# Patient Record
Sex: MALE | Race: BLACK OR AFRICAN AMERICAN | NOT HISPANIC OR LATINO | Employment: FULL TIME | URBAN - METROPOLITAN AREA
[De-identification: names, ages, dates, MRNs, and addresses within clinical notes are randomized per-mention and may not be internally consistent; named-entity substitution may affect disease eponyms.]

---

## 2017-03-27 ENCOUNTER — OFFICE VISIT (OUTPATIENT)
Dept: URGENT CARE | Facility: PHYSICIAN GROUP | Age: 27
End: 2017-03-27
Payer: COMMERCIAL

## 2017-03-27 VITALS
OXYGEN SATURATION: 96 % | WEIGHT: 165 LBS | BODY MASS INDEX: 23.62 KG/M2 | SYSTOLIC BLOOD PRESSURE: 126 MMHG | TEMPERATURE: 97.9 F | HEIGHT: 70 IN | RESPIRATION RATE: 16 BRPM | HEART RATE: 53 BPM | DIASTOLIC BLOOD PRESSURE: 82 MMHG

## 2017-03-27 DIAGNOSIS — K52.9 GASTROENTERITIS: ICD-10-CM

## 2017-03-27 DIAGNOSIS — J45.20 MILD INTERMITTENT ASTHMA WITHOUT COMPLICATION: ICD-10-CM

## 2017-03-27 DIAGNOSIS — R51.9 NONINTRACTABLE EPISODIC HEADACHE, UNSPECIFIED HEADACHE TYPE: ICD-10-CM

## 2017-03-27 PROCEDURE — 99214 OFFICE O/P EST MOD 30 MIN: CPT | Performed by: EMERGENCY MEDICINE

## 2017-03-27 RX ORDER — KETOROLAC TROMETHAMINE 30 MG/ML
60 INJECTION, SOLUTION INTRAMUSCULAR; INTRAVENOUS ONCE
Status: COMPLETED | OUTPATIENT
Start: 2017-03-27 | End: 2017-03-27

## 2017-03-27 RX ORDER — ONDANSETRON 4 MG/1
4 TABLET, ORALLY DISINTEGRATING ORAL EVERY 8 HOURS PRN
Qty: 10 TAB | Refills: 0 | Status: SHIPPED | OUTPATIENT
Start: 2017-03-27 | End: 2017-10-05

## 2017-03-27 RX ORDER — ONDANSETRON 4 MG/1
4 TABLET, ORALLY DISINTEGRATING ORAL ONCE
Status: COMPLETED | OUTPATIENT
Start: 2017-03-27 | End: 2017-03-27

## 2017-03-27 RX ORDER — ALBUTEROL SULFATE 90 UG/1
2 AEROSOL, METERED RESPIRATORY (INHALATION) EVERY 6 HOURS PRN
Qty: 8.5 G | Refills: 1 | Status: SHIPPED | OUTPATIENT
Start: 2017-03-27 | End: 2018-05-01

## 2017-03-27 RX ADMIN — ONDANSETRON 4 MG: 4 TABLET, ORALLY DISINTEGRATING ORAL at 10:27

## 2017-03-27 RX ADMIN — KETOROLAC TROMETHAMINE 60 MG: 30 INJECTION, SOLUTION INTRAMUSCULAR; INTRAVENOUS at 10:29

## 2017-03-27 ASSESSMENT — ENCOUNTER SYMPTOMS
SPEECH CHANGE: 0
MYALGIAS: 0
CONSTIPATION: 0
COUGH: 0
EYE DISCHARGE: 0
SHORTNESS OF BREATH: 0
SENSORY CHANGE: 0
ABDOMINAL PAIN: 1
NERVOUS/ANXIOUS: 0
DIARRHEA: 1
NAUSEA: 1
BLOOD IN STOOL: 0
HEADACHES: 1
EYE REDNESS: 0
FEVER: 0
CHILLS: 0
WEIGHT LOSS: 0
VOMITING: 1

## 2017-03-27 NOTE — PROGRESS NOTES
Subjective:      Barry Griffith is a 27 y.o. male who presents with Illness            Gastroenteritis   This is a new problem. The current episode started yesterday. The problem has been unchanged. The emesis has an appearance of stomach contents. There has been no fever. Associated symptoms include abdominal pain, diarrhea and headaches. Pertinent negatives include no chest pain, chills, coughing, fever, myalgias or weight loss. Associated symptoms comments: Vomiting associated with the diarrhea. No blood in either.. Risk factors: was at a birthday party for children yesterday. He has tried nothing for the symptoms.   No Known Allergies   Social History     Social History   • Marital Status: Single     Spouse Name: N/A   • Number of Children: 1   • Years of Education: N/A     Occupational History   • caregiver for mentally ill patients      Social History Main Topics   • Smoking status: Never Smoker    • Smokeless tobacco: Never Used   • Alcohol Use: No      Comment: rare    • Drug Use: No   • Sexual Activity:     Partners: Female     Birth Control/ Protection: Condom, Pill     Other Topics Concern   • Not on file     Social History Narrative     Past Medical History   Diagnosis Date   • ASTHMA      Asthma since infancy.     Current Outpatient Prescriptions on File Prior to Visit   Medication Sig Dispense Refill   • benzonatate (TESSALON) 100 MG Cap Take 1 Cap by mouth 3 times a day as needed for Cough. 60 Cap 0   • albuterol 108 (90 BASE) MCG/ACT Aero Soln inhalation aerosol Inhale 2 Puffs by mouth every 6 hours as needed for Shortness of Breath. 8.5 g 3   • DM-Doxylamine-Acetaminophen (NYQUIL COLD & FLU PO) Take  by mouth.       No current facility-administered medications on file prior to visit.     Family History   Problem Relation Age of Onset   • Hypertension Mother    • Hyperlipidemia Father    • Hypertension Father    • Cancer Neg Hx    • Diabetes Neg Hx        Review of Systems   Constitutional: Negative  for fever, chills and weight loss.   HENT: Negative for congestion and hearing loss.    Eyes: Negative for discharge and redness.   Respiratory: Negative for cough and shortness of breath.    Cardiovascular: Negative for chest pain and leg swelling.   Gastrointestinal: Positive for nausea, vomiting, abdominal pain and diarrhea. Negative for constipation and blood in stool.   Musculoskeletal: Negative for myalgias.   Neurological: Positive for headaches. Negative for sensory change and speech change.   Psychiatric/Behavioral: The patient is not nervous/anxious.           Objective:     There were no vitals taken for this visit.     Physical Exam   Constitutional: He is oriented to person, place, and time. He appears well-developed and well-nourished. He appears distressed.   Ill-appearing   HENT:   Head: Normocephalic and atraumatic.   Right Ear: External ear normal.   Eyes: Conjunctivae are normal. Right eye exhibits no discharge. Left eye exhibits no discharge. No scleral icterus.   Neck: Normal range of motion.   Cardiovascular: Normal rate.    Pulmonary/Chest: Effort normal and breath sounds normal. No stridor.   Abdominal: Soft. He exhibits no distension and no mass. There is no tenderness. There is no rebound and no guarding.   Musculoskeletal: Normal range of motion.   Lymphadenopathy:     He has no cervical adenopathy.   Neurological: He is alert and oriented to person, place, and time.   Skin: Skin is warm and dry. No rash noted. He is not diaphoretic. No erythema.   Psychiatric: He has a normal mood and affect. His behavior is normal.   Vitals reviewed.       Patient given Zofran by mouth: Able to hold liquids down without difficulty   Assessment/Plan:     Diagnosis acute gastroenteritis    Patient will be given a work note for the next 1-2 days off for 2 jobs. He will stay on clear liquids for the first 24 hours now. Context for 5 days. Patient has been meningeal dose of Zofran and a prescription has been  called into the pharmacy. He will use Imodium to slow down his diarrhea starting 2 tablets. If his diarrhea persists she'll bring in a stool culture and I will call him with results. If his symptoms worsen with any production of blood in his vomitus or stool he be reevaluated in the urgent care emergency department. Patient was given a work note for 1-2 days off to notes were given because of 2 jobs.    Patient is aware most likely contagious keep his eating drinking utensils away from others. Patient will stand clothing was for 24 hours. Products for 5 days. Patient will bring a specimen of stool in her culture of his diarrhea persists.    Patient was also given an inhaler for his chronic asthma.

## 2017-03-27 NOTE — Clinical Note
March 27, 2017         Patient: Barry Griffith   YOB: 1990   Date of Visit: 3/27/2017           To Whom it May Concern:    Barry Griffith was seen in my clinic on 3/27/2017. Please excuse him from work for the next 1-2 days for medical reasons.    If you have any questions or concerns, please don't hesitate to call.        Sincerely,           MARQUITA Rockwell M.D.  Electronically Signed

## 2017-04-11 ENCOUNTER — OFFICE VISIT (OUTPATIENT)
Dept: MEDICAL GROUP | Facility: PHYSICIAN GROUP | Age: 27
End: 2017-04-11
Payer: COMMERCIAL

## 2017-04-11 VITALS
OXYGEN SATURATION: 97 % | HEIGHT: 69 IN | BODY MASS INDEX: 23.7 KG/M2 | HEART RATE: 52 BPM | DIASTOLIC BLOOD PRESSURE: 66 MMHG | SYSTOLIC BLOOD PRESSURE: 110 MMHG | TEMPERATURE: 98.6 F | RESPIRATION RATE: 16 BRPM | WEIGHT: 160 LBS

## 2017-04-11 DIAGNOSIS — M54.2 ACUTE NECK PAIN: ICD-10-CM

## 2017-04-11 DIAGNOSIS — M25.571 ACUTE RIGHT ANKLE PAIN: ICD-10-CM

## 2017-04-11 DIAGNOSIS — G44.319 ACUTE POST-TRAUMATIC HEADACHE, NOT INTRACTABLE: ICD-10-CM

## 2017-04-11 PROCEDURE — 99214 OFFICE O/P EST MOD 30 MIN: CPT | Performed by: FAMILY MEDICINE

## 2017-04-11 RX ORDER — CYCLOBENZAPRINE HCL 10 MG
10 TABLET ORAL 3 TIMES DAILY PRN
Qty: 30 TAB | Refills: 1 | Status: SHIPPED | OUTPATIENT
Start: 2017-04-11 | End: 2017-04-12 | Stop reason: SDUPTHER

## 2017-04-11 RX ORDER — NAPROXEN 500 MG/1
500 TABLET ORAL 2 TIMES DAILY WITH MEALS
Qty: 60 TAB | Refills: 1 | Status: SHIPPED | OUTPATIENT
Start: 2017-04-11 | End: 2017-04-12 | Stop reason: SDUPTHER

## 2017-04-11 RX ORDER — HYDROCODONE BITARTRATE AND ACETAMINOPHEN 5; 325 MG/1; MG/1
1 TABLET ORAL EVERY 6 HOURS PRN
Qty: 20 TAB | Refills: 0 | Status: SHIPPED | OUTPATIENT
Start: 2017-04-11 | End: 2017-10-05

## 2017-04-11 ASSESSMENT — PATIENT HEALTH QUESTIONNAIRE - PHQ9: CLINICAL INTERPRETATION OF PHQ2 SCORE: 0

## 2017-04-11 NOTE — Clinical Note
April 11, 2017         Patient: Barry Griffith   YOB: 1990   Date of Visit: 4/11/2017           To Whom it May Concern:    Barry Griffith was seen in my clinic on 4/11/2017. He is excused from work on 4/11-14/2017 due to illness..    If you have any questions or concerns, please don't hesitate to call.        Sincerely,           Mine Nunes M.D.  Electronically Signed
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
## 2017-04-12 DIAGNOSIS — G44.319 ACUTE POST-TRAUMATIC HEADACHE, NOT INTRACTABLE: ICD-10-CM

## 2017-04-12 DIAGNOSIS — M25.571 ACUTE RIGHT ANKLE PAIN: ICD-10-CM

## 2017-04-12 DIAGNOSIS — M54.2 ACUTE NECK PAIN: ICD-10-CM

## 2017-04-12 RX ORDER — CYCLOBENZAPRINE HCL 10 MG
10 TABLET ORAL 3 TIMES DAILY PRN
Qty: 30 TAB | Refills: 1 | Status: SHIPPED | OUTPATIENT
Start: 2017-04-12 | End: 2017-10-05

## 2017-04-12 RX ORDER — NAPROXEN 500 MG/1
500 TABLET ORAL 2 TIMES DAILY WITH MEALS
Qty: 60 TAB | Refills: 1 | Status: SHIPPED | OUTPATIENT
Start: 2017-04-12 | End: 2017-10-05
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
## 2017-04-12 NOTE — TELEPHONE ENCOUNTER
1. Caller Name: Barry Griffith                                   Call Back Number: [phone] (home) [phone] (work)        Patient called requesting Rxs for Flexeril & Naprosyn be resent to [facility].  Rxs sent to [facility] on 4/11/17.  Rxs cancelled at [facility] and new Rxs attached.  Patient states he dropped off [place] [facility] to [facility] already, aware we can not replace narcotic Rx so he will speak with [facility] regarding this refill.

## 2017-10-05 ENCOUNTER — OFFICE VISIT (OUTPATIENT)
Dept: MEDICAL GROUP | Facility: PHYSICIAN GROUP | Age: 27
End: 2017-10-05
Payer: COMMERCIAL

## 2017-10-05 ENCOUNTER — TELEPHONE (OUTPATIENT)
Dept: MEDICAL GROUP | Facility: PHYSICIAN GROUP | Age: 27
End: 2017-10-05

## 2017-10-05 VITALS
OXYGEN SATURATION: 95 % | HEIGHT: 69 IN | WEIGHT: 160 LBS | HEART RATE: 75 BPM | BODY MASS INDEX: 23.7 KG/M2 | SYSTOLIC BLOOD PRESSURE: 120 MMHG | RESPIRATION RATE: 24 BRPM | DIASTOLIC BLOOD PRESSURE: 62 MMHG | TEMPERATURE: 99.5 F

## 2017-10-05 DIAGNOSIS — J18.9 PNEUMONIA OF BOTH LOWER LOBES DUE TO INFECTIOUS ORGANISM: ICD-10-CM

## 2017-10-05 DIAGNOSIS — J45.21 MILD INTERMITTENT ASTHMA WITH ACUTE EXACERBATION: ICD-10-CM

## 2017-10-05 PROCEDURE — 99214 OFFICE O/P EST MOD 30 MIN: CPT | Performed by: NURSE PRACTITIONER

## 2017-10-05 PROCEDURE — 94760 N-INVAS EAR/PLS OXIMETRY 1: CPT | Performed by: NURSE PRACTITIONER

## 2017-10-05 RX ORDER — METHYLPREDNISOLONE 4 MG/1
TABLET ORAL
Qty: 21 TAB | Refills: 0 | Status: SHIPPED | OUTPATIENT
Start: 2017-10-05 | End: 2018-03-06 | Stop reason: SDUPTHER

## 2017-10-05 RX ORDER — IPRATROPIUM BROMIDE AND ALBUTEROL SULFATE 2.5; .5 MG/3ML; MG/3ML
3 SOLUTION RESPIRATORY (INHALATION) ONCE
Status: COMPLETED | OUTPATIENT
Start: 2017-10-05 | End: 2017-10-05

## 2017-10-05 RX ORDER — PROMETHAZINE HYDROCHLORIDE, PHENYLEPHRINE HYDROCHLORIDE AND CODEINE PHOSPHATE 6.25; 5; 1 MG/5ML; MG/5ML; MG/5ML
5 SOLUTION ORAL EVERY 8 HOURS PRN
Qty: 280 ML | Refills: 0 | Status: SHIPPED | OUTPATIENT
Start: 2017-10-05 | End: 2018-03-06 | Stop reason: SDUPTHER

## 2017-10-05 RX ORDER — AZITHROMYCIN 250 MG/1
TABLET, FILM COATED ORAL
Qty: 6 TAB | Refills: 0 | Status: SHIPPED | OUTPATIENT
Start: 2017-10-05 | End: 2018-03-06 | Stop reason: SDUPTHER

## 2017-10-05 RX ORDER — ALBUTEROL SULFATE 90 UG/1
2 AEROSOL, METERED RESPIRATORY (INHALATION) EVERY 6 HOURS PRN
Qty: 8.5 G | Refills: 6 | Status: SHIPPED | OUTPATIENT
Start: 2017-10-05 | End: 2018-05-01

## 2017-10-05 RX ADMIN — IPRATROPIUM BROMIDE AND ALBUTEROL SULFATE 3 ML: 2.5; .5 SOLUTION RESPIRATORY (INHALATION) at 13:55

## 2017-10-05 ASSESSMENT — PAIN SCALES - GENERAL: PAINLEVEL: NO PAIN

## 2017-10-05 NOTE — PROGRESS NOTES
Chief Complaint   Patient presents with   • Cough     x 2 weeks    • Pharyngitis     x 3 days        HISTORY OF PRESENT ILLNESS: Patient is a 27 y.o. male established patient who presents today toDiscuss asthma and pneumonia.    Mild intermittent asthma with acute exacerbation  Patient states that he started to have increased shortness of breath approximately 2 weeks ago. Patient states that this is worse at night. Patient states that he has been out of his rescue inhaler. States he developed a cough, congestion. States he is having coughing that keeps him awake at night. Patient also states he has had increased wheezing. Patient states that he has not noticed anything specific that makes this better or worse. He has been taking over-the-counter decongestants, NyQuil, DayQuil without results.    Pneumonia of both lower lobes due to infectious organism  This is a new problem. Patient states he has been having symptoms of congestion, pain with deep breaths, coughing, fevers over the last couple of weeks patient states that over the last 3 days shortness of breath has become significantly worse and he has been feeling fatigued, positive myalgia. Patient states that he has been taking over-the-counter medications without relief. Patient states that he has been unable to sleep in the same room as his children as he wakes him up with his coughing.      Patient Active Problem List    Diagnosis Date Noted   • Pneumonia of both lower lobes due to infectious organism 10/05/2017   • Cough, persistent 12/22/2016   • Mild intermittent asthma with acute exacerbation 06/10/2016       Allergies:Review of patient's allergies indicates no known allergies.    Current Outpatient Prescriptions   Medication Sig Dispense Refill   • albuterol 108 (90 Base) MCG/ACT Aero Soln inhalation aerosol Inhale 2 Puffs by mouth every 6 hours as needed for Shortness of Breath. 8.5 g 6   • MethylPREDNISolone (MEDROL DOSEPAK) 4 MG Tablet Therapy Pack As  directed on the packaging label. 21 Tab 0   • Promethazine-Phenyleph-Codeine (PHENERGAN VC CODEINE) 6.25-5-10 MG/5ML Syrup Take 5 mL by mouth every 8 hours as needed. 280 mL 0   • azithromycin (ZITHROMAX) 250 MG Tab Take 500 mg on the first day and then 250 mg daily for 4 days. 6 Tab 0   • albuterol 108 (90 BASE) MCG/ACT Aero Soln inhalation aerosol Inhale 2 Puffs by mouth every 6 hours as needed for Shortness of Breath. 8.5 g 3   • albuterol 108 (90 BASE) MCG/ACT Aero Soln inhalation aerosol Inhale 2 Puffs by mouth every 6 hours as needed for Shortness of Breath. 8.5 g 1     Current Facility-Administered Medications   Medication Dose Route Frequency Provider Last Rate Last Dose   • ipratropium-albuterol (DUONEB) nebulizer solution 3 mL  3 mL Nebulization Once Rajendra Saeed, A.P.R.N.           Social History   Substance Use Topics   • Smoking status: Never Smoker   • Smokeless tobacco: Never Used   • Alcohol use No      Comment: rare        Family Status   Relation Status   • Mother    • Father Alive   • Sister Alive   • Brother Alive   • Brother Alive   • Neg Hx      Family History   Problem Relation Age of Onset   • Hypertension Mother    • Hyperlipidemia Father    • Hypertension Father    • Cancer Neg Hx    • Diabetes Neg Hx        Review of Systems:   Constitutional:  Negative for fever, chills, weight loss andPositive fatigue.   HEENT:  Negative for ear pain, nosebleeds, positive congestion, sore throat and negative neck pain.    Eyes:  Negative for blurred vision.   Respiratory: Positive for dry cough, negative sputum production, positive shortness of breath and wheezing.    Cardiovascular:  Negative for chest pain, palpitations, orthopnea and leg swelling.   Gastrointestinal:  Negative for heartburn, nausea, vomiting and abdominal pain.   Genitourinary:  Negative for dysuria, urgency and frequency.   Musculoskeletal: Positive for myalgias, negative back pain and joint pain.   Skin:   "Negative for rash and itching.   Neurological:  Negative for dizziness, tingling, tremors, sensory change, focal weakness and headaches.   Endo/Heme/Allergies:  Does not bruise/bleed easily.   Psychiatric/Behavioral:  Negative for depression, suicidal ideas and memory loss.  The patient is not nervous/anxious and does not have insomnia.    All other systems reviewed and are negative except as in HPI.    Exam:  Blood pressure 120/62, pulse 75, temperature 37.5 °C (99.5 °F), resp. rate (!) 24, height 1.753 m (5' 9\"), weight 72.6 kg (160 lb), SpO2 96 %.  General:  Normal appearing. No distress.  HEENT:  Normocephalic. Eyes conjunctiva clear lids without ptosis, pupils equal and reactive to light accommodation, ears normal shape and contour, canals are clear bilaterally, tympanic membranes are benign, nasal mucosa erythematous, oropharynx is with erythema positive cobblestoning.  Neck:  Supple without JVD or bruit. Thyroid is not enlarged.  Pulmonary:  Positive wheezing expiratory in all 5 lung fields, positive bibasilar crackles.  Cardiovascular:  Regular rate and rhythm without murmur. Carotid and radial pulses are intact and equal bilaterally.  Neurologic:  Grossly nonfocal  Lymph: Positive cervical lymphadenopathy negative supraclavicular or axillary lymph nodes are palpable  Skin:  Warm and dry.  No obvious lesions.  Musculoskeletal:  Normal gait. No extremity cyanosis, clubbing, or edema.  Psych:  Normal mood and affect. Alert and oriented x3. Judgment and insight is normal.    Nebulizer: Nebulized DuoNeb treatment in office. Patient tolerated procedure well. Lungs no wheezing, crackles following treatment. Patient reports improvement of dyspnea.    PLAN:    1. Pneumonia of both lower lobes due to infectious organism  Counseled patient regarding recent CBC and in the emergency room including shortness of breath not relieved by rest, fevers, increasing symptoms  Counseled patient regarding continued " over-the-counter treatment  Discussed risks, benefits, side effects of prescribed medications.  - ipratropium-albuterol (DUONEB) nebulizer solution 3 mL; 3 mL by Nebulization route Once.  - DX-CHEST-2 VIEWS; Future  - albuterol 108 (90 Base) MCG/ACT Aero Soln inhalation aerosol; Inhale 2 Puffs by mouth every 6 hours as needed for Shortness of Breath.  Dispense: 8.5 g; Refill: 6  - MethylPREDNISolone (MEDROL DOSEPAK) 4 MG Tablet Therapy Pack; As directed on the packaging label.  Dispense: 21 Tab; Refill: 0  - Promethazine-Phenyleph-Codeine (PHENERGAN VC CODEINE) 6.25-5-10 MG/5ML Syrup; Take 5 mL by mouth every 8 hours as needed.  Dispense: 280 mL; Refill: 0  - azithromycin (ZITHROMAX) 250 MG Tab; Take 500 mg on the first day and then 250 mg daily for 4 days.  Dispense: 6 Tab; Refill: 0    2. Mild intermittent asthma with acute exacerbation  Plan as above.    . Follow-up in 2 weeks or sooner as needed. Patient is encouraged to be seen in the emergency room for chest pain, palpitations, shortness of breath, dizziness, severe abdominal pain or other concerning symptoms.        Please note that this dictation was created using voice recognition software. I have made every reasonable attempt to correct obvious errors, but I expect that there are errors of grammar and possibly content that I did not discover before finalizing the note.    Assessment/Plan:  1. Pneumonia of both lower lobes due to infectious organism  ipratropium-albuterol (DUONEB) nebulizer solution 3 mL    DX-CHEST-2 VIEWS    albuterol 108 (90 Base) MCG/ACT Aero Soln inhalation aerosol    MethylPREDNISolone (MEDROL DOSEPAK) 4 MG Tablet Therapy Pack    Promethazine-Phenyleph-Codeine (PHENERGAN VC CODEINE) 6.25-5-10 MG/5ML Syrup    azithromycin (ZITHROMAX) 250 MG Tab   2. Mild intermittent asthma with acute exacerbation            I have placed the below orders and discussed them with an approved delegating provider. The MA is performing the below orders  under the direction of Dr. Cummings.

## 2017-10-05 NOTE — ASSESSMENT & PLAN NOTE
This is a new problem. Patient states he has been having symptoms of congestion, pain with deep breaths, coughing, fevers over the last couple of weeks patient states that over the last 3 days shortness of breath has become significantly worse and he has been feeling fatigued, positive myalgia. Patient states that he has been taking over-the-counter medications without relief. Patient states that he has been unable to sleep in the same room as his children as he wakes him up with his coughing.

## 2017-10-05 NOTE — ASSESSMENT & PLAN NOTE
Patient states that he started to have increased shortness of breath approximately 2 weeks ago. Patient states that this is worse at night. Patient states that he has been out of his rescue inhaler. States he developed a cough, congestion. States he is having coughing that keeps him awake at night. Patient also states he has had increased wheezing. Patient states that he has not noticed anything specific that makes this better or worse. He has been taking over-the-counter decongestants, NyQuil, DayQuil without results.

## 2017-10-05 NOTE — LETTER
October 5, 2017        Barry Griffith  [address] [facility] [address] [address]        To Whom It May Concern:    Barry was seen in our office today please excuse him from work 10/5/2017 to 10/9/2017.    If you have any questions or concerns, please don't hesitate to call.        Sincerely,        [email or web address] [email or web address]    Electronically Signed

## 2018-02-23 ENCOUNTER — OFFICE VISIT (OUTPATIENT)
Dept: MEDICAL GROUP | Facility: PHYSICIAN GROUP | Age: 28
End: 2018-02-23
Payer: COMMERCIAL

## 2018-02-23 VITALS
BODY MASS INDEX: 23.7 KG/M2 | WEIGHT: 160 LBS | TEMPERATURE: 99.2 F | HEART RATE: 62 BPM | SYSTOLIC BLOOD PRESSURE: 120 MMHG | DIASTOLIC BLOOD PRESSURE: 66 MMHG | RESPIRATION RATE: 16 BRPM | HEIGHT: 69 IN | OXYGEN SATURATION: 100 %

## 2018-02-23 DIAGNOSIS — Z70.8 SEXUALLY TRANSMITTED DISEASE COUNSELING: Primary | ICD-10-CM

## 2018-02-23 DIAGNOSIS — A63.0 WARTS, GENITAL: ICD-10-CM

## 2018-02-23 PROBLEM — N48.9 PENILE ABNORMALITY: Status: ACTIVE | Noted: 2018-02-23

## 2018-02-23 PROCEDURE — 99213 OFFICE O/P EST LOW 20 MIN: CPT | Mod: 25 | Performed by: PHYSICIAN ASSISTANT

## 2018-02-23 PROCEDURE — 54056 CRYOSURGERY PENIS LESION(S): CPT | Performed by: PHYSICIAN ASSISTANT

## 2018-02-23 ASSESSMENT — PAIN SCALES - GENERAL: PAINLEVEL: NO PAIN

## 2018-02-23 NOTE — ASSESSMENT & PLAN NOTE
Patient states one month ago he developed a small raised bump on ventral side of penis below the glans penis. Denies being sexually active for the past 2 months. Admits to being in a long term relationship but states he has not always been faithful. Admits to not always wearing protection with past sexual partners. Denies using at home or over-the-counter treatments to alleviate symptoms. Denies abdominal pain, pelvic pain, rectal pain, fever, chills, nausea, vomiting, pruritus/blisters, urethral discharge, dysuria, hematuria. Denies long term partner having similiar symptoms.

## 2018-02-27 PROBLEM — Z70.8 SEXUALLY TRANSMITTED DISEASE COUNSELING: Status: ACTIVE | Noted: 2018-02-27

## 2018-02-28 NOTE — PROGRESS NOTES
Chief Complaint   Patient presents with   • Nodule     on penis       HISTORY OF PRESENT ILLNESS: Barry Griffith is an established 28 y.o. male here to discuss the evaluation and management of:    Warts, genital  Patient states one month ago he developed a small raised bump on ventral side of penis below the glans penis. Denies being sexually active for the past 2 months. Admits to being in a long term relationship but states he has not always been faithful. Admits to not always wearing protection with past sexual partners. Denies using at home or over-the-counter treatments to alleviate symptoms. Denies abdominal pain, pelvic pain, rectal pain, fever, chills, nausea, vomiting, pruritus/blisters, urethral discharge, dysuria, hematuria. Denies long term partner having similiar symptoms.      Sexually transmitted disease counseling  Patient denies always using protection with sexual intercourse. States he is currently in a committed relationship and has been with his partner for an extended period of time. Denies always being faithful and admits to high risk sexual behavior. Patient is concerned about STI's during today's appointment.       Patient Active Problem List    Diagnosis Date Noted   • Sexually transmitted disease counseling 02/27/2018   • Warts, genital 02/23/2018   • Pneumonia of both lower lobes due to infectious organism 10/05/2017   • Cough, persistent 12/22/2016   • Mild intermittent asthma with acute exacerbation 06/10/2016       Allergies:Patient has no known allergies.    Current Outpatient Prescriptions   Medication Sig Dispense Refill   • albuterol 108 (90 Base) MCG/ACT Aero Soln inhalation aerosol Inhale 2 Puffs by mouth every 6 hours as needed for Shortness of Breath. 8.5 g 6   • MethylPREDNISolone (MEDROL DOSEPAK) 4 MG Tablet Therapy Pack As directed on the packaging label. 21 Tab 0   • Promethazine-Phenyleph-Codeine (PHENERGAN VC CODEINE) 6.25-5-10 MG/5ML Syrup Take 5 mL by mouth every 8 hours  as needed. 280 mL 0   • azithromycin (ZITHROMAX) 250 MG Tab Take 500 mg on the first day and then 250 mg daily for 4 days. 6 Tab 0   • albuterol 108 (90 BASE) MCG/ACT Aero Soln inhalation aerosol Inhale 2 Puffs by mouth every 6 hours as needed for Shortness of Breath. 8.5 g 1   • albuterol 108 (90 BASE) MCG/ACT Aero Soln inhalation aerosol Inhale 2 Puffs by mouth every 6 hours as needed for Shortness of Breath. 8.5 g 3     No current facility-administered medications for this visit.        Social History   Substance Use Topics   • Smoking status: Never Smoker   • Smokeless tobacco: Never Used   • Alcohol use No      Comment: rare        Family Status   Relation Status   • Mother    • Father Alive   • Sister Alive   • Brother Alive   • Brother Alive   • Neg Hx      Family History   Problem Relation Age of Onset   • Hypertension Mother    • Hyperlipidemia Father    • Hypertension Father    • Cancer Neg Hx    • Diabetes Neg Hx        ROS:  Review of Systems   Constitutional: Negative for fever, chills, weight loss and malaise/fatigue.   HENT: Negative for ear pain, nosebleeds, congestion, sore throat and neck pain.    Eyes: Negative for blurred vision.   Respiratory: Negative for cough, sputum production, shortness of breath and wheezing.    Cardiovascular: Negative for chest pain, palpitations, orthopnea and leg swelling.   Gastrointestinal: Negative for heartburn, nausea, vomiting and abdominal pain.   Genitourinary: Negative for dysuria, urgency and frequency.   Musculoskeletal: Negative for myalgias, back pain and joint pain.   Skin: Negative for rash and itching. + for genital lesion.  Neurological: Negative for dizziness, tingling, tremors, sensory change, focal weakness and headaches.   Endo/Heme/Allergies: Does not bruise/bleed easily.   Psychiatric/Behavioral: Negative for depression, suicidal ideas and memory loss.  The patient is not nervous/anxious and does not have insomnia.    All other systems  "reviewed and are negative except as in HPI.    Exam: Blood pressure 120/66, pulse 62, temperature 37.3 °C (99.2 °F), resp. rate 16, height 1.753 m (5' 9\"), weight 72.6 kg (160 lb), SpO2 100 %. Body mass index is 23.63 kg/m².  General: Normal appearing. No distress.  HEENT: Normocephalic. Eyes conjunctiva clear lids without ptosis, pupils equal and reactive to light accommodation, ears normal shape and contour, canals are clear bilaterally, tympanic membranes are benign, nasal mucosa benign, oropharynx is without erythema, edema or exudates.   Neck: Supple without JVD or bruit. Thyroid is not enlarged.  Pulmonary: Clear to ausculation.  Normal effort. No rales, ronchi, or wheezing.  Cardiovascular: Regular rate and rhythm without murmur. Carotid and radial pulses are intact and equal bilaterally.  Abdomen: Soft, nontender, nondistended. Normal bowel sounds. Liver and spleen are not palpable  Neurologic: Grossly nonfocal.  Cranial nerves are normal. DTR's normal and symmetric.  Lymph: No cervical, supraclavicular or axillary lymph nodes are palpable  Skin: Warm and dry.  No rashes or suspicious skin lesions.  Musculoskeletal: Normal gait. No extremity cyanosis, clubbing, or edema.  Psych: Normal mood and affect. Alert and oriented x3. Judgment and insight is normal.  Genitalia: normal circumcised penis, no urethral discharge, scrotal contents normal to inspection and palpation. + for pen head sized keratotic round firm mass on ventral penis inferior to glans penis. Negative for bleeding/erythema/discharge.         Medical decision-making and discussion:  1. Sexually transmitted disease counseling  Discussed with patient the STI prevention, HIV risk factor and prevention, and the importance of practice safe sex. Patient did not want to be screened for STI's during today's appointment.   Advised patient to abstain from high risk sexual behavior.     2. Warts, genital  Discussed topical Imiquimod as a treatment option " and patient did not want to proceed. States he would be non complaint and would rather undergo cryotherapy and is requesting cryotherapy during today's appointment. This patient the importance of discussing potential STI with partner and the importance of wearing protection.     Follow-up for worsening symptoms, lack of expected recovery, or should new symptoms or problems arise.    CRYOTHERAPY:  Discussed risks and benefits of cryotherapy. Patient verbally agreed. Three applications of liquid nitrogen were applied to 1 lesion on ventral penis inferior to glans penis. Patient tolerated procedure well. Aftercare and return precaution instructions given.      Please note that this dictation was created using voice recognition software. I have made every reasonable attempt to correct obvious errors, but I expect that there are errors of grammar and possibly content that I did not discover before finalizing the note.      Return if symptoms worsen or fail to improve.

## 2018-02-28 NOTE — ASSESSMENT & PLAN NOTE
Patient denies always using protection with sexual intercourse. States he is currently in a committed relationship and has been with his partner for an extended period of time. Denies always being faithful and admits to high risk sexual behavior. Patient is concerned about STI's during today's appointment.

## 2018-03-01 ENCOUNTER — SUPERVISING PHYSICIAN REVIEW (OUTPATIENT)
Dept: MEDICAL GROUP | Facility: PHYSICIAN GROUP | Age: 28
End: 2018-03-01

## 2018-03-01 NOTE — PROGRESS NOTES
I have reviewed and agree with history, assessment and plan for office encounter on 2/23/2018 with Jewels Amaral PA-C.  Face to Face encounter/direct observation: no  Suggestions as follows: In counseling for genital warts, would emphasize that multiple cryotherapy treatments are often needed and schedule follow-up in 4 weeks to re-evaluate. Follow-up visit may also be helpful to discuss STD screening with patient again as he declined.  Katelyn Cummings M.D.

## 2018-03-06 ENCOUNTER — OFFICE VISIT (OUTPATIENT)
Dept: MEDICAL GROUP | Facility: MEDICAL CENTER | Age: 28
End: 2018-03-06
Payer: COMMERCIAL

## 2018-03-06 VITALS
HEART RATE: 70 BPM | WEIGHT: 161 LBS | DIASTOLIC BLOOD PRESSURE: 68 MMHG | BODY MASS INDEX: 23.85 KG/M2 | OXYGEN SATURATION: 99 % | SYSTOLIC BLOOD PRESSURE: 108 MMHG | HEIGHT: 69 IN | TEMPERATURE: 98.2 F

## 2018-03-06 DIAGNOSIS — R05.9 COUGH: ICD-10-CM

## 2018-03-06 DIAGNOSIS — J02.9 SORE THROAT: ICD-10-CM

## 2018-03-06 DIAGNOSIS — J45.21 EXACERBATION OF INTERMITTENT ASTHMA, UNSPECIFIED ASTHMA SEVERITY: ICD-10-CM

## 2018-03-06 DIAGNOSIS — Z00.00 HEALTH CARE MAINTENANCE: ICD-10-CM

## 2018-03-06 LAB
FLUAV+FLUBV AG SPEC QL IA: NEGATIVE
INT CON NEG: NEGATIVE
INT CON POS: POSITIVE

## 2018-03-06 PROCEDURE — 99214 OFFICE O/P EST MOD 30 MIN: CPT | Performed by: INTERNAL MEDICINE

## 2018-03-06 PROCEDURE — 87804 INFLUENZA ASSAY W/OPTIC: CPT | Performed by: INTERNAL MEDICINE

## 2018-03-06 RX ORDER — PROMETHAZINE HYDROCHLORIDE, PHENYLEPHRINE HYDROCHLORIDE AND CODEINE PHOSPHATE 6.25; 5; 1 MG/5ML; MG/5ML; MG/5ML
5 SOLUTION ORAL EVERY 8 HOURS PRN
Qty: 280 ML | Refills: 0 | Status: SHIPPED | OUTPATIENT
Start: 2018-03-06 | End: 2018-03-16

## 2018-03-06 RX ORDER — METHYLPREDNISOLONE 4 MG/1
TABLET ORAL
Qty: 21 TAB | Refills: 0 | Status: SHIPPED | OUTPATIENT
Start: 2018-03-06 | End: 2018-05-01

## 2018-03-06 RX ORDER — AZITHROMYCIN 250 MG/1
TABLET, FILM COATED ORAL
Qty: 6 TAB | Refills: 0 | Status: SHIPPED | OUTPATIENT
Start: 2018-03-06 | End: 2018-05-01

## 2018-03-06 ASSESSMENT — PAIN SCALES - GENERAL: PAINLEVEL: NO PAIN

## 2018-03-06 NOTE — PROGRESS NOTES
CHIEF COMPLAINT  No chief complaint on file.      HPI  Patient is a 28 y.o. male patient who presents today for the following     ACUTE  The patient 28-year-old, male, with history of asthma and pneumonia in October 2017, 6 months ago    He got sick 2 days ago with:  · Nasal congestion w  Yellow d/c  · Postnasal drip,   · HA,   · Sore throat, severe  · Swollen glands, difficulty swallowing  ·   · Cough with yellow sputum  ·  chest pain  · Decreased appetite,     Denies:   · pain in sinus areas  Fever, chills,  body aches,   Earache  · Wheezing,  · nausea, vomiting, diarrhea, abdominal pain  · Skin rash.    · Meds used:   Nyquil, did not help;   · Sick contact:  N  · Flu vaccine:    N    Reviewed PMH, PSH, FH, SH, ALL, HCM/IMM, no changes  Reviewed MEDS, no changes    Patient Active Problem List    Diagnosis Date Noted   • Sexually transmitted disease counseling 02/27/2018   • Warts, genital 02/23/2018   • Pneumonia of both lower lobes due to infectious organism 10/05/2017   • Cough, persistent 12/22/2016   • Mild intermittent asthma with acute exacerbation 06/10/2016     CURRENT MEDICATIONS  Current Outpatient Prescriptions   Medication Sig Dispense Refill   • albuterol 108 (90 Base) MCG/ACT Aero Soln inhalation aerosol Inhale 2 Puffs by mouth every 6 hours as needed for Shortness of Breath. 8.5 g 6   • MethylPREDNISolone (MEDROL DOSEPAK) 4 MG Tablet Therapy Pack As directed on the packaging label. 21 Tab 0   • Promethazine-Phenyleph-Codeine (PHENERGAN VC CODEINE) 6.25-5-10 MG/5ML Syrup Take 5 mL by mouth every 8 hours as needed. 280 mL 0   • azithromycin (ZITHROMAX) 250 MG Tab Take 500 mg on the first day and then 250 mg daily for 4 days. 6 Tab 0   • albuterol 108 (90 BASE) MCG/ACT Aero Soln inhalation aerosol Inhale 2 Puffs by mouth every 6 hours as needed for Shortness of Breath. 8.5 g 1   • albuterol 108 (90 BASE) MCG/ACT Aero Soln inhalation aerosol Inhale 2 Puffs by mouth every 6 hours as needed for Shortness  of Breath. 8.5 g 3     No current facility-administered medications for this visit.      ALLERGIES  Allergies: Patient has no known allergies.  PAST MEDICAL HISTORY  Past Medical History:   Diagnosis Date   • ASTHMA     Asthma since infancy.     SURGICAL HISTORY  He  has no past surgical history on file.  SOCIAL HISTORY  Social History   Substance Use Topics   • Smoking status: Never Smoker   • Smokeless tobacco: Never Used   • Alcohol use No      Comment: rare      Social History     Social History Narrative   • No narrative on file     FAMILY HISTORY  Family History   Problem Relation Age of Onset   • Hypertension Mother    • Hyperlipidemia Father    • Hypertension Father    • Cancer Neg Hx    • Diabetes Neg Hx      Family Status   Relation Status   • Mother    • Father Alive   • Sister Alive   • Brother Alive   • Brother Alive   • Neg Hx      ROS   Constitutional: as above.  HENT: as above.  Eyes: Negative for blurred vision.   Respiratory: as above.  Cardiovascular: Negative for chest pain, palpitations.   Gastrointestinal: Negative for heartburn, nausea, abdominal pain.   Genitourinary: Negative for dysuria.  Musculoskeletal: Negative for significant myalgias, back pain and joint pain.   Skin: Negative for rash and itching.   Neuro: Negative for dizziness, weakness and headaches.   Endo/Heme/Allergies: Does not bruise/bleed easily.   Psychiatric/Behavioral: Negative for depression.    PHYSICAL EXAM   There were no vitals taken for this visit. There is no height or weight on file to calculate BMI.  General:  NAD, appears acutely sick  HEENT:   NC/AT, PERRLA, EOMI, TMs are clear. Pharyngeal mucosa is erythematous,  without lesions;  no cervical / supraclavicular  lymphadenopathy, no thyromegaly.    Cardiovascular: RRR.   No m/r/g. No carotid bruits .      Lungs:   Slightly decreased air entry, with bilateral expiratory wheezing, no r/r, no respiratory distress.  Abdomen: Soft, NT/ND + BS; no suprapubic  tenderness; no masses or hepatosplenomegaly.  Extremities:  2+ DP and radial pulses bilaterally.  No c/c/e.   Skin:  Warm, dry.  No erythema. No rash.   Neurologic: Alert & oriented x 3. No focal deficits.  Psychiatric:  Affect normal, mood normal, judgment normal.    LABS     Labs are reviewed and discussed with a patient:  POCT influenza ANP: Negative      IMAGING     None    ASSESMENT AND PLAN          1. Exacerbation of intermittent asthma, unspecified asthma severity  - POCT Influenza A/B  - azithromycin (ZITHROMAX) 250 MG Tab; Take 500 mg on the first day and then 250 mg daily for 4 days.  Dispense: 6 Tab; Refill: 0  - Promethazine-Phenyleph-Codeine (PHENERGAN VC CODEINE) 6.25-5-10 MG/5ML Syrup; Take 5 mL by mouth every 8 hours as needed for up to 10 days.  Dispense: 280 mL; Refill: 0  Advised to take abx after a meal.   Side effects of abx use discussed with a patient. Advised to stop abx and call if develop any side effect, including diarrhea.     - MethylPREDNISolone (MEDROL DOSEPAK) 4 MG Tablet Therapy Pack; As directed on the packaging label.  Dispense: 21 Tab; Refill: 0  - Reviewed effects and side effects of medication, the patient verbalized understanding    2. Sore throat  - POCT Influenza A/B  3. Cough  - POCT Influenza A/B  As above.    Advised   - increase fluid intake;   - may take Tylenol/ibuprofen for fever, pain; nasal decongestant   - may apply warm packs over sinus areas     4. Health care maintenance  Advise flu shot in the season    Counseling:   - Smoking:  Nonsmoker    Followup: as needed    All questions are answered.    Please note that this dictation was created using voice recognition software, and that there might be errors of myranda and possibly content.

## 2018-03-06 NOTE — LETTER
[facility]  [address]  [place] [address]     March 6, 2018    Patient: Barry Griffith   YOB: 1990   Date of Visit: 3/6/2018                 To Whom It May Concern:          Barry Griffith was seen and treated in our department on 3/6/2018.       Excuse from work today and tomorrow, start to work 3/8/18.      Sincerely,       Amanda Soria M.D.

## 2018-04-22 ENCOUNTER — HOSPITAL ENCOUNTER (EMERGENCY)
Facility: MEDICAL CENTER | Age: 28
End: 2018-04-22
Attending: EMERGENCY MEDICINE
Payer: COMMERCIAL

## 2018-04-22 ENCOUNTER — APPOINTMENT (OUTPATIENT)
Dept: RADIOLOGY | Facility: MEDICAL CENTER | Age: 28
End: 2018-04-22
Attending: EMERGENCY MEDICINE
Payer: COMMERCIAL

## 2018-04-22 VITALS
WEIGHT: 165 LBS | DIASTOLIC BLOOD PRESSURE: 49 MMHG | BODY MASS INDEX: 23.62 KG/M2 | RESPIRATION RATE: 16 BRPM | TEMPERATURE: 98.4 F | OXYGEN SATURATION: 99 % | HEIGHT: 70 IN | SYSTOLIC BLOOD PRESSURE: 126 MMHG | HEART RATE: 55 BPM

## 2018-04-22 DIAGNOSIS — R56.9 SEIZURE (HCC): ICD-10-CM

## 2018-04-22 LAB
ALBUMIN SERPL BCP-MCNC: 4.6 G/DL (ref 3.2–4.9)
ALBUMIN/GLOB SERPL: 1.8 G/DL
ALP SERPL-CCNC: 62 U/L (ref 30–99)
ALT SERPL-CCNC: 30 U/L (ref 2–50)
AMPHET UR QL SCN: NEGATIVE
ANION GAP SERPL CALC-SCNC: 14 MMOL/L (ref 0–11.9)
APTT PPP: 21.8 SEC (ref 24.7–36)
AST SERPL-CCNC: 29 U/L (ref 12–45)
BARBITURATES UR QL SCN: NEGATIVE
BASOPHILS # BLD AUTO: 0.4 % (ref 0–1.8)
BASOPHILS # BLD: 0.03 K/UL (ref 0–0.12)
BENZODIAZ UR QL SCN: NEGATIVE
BILIRUB SERPL-MCNC: 0.5 MG/DL (ref 0.1–1.5)
BUN SERPL-MCNC: 12 MG/DL (ref 8–22)
BZE UR QL SCN: NEGATIVE
CALCIUM SERPL-MCNC: 9.5 MG/DL (ref 8.5–10.5)
CANNABINOIDS UR QL SCN: POSITIVE
CHLORIDE SERPL-SCNC: 108 MMOL/L (ref 96–112)
CO2 SERPL-SCNC: 17 MMOL/L (ref 20–33)
CREAT SERPL-MCNC: 1.13 MG/DL (ref 0.5–1.4)
EOSINOPHIL # BLD AUTO: 0.15 K/UL (ref 0–0.51)
EOSINOPHIL NFR BLD: 1.8 % (ref 0–6.9)
ERYTHROCYTE [DISTWIDTH] IN BLOOD BY AUTOMATED COUNT: 40 FL (ref 35.9–50)
ETHANOL BLD-MCNC: 0 G/DL
GLOBULIN SER CALC-MCNC: 2.6 G/DL (ref 1.9–3.5)
GLUCOSE SERPL-MCNC: 75 MG/DL (ref 65–99)
HCT VFR BLD AUTO: 48.5 % (ref 42–52)
HGB BLD-MCNC: 16 G/DL (ref 14–18)
IMM GRANULOCYTES # BLD AUTO: 0.06 K/UL (ref 0–0.11)
IMM GRANULOCYTES NFR BLD AUTO: 0.7 % (ref 0–0.9)
INR PPP: 0.92 (ref 0.87–1.13)
LACTATE BLD-SCNC: 7.8 MMOL/L (ref 0.5–2)
LYMPHOCYTES # BLD AUTO: 4.23 K/UL (ref 1–4.8)
LYMPHOCYTES NFR BLD: 49.9 % (ref 22–41)
MCH RBC QN AUTO: 31.4 PG (ref 27–33)
MCHC RBC AUTO-ENTMCNC: 33 G/DL (ref 33.7–35.3)
MCV RBC AUTO: 95.3 FL (ref 81.4–97.8)
METHADONE UR QL SCN: NEGATIVE
MONOCYTES # BLD AUTO: 0.49 K/UL (ref 0–0.85)
MONOCYTES NFR BLD AUTO: 5.8 % (ref 0–13.4)
NEUTROPHILS # BLD AUTO: 3.52 K/UL (ref 1.82–7.42)
NEUTROPHILS NFR BLD: 41.4 % (ref 44–72)
NRBC # BLD AUTO: 0 K/UL
NRBC BLD-RTO: 0 /100 WBC
OPIATES UR QL SCN: POSITIVE
OXYCODONE UR QL SCN: NEGATIVE
PCP UR QL SCN: NEGATIVE
PLATELET # BLD AUTO: 141 K/UL (ref 164–446)
PMV BLD AUTO: 11.6 FL (ref 9–12.9)
POTASSIUM SERPL-SCNC: 3.9 MMOL/L (ref 3.6–5.5)
PROPOXYPH UR QL SCN: NEGATIVE
PROT SERPL-MCNC: 7.2 G/DL (ref 6–8.2)
PROTHROMBIN TIME: 12.1 SEC (ref 12–14.6)
RBC # BLD AUTO: 5.09 M/UL (ref 4.7–6.1)
SODIUM SERPL-SCNC: 139 MMOL/L (ref 135–145)
TROPONIN I SERPL-MCNC: <0.01 NG/ML (ref 0–0.04)
WBC # BLD AUTO: 8.5 K/UL (ref 4.8–10.8)

## 2018-04-22 PROCEDURE — A9270 NON-COVERED ITEM OR SERVICE: HCPCS | Performed by: EMERGENCY MEDICINE

## 2018-04-22 PROCEDURE — 700102 HCHG RX REV CODE 250 W/ 637 OVERRIDE(OP): Performed by: EMERGENCY MEDICINE

## 2018-04-22 PROCEDURE — 84484 ASSAY OF TROPONIN QUANT: CPT

## 2018-04-22 PROCEDURE — 80307 DRUG TEST PRSMV CHEM ANLYZR: CPT

## 2018-04-22 PROCEDURE — 36415 COLL VENOUS BLD VENIPUNCTURE: CPT

## 2018-04-22 PROCEDURE — 85730 THROMBOPLASTIN TIME PARTIAL: CPT

## 2018-04-22 PROCEDURE — 70450 CT HEAD/BRAIN W/O DYE: CPT

## 2018-04-22 PROCEDURE — 80053 COMPREHEN METABOLIC PANEL: CPT

## 2018-04-22 PROCEDURE — 93005 ELECTROCARDIOGRAM TRACING: CPT | Performed by: EMERGENCY MEDICINE

## 2018-04-22 PROCEDURE — 85610 PROTHROMBIN TIME: CPT

## 2018-04-22 PROCEDURE — 85025 COMPLETE CBC W/AUTO DIFF WBC: CPT

## 2018-04-22 PROCEDURE — 83605 ASSAY OF LACTIC ACID: CPT

## 2018-04-22 PROCEDURE — 99284 EMERGENCY DEPT VISIT MOD MDM: CPT

## 2018-04-22 PROCEDURE — 94760 N-INVAS EAR/PLS OXIMETRY 1: CPT

## 2018-04-22 RX ORDER — ACETAMINOPHEN 325 MG/1
650 TABLET ORAL ONCE
Status: COMPLETED | OUTPATIENT
Start: 2018-04-22 | End: 2018-04-22

## 2018-04-22 RX ADMIN — ACETAMINOPHEN 650 MG: 325 TABLET, FILM COATED ORAL at 10:53

## 2018-04-22 ASSESSMENT — LIFESTYLE VARIABLES: DO YOU DRINK ALCOHOL: NO

## 2018-04-22 NOTE — DISCHARGE INSTRUCTIONS
Driving and Equipment Restrictions  Some medical problems make it dangerous to drive, ride a bike, or use machines. Some of these problems are:  · A hard blow to the head (concussion).  · Passing out (fainting).  · Twitching and shaking (seizures).  · Low blood sugar.  · Taking medicine to help you relax (sedatives).  · Taking pain medicines.  · Wearing an eye patch.  · Wearing splints. This can make it hard to use parts of your body that you need to drive safely.  HOME CARE   · Do not drive until your doctor says it is okay.  · Do not use machines until your doctor says it is okay.  You may need a form signed by your doctor (medical release) before you can drive again. You may also need this form before you do other tasks where you need to be fully alert.  MAKE SURE YOU:  · Understand these instructions.  · Will watch your condition.  · Will get help right away if you are not doing well or get worse.     This information is not intended to replace advice given to you by your health care provider. Make sure you discuss any questions you have with your health care provider.     Document Released: 01/25/2006 Document Revised: 03/11/2013 Document Reviewed: 04/27/2011  [employer] Interactive Patient Education ©2016 [employer]    Seizure, Adult  When you have a seizure:  Parts of your body may move.  How aware or awake (conscious) you are may change.  You may shake (convulse).  Some people have symptoms right before a seizure happens. These symptoms may include:  Fear.  Worry (anxiety).  Feeling like you are going to throw up (nausea).  Feeling like the room is spinning (vertigo).  Feeling like you saw or heard something before (maria victoria vu).  Odd tastes or smells.  Changes in vision, such as seeing flashing lights or spots.  Seizures usually last from 30 seconds to 2 minutes. Usually, they are not harmful unless they last a long time.  Follow these instructions at home:  Medicines  Take over-the-counter and prescription  medicines only as told by your doctor.  Avoid anything that may keep your medicine from working, such as alcohol.  Activity  Do not do any activities that would be dangerous if you had another seizure, like driving or swimming. Wait until your doctor approves.  If you live in the [place] ask your local DMV (department of Availigent) when you can drive.  Rest.  Teaching others  Teach friends and family what to do when you have a seizure. They should:  Lay you on the ground.  Protect your head and body.  Loosen any tight clothing around your neck.  Turn you on your side.  Stay with you until you are better.  Not hold you down.  Not put anything in your mouth.  Know whether or not you need emergency care.  General instructions  Contact your doctor each time you have a seizure.  Avoid anything that gives you seizures.  Keep a seizure diary. Write down:  What you think caused each seizure.  What you remember about each seizure.  Keep all follow-up visits as told by your doctor. This is important.  Contact a doctor if:  You have another seizure.  You have seizures more often.  There is any change in what happens during your seizures.  You continue to have seizures with treatment.  You have symptoms of being sick or having an infection.  Get help right away if:  You have a seizure:  That lasts longer than 5 minutes.  That is different than seizures you had before.  That makes it harder to breathe.  After you hurt your head.  After a seizure, you cannot speak or use a part of your body.  After a seizure, you are confused or have a bad headache.  You have two or more seizures in a row.  You are having seizures more often.  You do not wake up right after a seizure.  You get hurt during a seizure.  In an emergency:  These symptoms may be an emergency. Do not wait to see if the symptoms will go away. Get medical help right away. Call your local emergency services (911 in the [place] Do not drive yourself to the hospital.  This  information is not intended to replace advice given to you by your health care provider. Make sure you discuss any questions you have with your health care provider.  Document Released: 06/05/2009 Document Revised: 08/30/2017 Document Reviewed: 08/30/2017  [employer] Interactive Patient Education © 2017 [employer]

## 2018-04-22 NOTE — ED PROVIDER NOTES
"ED Provider Note    Scribed for Radha Garcia M.D. by Lorenzo Olivas. 4/22/2018  8:56 AM    Primary care provider: Mine Nunes M.D.  Means of arrival: Ambulance  History obtained from: Patient  History limited by: None    CHIEF COMPLAINT  Chief Complaint   Patient presents with   • Seizure     denies hx        HPI  Barry Griffith is a 28 y.o. male who presents to the Emergency Department via ambulance for a seizure onset this morning. Per wife, she heard a loud \"thump\" and found the patient on the floor seizing. His seizure was described as his upper body shaking, he was grunting and panting attempting to breathe. She noticed his eyes were rolled back. His seizure lasted approximately 40 seconds. After he regained consciousness, wife states the patient experienced slight memory loss. He sustained tongue trauma during his episode, however denies urinary incontinence or shoulder dislocation or injury. Denies past head injury, family or personal history of seizure. He does have a history of asthma. Denies smoking. He occasionally smokes marijuana and occasionally drinks alcohol with his last drink last night. Father reports the patient drinks over the counter cough syrup at times. No complaints of fever at this time.     REVIEW OF SYSTEMS  HEENT:  Tongue trauma  : no urinary incontinence  Neuro: Seizure, no weakness, aphasia, or headache  Musculoskeletal: no shoulder dislocation or injury or deformity  Endocrine: no fevers     See history of present illness. All other systems are negative. C.     PAST MEDICAL HISTORY   has a past medical history of ASTHMA.    SURGICAL HISTORY  patient denies any surgical history    SOCIAL HISTORY  Social History   Substance Use Topics   • Smoking status: Never Smoker   • Smokeless tobacco: Never Used   • Alcohol use No      Comment: rare       History   Drug Use No       FAMILY HISTORY  Family History   Problem Relation Age of Onset   • Hypertension Mother    • Hyperlipidemia " "Father    • Hypertension Father    • Cancer Neg Hx    • Diabetes Neg Hx        CURRENT MEDICATIONS  No current facility-administered medications on file prior to encounter.      Current Outpatient Prescriptions on File Prior to Encounter   Medication Sig Dispense Refill   • azithromycin (ZITHROMAX) 250 MG Tab Take 500 mg on the first day and then 250 mg daily for 4 days. 6 Tab 0   • MethylPREDNISolone (MEDROL DOSEPAK) 4 MG Tablet Therapy Pack As directed on the packaging label. 21 Tab 0   • albuterol 108 (90 Base) MCG/ACT Aero Soln inhalation aerosol Inhale 2 Puffs by mouth every 6 hours as needed for Shortness of Breath. 8.5 g 6   • albuterol 108 (90 BASE) MCG/ACT Aero Soln inhalation aerosol Inhale 2 Puffs by mouth every 6 hours as needed for Shortness of Breath. 8.5 g 1   • albuterol 108 (90 BASE) MCG/ACT Aero Soln inhalation aerosol Inhale 2 Puffs by mouth every 6 hours as needed for Shortness of Breath. 8.5 g 3      ALLERGIES  No Known Allergies    PHYSICAL EXAM  VITAL SIGNS: /49   Pulse 74   Temp 36.9 °C (98.4 °F)   Resp 16   Ht 1.765 m (5' 9.5\")   Wt 74.8 kg (165 lb)   BMI 24.02 kg/m²     Constitutional: Afebrile, Well developed, Well nourished, No acute distress, Non-toxic appearance.   HEENT: Normocephalic, Atraumatic,  external ears normal, pharynx pink,  Mucous  Membranes moist, No rhinorrhea or mucosal edema  Eyes: PERRL, EOMI, Conjunctiva normal, No discharge.   Neck: Normal range of motion, No tenderness, Supple, No stridor.   Lymphatic: No lymphadenopathy    Cardiovascular: Regular Rate and Rhythm, No murmurs,  rubs, or gallops.   Thorax & Lungs: Lungs clear to auscultation bilaterally, No respiratory distress, No wheezes, rhales or rhonchi, No chest wall tenderness.   Abdomen: Bowel sounds normal, Soft, non tender, non distended,  No pulsatile masses., no rebound guarding or peritoneal signs.   Skin: Warm, Dry, No erythema, No rash,   Back:  No CVA tenderness,  No spinal tenderness, bony " crepitance, step offs, or instability.   Neurologic: Alert & oriented x 3, Normal motor function, Normal sensory function, No focal deficits noted. Normal reflexes. Normal Cranial Nerves.  Extremities: Equal, intact distal pulses, No cyanosis, clubbing or edema,  No tenderness.   Musculoskeletal: Good range of motion in all major joints. No tenderness to palpation or major deformities noted.     DIAGNOSTIC STUDIES / PROCEDURES    LABS  Results for orders placed or performed during the hospital encounter of 04/22/18   CBC WITH DIFFERENTIAL   Result Value Ref Range    WBC 8.5 4.8 - 10.8 K/uL    RBC 5.09 4.70 - 6.10 M/uL    Hemoglobin 16.0 14.0 - 18.0 g/dL    Hematocrit 48.5 42.0 - 52.0 %    MCV 95.3 81.4 - 97.8 fL    MCH 31.4 27.0 - 33.0 pg    MCHC 33.0 (L) 33.7 - 35.3 g/dL    RDW 40.0 35.9 - 50.0 fL    Platelet Count 141 (L) 164 - 446 K/uL    MPV 11.6 9.0 - 12.9 fL    Neutrophils-Polys 41.40 (L) 44.00 - 72.00 %    Lymphocytes 49.90 (H) 22.00 - 41.00 %    Monocytes 5.80 0.00 - 13.40 %    Eosinophils 1.80 0.00 - 6.90 %    Basophils 0.40 0.00 - 1.80 %    Immature Granulocytes 0.70 0.00 - 0.90 %    Nucleated RBC 0.00 /100 WBC    Neutrophils (Absolute) 3.52 1.82 - 7.42 K/uL    Lymphs (Absolute) 4.23 1.00 - 4.80 K/uL    Monos (Absolute) 0.49 0.00 - 0.85 K/uL    Eos (Absolute) 0.15 0.00 - 0.51 K/uL    Baso (Absolute) 0.03 0.00 - 0.12 K/uL    Immature Granulocytes (abs) 0.06 0.00 - 0.11 K/uL    NRBC (Absolute) 0.00 K/uL   COMP METABOLIC PANEL   Result Value Ref Range    Sodium 139 135 - 145 mmol/L    Potassium 3.9 3.6 - 5.5 mmol/L    Chloride 108 96 - 112 mmol/L    Co2 17 (L) 20 - 33 mmol/L    Anion Gap 14.0 (H) 0.0 - 11.9    Glucose 75 65 - 99 mg/dL    Bun 12 8 - 22 mg/dL    Creatinine 1.13 0.50 - 1.40 mg/dL    Calcium 9.5 8.5 - 10.5 mg/dL    AST(SGOT) 29 12 - 45 U/L    ALT(SGPT) 30 2 - 50 U/L    Alkaline Phosphatase 62 30 - 99 U/L    Total Bilirubin 0.5 0.1 - 1.5 mg/dL    Albumin 4.6 3.2 - 4.9 g/dL    Total Protein 7.2 6.0  - 8.2 g/dL    Globulin 2.6 1.9 - 3.5 g/dL    A-G Ratio 1.8 g/dL   PROTHROMBIN TIME   Result Value Ref Range    PT 12.1 12.0 - 14.6 sec    INR 0.92 0.87 - 1.13   APTT   Result Value Ref Range    APTT 21.8 (L) 24.7 - 36.0 sec   LACTIC ACID   Result Value Ref Range    Lactic Acid 7.8 (HH) 0.5 - 2.0 mmol/L   TROPONIN   Result Value Ref Range    Troponin I <0.01 0.00 - 0.04 ng/mL   DIAGNOSTIC ALCOHOL   Result Value Ref Range    Diagnostic Alcohol 0.00 0.00 g/dL   URINE DRUG SCREEN   Result Value Ref Range    Amphetamines Urine Negative Negative    Barbiturates Negative Negative    Benzodiazepines Negative Negative    Cocaine Metabolite Negative Negative    Methadone Negative Negative    Opiates Positive (A) Negative    Oxycodone Negative Negative    Phencyclidine -Pcp Negative Negative    Propoxyphene Negative Negative    Cannabinoid Metab Positive (A) Negative   ESTIMATED GFR   Result Value Ref Range    GFR If African American >60 >60 mL/min/1.73 m 2    GFR If Non African American >60 >60 mL/min/1.73 m 2   EKG (NOW)   Result Value Ref Range    Report       [facility] Emergency Dept.    Test Date:  2018  Pt Name:    DUANE AGUILAR                Department: ER  MRN:        [id number]                      Room:        22  Gender:     Male                         Technician: [id number]  :        1990                   Requested By:JAQUI MARQUEZ  Order #:    [id number]                    Reading MD:    Measurements  Intervals                                Axis  Rate:       65                           P:          77  MN:         180                          QRS:        84  QRSD:       100                          T:          59  QT:         384  QTc:        400    Interpretive Statements  SINUS RHYTHM  RSR' IN V1 OR V2, PROBABLY NORMAL VARIANT  ST ELEV, PROBABLE NORMAL EARLY REPOL PATTERN  No previous ECG available for comparison        All labs reviewed by me.    EKG  12 lead EKG; Interpreted by  emergency department physician at 9:27 AM.     Rhythm: Normal Sinus Rhythm   Rate: 65  Axis: Normal  R Wave: Normal R wave progression  Normal ST-T segments  ST Segments: No ST segment elevation   T waves: Normal  Q waves: None    Clinical Impression: Early repolarization pattern noted. No previous EKG.     RADIOLOGY  CT-HEAD W/O   Final Result         NO ACUTE ABNORMALITIES ARE NOTED ON CT SCAN OF THE HEAD.           The radiologist's interpretation of all radiological studies have been reviewed by me.    COURSE & MEDICAL DECISION MAKING  Nursing notes, VS, PMSFHx reviewed in chart.    8:56 AM - Patient seen and examined at bedside. Ordered CT head, diagnostic alcohol, urine drug screen, CBC, CMP, prothrombin time, APTT, lactic acid, troponin, and EKG to evaluate his symptoms. The differential diagnoses include but are not limited to: new onset seizure, electrolyte disturbance, medication reaction. Discussed with the patient and wife, I will check basic     10:30 AM Reviewed the patient's lab and radiology results.     10:45 AM Paged Neuro, Dr. Hung.    10:46 AM Patient was reevaluated at bedside. Patient reports he has a headache. Discussed lab and radiology results with the patient and informed them that I have a page out to the neurologist to consult. Advised the patient to refrain from using cough syrup and marijuana.     10:50 AM Ordered acetaminophen 650 mg for patient's headache.     10:51 AM I discussed the patient's case and the above findings with Dr. Hung (Neurology) who recommends patient follow up.     10:54 AM Discussed with patient to follow up with Dr. Hung. Explained DMV laws and patient has signed DMV/seizure driving form. He will be discharged. Patient agrees with plan of care.     The patient will return for new or worsening symptoms and is stable at the time of discharge.    DISPOSITION:  Patient will be discharged home in stable condition.    FOLLOW UP:  [facility]  [address], [address]  [facility] [address]  [phone]  Call in 1 day  to establish care, for recheck      FINAL IMPRESSION  1. Seizure ([facility])          Lorenzo VANN (Scribpat), am scribing for, and in the presence of, Radha Garcia M.D..    Electronically signed by: Lorenzo Olivas (Natalia), 4/22/2018    Radha VANN M.D. personally performed the services described in this documentation, as scribed by Lorenzo Olivas in my presence, and it is both accurate and complete.    The note accurately reflects work and decisions made by me.  Radha Garcia  4/22/2018  3:52 PM

## 2018-04-22 NOTE — ED TRIAGE NOTES
Chief Complaint   Patient presents with   • Seizure     denies hx      pts wife describes t/c sz activity with associated rigidity and confusion after

## 2018-04-23 ENCOUNTER — PATIENT OUTREACH (OUTPATIENT)
Dept: HEALTH INFORMATION MANAGEMENT | Facility: OTHER | Age: 28
End: 2018-04-23

## 2018-04-23 NOTE — DISCHARGE PLANNING
MR faxed to [facility] with note that Dr. LINO spoke with Dr. Hung who recommended f/u in office within one week.

## 2018-04-25 LAB — EKG IMPRESSION: NORMAL

## 2018-05-01 ENCOUNTER — OFFICE VISIT (OUTPATIENT)
Dept: MEDICAL GROUP | Facility: PHYSICIAN GROUP | Age: 28
End: 2018-05-01
Payer: COMMERCIAL

## 2018-05-01 VITALS
BODY MASS INDEX: 23.55 KG/M2 | RESPIRATION RATE: 16 BRPM | OXYGEN SATURATION: 98 % | HEART RATE: 81 BPM | TEMPERATURE: 97.6 F | WEIGHT: 159 LBS | SYSTOLIC BLOOD PRESSURE: 110 MMHG | DIASTOLIC BLOOD PRESSURE: 60 MMHG | HEIGHT: 69 IN

## 2018-05-01 DIAGNOSIS — R56.9 SEIZURE (HCC): ICD-10-CM

## 2018-05-01 PROCEDURE — 99213 OFFICE O/P EST LOW 20 MIN: CPT | Performed by: NURSE PRACTITIONER

## 2018-05-01 ASSESSMENT — PAIN SCALES - GENERAL: PAINLEVEL: NO PAIN

## 2018-05-01 ASSESSMENT — PATIENT HEALTH QUESTIONNAIRE - PHQ9: CLINICAL INTERPRETATION OF PHQ2 SCORE: 0

## 2018-05-01 NOTE — ASSESSMENT & PLAN NOTE
Patient was seen in the emergency room on 4/22/2018 for seizure activity. Seizure was witnessed by his wife. CT and labs post seizure were within normal limits. Single occurrence of seizure, patient has no past history of seizure. No continued issues. No headache, dizziness, changes in vision, disorientation. States he overall feels well and is requesting referral to neurologist in [place] as next appointment in [place] is August.

## 2018-05-02 NOTE — PROGRESS NOTES
Chief Complaint   Patient presents with   • Hospital Follow-up     SEIZURE        HISTORY OF PRESENT ILLNESS: Patient is a 28 y.o. male established patient who presents today to discuss seizure.    Seizure ([facility])  Patient was seen in the emergency room on 2018 for seizure activity. Seizure was witnessed by his wife. CT and labs post seizure were within normal limits. Single occurrence of seizure, patient has no past history of seizure. No continued issues. No headache, dizziness, changes in vision, disorientation. States he overall feels well and is requesting referral to neurologist in [place] as next appointment in [place] is August.       Patient Active Problem List    Diagnosis Date Noted   • Seizure ([facility]) 2018   • Sexually transmitted disease counseling 2018   • Warts, genital 2018   • Pneumonia of both lower lobes due to infectious organism ([facility]) 10/05/2017   • Cough, persistent 2016   • Mild intermittent asthma with acute exacerbation 06/10/2016       Allergies:Patient has no known allergies.    Current Outpatient Prescriptions   Medication Sig Dispense Refill   • albuterol 108 (90 BASE) MCG/ACT Aero Soln inhalation aerosol Inhale 2 Puffs by mouth every 6 hours as needed for Shortness of Breath. 8.5 g 3     No current facility-administered medications for this visit.        Social History   Substance Use Topics   • Smoking status: Never Smoker   • Smokeless tobacco: Never Used   • Alcohol use No      Comment: rare        Family Status   Relation Status   • Mother    • Father Alive   • Sister Alive   • Brother Alive   • Brother Alive   • Neg Hx      Family History   Problem Relation Age of Onset   • Hypertension Mother    • Hyperlipidemia Father    • Hypertension Father    • Cancer Neg Hx    • Diabetes Neg Hx        Review of Systems:   Constitutional:  Negative for fever, chills, weight loss and malaise/fatigue.   HEENT:  Negative for ear pain, nosebleeds, congestion, sore  "throat and neck pain.    Eyes:  Negative for blurred vision.   Respiratory:  Negative for cough, sputum production, shortness of breath and wheezing.    Cardiovascular:  Negative for chest pain, palpitations, orthopnea and leg swelling.   Gastrointestinal:  Negative for heartburn, nausea, vomiting and abdominal pain.   Genitourinary:  Negative for dysuria, urgency and frequency.   Musculoskeletal:  Negative for myalgias, back pain and joint pain.   Skin:  Negative for rash and itching.   Neurological:  Negative for dizziness, tingling, tremors, sensory change, focal weakness and headaches.   Endo/Heme/Allergies:  Does not bruise/bleed easily.   Psychiatric/Behavioral:  Negative for depression, suicidal ideas and memory loss.  The patient is not nervous/anxious and does not have insomnia.    All other systems reviewed and are negative except as in HPI.    Exam:  Blood pressure 110/60, pulse 81, temperature 36.4 °C (97.6 °F), resp. rate 16, height 1.765 m (5' 9.49\"), weight 72.1 kg (159 lb), SpO2 98 %.  General: Normal appearing. No distress.  HEENT:  Normocephalic. Eyes conjunctiva clear lids without ptosis, pupils equal and reactive to light accommodation, ears normal shape and contour, canals are clear bilaterally, tympanic membranes are benign, nasal mucosa benign, oropharynx is without erythema, edema or exudates.   Pulmonary:  Clear to ausculation.  Normal effort. No rales, ronchi, or wheezing.  Cardiovascular:  Regular rate and rhythm without murmur. Carotid and radial pulses are intact and equal bilaterally.  Abdomen:  Soft, nontender, nondistended. Normal bowel sounds. Liver and spleen are not palpable  Neurologic:  Grossly nonfocal. CN 2-12  Skin:  Warm and dry.  No obvious lesions.  Musculoskeletal:  Normal gait. No extremity cyanosis, clubbing, or edema.  Psych:  Normal mood and affect. Alert and oriented x3. Judgment and insight is normal.      PLAN:    1. Seizure (H[facility])  Patient referred to [place] " neurology per request.  - REFERRAL TO NEUROLOGY    Follow-up with primary care provider. Patient is encouraged to be seen in the emergency room for chest pain, palpitations, shortness of breath, dizziness, severe abdominal pain or other concerning symptoms.    Please note that this dictation was created using voice recognition software. I have made every reasonable attempt to correct obvious errors, but I expect that there are errors of grammar and possibly content that I did not discover before finalizing the note.    Assessment/Plan:  1. Seizure (HCC)  REFERRAL TO NEUROLOGY          I have placed the below orders and discussed them with an approved delegating provider. The MA is performing the below orders under the direction of Dr. Nunes.

## 2018-05-11 ENCOUNTER — OFFICE VISIT (OUTPATIENT)
Dept: NEUROLOGY | Facility: MEDICAL CENTER | Age: 28
End: 2018-05-11
Payer: COMMERCIAL

## 2018-05-11 ENCOUNTER — TELEPHONE (OUTPATIENT)
Dept: MEDICAL GROUP | Facility: PHYSICIAN GROUP | Age: 28
End: 2018-05-11

## 2018-05-11 ENCOUNTER — TELEPHONE (OUTPATIENT)
Dept: NEUROLOGY | Facility: MEDICAL CENTER | Age: 28
End: 2018-05-11

## 2018-05-11 VITALS
SYSTOLIC BLOOD PRESSURE: 128 MMHG | OXYGEN SATURATION: 100 % | DIASTOLIC BLOOD PRESSURE: 68 MMHG | TEMPERATURE: 97.2 F | RESPIRATION RATE: 16 BRPM | BODY MASS INDEX: 22.97 KG/M2 | HEART RATE: 62 BPM | HEIGHT: 69 IN | WEIGHT: 155.1 LBS

## 2018-05-11 DIAGNOSIS — R56.9 SEIZURE (HCC): ICD-10-CM

## 2018-05-11 PROCEDURE — 99205 OFFICE O/P NEW HI 60 MIN: CPT | Performed by: PSYCHIATRY & NEUROLOGY

## 2018-05-11 NOTE — TELEPHONE ENCOUNTER
1. Caller Name: [facility]ne [facility] Referral [facility]                                         Call Back Number: ext [phone]      Patient approves a detailed voicemail message: yes    Marianne called stating insurance called today stating that patient has a pending appointment with [facility] and wants this referral to [facility] withdrawn and if [facility] neurology thinks he needs to go to [place] we can resubmit.    I spoke with Marianne and she can not withdraw referral request unless ordering provider ok's this.  She is asking if you agree and would like to withdraw request until seen and recommended by [facility] neurology.

## 2018-05-11 NOTE — TELEPHONE ENCOUNTER
Per Cecelia I called patient to notify him of his VEEG in July, I confirmed his address and mailed his appointment letter out. Patient verbally understood all information given to him.

## 2018-05-11 NOTE — TELEPHONE ENCOUNTER
Rajendra, I spoke with patient and he is fine with keeping appointment with [facility].  Ok to withdraw Chris request?

## 2018-05-12 NOTE — PROGRESS NOTES
Chief Complaint   Patient presents with   • New Patient     sz       Problem List Items Addressed This Visit     Seizure (HCC)    Relevant Orders    MR-BRAIN-WITH & W/O    REFERRAL TO NEURODIAGNOSTICS (EEG,EP,EMG/NCS/DBS) Modality Requested: EEG-Video (video EEG )          History of present illness:  Barry Griffith 28 y.o. male presents today to establish care for a new onset seizure He is here with his wife, who witnessed the spell On 4/22/18, he was home, in bed, wife was out of bed and heard a noise like he had fallen on the floor She went to check on him and found him on the floor, stiff, head back, eyes rolled back, had generalized jerky movements, had blood on his mouth, was unresponsive, shaking spell lasted for about 40 seconds, then he was very confused and sleepy for at least 15-20 mins, slowly recovering back to baseline No urinary incontinence Wife states, his left arm was out, deviated and stiff. EMS was called, and he was [place] [facility] [place] blood work, CTB and later discharged He was instructed not to drive and was notified to the DMV He is currently not driving and wants to resume driving     He was not started on any seizure meds     He had a normal birth and development. There is no family h/o epilepsy.     He had occasional headaches in the past, but denies currently.     He has had perception of chlorine smell a few times over the last year, and he has thought it was odd. Denies metallic taste, maria victoria vu and others.     The night before the spell, he as sleep deprived, he had gone out with his wife and had two drinks, and also had used marijuana, which he states he has done occasionally in the past. He did not take any medications or used any other drugs.     He has been spell free since the episode.       Seizure onset: 4/22/18    Seizure semiology: temporal aura? GTC.     Seizure types: Focal with secondary generalization?     Last seizure: 4/22/2018    Past AED’s: None.     Current  AED regimen: None.     Prior neurologists: None.     Prior EEG’s: None.     Prior brain imaging: yes.     Driving status: No.     School/work status: yes.       Past medical history:   Past Medical History:   Diagnosis Date   • ASTHMA     Asthma since infancy.       Past surgical history:   History reviewed. No pertinent surgical history.    Family history:   Family History   Problem Relation Age of Onset   • Hypertension Mother    • Hyperlipidemia Father    • Hypertension Father    • Cancer Neg Hx    • Diabetes Neg Hx        Social history:   Social History     Social History   • Marital status: Single     Spouse name: N/A   • Number of children: 1   • Years of education: N/A     Occupational History   • caregiver for mentally ill patients [employer]     Social History Main Topics   • Smoking status: Never Smoker   • Smokeless tobacco: Never Used   • Alcohol use No      Comment: rare    • Drug use: No   • Sexual activity: Yes     Partners: Female     Birth control/ protection: Condom, Pill     Other Topics Concern   • Not on file     Social History Narrative   • No narrative on file       Current medications:   Current Outpatient Prescriptions   Medication   • albuterol 108 (90 BASE) MCG/ACT Aero Soln inhalation aerosol     No current facility-administered medications for this visit.        Medication Allergy:  No Known Allergies      Review of systems:   Constitutional: denies fever, night sweats, weight loss, or malaise/fatigue.   Eyes: denies acute vision change, eye pain or secretion.   Ears, Nose, Mouth, Throat: denies nasal secretion, nasal bleeding, difficulty swallowing, hearing loss, tinnitus, vertigo, ear pain, oral ulcers or lesions.   Endocrine: denies recent weight changes, heat or cold intolerance, polyuria, polydypsia, polyphagia,abnormal hair growth.  Cardiovascular: denies new onset of chest pain, palpitations, syncope, lower extremity edema, or dyspnea of exertion.  Pulmonary:  "denies shortness of breath, new onset of cough, hemoptysis, wheezing, chest pain or flu-like symptoms.   GI: denies nausea, vomiting, diarrhea, GI bleeding, change in appetite, abdominal pain, and change in bowel habits.  : denies urinary incontinence, retention or hematuria.  Heme/oncology: denies history of easy bruising or bleeding. No history of cancer. Allergy/immunology: denies hives/urticarial.  Dermatologic: denies new rash.  Musculoskeletal:denies joint swelling or pain, muscle pain, neck and back pain.   Neurologic: denies headaches, acute visual changes, facial droopiness, muscle weakness (focal or generalized), paresthesias, anesthesia, ataxia, change in speech or language, memory loss.  Psychiatric: denies symptoms of depression, anxiety, hallucinations, mood swings or changes, suicidal or homicidal thoughts.     Physical examination:   Vitals:    05/11/18 1104   BP: 128/68   Pulse: 62   Resp: 16   Temp: 36.2 °C (97.2 °F)   SpO2: 100%   Weight: 70.4 kg (155 lb 1.6 oz)   Height: 1.753 m (5' 9\")     General: Patient in no acute distress, pleasant and cooperative.  HEENT: Normocephalic, no signs of acute trauma.   Neck: supple, no meningeal signs or carotid bruits. There is normal range of motion. No tenderness on exam.   Chest: clear to auscultation. No cough.   CV: RRR, no murmurs.  Skin: no signs of acute rashes or trauma.   Musculoskeletal: joints exhibit full range of motion, without any pain to palpation. There are no signs of joint or muscle swelling. There is no tenderness to deep palpation of muscles.   Psychiatric: No hallucinatory behavior. Denies symptoms of depression or suicidal ideation. Mood and affect appear normal on exam.     NEUROLOGICAL EXAM:   Mental status, orientation: Awake, alert and fully oriented.   Speech and language: speech is clear and fluent. The patient is able to name, repeat and comprehend.   Memory: There is intact recollection of recent and remote events.   Cranial " nerve exam: Pupils are 3-4 mm bilaterally and equally reactive to light and accommodation. Visual fields are intact by confrontation. There is no nystagmus on primary or secondary gaze. Intact full EOM in all directions of gaze. Face appears symmetric. Sensation in the face is intact to light touch. Uvula is midline. Palate elevates symmetrically. Tongue is midline and without any signs of tongue biting or fasciculations.Sternocleidomastoid muscles exhibit is normal strength bilaterally. Shoulder shrug is intact bilaterally.   Motor exam: Strength is 5/5 in all extremities. Tone is normal. No abnormal movements were seen on exam.   Sensory exam reveals normal sense of light touch, proprioception, vibration and pinprick in all extremities.   Deep tendon reflexes:  2+ throughout. Plantar responses are flexor. There is no clonus.   Coordination: shows a normal finger-nose-finger. Normal rapidly alternating movements.   Gait: The patient was able to get up from seated position on first attempt without requiring assistance. Found to be steady when walking. Movements were fluid with normal arm swing. The patient was able to turn without difficulties or tendency to fall. Romberg exam was unremarkable.         ANCILLARY DATA REVIEWED:       Lab Data Review:  Amphetamines Urine Negative  Negative Final   Barbiturates Negative  Negative Final   Benzodiazepines Negative  Negative Final   Cocaine Metabolite Negative  Negative Final   Methadone Negative  Negative Final   Opiates Positive   Negative Final   Oxycodone Negative  Negative Final   Phencyclidine -Pcp Negative  Negative Final   Propoxyphene Negative  Negative Final   Cannabinoid Metab Positive   Negative Final     Diagnostic Alcohol 0.00  0.00 g/dL Final     Lactic Acid 7.8   0.5 - 2.0 mmol/L Final     Sodium 139  135 - 145 mmol/L Final   Potassium 3.9  3.6 - 5.5 mmol/L Final   Chloride 108  96 - 112 mmol/L Final   Co2 17   20 - 33 mmol/L Final   Anion Gap 14.0   0.0 -  11.9 Final   Glucose 75  65 - 99 mg/dL Final   Bun 12  8 - 22 mg/dL Final   Creatinine 1.13  0.50 - 1.40 mg/dL Final   Calcium 9.5  8.5 - 10.5 mg/dL Final   AST(SGOT) 29  12 - 45 U/L Final   ALT(SGPT) 30  2 - 50 U/L Final   Alkaline Phosphatase 62  30 - 99 U/L Final   Total Bilirubin 0.5  0.1 - 1.5 mg/dL Final   Albumin 4.6  3.2 - 4.9 g/dL Final   Total Protein 7.2  6.0 - 8.2 g/dL Final   Globulin 2.6  1.9 - 3.5 g/dL Final   A-G Ratio 1.8  g/dL Final     WBC 8.5  4.8 - 10.8 K/uL Final   RBC 5.09  4.70 - 6.10 M/uL Final   Hemoglobin 16.0  14.0 - 18.0 g/dL Final   Hematocrit 48.5  42.0 - 52.0 % Final   MCV 95.3  81.4 - 97.8 fL Final   MCH 31.4  27.0 - 33.0 pg Final   MCHC 33.0   33.7 - 35.3 g/dL Final   RDW 40.0  35.9 - 50.0 fL Final   Platelet Count 141   164 - 446 K/uL Final   MPV 11.6  9.0 - 12.9 fL Final   Neutrophils-Polys 41.40   44.00 - 72.00 % Final   Lymphocytes 49.90   22.00 - 41.00 % Final   Monocytes 5.80  0.00 - 13.40 % Final   Eosinophils 1.80  0.00 - 6.90 % Final   Basophils 0.40  0.00 - 1.80 % Final   Immature Granulocytes 0.70  0.00 - 0.90 % Final   Nucleated RBC 0.00  /100 WBC Final   Neutrophils (Absolute) 3.52  1.82 - 7.42 K/uL Final   Comment:   Includes immature neutrophils, if present.   Lymphs (Absolute) 4.23  1.00 - 4.80 K/uL Final   Monos (Absolute) 0.49  0.00 - 0.85 K/uL Final   Eos (Absolute) 0.15  0.00 - 0.51 K/uL Final   Baso (Absolute) 0.03  0.00 - 0.12 K/uL Final   Immature Granulocytes (abs) 0.06  0.00 - 0.11 K/uL Final   NRBC (Absolute) 0.00  K/uL Final             Records reviewed:   Chart reviewed.      Imaging:   CTB 4/22/18  NO ACUTE ABNORMALITIES ARE NOTED ON CT SCAN OF THE HEA  (I personally reviewed images).       EEG: None.           ASSESSMENT AND PLAN:    1. Seizure (HCC)  - MR-BRAIN-WITH & W/O; Future  - REFERRAL TO NEURODIAGNOSTICS (EEG,EP,EMG/NCS/DBS) Modality Requested: EEG-Video (video EEG )    CLINICAL DISCUSSION:   Possibly a provoked seizure (sleep deprivation, drug  use, stress).   However, I am concerned about two things: 1)-Focal features of convulsion, wife described left arm deviation, 2)-his reported h/o chlorine like smell episodes, pointing to focal seizure. We discussed possible benefits and short term seizure reduction with antiepileptic drug use. He would like to wait for now and undergo additional testing. I ordered MRI brain w/wo, and a VEEG. He may require prolonged monitoring. He will continue to refrain from driving, has already been reported to DMV per ED notes. If he remains sz free may be able to resume driving three months later, after he is cleared by me. I will see him before he resumes driving. He will avoid drugs and sleep deprivation. We discussed his job functions and it appears he should be able to continue to work without restrictions.       FOLLOW-UP:   Return in about 3 months (around 8/11/2018).      EDUCATION AND COUNSELING:  -Education was provided to the patient and/or family regarding diagnosis and prognosis. The chronic and unpredictable nature of the condition were discussed. There is increased risk for additional events, which may carry potential for significant injuries and death. Discussed frequent seizure triggers: sleep deprivation, medication non-compliance, use of illegal drugs/alcohol, stress, and others.   -We reviewed in detail the current recommendations for use of antiepileptic regimen as well as potential side effects of antiepileptics.   -Recommend chronic vitamin D supplementation and regular exercise (if not contraindicated).   -Patient/family educated on risk for SUDEP (Sudden Death in Epilepsy). Counseling was provided on the importance of strict compliance. The patient/family understand the risks associated with non-adherence with the medical plan as outlined, including but not limited to an increased risk for breakthrough seizures, which may contribute to injuries, disability, status epilepticus, and even death.    -Counseling was also provided on potential effects of alcohol and other drugs, which may lower seizure threshold and/or affect the metabolism of antiepileptic drugs. We recommend avoidance of alcohol and illegal drugs.  -Avoid sleep deprivation.   -We extensively discussed the aspects related to safety in drivers who suffer from epilepsy. The patient is encourage to report to the Division of Motor Vehicles of any condition and/or spells related to confusion, disorientation, and/or loss of awareness and/or loss of consciousness; as these may pose a safety issue if they occur while operating a motor vehicle. The patient and/or family are ultimately responsible for exercising caution and abiding to regulations in place.   -Other seizure precautions were discussed at length, including no diving, no skydiving, no climbing or exposure to unprotected heights, no unsupervised swimming, no Jacuzzi or bathing in bathtubs or deep bodies of water. The patient/family have been advised about risks for operating any machinery while suffering from seizures / syncope / epilepsy and/or while taking antiepileptic drugs.   -The patient understands and agrees that due to the complexity of his/her diagnosis, results of any testing and further recommendations will typically be discussed/made during a face to face encounter in my office. The patient and/or family further understands it is their responsibility to keep proper follow up.     Patient/family agree with plan, as outlined.         Manav Bai MD   Epilepsy and Neurodiagnostics.   Clinical  of Neurology [facility].   Diplomate in Neurology, Epilepsy, and Electrodiagnostic Medicine.   Office: [phone]  Fax: [phone]      o BILLING DOCUMENTATION:   Counseling:  I spent greater than 50% time face-to-face time of a total of 65 mins visit. Over 50% of the time of the visit today was spent on counseling and  or coordination of care wtih the patient/family, with greater than 50% of the total time discussing:   o Diagnostic results, impressions, and/or recommended diagnostic studies, and coordination of care.   o Prognosis.  o Treatment recommendations, including risks, benefits, & alternatives.  o Instructions for treatment/management and/or follow-up.  o Importance of compliance with chosen treatment/management options.  o Risk factor modification.   o Patient & family education.  o Provided business card and/or instructions for follow-up & emergencies.

## 2018-06-15 ENCOUNTER — TELEPHONE (OUTPATIENT)
Dept: NEUROLOGY | Facility: MEDICAL CENTER | Age: 28
End: 2018-06-15

## 2018-06-15 ENCOUNTER — DOCUMENTATION (OUTPATIENT)
Dept: NEUROLOGY | Facility: MEDICAL CENTER | Age: 28
End: 2018-06-15

## 2018-06-15 DIAGNOSIS — G40.109 LOCALIZATION-RELATED EPILEPSY (HCC): ICD-10-CM

## 2018-06-15 RX ORDER — LEVETIRACETAM 500 MG/1
500 TABLET ORAL 2 TIMES DAILY
Qty: 60 TAB | Refills: 11 | Status: SHIPPED | OUTPATIENT
Start: 2018-06-15 | End: 2018-11-19 | Stop reason: SDUPTHER

## 2018-06-15 RX ORDER — LORAZEPAM 0.5 MG/1
0.5 TABLET ORAL
Qty: 15 TAB | Refills: 0 | Status: SHIPPED | OUTPATIENT
Start: 2018-06-15 | End: 2018-06-30

## 2018-06-15 NOTE — TELEPHONE ENCOUNTER
Wife Jami returned call dr moreland spoke with her stated he was going to start pt on keppra and ativan for after a seizure. He put in the scripts and I faxed them to [facility] pharmacy in [place] on [address] and scanned into chart with confirmation.

## 2018-06-15 NOTE — TELEPHONE ENCOUNTER
Pt's wife called stated pt had a sz early this morning at 5 am while he was sleeping wife turned him on his side. Lasted about a minute, some tongue biting. Paramedics came to their home and wife said they stayed until he came to and made sure he was ok. Pt has veeg on 07/10/18 but wife is asking if maybe he needs to have it done sooner? Please advise, thanks.

## 2018-06-15 NOTE — PROGRESS NOTES
Spoke with wife, Jami. Pt had another seizure this am while asleep. Wife says this time he was not sleep deprived. Had been doing well, the only thing is he got a cold and took allegra. Seizure was a convulsion for about three mins, bit tongue, was post ictal for about 30 mins, EMS came but they did not go to ED.     Discussed starting AED. Has had two spells. This one not clearly provoked? Allegra? Flu? He is back to normal now but has a post ictal headache. Discussed starting Keppra 500 mg po bid. She agrees. Discussed side effects including drowsiness, mood changes, depression and others, to keep an eye on him. She asked for rescue medication if he has another seizure, faxed RX for Ativan 0.5 mg only use when breakthrough seizures. He is not driving.     EEG scheduled for July and fu appt with me.     She will call with any issues.

## 2018-06-18 ENCOUNTER — TELEPHONE (OUTPATIENT)
Dept: NEUROLOGY | Facility: MEDICAL CENTER | Age: 28
End: 2018-06-18

## 2018-06-18 NOTE — TELEPHONE ENCOUNTER
I returned Jami's call from Friday, she stated they did  the rx keppra 500 mg but stated they read all the side effects of the medication and pt decided he does not want to take the keppra and is requesting if Dr Bai can prescribe him something else. Please advise. Thanks.

## 2018-06-19 NOTE — TELEPHONE ENCOUNTER
Per Dr moreland I called Jami and did let her know that Dr Moreland stated The side effect profile is pretty much similar for all antiepileptics. Jami stated pt still does ot want to start the keppra and asked if he can be prescribed something else I did let her know most seizure medications do have some sort of side effects. Also she stated that pt wants to wait until after he has his nerve testing done on 07/10/18 and then go from there. I did let Jami know to call me if pt toure have another seizure that way we are aware, she verbally understood and agreed.

## 2018-07-10 ENCOUNTER — TELEPHONE (OUTPATIENT)
Dept: NEUROLOGY | Facility: MEDICAL CENTER | Age: 28
End: 2018-07-10

## 2018-07-10 ENCOUNTER — NON-PROVIDER VISIT (OUTPATIENT)
Dept: NEUROLOGY | Facility: MEDICAL CENTER | Age: 28
End: 2018-07-10
Payer: COMMERCIAL

## 2018-07-10 DIAGNOSIS — R56.9 SEIZURE (HCC): ICD-10-CM

## 2018-07-10 PROCEDURE — 95951 PR EEG MONITORING/VIDEORECORD: CPT | Mod: 52 | Performed by: PSYCHIATRY & NEUROLOGY

## 2018-07-10 NOTE — PROGRESS NOTES
VIDEO ELECTROENCEPHALOGRAM REPORT      DOS:  7/10/2018 (total recording of 32 minutes).     INDICATION:  Barry Griffith 28 y.o. male presenting with seizures.     CURRENT ANTIEPILEPTIC REGIMEN: None.     TECHNIQUE: 30 channel video electroencephalogram (EEG) was performed in accordance with the international 10-20 system. The study was reviewed in bipolar and referential montages. The recording examined the patient during wakeful and drowsy/sleep state(s).     DESCRIPTION OF THE RECORD:  During the wakefulness, the background showed a symmetrical 10 Hz alpha activity posteriorly with amplitude of 70 mV.  There was reactivity to eye closure/opening.  A normal anterior-posterior gradient was noted with faster beta frequencies seen anteriorly.  During drowsiness, theta/delta frequencies were seen.    During the sleep state, background shows diffuse high-amplitude 4-5 Hz delta activity.  Symmetrical high-amplitude sleep spindles and vertex sharps were seen in the leads over the central regions.     ACTIVATION PROCEDURES:   Hyperventilation was performed by the patient for a total of 3 minutes. The technician performing the test noted good effort. This technique failed to produce any electroencephalographic changes.    Intermittent Photic stimulation was performed in a stepwise fashion from 1 to 30 Hz and elicited a normal response (photic driving), most noticeable in the posterior leads.      ICTAL AND/OR INTERICTAL FINDINGS:   Frequent left temporal sharps. Intermittent, subtle, left temporal slowing. No clinical events or seizures were reported or recorded during the study.     EKG: sampling of the EKG recording demonstrated sinus rhythm.     EVENTS:  None.     INTERPRETATION:  This is an abnormal video EEG recording in the awake, drowsy, and sleep state(s). Frequent left temporal sharps. Intermittent, subtle, left temporal slowing. The findings increase risk for seizures and suggest underlying area of cortical  irritability and structural abnormality. Clinical and radiological correlation is recommended.      Manav Bai MD   Epilepsy and Neurodiagnostics.   Clinical  of Neurology [facility].   Diplomate in Neurology, Epilepsy, and Electrodiagnostic Medicine.   Office: [phone]  Fax: [phone]

## 2018-07-10 NOTE — PROCEDURES
VIDEO ELECTROENCEPHALOGRAM REPORT        DOS:  7/10/2018 (total recording of 32 minutes).      INDICATION:  Barry Griffith 28 y.o. male presenting with seizures.      CURRENT ANTIEPILEPTIC REGIMEN: None.      TECHNIQUE: 30 channel video electroencephalogram (EEG) was performed in accordance with the international 10-20 system. The study was reviewed in bipolar and referential montages. The recording examined the patient during wakeful and drowsy/sleep state(s).      DESCRIPTION OF THE RECORD:  During the wakefulness, the background showed a symmetrical 10 Hz alpha activity posteriorly with amplitude of 70 mV.  There was reactivity to eye closure/opening.  A normal anterior-posterior gradient was noted with faster beta frequencies seen anteriorly.  During drowsiness, theta/delta frequencies were seen.     During the sleep state, background shows diffuse high-amplitude 4-5 Hz delta activity.  Symmetrical high-amplitude sleep spindles and vertex sharps were seen in the leads over the central regions.      ACTIVATION PROCEDURES:   Hyperventilation was performed by the patient for a total of 3 minutes. The technician performing the test noted good effort. This technique failed to produce any electroencephalographic changes.     Intermittent Photic stimulation was performed in a stepwise fashion from 1 to 30 Hz and elicited a normal response (photic driving), most noticeable in the posterior leads.        ICTAL AND/OR INTERICTAL FINDINGS:   Frequent left temporal sharps. Intermittent, subtle, left temporal slowing. No clinical events or seizures were reported or recorded during the study.      EKG: sampling of the EKG recording demonstrated sinus rhythm.      EVENTS:  None.      INTERPRETATION:  This is an abnormal video EEG recording in the awake, drowsy, and sleep state(s). Frequent left temporal sharps. Intermittent, subtle, left temporal slowing. The findings increase risk for seizures and suggest underlying area of  cortical irritability and structural abnormality. Clinical and radiological correlation is recommended.        Manav Bai MD   Epilepsy and Neurodiagnostics.   Clinical  of Neurology [facility] [employer] [facility].   Diplomate in Neurology, Epilepsy, and Electrodiagnostic Medicine.   Office: [phone]  Fax: [phone]    RAO HOLT    DD:  07/10/2018 12:42:16  DT:  07/10/2018 12:45:15    D#:  [id number]  Job#:  [id number]

## 2018-07-10 NOTE — TELEPHONE ENCOUNTER
Per dr moreland I called pt asked if he can come into a f/v visit tomorrow at 10:30 am to go over his eeg results, pt agreed and stated he will be here tomorrow morning.

## 2018-07-11 ENCOUNTER — OFFICE VISIT (OUTPATIENT)
Dept: NEUROLOGY | Facility: MEDICAL CENTER | Age: 28
End: 2018-07-11
Payer: COMMERCIAL

## 2018-07-11 VITALS
OXYGEN SATURATION: 98 % | HEIGHT: 69 IN | WEIGHT: 156 LBS | DIASTOLIC BLOOD PRESSURE: 60 MMHG | RESPIRATION RATE: 16 BRPM | SYSTOLIC BLOOD PRESSURE: 118 MMHG | BODY MASS INDEX: 23.11 KG/M2 | TEMPERATURE: 97.5 F | HEART RATE: 96 BPM

## 2018-07-11 DIAGNOSIS — G40.109 LOCALIZATION-RELATED EPILEPSY (HCC): ICD-10-CM

## 2018-07-11 PROCEDURE — 99215 OFFICE O/P EST HI 40 MIN: CPT | Performed by: PSYCHIATRY & NEUROLOGY

## 2018-07-11 NOTE — PROGRESS NOTES
Cc: seizures.     Problem List Items Addressed This Visit     None      Visit Diagnoses     Localization-related epilepsy (HCC)              Interim history:  Barry Griffith returns in [place] with wife and father. He did have another GTC seizure, reported by his wife on 6/15/18, apparently he was taking flu syrup medication during those days. He was not sleep deprived night before and had not used alcohol. He also smokes marijuana frequently. His father states he drinks cough syrup almost every day, which he has done since he was a teenager, some sort of addiction problem to it.     He completed EEG but has not yet done MRI of his brain.     He is not suicidal or homicidal.     His wife, and father are very concerned about the seizures, but patient not so much.     He denies any other current complaints.     He decided not to take AED for now, as he is afraid of side effects and he does not believe he has epilepsy.     He is not driving.       Past medical history:   Past Medical History:   Diagnosis Date   • ASTHMA     Asthma since infancy.       Past surgical history:   No past surgical history on file.    Family history:   Family History   Problem Relation Age of Onset   • Hypertension Mother    • Hyperlipidemia Father    • Hypertension Father    • Cancer Neg Hx    • Diabetes Neg Hx        Social history:   Social History     Social History   • Marital status: Single     Spouse name: N/A   • Number of children: 1   • Years of education: N/A     Occupational History   • caregiver for mentally ill patients [employer]     Social History Main Topics   • Smoking status: Never Smoker   • Smokeless tobacco: Never Used   • Alcohol use No      Comment: rare    • Drug use: No   • Sexual activity: Yes     Partners: Female     Birth control/ protection: Condom, Pill     Other Topics Concern   • Not on file     Social History Narrative   • No narrative on file       Current medications:   Current Outpatient  Prescriptions   Medication   • levETIRAcetam (KEPPRA) 500 MG Tab   • albuterol 108 (90 BASE) MCG/ACT Aero Soln inhalation aerosol     No current facility-administered medications for this visit.        Medication Allergy:  No Known Allergies      Review of systems:   Constitutional: denies fever, night sweats, weight loss, or malaise/fatigue.   Eyes: denies acute vision change, eye pain or secretion.   Ears, Nose, Mouth, Throat: denies nasal secretion, nasal bleeding, difficulty swallowing, hearing loss, tinnitus, vertigo, ear pain, oral ulcers or lesions.   Endocrine: denies recent weight changes, heat or cold intolerance, polyuria, polydypsia, polyphagia,abnormal hair growth.  Cardiovascular: denies new onset of chest pain, palpitations, syncope, lower extremity edema, or dyspnea of exertion.  Pulmonary: denies shortness of breath, new onset of cough, hemoptysis, wheezing, chest pain or flu-like symptoms.   GI: denies nausea, vomiting, diarrhea, GI bleeding, change in appetite, abdominal pain, and change in bowel habits.  : denies urinary incontinence, retention or hematuria.  Heme/oncology: denies history of easy bruising or bleeding. No history of cancer.   Allergy/immunology: denies hives/urticarial.  Dermatologic: denies new rash.  Musculoskeletal:denies joint swelling or pain, muscle pain, neck and back pain.   Neurologic: denies headaches, acute visual changes, facial droopiness, muscle weakness (focal or generalized), paresthesias, anesthesia, ataxia, change in speech or language, memory loss.  Psychiatric: denies symptoms of depression, anxiety, hallucinations, mood swings or changes, suicidal or homicidal thoughts.         Physical examination:   Reviewed VS in chart.   General: Patient in no acute distress, pleasant and cooperative.  HEENT: Normocephalic, no signs of acute trauma.   Neck: supple, no meningeal signs or carotid bruits. There is normal range of motion. No tenderness on exam.   Chest:  clear to auscultation. No cough.   CV: RRR, no murmurs.   Abdomen: soft, non distended or tender.   Skin: no signs of acute rashes or trauma.   Musculoskeletal: joints exhibit full range of motion, without any pain to palpation. There are no signs of joint or muscle swelling. There is no tenderness to deep palpation of muscles.   Psychiatric: No hallucinatory behavior. Denies symptoms of depression or suicidal ideation. Mood and affect appear normal on exam.     NEUROLOGICAL EXAM:   Mental status, orientation: Awake, alert and fully oriented.   Speech and language: speech is clear and fluent. The patient is able to name, repeat and comprehend.   Memory: There is intact recollection of recent and remote events.   Cranial nerve exam: Pupils are 3-4 mm bilaterally and equally reactive to light and accommodation. Visual fields are intact by confrontation. There is no nystagmus on primary or secondary gaze. Intact full EOM in all directions of gaze. Face appears symmetric. Sensation in the face is intact to light touch. Uvula is midline. Palate elevates symmetrically. Tongue is midline and without any signs of tongue biting or fasciculations.Sternocleidomastoid muscles exhibit is normal strength bilaterally. Shoulder shrug is intact bilaterally.   Motor exam: Strength is 5/5 in all extremities. Tone is normal. No abnormal movements were seen on exam.   Sensory exam reveals normal sense of light touch, proprioception, vibration and pinprick in all extremities.   Deep tendon reflexes:  2+ throughout. Plantar responses are flexor. There is no clonus.   Coordination: shows a normal finger-nose-finger. Normal rapidly alternating movements.   Gait: The patient was able to get up from seated position on first attempt without requiring assistance. Found to be steady when walking. Movements were fluid with normal arm swing. The patient was able to turn without difficulties or tendency to fall. Romberg exam unremarkable.          ANCILLARY DATA REVIEWED:       ANCILLARY DATA REVIEWED:         Lab Data Review:  Amphetamines Urine Negative  Negative Final   Barbiturates Negative  Negative Final   Benzodiazepines Negative  Negative Final   Cocaine Metabolite Negative  Negative Final   Methadone Negative  Negative Final   Opiates Positive   Negative Final   Oxycodone Negative  Negative Final   Phencyclidine -Pcp Negative  Negative Final   Propoxyphene Negative  Negative Final   Cannabinoid Metab Positive   Negative Final      Diagnostic Alcohol 0.00  0.00 g/dL Final      Lactic Acid 7.8   0.5 - 2.0 mmol/L Final      Sodium 139  135 - 145 mmol/L Final   Potassium 3.9  3.6 - 5.5 mmol/L Final   Chloride 108  96 - 112 mmol/L Final   Co2 17   20 - 33 mmol/L Final   Anion Gap 14.0   0.0 - 11.9 Final   Glucose 75  65 - 99 mg/dL Final   Bun 12  8 - 22 mg/dL Final   Creatinine 1.13  0.50 - 1.40 mg/dL Final   Calcium 9.5  8.5 - 10.5 mg/dL Final   AST(SGOT) 29  12 - 45 U/L Final   ALT(SGPT) 30  2 - 50 U/L Final   Alkaline Phosphatase 62  30 - 99 U/L Final   Total Bilirubin 0.5  0.1 - 1.5 mg/dL Final   Albumin 4.6  3.2 - 4.9 g/dL Final   Total Protein 7.2  6.0 - 8.2 g/dL Final   Globulin 2.6  1.9 - 3.5 g/dL Final   A-G Ratio 1.8  g/dL Final      WBC 8.5  4.8 - 10.8 K/uL Final   RBC 5.09  4.70 - 6.10 M/uL Final   Hemoglobin 16.0  14.0 - 18.0 g/dL Final   Hematocrit 48.5  42.0 - 52.0 % Final   MCV 95.3  81.4 - 97.8 fL Final   MCH 31.4  27.0 - 33.0 pg Final   MCHC 33.0   33.7 - 35.3 g/dL Final   RDW 40.0  35.9 - 50.0 fL Final   Platelet Count 141   164 - 446 K/uL Final   MPV 11.6  9.0 - 12.9 fL Final   Neutrophils-Polys 41.40   44.00 - 72.00 % Final   Lymphocytes 49.90   22.00 - 41.00 % Final   Monocytes 5.80  0.00 - 13.40 % Final   Eosinophils 1.80  0.00 - 6.90 % Final   Basophils 0.40  0.00 - 1.80 % Final   Immature Granulocytes 0.70  0.00 - 0.90 % Final   Nucleated RBC 0.00  /100 WBC Final   Neutrophils (Absolute) 3.52  1.82 - 7.42 K/uL Final    Comment:   Includes immature neutrophils, if present.   Lymphs (Absolute) 4.23  1.00 - 4.80 K/uL Final   Monos (Absolute) 0.49  0.00 - 0.85 K/uL Final   Eos (Absolute) 0.15  0.00 - 0.51 K/uL Final   Baso (Absolute) 0.03  0.00 - 0.12 K/uL Final   Immature Granulocytes (abs) 0.06  0.00 - 0.11 K/uL Final   NRBC (Absolute) 0.00  K/uL Final                  Records reviewed:   Chart reviewed.        Imaging:   CTB 18  NO ACUTE ABNORMALITIES ARE NOTED ON CT SCAN OF THE HEA  (I personally reviewed images).         EE/10/2018:   INTERPRETATION:  This is an abnormal video EEG recording in the awake, drowsy, and sleep state(s). Frequent left temporal sharps. Intermittent, subtle, left temporal slowing. The findings increase risk for seizures and suggest underlying area of cortical irritability and structural abnormality. Clinical and radiological correlation is recommended.        ASSESSMENT AND PLAN:    1. Localization-related epilepsy (HCC)        CLINICAL DISCUSSION:   He has now have two GTC seizures. There are questions about provoking factors associated with both. For first seizure: sleep deprivation, drug use, stress, and for his second one: the use of cough syrup which I learned today he has used almost daily since he was a teenager.   However, I am concerned about three things: 1)-Focal features of convulsion, wife described left arm deviation, 2)-his reported h/o chlorine like smell episodes, pointing to focal seizure likely out of the temporal lobe, 3)-His EEG is abnormal with left temporal sharps, which increase risk for seizures. I believe based on these factors he does suffer from epilepsy and recommended starting AED. He had extensive discussion about possible options, including using newer drugs with better side effect profile and low doses to begin with, he continues to be refusal of this idea and believes he does not have epilepsy but rather his seizures so far have been a result of his  lifestyle options and appears to have a compromise now to make drastic changes on his lifestyle before taking any medications. His family however appears to lean towards the idea of him starting medication. His wife is particularly concerned about the seizures. We had extensive discussion about possible complications of seizures, including but not limited to injuries, status epilepticus, death.   For now he will remain on no driving status and we will reassess this again after three months from today. His family (both wife and father) will need to state during the next visit that he remains seizure free and that he has made significant changes to his lifestyle before I release him to drive. He will avoid drugs and sleep deprivation. He will undergo MRI brain which has not yet been scheduled by pt. We discussed his job functions and it appears he should be able to continue to work without restrictions.       FOLLOW-UP:   3 months.         EDUCATION AND COUNSELING:  -Education was provided to the patient and/or family regarding diagnosis and prognosis. The chronic and unpredictable nature of the condition were discussed. There is increased risk for additional events, which may carry potential for significant injuries and death. Discussed frequent seizure triggers: sleep deprivation, medication non-compliance, use of illegal drugs/alcohol, stress, and others.   -We reviewed in detail the currently available antiepileptic drugs and potential side effects of antiepileptics were discussed at length, including but no limited to: hypersensitivity reactions (rash and others, some of which can be fatal), visual field changes (some of which may be irreversible), glaucoma, diplopia, kidney stones, osteopenia/osteoporosis/bone fractures, hyperthermia/anhydrosis, hyponatremia, tremors/abnormal movements, ataxia, dizziness, fatigue, increased risk for falls, risk for cardiac arrhythmias/syncope, gastrointestinal side  effects(hepatitis, pancreatitis, gastritis, ulcers), gingival hypertrophy/bleeding, drowsiness, sedation, anxiety/nervousness, increased risk for suicide, increased risk for depression, and psychosis.   -Recommend chronic vitamin D supplementation and regular exercise (if not contraindicated).   -Patient/family educated on risk for SUDEP (Sudden Death in Epilepsy). Counseling was provided on the importance of strict medication and follow up compliance. The patient/family understand the risks associated with non-adherence with the medical plan as outlined, including but not limited to an increased risk for breakthrough seizures, which may contribute to injuries, disability, status epilepticus, and even death.   -Counseling was also provided on potential effects of alcohol and other drugs, which may lower seizure threshold and/or affect the metabolism of antiepileptic drugs. We recommend avoidance of alcohol and illegal drugs.  -Avoid sleep deprivation.   -We extensively discussed the aspects related to safety in drivers who suffer from epilepsy. The patient is encourage to report to the Division of Motor Vehicles of any condition and/or spells related to confusion, disorientation, and/or loss of awareness and/or loss of consciousness; as these may pose a safety issue if they occur while operating a motor vehicle. The patient and/or family are ultimately responsible for exercising caution and abiding to regulations in place.   -Other seizure precautions were discussed at length, including no diving, no skydiving, no climbing or exposure to unprotected heights, no unsupervised swimming, no Jacuzzi or bathing in bathtubs or deep bodies of water. The patient/family have been advised about risks for operating any machinery while suffering from seizures / syncope / epilepsy and/or while taking antiepileptic drugs.   -The patient understands and agrees that due to the complexity of his/her diagnosis, results of any testing  and further recommendations will typically be discussed/made during a face to face encounter in my office. The patient and/or family further understands it is their responsibility to keep proper follow up.     Patient/family agree with plan, as outlined.         Manav Bai MD   Epilepsy and Neurodiagnostics.   Clinical  of Neurology [facility].   Diplomate in Neurology, Epilepsy, and Electrodiagnostic Medicine.   Office: [phone]  Fax: [phone]      o BILLING DOCUMENTATION:   Counseling:  I spent greater than 50% time face-to-face time of a total of 55 mins visit. Over 50% of the time of the visit today was spent on counseling and or coordination of care wtih the patient/family, with greater than 50% of the total time discussing:   o Diagnostic results, impressions, and/or recommended diagnostic studies, and coordination of care.   o Prognosis.  o Treatment recommendations, including risks, benefits, & alternatives.  o Instructions for treatment/management and/or follow-up.  o Importance of compliance with chosen treatment/management options.  o Risk factor modification.   o Patient & family education.  o Provided business card and/or instructions for follow-up & emergencies.

## 2018-07-11 NOTE — NON-PROVIDER
Interim History:  Barry Griffith comes back for f/u visit with his wife and his dad. His initial spell (GTC) was in April 2018 that was witnessed by his wife at home. Today, his wife reports that he had another GTC seizure on 6/15/18. His dad has reported that the patient has not done any lifestyle modification since his first seizure. He still continues to sleep late, having irregular sleep patterns, drinking alcohol, and using recreational substances. Dad also reports that pt has been abusing on OTC cough syrup (name of medication not mentioned) since he was in college. Pt reported that he has a cold at that time and was taking Nyquil when the second seizure occurred.     Pt is currently not on any medication for sz. He has not tried any seizure medication before. He does not drive.     No changes in mood. He denies homicidal or suicidal ideation.     He completed his EEG. MRI brain is yet to be scheduled by his wife.     Family is very concerned of his condition and wants to know how they can of help. All their questions were answered regarding disease progression, risk factors, and treatment options.    Family has been receptive and pt has been quiet in a corner during the entire conversation except for when he asked about him being released to drive in the future and possibilities of him not having seizures without taking any medications.            Seizure onset: 4/22/18     Seizure semiology: temporal aura? GTC.      Seizure types: Focal with secondary generalization?      Last seizure: 4/22/2018     Past AED’s: None.      Current AED regimen: None.      Prior neurologists: None.      Prior EEG’s: None.      Prior brain imaging: yes.      Driving status: No.      School/work status: yes.         Past medical history:   Past Medical History        Past Medical History:   Diagnosis Date   • ASTHMA       Asthma since infancy.            Past surgical history:   Past Surgical History   History reviewed. No  pertinent surgical history.        Family history:   Family History         Family History   Problem Relation Age of Onset   • Hypertension Mother     • Hyperlipidemia Father     • Hypertension Father     • Cancer Neg Hx     • Diabetes Neg Hx              Social history:   Social History   Social History            Social History   • Marital status: Single       Spouse name: N/A   • Number of children: 1   • Years of education: N/A           Occupational History   • caregiver for mentally ill patients [employer]             Social History Main Topics   • Smoking status: Never Smoker   • Smokeless tobacco: Never Used   • Alcohol use No         Comment: rare    • Drug use: No   • Sexual activity: Yes       Partners: Female       Birth control/ protection: Condom, Pill           Other Topics Concern   • Not on file          Social History Narrative   • No narrative on file            Current medications:       Current Outpatient Prescriptions   Medication   • albuterol 108 (90 BASE) MCG/ACT Aero Soln inhalation aerosol      No current facility-administered medications for this visit.          Medication Allergy:  No Known Allergies        Review of systems:   Constitutional: denies fever, night sweats, weight loss, or malaise/fatigue.   Eyes: denies acute vision change, eye pain or secretion.   Ears, Nose, Mouth, Throat: denies nasal secretion, nasal bleeding, difficulty swallowing, hearing loss, tinnitus, vertigo, ear pain, oral ulcers or lesions.   Endocrine: denies recent weight changes, heat or cold intolerance, polyuria, polydypsia, polyphagia,abnormal hair growth.  Cardiovascular: denies new onset of chest pain, palpitations, syncope, lower extremity edema, or dyspnea of exertion.  Pulmonary: denies shortness of breath, new onset of cough, hemoptysis, wheezing, chest pain or flu-like symptoms.   GI: denies nausea, vomiting, diarrhea, GI bleeding, change in appetite, abdominal pain, and change in  "bowel habits.  : denies urinary incontinence, retention or hematuria.  Heme/oncology: denies history of easy bruising or bleeding. No history of cancer. Allergy/immunology: denies hives/urticarial.  Dermatologic: denies new rash.  Musculoskeletal:denies joint swelling or pain, muscle pain, neck and back pain.   Neurologic: denies headaches, acute visual changes, facial droopiness, muscle weakness (focal or generalized), paresthesias, anesthesia, ataxia, change in speech or language, memory loss.  Psychiatric: denies symptoms of depression, anxiety, hallucinations, mood swings or changes, suicidal or homicidal thoughts.      Physical examination:   /60   Pulse 96   Temp 36.4 °C (97.5 °F)   Resp 16   Ht 1.753 m (5' 9\")   Wt 70.8 kg (156 lb)   SpO2 98%   BMI 23.04 kg/m²     General: Patient in no acute distress, pleasant and cooperative.  HEENT: Normocephalic, no signs of acute trauma.   Neck: supple, no meningeal signs or carotid bruits. There is normal range of motion. No tenderness on exam.   Chest: clear to auscultation. No cough.   CV: RRR, no murmurs.  Skin: no signs of acute rashes or trauma.   Musculoskeletal: joints exhibit full range of motion, without any pain to palpation. There are no signs of joint or muscle swelling. There is no tenderness to deep palpation of muscles.   Psychiatric: No hallucinatory behavior. Denies symptoms of depression or suicidal ideation. Mood and affect appear normal on exam.      NEUROLOGICAL EXAM:   Mental status, orientation: Awake, alert and fully oriented.   Speech and language: speech is clear and fluent. The patient is able to name, repeat and comprehend.   Memory: There is intact recollection of recent and remote events.   Cranial nerve exam: Pupils are 3-4 mm bilaterally and equally reactive to light and accommodation. Visual fields are intact by confrontation. There is no nystagmus on primary or secondary gaze. Intact full EOM in all directions of gaze. " Face appears symmetric. Sensation in the face is intact to light touch. Uvula is midline. Palate elevates symmetrically. Tongue is midline and without any signs of tongue biting or fasciculations.Sternocleidomastoid muscles exhibit is normal strength bilaterally. Shoulder shrug is intact bilaterally.   Motor exam: Strength is 5/5 in all extremities. Tone is normal. No abnormal movements were seen on exam.   Sensory exam reveals normal sense of light touch, proprioception, vibration and pinprick in all extremities.   Deep tendon reflexes:  2+ throughout. Plantar responses are flexor. There is no clonus.   Coordination: shows a normal finger-nose-finger. Normal rapidly alternating movements.   Gait: The patient was able to get up from seated position on first attempt without requiring assistance. Found to be steady when walking. Movements were fluid with normal arm swing. The patient was able to turn without difficulties or tendency to fall. Romberg exam was unremarkable.            ANCILLARY DATA REVIEWED:         Lab Data Review:  Urine drug screen 4/22/2018 positive for opiates and cannabinoid.            Records reviewed:   Chart reviewed.        Imaging:   CTB 4/22/18  NO ACUTE ABNORMALITIES ARE NOTED ON CT SCAN OF THE HEA  (I personally reviewed images).         EEG:     Video EEG; 7/10/18  INTERPRETATION: This is an abnormal video EEG recording in the awake, drowsy, and sleep state(s). Frequent left temporal sharps. Intermittent, subtle, left temporal slowing. The findings increase risk for seizures and suggest underlying area of cortical irritability and structural abnormality. Clinical and radiological correlation is recommended.         ASSESSMENT AND PLAN:    Localization-related epilepsy (HCC)    Pt's video EEG came back abnormal with frequent left temporal sharps and intermittent, subtle, left temporal slowing. His risk for seizures is increased especially with the lifestyle risk factors he is engaging  with such as drug use, sleep deprivation, and alcohol use.     We discussed detail at length about the possibility seizure reoccurrences especially with pt having an abnormal EEG and 2 reported seizures. We talked about risk factors that may provoke his seizures but they are not solely the causes of his seizures at this point. It is important that patient refrain from alcohol and substance/drug use, minimize stress, and have a structured sleep pattern of at least 7 hours of straight sleep at night.      Treatment options were discussed including initiation of anticonvulsants besides from Keppra. Side effects discussed including but not limited to dizziness, hyponatremia, mood changes, drowsiness, and memory loss.  Pt and family had concerns about side effects of Keppra in the past. Pt and family are aware that the goal is to prevent further spells from happening and even then, there is still a possibility of seizures to occur while he is on medication. Depending on the progression of his condition, adjunct therapy may be necessary if his seizures will not be controlled with monotherapy.     Pt is adamant about waiting and observing for now. He refuses to start on any medication. His wife will call the office if patient changes his mind in the future. Discussed with them about the risk of seizures including body injuries and even death if patient goes into status epilepticus.     We also discussed about a possibility of an EMU admission in the future if pt decides to explore more about his epilepsy.      Awaiting for MRI brain. Wife to schedule.     He will to continue with no driving for now. Anticipating to resume driving after 3 months of last seizure if he remains sz free.         FOLLOW-UP:   Return in about 3 months         EDUCATION AND COUNSELING:  -Education was provided to the patient and/or family regarding diagnosis and prognosis. The chronic and unpredictable nature of the condition were discussed. There  is increased risk for additional events, which may carry potential for significant injuries and death. Discussed frequent seizure triggers: sleep deprivation, medication non-compliance, use of illegal drugs/alcohol, stress, and others.   -We reviewed in detail the current recommendations for use of antiepileptic regimen as well as potential side effects of antiepileptics.   -Recommend chronic vitamin D supplementation and regular exercise (if not contraindicated).   -Patient/family educated on risk for SUDEP (Sudden Death in Epilepsy). Counseling was provided on the importance of strict compliance. The patient/family understand the risks associated with non-adherence with the medical plan as outlined, including but not limited to an increased risk for breakthrough seizures, which may contribute to injuries, disability, status epilepticus, and even death.   -Counseling was also provided on potential effects of alcohol and other drugs, which may lower seizure threshold and/or affect the metabolism of antiepileptic drugs. We recommend avoidance of alcohol and illegal drugs.  -Avoid sleep deprivation.   -We extensively discussed the aspects related to safety in drivers who suffer from epilepsy. The patient is encourage to report to the Division of Motor Vehicles of any condition and/or spells related to confusion, disorientation, and/or loss of awareness and/or loss of consciousness; as these may pose a safety issue if they occur while operating a motor vehicle. The patient and/or family are ultimately responsible for exercising caution and abiding to regulations in place.   -Other seizure precautions were discussed at length, including no diving, no skydiving, no climbing or exposure to unprotected heights, no unsupervised swimming, no Jacuzzi or bathing in bathtubs or deep bodies of water. The patient/family have been advised about risks for operating any machinery while suffering from seizures / syncope / epilepsy  and/or while taking antiepileptic drugs.   -The patient understands and agrees that due to the complexity of his/her diagnosis, results of any testing and further recommendations will typically be discussed/made during a face to face encounter in my office. The patient and/or family further understands it is their responsibility to keep proper follow up.      Patient/family agree with plan, as outlined.

## 2018-07-17 ENCOUNTER — TELEPHONE (OUTPATIENT)
Dept: MEDICAL GROUP | Facility: PHYSICIAN GROUP | Age: 28
End: 2018-07-17

## 2018-07-20 ENCOUNTER — TELEPHONE (OUTPATIENT)
Dept: NEUROLOGY | Facility: MEDICAL CENTER | Age: 28
End: 2018-07-20

## 2018-08-03 DIAGNOSIS — J45.21 MILD INTERMITTENT ASTHMA WITH ACUTE EXACERBATION: ICD-10-CM

## 2018-08-03 NOTE — TELEPHONE ENCOUNTER
Was the patient seen in the last year in this department? Yes    Does patient have an active prescription for medications requested? NO     Received Request Via: Pharmacy

## 2018-08-04 RX ORDER — ALBUTEROL SULFATE 90 UG/1
2 AEROSOL, METERED RESPIRATORY (INHALATION) EVERY 6 HOURS PRN
Qty: 8.5 G | Refills: 0 | Status: SHIPPED | OUTPATIENT
Start: 2018-08-04 | End: 2018-08-16 | Stop reason: SDUPTHER

## 2018-08-09 ENCOUNTER — HOSPITAL ENCOUNTER (OUTPATIENT)
Dept: RADIOLOGY | Facility: MEDICAL CENTER | Age: 28
End: 2018-08-09
Attending: PSYCHIATRY & NEUROLOGY
Payer: COMMERCIAL

## 2018-08-09 DIAGNOSIS — R56.9 SEIZURE (HCC): ICD-10-CM

## 2018-08-09 PROCEDURE — A9585 GADOBUTROL INJECTION: HCPCS | Performed by: PSYCHIATRY & NEUROLOGY

## 2018-08-09 PROCEDURE — 700117 HCHG RX CONTRAST REV CODE 255: Performed by: PSYCHIATRY & NEUROLOGY

## 2018-08-09 PROCEDURE — 70553 MRI BRAIN STEM W/O & W/DYE: CPT

## 2018-08-09 RX ORDER — GADOBUTROL 604.72 MG/ML
7.5 INJECTION INTRAVENOUS ONCE
Status: COMPLETED | OUTPATIENT
Start: 2018-08-09 | End: 2018-08-09

## 2018-08-09 RX ADMIN — GADOBUTROL 7.5 ML: 604.72 INJECTION INTRAVENOUS at 10:31

## 2018-08-15 ENCOUNTER — TELEPHONE (OUTPATIENT)
Dept: MEDICAL GROUP | Facility: PHYSICIAN GROUP | Age: 28
End: 2018-08-15

## 2018-08-15 ENCOUNTER — APPOINTMENT (OUTPATIENT)
Dept: MEDICAL GROUP | Facility: PHYSICIAN GROUP | Age: 28
End: 2018-08-15

## 2018-08-16 DIAGNOSIS — J45.21 MILD INTERMITTENT ASTHMA WITH ACUTE EXACERBATION: ICD-10-CM

## 2018-08-16 RX ORDER — ALBUTEROL SULFATE 90 UG/1
2 AEROSOL, METERED RESPIRATORY (INHALATION) EVERY 6 HOURS PRN
Qty: 8.5 G | Refills: 0 | Status: SHIPPED | OUTPATIENT
Start: 2018-08-16 | End: 2018-10-31 | Stop reason: SDUPTHER

## 2018-09-26 ENCOUNTER — HOSPITAL ENCOUNTER (EMERGENCY)
Facility: MEDICAL CENTER | Age: 28
End: 2018-09-26
Attending: EMERGENCY MEDICINE
Payer: COMMERCIAL

## 2018-09-26 ENCOUNTER — APPOINTMENT (OUTPATIENT)
Dept: RADIOLOGY | Facility: MEDICAL CENTER | Age: 28
End: 2018-09-26
Attending: EMERGENCY MEDICINE
Payer: COMMERCIAL

## 2018-09-26 VITALS
WEIGHT: 154 LBS | SYSTOLIC BLOOD PRESSURE: 152 MMHG | HEIGHT: 69 IN | DIASTOLIC BLOOD PRESSURE: 79 MMHG | TEMPERATURE: 97.7 F | HEART RATE: 90 BPM | RESPIRATION RATE: 14 BRPM | OXYGEN SATURATION: 95 % | BODY MASS INDEX: 22.81 KG/M2

## 2018-09-26 DIAGNOSIS — R56.9 SEIZURE (HCC): ICD-10-CM

## 2018-09-26 LAB
ALBUMIN SERPL BCP-MCNC: 5 G/DL (ref 3.2–4.9)
ALBUMIN/GLOB SERPL: 1.6 G/DL
ALP SERPL-CCNC: 68 U/L (ref 30–99)
ALT SERPL-CCNC: 47 U/L (ref 2–50)
ANION GAP SERPL CALC-SCNC: 28 MMOL/L (ref 0–11.9)
ANION GAP SERPL CALC-SCNC: 8 MMOL/L (ref 0–11.9)
APTT PPP: 23.1 SEC (ref 24.7–36)
AST SERPL-CCNC: 45 U/L (ref 12–45)
BASOPHILS # BLD AUTO: 0 % (ref 0–1.8)
BASOPHILS # BLD: 0 K/UL (ref 0–0.12)
BILIRUB SERPL-MCNC: 0.6 MG/DL (ref 0.1–1.5)
BUN SERPL-MCNC: 12 MG/DL (ref 8–22)
BUN SERPL-MCNC: 13 MG/DL (ref 8–22)
CALCIUM SERPL-MCNC: 10.4 MG/DL (ref 8.5–10.5)
CALCIUM SERPL-MCNC: 9.1 MG/DL (ref 8.5–10.5)
CHLORIDE SERPL-SCNC: 105 MMOL/L (ref 96–112)
CHLORIDE SERPL-SCNC: 108 MMOL/L (ref 96–112)
CO2 SERPL-SCNC: 25 MMOL/L (ref 20–33)
CO2 SERPL-SCNC: 8 MMOL/L (ref 20–33)
CREAT SERPL-MCNC: 1.23 MG/DL (ref 0.5–1.4)
CREAT SERPL-MCNC: 1.38 MG/DL (ref 0.5–1.4)
EOSINOPHIL # BLD AUTO: 0.45 K/UL (ref 0–0.51)
EOSINOPHIL NFR BLD: 3 % (ref 0–6.9)
ERYTHROCYTE [DISTWIDTH] IN BLOOD BY AUTOMATED COUNT: 42.9 FL (ref 35.9–50)
GLOBULIN SER CALC-MCNC: 3.2 G/DL (ref 1.9–3.5)
GLUCOSE SERPL-MCNC: 121 MG/DL (ref 65–99)
GLUCOSE SERPL-MCNC: 80 MG/DL (ref 65–99)
HCT VFR BLD AUTO: 52.6 % (ref 42–52)
HGB BLD-MCNC: 16.6 G/DL (ref 14–18)
INR PPP: 1.02 (ref 0.87–1.13)
LG PLATELETS BLD QL SMEAR: NORMAL
LYMPHOCYTES # BLD AUTO: 10.42 K/UL (ref 1–4.8)
LYMPHOCYTES NFR BLD: 69 % (ref 22–41)
MACROCYTES BLD QL SMEAR: ABNORMAL
MANUAL DIFF BLD: NORMAL
MCH RBC QN AUTO: 30.8 PG (ref 27–33)
MCHC RBC AUTO-ENTMCNC: 31.6 G/DL (ref 33.7–35.3)
MCV RBC AUTO: 97.6 FL (ref 81.4–97.8)
MONOCYTES # BLD AUTO: 0.45 K/UL (ref 0–0.85)
MONOCYTES NFR BLD AUTO: 3 % (ref 0–13.4)
MORPHOLOGY BLD-IMP: NORMAL
NEUTROPHILS # BLD AUTO: 3.78 K/UL (ref 1.82–7.42)
NEUTROPHILS NFR BLD: 21 % (ref 44–72)
NEUTS BAND NFR BLD MANUAL: 4 % (ref 0–10)
NRBC # BLD AUTO: 0 K/UL
NRBC BLD-RTO: 0 /100 WBC
PLATELET # BLD AUTO: 157 K/UL (ref 164–446)
PLATELET BLD QL SMEAR: NORMAL
PMV BLD AUTO: 11.5 FL (ref 9–12.9)
POTASSIUM SERPL-SCNC: 3.9 MMOL/L (ref 3.6–5.5)
POTASSIUM SERPL-SCNC: 4 MMOL/L (ref 3.6–5.5)
PROT SERPL-MCNC: 8.2 G/DL (ref 6–8.2)
PROTHROMBIN TIME: 13.5 SEC (ref 12–14.6)
RBC # BLD AUTO: 5.39 M/UL (ref 4.7–6.1)
RBC BLD AUTO: PRESENT
SODIUM SERPL-SCNC: 141 MMOL/L (ref 135–145)
SODIUM SERPL-SCNC: 141 MMOL/L (ref 135–145)
VARIANT LYMPHS BLD QL SMEAR: NORMAL
WBC # BLD AUTO: 15.1 K/UL (ref 4.8–10.8)

## 2018-09-26 PROCEDURE — 85730 THROMBOPLASTIN TIME PARTIAL: CPT

## 2018-09-26 PROCEDURE — 85007 BL SMEAR W/DIFF WBC COUNT: CPT

## 2018-09-26 PROCEDURE — 85027 COMPLETE CBC AUTOMATED: CPT

## 2018-09-26 PROCEDURE — 80053 COMPREHEN METABOLIC PANEL: CPT

## 2018-09-26 PROCEDURE — 36415 COLL VENOUS BLD VENIPUNCTURE: CPT

## 2018-09-26 PROCEDURE — 700105 HCHG RX REV CODE 258: Performed by: EMERGENCY MEDICINE

## 2018-09-26 PROCEDURE — 80048 BASIC METABOLIC PNL TOTAL CA: CPT

## 2018-09-26 PROCEDURE — 700111 HCHG RX REV CODE 636 W/ 250 OVERRIDE (IP)

## 2018-09-26 PROCEDURE — 94760 N-INVAS EAR/PLS OXIMETRY 1: CPT

## 2018-09-26 PROCEDURE — 99284 EMERGENCY DEPT VISIT MOD MDM: CPT

## 2018-09-26 PROCEDURE — 85610 PROTHROMBIN TIME: CPT

## 2018-09-26 PROCEDURE — 70450 CT HEAD/BRAIN W/O DYE: CPT

## 2018-09-26 RX ORDER — LORAZEPAM 2 MG/ML
INJECTION INTRAMUSCULAR
Status: COMPLETED
Start: 2018-09-26 | End: 2018-09-26

## 2018-09-26 RX ORDER — ONDANSETRON 4 MG/1
TABLET, ORALLY DISINTEGRATING ORAL
Status: DISCONTINUED
Start: 2018-09-26 | End: 2018-09-26 | Stop reason: HOSPADM

## 2018-09-26 RX ORDER — LEVETIRACETAM 500 MG/1
500 TABLET ORAL ONCE
Status: DISCONTINUED | OUTPATIENT
Start: 2018-09-26 | End: 2018-09-26 | Stop reason: HOSPADM

## 2018-09-26 RX ORDER — ONDANSETRON 2 MG/ML
INJECTION INTRAMUSCULAR; INTRAVENOUS
Status: COMPLETED
Start: 2018-09-26 | End: 2018-09-26

## 2018-09-26 RX ORDER — SODIUM CHLORIDE 9 MG/ML
1000 INJECTION, SOLUTION INTRAVENOUS ONCE
Status: COMPLETED | OUTPATIENT
Start: 2018-09-26 | End: 2018-09-26

## 2018-09-26 RX ORDER — LEVETIRACETAM 500 MG/1
500 TABLET ORAL 2 TIMES DAILY
Qty: 60 TAB | Refills: 0 | Status: SHIPPED | OUTPATIENT
Start: 2018-09-26 | End: 2018-11-01

## 2018-09-26 RX ADMIN — ONDANSETRON HYDROCHLORIDE 4 MG: 2 INJECTION INTRAMUSCULAR; INTRAVENOUS at 11:20

## 2018-09-26 RX ADMIN — SODIUM CHLORIDE 1000 ML: 9 INJECTION, SOLUTION INTRAVENOUS at 13:06

## 2018-09-26 RX ADMIN — LORAZEPAM 2 MG: 2 INJECTION INTRAMUSCULAR; INTRAVENOUS at 11:20

## 2018-09-26 ASSESSMENT — LIFESTYLE VARIABLES: DO YOU DRINK ALCOHOL: NO

## 2018-09-26 NOTE — ED NOTES
Shayy from Lab called with critical result of Co2=8 at 1236. Critical lab result read back to Kacey.   Dr. Torres notified of critical lab result at 1236.  Critical lab result read back by Dr. Torres.

## 2018-09-26 NOTE — ED NOTES
Pt given discharge instructions/prescription sent to pharm of choosing/ home care instructions explained/ pt understands the need for follow up, pt verbalized understanding of instructions given, pt ambulatory to ER lanny.
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
## 2018-09-27 ENCOUNTER — PATIENT OUTREACH (OUTPATIENT)
Dept: HEALTH INFORMATION MANAGEMENT | Facility: OTHER | Age: 28
End: 2018-09-27

## 2018-10-01 ENCOUNTER — TELEPHONE (OUTPATIENT)
Dept: NEUROLOGY | Facility: MEDICAL CENTER | Age: 28
End: 2018-10-01

## 2018-10-01 NOTE — TELEPHONE ENCOUNTER
Jami returned my called, I asked her if pt was taking keppra ED? She sttaed no the bottle just read levetiracetam 500 mg tab. I did let her know we will be reporting his sz to DMV which she understood.    I did let her know. Thank you.

## 2018-10-01 NOTE — TELEPHONE ENCOUNTER
Jami called in regards to pt's recent ct results, she wanted to know if everything turned out ok, she apologized about missing his appt today w/ Dr Bai stated they had forgotten about it. Also he had a seizure last Monday 09/24/18 and Jami states he forgot to take his keppra that day but is always good about taking it.

## 2018-10-09 ENCOUNTER — HOSPITAL ENCOUNTER (OUTPATIENT)
Facility: MEDICAL CENTER | Age: 28
End: 2018-10-09
Payer: COMMERCIAL

## 2018-10-09 LAB
BDY FAT % MEASURED: 10.1 %
BP DIAS: 70 MMHG
BP SYS: 108 MMHG
DIABETES HTDIA: NO
EVENT NAME HTEVT: NORMAL
HYPERTENSION HTHYP: NO
SCREENING LOC CITY HTCIT: NORMAL
SCREENING LOC STATE HTSTA: NORMAL
SCREENING LOCATION HTLOC: NORMAL
SUBSCRIBER ID HTSID: NORMAL

## 2018-10-10 LAB
CHOLEST SERPL-MCNC: 159 MG/DL (ref 100–199)
FASTING STATUS PATIENT QL REPORTED: NORMAL
GLUCOSE SERPL-MCNC: 60 MG/DL (ref 65–99)
HDLC SERPL-MCNC: 40 MG/DL
LDLC SERPL CALC-MCNC: 85 MG/DL
TRIGL SERPL-MCNC: 171 MG/DL (ref 0–149)

## 2018-10-11 ENCOUNTER — TELEPHONE (OUTPATIENT)
Dept: MEDICAL GROUP | Facility: PHYSICIAN GROUP | Age: 28
End: 2018-10-11

## 2018-10-11 NOTE — TELEPHONE ENCOUNTER
----- Message from Mine Nunes M.D. sent at 10/10/2018  5:45 PM PDT -----  Cholesterol panel came back all good except elevation of triglycerides.  Triglycerides are from carbs and sweets so avoid sweets and decrease the carbs.  Glucose is below normal.  This may be because you were fasting  You have not seen me in over a years so please schedule an appointment for follow-up.

## 2018-10-11 NOTE — TELEPHONE ENCOUNTER
Spoke with patient and let them know Mine Nunes M.D.'s message.  Appt scheduled for 10/31/18 @ 2 PM with PCP.

## 2018-10-30 ENCOUNTER — TELEPHONE (OUTPATIENT)
Dept: MEDICAL GROUP | Facility: PHYSICIAN GROUP | Age: 28
End: 2018-10-30

## 2018-10-30 NOTE — TELEPHONE ENCOUNTER
ESTABLISHED PATIENT PRE-VISIT PLANNING     Note: Patient will not be contacted if there is no indication to call.     1.  Reviewed notes from the last few office visits within the medical group: Yes    2.  If any orders were placed at last visit or intended to be done for this visit (i.e. 6 mos follow-up), do we have Results/Consult Notes?        •  Labs - Labs were not ordered at last office visit.   Note: If patient appointment is for lab review and patient did not complete labs, check with provider if OK to reschedule patient until labs completed.       •  Imaging - Imaging was not ordered at last office visit.       •  Referrals - Referral ordered, patient has NOT been seen.Pt was seen by Neurology    3. Is this appointment scheduled as a Hospital Follow-Up? No    4.  Immunizations were updated in Epic using WebIZ?: Epic matches WebIZ       •  Web Iz Recommendations: HEPATITIS A , TD and VARICELLA (Chicken Pox)     5.  Patient is due for the following Health Maintenance Topics:   Health Maintenance Due   Topic Date Due   • IMM VARICELLA (CHICKENPOX) VACCINE (1 of 2 - 2-dose adolescent series) 01/10/2004   • IMM PNEUMOCOCCAL 19-64 (ADULT) MEDIUM RISK SERIES (1 of 1 - PPSV23) 01/10/2010   • IMM DTaP/Tdap/Td Vaccine (7 - Td) 04/11/2015   • IMM INFLUENZA (1) 09/01/2018       - Patient has completed FLU, HEPATITIS B and TDAP Immunization(s) per WebIZ. Chart has been updated.    6.  MDX printed for Provider? NO    7.  Patient was NOT informed to arrive 15 min prior to their scheduled appointment and bring in their medication bottles.

## 2018-10-31 ENCOUNTER — OFFICE VISIT (OUTPATIENT)
Dept: MEDICAL GROUP | Facility: PHYSICIAN GROUP | Age: 28
End: 2018-10-31
Payer: COMMERCIAL

## 2018-10-31 VITALS
BODY MASS INDEX: 21.65 KG/M2 | HEART RATE: 68 BPM | OXYGEN SATURATION: 97 % | SYSTOLIC BLOOD PRESSURE: 100 MMHG | TEMPERATURE: 100 F | WEIGHT: 146.16 LBS | DIASTOLIC BLOOD PRESSURE: 60 MMHG | HEIGHT: 69 IN

## 2018-10-31 DIAGNOSIS — Z23 NEED FOR DIPHTHERIA-TETANUS-PERTUSSIS (TDAP) VACCINE: ICD-10-CM

## 2018-10-31 DIAGNOSIS — J45.20 MILD INTERMITTENT ASTHMA WITHOUT COMPLICATION: ICD-10-CM

## 2018-10-31 DIAGNOSIS — R56.9 SEIZURE (HCC): ICD-10-CM

## 2018-10-31 PROCEDURE — 90471 IMMUNIZATION ADMIN: CPT | Performed by: FAMILY MEDICINE

## 2018-10-31 PROCEDURE — 99213 OFFICE O/P EST LOW 20 MIN: CPT | Mod: 25 | Performed by: FAMILY MEDICINE

## 2018-10-31 PROCEDURE — 90715 TDAP VACCINE 7 YRS/> IM: CPT | Performed by: FAMILY MEDICINE

## 2018-10-31 RX ORDER — ALBUTEROL SULFATE 90 UG/1
2 AEROSOL, METERED RESPIRATORY (INHALATION) EVERY 6 HOURS PRN
Qty: 8.5 G | Refills: 5 | Status: SHIPPED | OUTPATIENT
Start: 2018-10-31 | End: 2019-12-04 | Stop reason: SDUPTHER
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
## 2018-11-19 ENCOUNTER — OFFICE VISIT (OUTPATIENT)
Dept: NEUROLOGY | Facility: MEDICAL CENTER | Age: 28
End: 2018-11-19
Payer: COMMERCIAL

## 2018-11-19 VITALS
RESPIRATION RATE: 16 BRPM | DIASTOLIC BLOOD PRESSURE: 68 MMHG | HEART RATE: 96 BPM | BODY MASS INDEX: 22.36 KG/M2 | HEIGHT: 69 IN | OXYGEN SATURATION: 98 % | WEIGHT: 151 LBS | SYSTOLIC BLOOD PRESSURE: 118 MMHG | TEMPERATURE: 96.6 F

## 2018-11-19 DIAGNOSIS — Z13.31 SCREENING FOR DEPRESSION: ICD-10-CM

## 2018-11-19 DIAGNOSIS — R94.01 ABNORMAL EEG: ICD-10-CM

## 2018-11-19 DIAGNOSIS — G40.109 LOCALIZATION-RELATED EPILEPSY (HCC): ICD-10-CM

## 2018-11-19 DIAGNOSIS — Z02.4 ENCOUNTER FOR EXAMINATION FOR DRIVING LICENSE: ICD-10-CM

## 2018-11-19 PROCEDURE — 99215 OFFICE O/P EST HI 40 MIN: CPT | Performed by: PSYCHIATRY & NEUROLOGY

## 2018-11-19 RX ORDER — LEVETIRACETAM 500 MG/1
500 TABLET ORAL 2 TIMES DAILY
Qty: 60 TAB | Refills: 11 | Status: SHIPPED | OUTPATIENT
Start: 2018-11-19 | End: 2019-01-02

## 2018-11-19 NOTE — PROGRESS NOTES
Chief Complaint   Patient presents with   • Follow-Up     Localization-related epilepsy        Problem List Items Addressed This Visit     None      Visit Diagnoses     Localization-related epilepsy (HCC)        Relevant Medications    levETIRAcetam (KEPPRA) 500 MG Tab    Screening for depression        Abnormal EEG        Encounter for examination for driving license              Interim history:  Barry Griffith returns in fu. He was last seen in the ED on 9/26/18, where he was taken post ictal after having had two more GTC seizures at home. That day he started Keppra 500 mg po bid and no side effects and no further spells. He still does not believe he has epilepsy, despite having abnormal eeg and several seizures.     He came by himself. Not driving.     Mood is great.     Working, states no risks at his job.         Past medical history:   Past Medical History:   Diagnosis Date   • ASTHMA     Asthma since infancy.   • Seizure (HCC)        Past surgical history:   History reviewed. No pertinent surgical history.    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[employer]    • psych coordinator [employer]      Social History Main Topics   • Smoking status: Never Smoker   • Smokeless tobacco: Never Used   • Alcohol use No      Comment: rare    • Drug use: Yes     Types: Marijuana      Comment: rare   • Sexual activity: Yes     Partners: Female     Birth control/ protection: Condom, Pill     Other Topics Concern   • Not on file     Social History Narrative   • No narrative on file       Current medications:   Current Outpatient Prescriptions   Medication   •  "levETIRAcetam (KEPPRA) 500 MG Tab   • albuterol 108 (90 Base) MCG/ACT Aero Soln inhalation aerosol     No current facility-administered medications for this visit.        Medication Allergy:  No Known Allergies    Review of systems:   Constitutional: denies fever, night sweats, weight loss.   Eyes: denies acute vision change, eye pain or secretion.   Ears, Nose, Mouth, Throat: denies nasal secretion, nasal bleeding, difficulty swallowing, hearing loss, tinnitus, vertigo, ear pain, acute dental problems, oral ulcers or lesions.   Endocrine: denies recent weight changes, heat or cold intolerance, polyuria, polydypsia, polyphagia,abnormal hair growth.  Cardiovascular: denies new onset of chest pain, palpitations, syncope, or dyspnea of exertion.  Pulmonary: denies shortness of breath, new onset of cough, hemoptysis, wheezing, chest pain or flu-like symptoms.   GI: denies nausea, vomiting, diarrhea, GI bleeding, change in appetite, abdominal pain, and change in bowel habits.  : denies dysuria, urinary incontinence, hematuria.  Heme/oncology: denies history of easy bruising or bleeding. No history of cancer, DVTor PE.  Allergy/immunology: denies hives/urticaria, or itching.   Dermatologic: denies new rash, or new skin lesions.  Musculoskeletal:denies joint swelling or pain, muscle pain, neck and back pain.   Neurologic: denies headaches, acute visual changes, facial droopiness, muscle weakness (focal or generalized), paresthesias, anesthesia, ataxia, change in speech or language, memory loss, abnormal movements.   Psychiatric: denies symptoms of depression, anxiety, hallucinations, mood swings or changes, suicidal or homicidal thoughts.     Physical examination:   Vitals:    11/19/18 1120   BP: 118/68   BP Location: Left arm   Patient Position: Sitting   BP Cuff Size: Adult   Pulse: 96   Resp: 16   Temp: 35.9 °C (96.6 °F)   TempSrc: Temporal   SpO2: 98%   Weight: 68.5 kg (151 lb)   Height: 1.753 m (5' 9\")     General: " Patient in no acute distress, pleasant and cooperative.  HEENT: Normocephalic, no signs of acute trauma.   Neck: supple, no meningeal signs or carotid bruits. There is normal range of motion. No tenderness on exam.   Chest: clear to auscultation. No cough.   CV: RRR, no murmurs.   Abdomen: soft, non distended or tender.   Skin: no signs of acute rashes or trauma.   Musculoskeletal: joints exhibit full range of motion, without any pain to palpation. There are no signs of joint or muscle swelling. There is no tenderness to deep palpation of muscles.   Psychiatric: No hallucinatory behavior. Denies symptoms of depression or suicidal ideation. Mood and affect appear normal on exam.      NEUROLOGICAL EXAM:   Mental status, orientation: Awake, alert and fully oriented.   Speech and language: speech is clear and fluent. The patient is able to name, repeat and comprehend.   Memory: There is intact recollection of recent and remote events.   Cranial nerve exam: Pupils are 3-4 mm bilaterally and equally reactive to light and accommodation. Visual fields are intact by confrontation. There is no nystagmus on primary or secondary gaze. Intact full EOM in all directions of gaze. Face appears symmetric. Sensation in the face is intact to light touch. Uvula is midline. Palate elevates symmetrically. Tongue is midline and without any signs of tongue biting or fasciculations.Sternocleidomastoid muscles exhibit is normal strength bilaterally. Shoulder shrug is intact bilaterally.   Motor exam: Strength is 5/5 in all extremities. Tone is normal. No abnormal movements were seen on exam.   Sensory exam reveals normal sense of light touch, proprioception, vibration and pinprick in all extremities.   Deep tendon reflexes:  2+ throughout. Plantar responses are flexor. There is no clonus.   Coordination: shows a normal finger-nose-finger. Normal rapidly alternating movements.   Gait: The patient was able to get up from seated position on  first attempt without requiring assistance. Found to be steady when walking. Movements were fluid with normal arm swing. The patient was able to turn without difficulties or tendency to fall. Romberg exam unremarkable.        ANCILLARY DATA REVIEWED:     Lab Data Review:  Reviewed in chart, including most recent labs in ED.     Records reviewed:   Chart reviewed.        Imaging:   CTB 18  NO ACUTE ABNORMALITIES ARE NOTED ON CT SCAN OF THE HEA  (I personally reviewed images).      MRI brain w/wo, 8/10/2018:  1. MRI of the brain without and with contrast within normal limits.  2. No evidence of mass lesion, heterotopic gray matter, gross cortical dysplasia or mesial temporal sclerosis.  (I personally reviewed images, no clear abnormalities found).          EE/10/2018:   INTERPRETATION:  This is an abnormal video EEG recording in the awake, drowsy, and sleep state(s). Frequent left temporal sharps. Intermittent, subtle, left temporal slowing. The findings increase risk for seizures and suggest underlying area of cortical irritability and structural abnormality. Clinical and radiological correlation is recommended.        ASSESSMENT AND PLAN:    1. Localization-related epilepsy (HCC)  - levETIRAcetam (KEPPRA) 500 MG Tab; Take 1 Tab by mouth 2 times a day.  Dispense: 60 Tab; Refill: 11    2. Screening for depression    3. Abnormal EEG    4. Encounter for examination for driving license          CLINICAL DISCUSSION:   Several GTC seizures. Some may have been provoked but other not: sleep deprivation, drug use, stress, and for his second one: the use of cough syrup which I learned he had used almost daily since he was a teenager, no longer using.   Focal features of convulsions, wife described left arm deviation, and his reported h/o chlorine like smell episodes, pointing to focal seizure likely out of the temporal lobe. His EEG is abnormal with left temporal sharps, which increases risk for seizures. I believe  based on these factors he does suffer from epilepsy.   He was reluctant to start AED but has been on Keppra 500 mg po bid since last seizures on 9/2018. He is doing well, no further spells and no side effects.   We discussed compliance at length. We had extensive discussion about possible complications of seizures, including but not limited to injuries, status epilepticus, death.   For now he will remain on no driving status and we will reassess this again after three months from 9/26/18. We discussed his job functions and it appears he should be able to continue to work without restrictions.         FOLLOW-UP:   3 months.            EDUCATION AND COUNSELING:  -Education was provided to the patient and/or family regarding diagnosis and prognosis. The chronic and unpredictable nature of the condition were discussed. There is increased risk for additional events, which may carry potential for significant injuries and death. Discussed frequent seizure triggers: sleep deprivation, medication non-compliance, use of illegal drugs/alcohol, stress, and others.   -We reviewed in detail the currently available antiepileptic drugs and potential side effects of antiepileptics were discussed at length, including but no limited to: hypersensitivity reactions (rash and others, some of which can be fatal), visual field changes (some of which may be irreversible), glaucoma, diplopia, kidney stones, osteopenia/osteoporosis/bone fractures, hyperthermia/anhydrosis, hyponatremia, tremors/abnormal movements, ataxia, dizziness, fatigue, increased risk for falls, risk for cardiac arrhythmias/syncope, gastrointestinal side effects(hepatitis, pancreatitis, gastritis, ulcers), gingival hypertrophy/bleeding, drowsiness, sedation, anxiety/nervousness, increased risk for suicide, increased risk for depression, and psychosis.   -Recommend chronic vitamin D supplementation and regular exercise (if not contraindicated).   -Patient/family educated  on risk for SUDEP (Sudden Death in Epilepsy). Counseling was provided on the importance of strict medication and follow up compliance. The patient/family understand the risks associated with non-adherence with the medical plan as outlined, including but not limited to an increased risk for breakthrough seizures, which may contribute to injuries, disability, status epilepticus, and even death.   -Counseling was also provided on potential effects of alcohol and other drugs, which may lower seizure threshold and/or affect the metabolism of antiepileptic drugs. We recommend avoidance of alcohol and illegal drugs.  -Avoid sleep deprivation.   -We extensively discussed the aspects related to safety in drivers who suffer from epilepsy. The patient is encourage to report to the Division of Motor Vehicles of any condition and/or spells related to confusion, disorientation, and/or loss of awareness and/or loss of consciousness; as these may pose a safety issue if they occur while operating a motor vehicle. The patient and/or family are ultimately responsible for exercising caution and abiding to regulations in place.   -Other seizure precautions were discussed at length, including no diving, no skydiving, no climbing or exposure to unprotected heights, no unsupervised swimming, no Jacuzzi or bathing in bathtubs or deep bodies of water. The patient/family have been advised about risks for operating any machinery while suffering from seizures / syncope / epilepsy and/or while taking antiepileptic drugs.   -The patient understands and agrees that due to the complexity of his/her diagnosis, results of any testing and further recommendations will typically be discussed/made during a face to face encounter in my office. The patient and/or family further understands it is their responsibility to keep proper follow up.      Patient agrees with plan, as outlined.         Manav Bai MD   Epilepsy and Neurodiagnostics.    Clinical  of Neurology [facility].   Diplomate in Neurology, Epilepsy, and Electrodiagnostic Medicine.   Office: [phone]  Fax: [phone]      o BILLING DOCUMENTATION:   Counseling:  I spent greater than 50% time face-to-face time of a total of 50 mins visit. Over 50% of the time of the visit today was spent on counseling and or coordination of care wtih the patient/family, with greater than 50% of the total time discussing:   o Diagnostic results, impressions, and/or recommended diagnostic studies, and coordination of care.   o Prognosis.  o Treatment recommendations, including risks, benefits, & alternatives.  o Instructions for treatment/management and/or follow-up.  o Importance of compliance with chosen treatment/management options.  o Risk factor modification.   o Patient & family education.  o Provided business card and/or instructions for follow-up & emergencies.

## 2018-11-20 ENCOUNTER — TELEPHONE (OUTPATIENT)
Dept: NEUROLOGY | Facility: MEDICAL CENTER | Age: 28
End: 2018-11-20

## 2018-11-20 NOTE — TELEPHONE ENCOUNTER
Per dr moreland I called pt to inform him that we scheduled him for a f/v for 12/26/18 @ 9 am to return my call to confirm. I also called his spouse Jami and spoke with her and gave her the appointment date and also mailed the appt letter out to pt's address.

## 2018-12-26 ENCOUNTER — OFFICE VISIT (OUTPATIENT)
Dept: NEUROLOGY | Facility: MEDICAL CENTER | Age: 28
End: 2018-12-26
Payer: COMMERCIAL

## 2018-12-26 VITALS
HEART RATE: 84 BPM | WEIGHT: 149 LBS | SYSTOLIC BLOOD PRESSURE: 118 MMHG | OXYGEN SATURATION: 94 % | RESPIRATION RATE: 16 BRPM | BODY MASS INDEX: 22 KG/M2 | DIASTOLIC BLOOD PRESSURE: 72 MMHG | TEMPERATURE: 96.3 F

## 2018-12-26 DIAGNOSIS — Z13.31 SCREENING FOR DEPRESSION: ICD-10-CM

## 2018-12-26 DIAGNOSIS — Z02.4 ENCOUNTER FOR EXAMINATION FOR DRIVING LICENSE: ICD-10-CM

## 2018-12-26 DIAGNOSIS — G40.109 LOCALIZATION-RELATED EPILEPSY (HCC): ICD-10-CM

## 2018-12-26 PROCEDURE — 99214 OFFICE O/P EST MOD 30 MIN: CPT | Performed by: PSYCHIATRY & NEUROLOGY

## 2018-12-26 NOTE — PROGRESS NOTES
Chief Complaint   Patient presents with   • Follow-Up     Localization-related epilepsy        Problem List Items Addressed This Visit     None      Visit Diagnoses     Localization-related epilepsy (HCC)        Relevant Orders    CBC WITH DIFFERENTIAL    COMP METABOLIC PANEL    LEVETIRACETAM (KEPPRA), S    VITAMIN D,25 HYDROXY    Encounter for examination for driving license        Screening for depression              History of present illness:  Barry Griffith returns in fu with his father. He has been doing great, no further seizures since 9/26/18. He remains on Keppra 500 mg po bid, no side effects, both pt and family confirm seizure freedom and compliance.     Mood is great. Denies suicidal or homicidal ideation.     He wants to resume driving and is requesting DMV paperwork filled out.     He is avoiding sleep deprivation and has not used any drugs or OTC products that can bring out seizures.     His father is happy he ultimately decided to start AED.       Past medical history:   Past Medical History:   Diagnosis Date   • ASTHMA     Asthma since infancy.   • Seizure (HCC)        Past surgical history:   History reviewed. No pertinent surgical history.    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[employer]    • psych coordinator [employer]      Social History Main Topics   • Smoking status: Never Smoker   • Smokeless tobacco: Never Used   • Alcohol use No      Comment: rare    • Drug use: Yes     Types: Marijuana      Comment: rare   • Sexual activity: Yes     Partners: Female     Birth control/ protection: Condom, Pill     Other Topics Concern   •  Not on file     Social History Narrative   • No narrative on file       Current medications:   Current Outpatient Prescriptions   Medication   • levETIRAcetam (KEPPRA) 500 MG Tab   • albuterol 108 (90 Base) MCG/ACT Aero Soln inhalation aerosol     No current facility-administered medications for this visit.        Medication Allergy:  No Known Allergies    Review of systems:   Constitutional: denies fever, night sweats, weight loss.   Eyes: denies acute vision change, eye pain or secretion.   Ears, Nose, Mouth, Throat: denies nasal secretion, nasal bleeding, difficulty swallowing, hearing loss, tinnitus, vertigo, ear pain, acute dental problems, oral ulcers or lesions.   Endocrine: denies recent weight changes, heat or cold intolerance, polyuria, polydypsia, polyphagia,abnormal hair growth.  Cardiovascular: denies new onset of chest pain, palpitations, syncope, or dyspnea of exertion.  Pulmonary: denies shortness of breath, new onset of cough, hemoptysis, wheezing, chest pain or flu-like symptoms.   GI: denies nausea, vomiting, diarrhea, GI bleeding, change in appetite, abdominal pain, and change in bowel habits.  : denies dysuria, urinary incontinence, hematuria.  Heme/oncology: denies history of easy bruising or bleeding. No history of cancer, DVTor PE.  Allergy/immunology: denies hives/urticaria, or itching.   Dermatologic: denies new rash, or new skin lesions.  Musculoskeletal:denies joint swelling or pain, muscle pain, neck and back pain.   Neurologic: denies headaches, acute visual changes, facial droopiness, muscle weakness (focal or generalized), paresthesias, anesthesia, ataxia, change in speech or language, memory loss.  Psychiatric: denies symptoms of depression, anxiety, hallucinations, mood swings or changes, suicidal or homicidal thoughts.     Physical examination:   Vitals:    12/26/18 0902   BP: 118/72   BP Location: Left arm   Patient Position: Standing   BP Cuff Size: Adult   Pulse: 84   Resp: 16    Temp: (!) 35.7 °C (96.3 °F)   TempSrc: Temporal   SpO2: 94%   Weight: 67.6 kg (149 lb)     General: Patient in no acute distress, pleasant and cooperative.  HEENT: Normocephalic, no signs of acute trauma.   Neck: supple, no meningeal signs or carotid bruits. There is normal range of motion. No tenderness on exam.   Chest: clear to auscultation. No cough.   CV: RRR, no murmurs.   Abdomen: soft, non distended or tender.   Skin: no signs of acute rashes or trauma.   Musculoskeletal: joints exhibit full range of motion, without any pain to palpation. There are no signs of joint or muscle swelling. There is no tenderness to deep palpation of muscles.   Psychiatric: No hallucinatory behavior. Denies symptoms of depression or suicidal ideation. Mood and affect appear normal on exam.      NEUROLOGICAL EXAM:   Mental status, orientation: Awake, alert and fully oriented.   Speech and language: speech is clear and fluent. The patient is able to name, repeat and comprehend.   Memory: There is intact recollection of recent and remote events.   Cranial nerve exam: Pupils are 3-4 mm bilaterally and equally reactive to light and accommodation. Visual fields are intact by confrontation. There is no nystagmus on primary or secondary gaze. Intact full EOM in all directions of gaze. Face appears symmetric. Sensation in the face is intact to light touch. Uvula is midline. Palate elevates symmetrically. Tongue is midline and without any signs of tongue biting or fasciculations.Sternocleidomastoid muscles exhibit is normal strength bilaterally. Shoulder shrug is intact bilaterally.   Motor exam: Strength is 5/5 in all extremities. Tone is normal. No abnormal movements were seen on exam.   Sensory exam reveals normal sense of light touch, proprioception, vibration and pinprick in all extremities.   Deep tendon reflexes:  2+ throughout. Plantar responses are flexor. There is no clonus.   Coordination: shows a normal finger-nose-finger.  Normal rapidly alternating movements.   Gait: The patient was able to get up from seated position on first attempt without requiring assistance. Found to be steady when walking. Movements were fluid with normal arm swing. The patient was able to turn without difficulties or tendency to fall. Romberg exam unremarkable.         ANCILLARY DATA REVIEWED:      Lab Data Review:  Reviewed in chart, including most recent labs in ED.      Records reviewed:   Chart reviewed.        Imaging:   CTB 18  NO ACUTE ABNORMALITIES ARE NOTED ON CT SCAN OF THE HEA  (I personally reviewed images).      MRI brain w/wo, 8/10/2018:  1. MRI of the brain without and with contrast within normal limits.  2. No evidence of mass lesion, heterotopic gray matter, gross cortical dysplasia or mesial temporal sclerosis.  (I personally reviewed images, no clear abnormalities found).            EE/10/2018:   INTERPRETATION:  This is an abnormal video EEG recording in the awake, drowsy, and sleep state(s). Frequent left temporal sharps. Intermittent, subtle, left temporal slowing. The findings increase risk for seizures and suggest underlying area of cortical irritability and structural abnormality. Clinical and radiological correlation is recommended.            ASSESSMENT AND PLAN:    1. Localization-related epilepsy (HCC)  - CBC WITH DIFFERENTIAL; Future  - COMP METABOLIC PANEL; Future  - LEVETIRACETAM (KEPPRA), S  - VITAMIN D,25 HYDROXY; Future    2. Encounter for examination for driving license      3. Screening for depression        CLINICAL DISCUSSION:   Several GTC seizures. Some may have been provoked but other not: sleep deprivation, drug use, stress, and for his second one: the use of cough syrup which I learned he had used almost daily since he was a teenager, no longer using.   Focal features of convulsions, wife described left arm deviation, and his reported h/o chlorine like smell episodes, pointing to focal seizure likely out  of the temporal lobe. His EEG was abnormal with left temporal sharps, which increases risk for seizures. I believe based on these factors he does suffer from epilepsy.   He was reluctant to start AED but has been on Keppra 500 mg po bid since last seizures on 9/2018. He is doing well, no further spells and no side effects.   We discussed compliance at length. We had extensive discussion about possible complications of seizures, including but not limited to injuries, status epilepticus, death.   He has been sz free for at least three months and both pt and family confirm compliance with Keppra. Ok to resume driving, but he is aware that he will need to stop driving immediately should he decide to ever stop keppra without my knowledge or if he has any further spells. He is aware he must notify us should he have additional spells. We discussed his job functions and it appears he should be able to continue to work without restrictions.         FOLLOW-UP:   6 months, will call with any side effects or spells.            EDUCATION AND COUNSELING:  -Education was provided to the patient and/or family regarding diagnosis and prognosis. The chronic and unpredictable nature of the condition were discussed. There is increased risk for additional events, which may carry potential for significant injuries and death. Discussed frequent seizure triggers: sleep deprivation, medication non-compliance, use of illegal drugs/alcohol, stress, and others.   -We reviewed in detail the currently available antiepileptic drugs and potential side effects of antiepileptics were discussed at length, including but no limited to: hypersensitivity reactions (rash and others, some of which can be fatal), visual field changes (some of which may be irreversible), glaucoma, diplopia, kidney stones, osteopenia/osteoporosis/bone fractures, hyperthermia/anhydrosis, hyponatremia, tremors/abnormal movements, ataxia, dizziness, fatigue, increased risk for  falls, risk for cardiac arrhythmias/syncope, gastrointestinal side effects(hepatitis, pancreatitis, gastritis, ulcers), gingival hypertrophy/bleeding, drowsiness, sedation, anxiety/nervousness, increased risk for suicide, increased risk for depression, and psychosis.   -Recommend chronic vitamin D supplementation and regular exercise (if not contraindicated).   -Patient/family educated on risk for SUDEP (Sudden Death in Epilepsy). Counseling was provided on the importance of strict medication and follow up compliance. The patient/family understand the risks associated with non-adherence with the medical plan as outlined, including but not limited to an increased risk for breakthrough seizures, which may contribute to injuries, disability, status epilepticus, and even death.   -Counseling was also provided on potential effects of alcohol and other drugs, which may lower seizure threshold and/or affect the metabolism of antiepileptic drugs. We recommend avoidance of alcohol and illegal drugs.  -Avoid sleep deprivation.   -We extensively discussed the aspects related to safety in drivers who suffer from epilepsy. The patient is encourage to report to the Division of Motor Vehicles of any condition and/or spells related to confusion, disorientation, and/or loss of awareness and/or loss of consciousness; as these may pose a safety issue if they occur while operating a motor vehicle. The patient and/or family are ultimately responsible for exercising caution and abiding to regulations in place.   -Other seizure precautions were discussed at length, including no diving, no skydiving, no climbing or exposure to unprotected heights, no unsupervised swimming, no Jacuzzi or bathing in bathtubs or deep bodies of water. The patient/family have been advised about risks for operating any machinery while suffering from seizures / syncope / epilepsy and/or while taking antiepileptic drugs.   -The patient understands and agrees that  due to the complexity of his/her diagnosis, results of any testing and further recommendations will typically be discussed/made during a face to face encounter in my office. The patient and/or family further understands it is their responsibility to keep proper follow up.      Patient agrees with plan, as outlined.            Manav Bai MD   Epilepsy and Neurodiagnostics.   Clinical  of Neurology [facility].   Diplomate in Neurology, Epilepsy, and Electrodiagnostic Medicine.   Office: [phone]  Fax: [phone]        ? BILLING DOCUMENTATION:   Counseling:  I spent greater than 50% time face-to-face time of a total of 40 mins visit. Over 50% of the time of the visit today was spent on counseling and or coordination of care wtih the patient/family, with greater than 50% of the total time discussing:   ? Diagnostic results, impressions, and/or recommended diagnostic studies, and coordination of care.   ? Prognosis.  ? Treatment recommendations, including risks, benefits, & alternatives.  ? Instructions for treatment/management and/or follow-up.  ? Importance of compliance with chosen treatment/management options.  ? Risk factor modification.   ? Patient & family education.  ? Provided instructions for follow-up & emergencies.

## 2019-01-02 ENCOUNTER — APPOINTMENT (OUTPATIENT)
Dept: RADIOLOGY | Facility: MEDICAL CENTER | Age: 29
End: 2019-01-02
Attending: EMERGENCY MEDICINE
Payer: COMMERCIAL

## 2019-01-02 ENCOUNTER — HOSPITAL ENCOUNTER (EMERGENCY)
Facility: MEDICAL CENTER | Age: 29
End: 2019-01-03
Attending: EMERGENCY MEDICINE
Payer: COMMERCIAL
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
## 2019-01-02 DIAGNOSIS — G40.109 LOCALIZATION-RELATED EPILEPSY (HCC): ICD-10-CM

## 2019-01-02 DIAGNOSIS — R41.82 ALTERED MENTAL STATUS, UNSPECIFIED ALTERED MENTAL STATUS TYPE: ICD-10-CM

## 2019-01-02 LAB
ALBUMIN SERPL BCP-MCNC: 4.2 G/DL (ref 3.2–4.9)
ALBUMIN/GLOB SERPL: 2 G/DL
ALP SERPL-CCNC: 50 U/L (ref 30–99)
ALT SERPL-CCNC: 33 U/L (ref 2–50)
ANION GAP SERPL CALC-SCNC: 12 MMOL/L (ref 0–11.9)
AST SERPL-CCNC: 30 U/L (ref 12–45)
BILIRUB SERPL-MCNC: 0.4 MG/DL (ref 0.1–1.5)
BUN SERPL-MCNC: 14 MG/DL (ref 8–22)
CALCIUM SERPL-MCNC: 8.9 MG/DL (ref 8.5–10.5)
CHLORIDE SERPL-SCNC: 111 MMOL/L (ref 96–112)
CO2 SERPL-SCNC: 18 MMOL/L (ref 20–33)
CREAT SERPL-MCNC: 1.29 MG/DL (ref 0.5–1.4)
ERYTHROCYTE [DISTWIDTH] IN BLOOD BY AUTOMATED COUNT: 41.8 FL (ref 35.9–50)
ETHANOL BLD-MCNC: 0 G/DL
GLOBULIN SER CALC-MCNC: 2.1 G/DL (ref 1.9–3.5)
GLUCOSE BLD-MCNC: 67 MG/DL (ref 65–99)
GLUCOSE SERPL-MCNC: 60 MG/DL (ref 65–99)
HCT VFR BLD AUTO: 44.8 % (ref 42–52)
HGB BLD-MCNC: 14.4 G/DL (ref 14–18)
MCH RBC QN AUTO: 31.4 PG (ref 27–33)
MCHC RBC AUTO-ENTMCNC: 32.1 G/DL (ref 33.7–35.3)
MCV RBC AUTO: 97.6 FL (ref 81.4–97.8)
MORPHOLOGY BLD-IMP: NORMAL
PLATELET # BLD AUTO: 106 K/UL (ref 164–446)
PMV BLD AUTO: 12.1 FL (ref 9–12.9)
POTASSIUM SERPL-SCNC: 4.2 MMOL/L (ref 3.6–5.5)
PROT SERPL-MCNC: 6.3 G/DL (ref 6–8.2)
RBC # BLD AUTO: 4.59 M/UL (ref 4.7–6.1)
SODIUM SERPL-SCNC: 141 MMOL/L (ref 135–145)
TROPONIN I SERPL-MCNC: <0.01 NG/ML (ref 0–0.04)
WBC # BLD AUTO: 6.2 K/UL (ref 4.8–10.8)

## 2019-01-02 PROCEDURE — 304561 HCHG STAT O2

## 2019-01-02 PROCEDURE — 93005 ELECTROCARDIOGRAM TRACING: CPT | Performed by: EMERGENCY MEDICINE

## 2019-01-02 PROCEDURE — 72128 CT CHEST SPINE W/O DYE: CPT

## 2019-01-02 PROCEDURE — 71045 X-RAY EXAM CHEST 1 VIEW: CPT

## 2019-01-02 PROCEDURE — 72170 X-RAY EXAM OF PELVIS: CPT

## 2019-01-02 PROCEDURE — 700117 HCHG RX CONTRAST REV CODE 255: Performed by: EMERGENCY MEDICINE

## 2019-01-02 PROCEDURE — 93005 ELECTROCARDIOGRAM TRACING: CPT

## 2019-01-02 PROCEDURE — 72125 CT NECK SPINE W/O DYE: CPT

## 2019-01-02 PROCEDURE — 99285 EMERGENCY DEPT VISIT HI MDM: CPT

## 2019-01-02 PROCEDURE — 71260 CT THORAX DX C+: CPT

## 2019-01-02 PROCEDURE — 80053 COMPREHEN METABOLIC PANEL: CPT

## 2019-01-02 PROCEDURE — 82962 GLUCOSE BLOOD TEST: CPT

## 2019-01-02 PROCEDURE — 700111 HCHG RX REV CODE 636 W/ 250 OVERRIDE (IP)

## 2019-01-02 PROCEDURE — 84484 ASSAY OF TROPONIN QUANT: CPT

## 2019-01-02 PROCEDURE — 70450 CT HEAD/BRAIN W/O DYE: CPT

## 2019-01-02 PROCEDURE — 72131 CT LUMBAR SPINE W/O DYE: CPT

## 2019-01-02 PROCEDURE — 85027 COMPLETE CBC AUTOMATED: CPT

## 2019-01-02 PROCEDURE — 80307 DRUG TEST PRSMV CHEM ANLYZR: CPT

## 2019-01-02 PROCEDURE — 700105 HCHG RX REV CODE 258: Performed by: EMERGENCY MEDICINE

## 2019-01-02 RX ORDER — SODIUM CHLORIDE 9 MG/ML
1000 INJECTION, SOLUTION INTRAVENOUS ONCE
Status: COMPLETED | OUTPATIENT
Start: 2019-01-02 | End: 2019-01-02

## 2019-01-02 RX ORDER — LEVETIRACETAM 500 MG/1
500 TABLET ORAL 2 TIMES DAILY
Qty: 60 TAB | Refills: 1 | Status: SHIPPED | OUTPATIENT
Start: 2019-01-02 | End: 2019-01-30 | Stop reason: SDUPTHER

## 2019-01-02 RX ORDER — MIDAZOLAM HYDROCHLORIDE 1 MG/ML
INJECTION INTRAMUSCULAR; INTRAVENOUS
Status: COMPLETED
Start: 2019-01-02 | End: 2019-01-02

## 2019-01-02 RX ORDER — MIDAZOLAM HYDROCHLORIDE 1 MG/ML
2.5 INJECTION INTRAMUSCULAR; INTRAVENOUS ONCE
Status: DISCONTINUED | OUTPATIENT
Start: 2019-01-02 | End: 2019-01-02

## 2019-01-02 RX ADMIN — IOHEXOL 100 ML: 350 INJECTION, SOLUTION INTRAVENOUS at 21:00

## 2019-01-02 RX ADMIN — MIDAZOLAM HYDROCHLORIDE 2.5 MG: 1 INJECTION, SOLUTION INTRAMUSCULAR; INTRAVENOUS at 22:11

## 2019-01-02 RX ADMIN — SODIUM CHLORIDE 1000 ML: 9 INJECTION, SOLUTION INTRAVENOUS at 20:00

## 2019-01-03 VITALS
OXYGEN SATURATION: 98 % | BODY MASS INDEX: 24.44 KG/M2 | HEIGHT: 69 IN | TEMPERATURE: 98.4 F | WEIGHT: 165 LBS | RESPIRATION RATE: 16 BRPM | SYSTOLIC BLOOD PRESSURE: 106 MMHG | HEART RATE: 64 BPM | DIASTOLIC BLOOD PRESSURE: 42 MMHG

## 2019-01-03 PROCEDURE — 700111 HCHG RX REV CODE 636 W/ 250 OVERRIDE (IP): Performed by: EMERGENCY MEDICINE

## 2019-01-03 PROCEDURE — 96374 THER/PROPH/DIAG INJ IV PUSH: CPT

## 2019-01-03 RX ORDER — ONDANSETRON 2 MG/ML
4 INJECTION INTRAMUSCULAR; INTRAVENOUS ONCE
Status: COMPLETED | OUTPATIENT
Start: 2019-01-03 | End: 2019-01-03

## 2019-01-03 RX ADMIN — ONDANSETRON HYDROCHLORIDE 4 MG: 2 INJECTION, SOLUTION INTRAMUSCULAR; INTRAVENOUS at 00:30

## 2019-01-03 NOTE — ED NOTES
Pt reevaluated by ERP and will be discharged. Pt is fully alert and oriented move all extremities appropriately and has no neurological deficits. PT ambulates with a steady gait.

## 2019-01-03 NOTE — DISCHARGE INSTRUCTIONS
Today you signed a form that states that you cannot drive again until cleared by your neurologist or primary care physician

## 2019-01-03 NOTE — ED NOTES
Pt with family at bedside now fully alert and oriented but doesn't recall event. Pt c/o some mild nausea otherwise no other complaint.
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
## 2019-01-03 NOTE — ED NOTES
Pt resting with stable vital signs, pt is not fully alert and oriented he is A&Ox2 (person and place) has repetitive questioning. Will continue to monitor closely.
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
## 2019-01-04 ENCOUNTER — PATIENT OUTREACH (OUTPATIENT)
Dept: HEALTH INFORMATION MANAGEMENT | Facility: OTHER | Age: 29
End: 2019-01-04

## 2019-01-04 NOTE — LETTER
Barry Griffith  [address] [place] [address]    January 4, 2019      Dear Barry Griffith,    [facility] wants to ensure your discharge home is safe and you or your loved ones have had all of your questions answered regarding your care after you leave the hospital.    Our discharge team was unsuccessful in our attempts to contact you telephonically and we wanted to be sure that you had a list of resources and contact information should you have any questions regarding your hospital stay, follow-up instructions, or active medical symptoms.    Questions or Concerns Regarding… Contact   Medical Questions Related to Your Discharge  (7 days a week, 8am-5pm) Contact a Nurse Care Coordinator   [phone]   Medical Questions Not Related to Your Discharge  (24 hours a day / 7 days a week)  Contact the Nurse Health Line   [phone]    Medications or Discharge Instructions Refer to your discharge packet   or contact your -[phone]   Follow-up Appointment(s) Schedule your appointment via [facility]   or contact Scheduling [phone]   Billing Review your statement via [facility]  or contact Billing [phone]   Medical Records Review your records via [facility]   or contact Medical Records [phone]     You can also easily access your medical information, test results and upcoming appointments via the [facility] free online health management tool. You can learn more and sign up at [employer]. For assistance setting up your [facility] account, please call [phone].    Once again, we want to ensure your discharge home is safe and that you have a clear understanding of any next steps in your care. If you have any questions or concerns, please do not hesitate to contact us, we are here for you. Thank you for choosing [facility] for your healthcare needs.    Sincerely,      Your [facility] Healthcare Team

## 2019-01-30 ENCOUNTER — TELEPHONE (OUTPATIENT)
Dept: NEUROLOGY | Facility: MEDICAL CENTER | Age: 29
End: 2019-01-30

## 2019-01-30 DIAGNOSIS — G40.109 LOCALIZATION-RELATED EPILEPSY (HCC): ICD-10-CM

## 2019-01-30 RX ORDER — LEVETIRACETAM 750 MG/1
750 TABLET ORAL 2 TIMES DAILY
Qty: 60 TAB | Refills: 11 | Status: SHIPPED | OUTPATIENT
Start: 2019-01-30 | End: 2019-08-13 | Stop reason: SDUPTHER

## 2019-01-30 NOTE — TELEPHONE ENCOUNTER
Per Dr Bai I called pt's spouse and stated : Let's increase to Keppra 750 mg po bid. Cannot drive again for another three months. We will fax DMV today.   RX to pharmacy.   RAO Farrell verbally understood.

## 2019-01-31 LAB — EKG IMPRESSION: NORMAL

## 2019-02-12 ENCOUNTER — TELEPHONE (OUTPATIENT)
Dept: NEUROLOGY | Facility: MEDICAL CENTER | Age: 29
End: 2019-02-12

## 2019-02-12 NOTE — TELEPHONE ENCOUNTER
Patient's spouse is requesting if Dr Bai can write a letter for her employer and landlord stating pt's dx and that he does not drive so his spouse drives him to work and to his doctor appointments also that his dx has been affecting his work. Spouse stated her landlord has put a deposit because they renewed their lease and she can not afford it. Please advise, thanks.

## 2019-02-13 ENCOUNTER — TELEPHONE (OUTPATIENT)
Dept: NEUROLOGY | Facility: MEDICAL CENTER | Age: 29
End: 2019-02-13

## 2019-02-13 NOTE — TELEPHONE ENCOUNTER
Called pt's wife ignacio stating the letter she requested ws ready and that I will mail it out to the address in pt's chart.

## 2019-08-13 ENCOUNTER — OFFICE VISIT (OUTPATIENT)
Dept: NEUROLOGY | Facility: MEDICAL CENTER | Age: 29
End: 2019-08-13
Payer: COMMERCIAL

## 2019-08-13 VITALS
SYSTOLIC BLOOD PRESSURE: 132 MMHG | RESPIRATION RATE: 16 BRPM | OXYGEN SATURATION: 98 % | BODY MASS INDEX: 22.22 KG/M2 | TEMPERATURE: 96.9 F | HEIGHT: 69 IN | HEART RATE: 76 BPM | WEIGHT: 150 LBS | DIASTOLIC BLOOD PRESSURE: 78 MMHG

## 2019-08-13 DIAGNOSIS — Z02.4 ENCOUNTER FOR EXAMINATION FOR DRIVING LICENSE: ICD-10-CM

## 2019-08-13 DIAGNOSIS — G40.109 LOCALIZATION-RELATED EPILEPSY (HCC): ICD-10-CM

## 2019-08-13 DIAGNOSIS — Z13.31 SCREENING FOR DEPRESSION: ICD-10-CM

## 2019-08-13 PROCEDURE — 99215 OFFICE O/P EST HI 40 MIN: CPT | Performed by: PSYCHIATRY & NEUROLOGY

## 2019-08-13 RX ORDER — LEVETIRACETAM 750 MG/1
750 TABLET ORAL 2 TIMES DAILY
Qty: 180 TAB | Refills: 3 | Status: SHIPPED | OUTPATIENT
Start: 2019-08-13 | End: 2019-09-23 | Stop reason: SDUPTHER

## 2019-08-13 ASSESSMENT — PATIENT HEALTH QUESTIONNAIRE - PHQ9: CLINICAL INTERPRETATION OF PHQ2 SCORE: 0

## 2019-08-13 NOTE — PROGRESS NOTES
Chief Complaint   Patient presents with   • Follow-Up     Localization-related epilepsy        Problem List Items Addressed This Visit     None      Visit Diagnoses     Localization-related epilepsy (HCC)        Relevant Medications    levetiracetam (KEPPRA) 750 MG tablet    Other Relevant Orders    CBC WITH DIFFERENTIAL    Comp Metabolic Panel    VITAMIN D,25 HYDROXY    LEVETIRACETAM (KEPPRA), S    Encounter for examination for driving license        Screening for depression              Interim history:  Barry Griffith returns in follow-up.  He continues on Keppra 750 mg p.o. twice daily.  He denies any side effects.  He states he is very compliant, and takes medication twice a day, as prescribed.  He remains seizure-free since January 2019.  He is avoiding sleep deprivation.  He is not driving, but would like to resume driving.  He forgot to have blood work done, and is asking for new orders to be placed.  His mood is good, denies any issues with depression, suicidal or homicidal ideation.    Last seizure was January 29, 2019.    Past medical history:   Past Medical History:   Diagnosis Date   • ASTHMA     Asthma since infancy.   • Seizure (HCC)        Past surgical history:   History reviewed. No pertinent surgical history.    Family history:   Family History   Problem Relation Age of Onset   • Hypertension Mother    • Hyperlipidemia Father    • Hypertension Father    • Cancer Neg Hx    • Diabetes Neg Hx        Social history:   Social History     Socioeconomic History   • Marital status: Single     Spouse name: Not on file   • Number of children: 2   • Years of education: Not on file   • Highest education level: Not on file   Occupational History   • Occupation: guidance counsellor [employer]   • Occupation: psych coordinator [employer]   Social Needs   • Financial resource strain: Not on file   • Food insecurity:     Worry: Not on file     Inability: Not on file   • Transportation needs:      Medical: Not on file     Non-medical: Not on file   Tobacco Use   • Smoking status: Never Smoker   • Smokeless tobacco: Never Used   Substance and Sexual Activity   • Alcohol use: No     Alcohol/week: 0.0 oz     Comment: rare    • Drug use: Yes     Types: Marijuana     Comment: rare   • Sexual activity: Yes     Partners: Female     Birth control/protection: Condom, Pill   Lifestyle   • Physical activity:     Days per week: Not on file     Minutes per session: Not on file   • Stress: Not on file   Relationships   • Social connections:     Talks on phone: Not on file     Gets together: Not on file     Attends Anabaptist service: Not on file     Active member of club or organization: Not on file     Attends meetings of clubs or organizations: Not on file     Relationship status: Not on file   • Intimate partner violence:     Fear of current or ex partner: Not on file     Emotionally abused: Not on file     Physically abused: Not on file     Forced sexual activity: Not on file   Other Topics Concern   • Not on file   Social History Narrative   • Not on file       Current medications:   Current Outpatient Medications   Medication   • levetiracetam (KEPPRA) 750 MG tablet   • albuterol 108 (90 Base) MCG/ACT Aero Soln inhalation aerosol     No current facility-administered medications for this visit.        Medication Allergy:  No Known Allergies      Review of systems:   Constitutional: denies fever, night sweats, weight loss, or malaise/fatigue.   Eyes: denies acute vision change, eye pain or secretion.   Ears, Nose, Mouth, Throat: denies nasal secretion, nasal bleeding, difficulty swallowing, hearing loss, tinnitus, vertigo, ear pain, oral ulcers or lesions.   Endocrine: denies recent weight changes, heat or cold intolerance, polyuria, polydypsia, polyphagia,abnormal hair growth.  Cardiovascular: denies new onset of chest pain, palpitations, syncope, lower extremity edema, or dyspnea of exertion.  Pulmonary: denies  "shortness of breath, new onset of cough, hemoptysis, wheezing, chest pain or flu-like symptoms.   GI: denies nausea, vomiting, diarrhea, GI bleeding, change in appetite, abdominal pain, and change in bowel habits.  : denies urinary incontinence, retention or hematuria.  Heme/oncology: denies history of easy bruising or bleeding. No history of cancer. Allergy/immunology: denies hives/urticarial.  Dermatologic: denies new rash.  Musculoskeletal:denies joint swelling or pain, muscle pain, neck and back pain. Neurologic: denies headaches, acute visual changes, facial droopiness, muscle weakness (focal or generalized), paresthesias, anesthesia, ataxia, change in speech or language, memory loss, abnormal movements, seizures, loss of consciousness, or episodes of confusion.   Psychiatric: denies symptoms of depression, anxiety, hallucinations, mood swings or changes, suicidal or homicidal thoughts.     Physical examination:   Vitals:    08/13/19 0907   BP: 132/78   BP Location: Left arm   Patient Position: Sitting   BP Cuff Size: Adult   Pulse: 76   Resp: 16   Temp: 36.1 °C (96.9 °F)   TempSrc: Temporal   SpO2: 98%   Weight: 68 kg (150 lb)   Height: 1.753 m (5' 9\")     General: Patient in no acute distress, pleasant and cooperative.  HEENT: Normocephalic, no signs of acute trauma.   Neck: supple, no meningeal signs or carotid bruits. There is normal range of motion. No tenderness on exam.   Chest: clear to auscultation. No cough.   CV: RRR, no murmurs.   Skin: no signs of acute rashes or trauma.   Musculoskeletal: joints exhibit full range of motion, without any pain to palpation. There are no signs of joint or muscle swelling. There is no tenderness to deep palpation of muscles.   Psychiatric: No hallucinatory behavior. Denies symptoms of depression or suicidal ideation. Mood and affect appear normal on exam.     NEUROLOGICAL EXAM:   Mental status, orientation: Awake, alert and fully oriented.   Speech and language: " speech is clear and fluent. The patient is able to name, repeat and comprehend.   Memory: There is intact recollection of recent and remote events.   Cranial nerve exam: Pupils are 3-4 mm bilaterally and equally reactive to light and accommodation. Visual fields are intact by confrontation. There is no nystagmus on primary or secondary gaze. Intact full EOM in all directions of gaze. Face appears symmetric. Sensation in the face is intact to light touch. Uvula is midline. Palate elevates symmetrically. Tongue is midline and without any signs of tongue biting or fasciculations.Sternocleidomastoid muscles exhibit is normal strength bilaterally. Shoulder shrug is intact bilaterally.   Motor exam: Strength is 5/5 in all extremities. Tone is normal. No abnormal movements were seen on exam.   Sensory exam reveals normal sense of light touch in all extremities.   Deep tendon reflexes:  2+ throughout. Plantar responses are flexor. There is no clonus.   Coordination: shows a normal finger-nose-finger. Normal rapidly alternating movements.   Gait: The patient was able to get up from seated position on first attempt without requiring assistance. Found to be steady when walking. Movements were fluid with normal arm swing. The patient was able to turn without difficulties or tendency to fall. Romberg exam unremarkable.        ANCILLARY DATA REVIEWED:       Lab Data Review:  Reviewed in chart.    Records reviewed:   Chart reviewed.     Imaging:   CTB 18  NO ACUTE ABNORMALITIES ARE NOTED ON CT SCAN OF THE HEA  (I personally reviewed images).      MRI brain w/wo, 8/10/2018:  1. MRI of the brain without and with contrast within normal limits.  2. No evidence of mass lesion, heterotopic gray matter, gross cortical dysplasia or mesial temporal sclerosis.  (I personally reviewed images, no clear abnormalities found).            EE/10/2018:   INTERPRETATION:  This is an abnormal video EEG recording in the awake, drowsy, and  sleep state(s). Frequent left temporal sharps. Intermittent, subtle, left temporal slowing. The findings increase risk for seizures and suggest underlying area of cortical irritability and structural abnormality. Clinical and radiological correlation is recommended.             ASSESSMENT AND PLAN:    1. Localization-related epilepsy (HCC)  - CBC WITH DIFFERENTIAL; Future  - Comp Metabolic Panel; Future  - VITAMIN D,25 HYDROXY; Future  - LEVETIRACETAM (KEPPRA), S  - levetiracetam (KEPPRA) 750 MG tablet; Take 1 Tab by mouth 2 times a day.  Dispense: 180 Tab; Refill: 3    2. Encounter for examination for driving license    3. Screening for depression      CLINICAL DISCUSSION:   History of several GTC seizures. Some may have been provoked but others were not: sleep deprivation, illegal drug use, stress may have played a role on the first seizure, and for his second one: the use of cough syrup which I learned he had used almost daily since he was a teenager, but since has no longer used.    His convulsions exhibited focal features, wife described left arm deviation, and his reported h/o chlorine like smell episodes, pointing to focal seizure likely out of the temporal lobe. His EEG was abnormal with left temporal sharps, and he understands that this increases risk for further seizures, and suggest underlying cortical irritability. I believe based on these factors he does suffer from epilepsy.   He was initially reluctant to start AED but has been on Keppra, increased in January 2019 to 750 mg twice daily, after his last episode in January 29, 2019.  He has been seizure-free since.  He states he is very compliant.  He is doing well, no further spells and no side effects.   We discussed compliance at length. We had extensive discussion about possible complications of seizures, including but not limited to injuries, status epilepticus, death.   He has been sz free for at least three months and both pt and family have  confirmed compliance with Keppra.   The patient currently not driving, but okay to resume driving.  The patient has aware that he will need to stop driving immediately should he decide to ever stop keppra without my knowledge or if he has any further spells. He is aware he must notify us should he have additional spells. We discussed his job functions and it appears he should be able to continue to work without restrictions.   The patient will have blood work over the next few days, including a Keppra trough level.        FOLLOW-UP:   Return in about 6 months (around 2/13/2020).      EDUCATION AND COUNSELING:  -Education was provided to the patient and/or family regarding diagnosis and prognosis. The chronic and unpredictable nature of the condition were discussed. There is increased risk for additional events, which may carry potential for significant injuries and death. Discussed frequent seizure triggers: sleep deprivation, medication non-compliance, use of illegal drugs/alcohol, stress, and others.   -We reviewed in detail the current antiepileptic regimen. Potential side effects of antiepileptics were discussed at length, including but no limited to: hypersensitivity reactions (rash and others, some of which can be fatal), visual field changes (some of which may be irreversible), glaucoma, diplopia, kidney stones, osteopenia/osteoporosis/bone fractures, hyperthermia/anhydrosis, hyponatremia, tremors/abnormal movements, ataxia, dizziness, fatigue, increased risk for falls, risk for cardiac arrhythmias/syncope, gastrointestinal side effects(hepatitis, pancreatitis, gastritis, ulcers), gingival hypertrophy/bleeding, drowsiness, sedation, anxiety/nervousness, increased risk for suicide, increased risk for depression, and psychosis.   -Recommend chronic vitamin D supplementation and regular exercise (if not contraindicated).   -Patient/family educated on risk for SUDEP (Sudden Death in Epilepsy). Counseling was  provided on the importance of strict medication and follow up compliance. The patient/family understand the risks associated with non-adherence with the medical plan as outlined, including but not limited to an increased risk for breakthrough seizures, which may contribute to injuries, disability, status epilepticus, and even death.   -Counseling was also provided on potential effects of alcohol and other drugs, which may lower seizure threshold and/or affect the metabolism of antiepileptic drugs. We recommend avoidance of alcohol and illegal drugs.  -Avoid sleep deprivation.   -We extensively discussed the aspects related to safety in drivers who suffer from epilepsy. The patient is encourage to report to the Division of Motor Vehicles of any condition and/or spells related to confusion, disorientation, and/or loss of awareness and/or loss of consciousness; as these may pose a safety issue if they occur while operating a motor vehicle. The patient and/or family are ultimately responsible for exercising caution and abiding to regulations in place.   -Other seizure precautions were discussed at length, including no diving, no skydiving, no climbing or exposure to unprotected heights, no unsupervised swimming, no Jacuzzi or bathing in bathtubs or deep bodies of water. The patient/family have been advised about risks for operating any machinery while suffering from seizures / syncope / epilepsy and/or while taking antiepileptic drugs.   -The patient understands and agrees that due to the complexity of his/her diagnosis, results of any testing and further recommendations will typically be discussed/made during a face to face encounter in my office. The patient and/or family further understands it is their responsibility to keep proper follow up.     Patient agrees with plan, as outlined.         Manav Bai MD   Epilepsy and Neurodiagnostics.   Clinical  of Neurology [facility]  [facility].   Diplomate in Neurology, Epilepsy, and Electrodiagnostic Medicine.   Office: [phone]  Fax: [phone]      BILLING DOCUMENTATION:     I have performed physical exam and reviewed and updated ROS and plan today 8/13/2019. In review of that note, there are no new changes except as documented above.     Counseling:  I spent greater than 50% time face-to-face time of a total of 50 minutes visit. Over 50% of the time of the visit today was spent on counseling and or coordination of care wtih the patient and/or family, with greater than 50% of the total discussing my assessment and plan as stated above.

## 2019-08-14 ENCOUNTER — HOSPITAL ENCOUNTER (OUTPATIENT)
Dept: LAB | Facility: MEDICAL CENTER | Age: 29
End: 2019-08-14
Attending: PSYCHIATRY & NEUROLOGY
Payer: COMMERCIAL

## 2019-08-14 DIAGNOSIS — G40.109 LOCALIZATION-RELATED EPILEPSY (HCC): ICD-10-CM

## 2019-08-14 LAB
25(OH)D3 SERPL-MCNC: 13 NG/ML (ref 30–100)
ALBUMIN SERPL BCP-MCNC: 4.5 G/DL (ref 3.2–4.9)
ALBUMIN/GLOB SERPL: 1.7 G/DL
ALP SERPL-CCNC: 47 U/L (ref 30–99)
ALT SERPL-CCNC: 33 U/L (ref 2–50)
ANION GAP SERPL CALC-SCNC: 6 MMOL/L (ref 0–11.9)
AST SERPL-CCNC: 25 U/L (ref 12–45)
BASOPHILS # BLD AUTO: 0.6 % (ref 0–1.8)
BASOPHILS # BLD: 0.03 K/UL (ref 0–0.12)
BILIRUB SERPL-MCNC: 0.8 MG/DL (ref 0.1–1.5)
BUN SERPL-MCNC: 13 MG/DL (ref 8–22)
CALCIUM SERPL-MCNC: 9.6 MG/DL (ref 8.5–10.5)
CHLORIDE SERPL-SCNC: 110 MMOL/L (ref 96–112)
CO2 SERPL-SCNC: 27 MMOL/L (ref 20–33)
CREAT SERPL-MCNC: 1.14 MG/DL (ref 0.5–1.4)
EOSINOPHIL # BLD AUTO: 0.12 K/UL (ref 0–0.51)
EOSINOPHIL NFR BLD: 2.5 % (ref 0–6.9)
ERYTHROCYTE [DISTWIDTH] IN BLOOD BY AUTOMATED COUNT: 40 FL (ref 35.9–50)
GLOBULIN SER CALC-MCNC: 2.6 G/DL (ref 1.9–3.5)
GLUCOSE SERPL-MCNC: 82 MG/DL (ref 65–99)
HCT VFR BLD AUTO: 47.7 % (ref 42–52)
HGB BLD-MCNC: 15.7 G/DL (ref 14–18)
IMM GRANULOCYTES # BLD AUTO: 0.01 K/UL (ref 0–0.11)
IMM GRANULOCYTES NFR BLD AUTO: 0.2 % (ref 0–0.9)
LYMPHOCYTES # BLD AUTO: 2.27 K/UL (ref 1–4.8)
LYMPHOCYTES NFR BLD: 46.6 % (ref 22–41)
MCH RBC QN AUTO: 31.7 PG (ref 27–33)
MCHC RBC AUTO-ENTMCNC: 32.9 G/DL (ref 33.7–35.3)
MCV RBC AUTO: 96.4 FL (ref 81.4–97.8)
MONOCYTES # BLD AUTO: 0.33 K/UL (ref 0–0.85)
MONOCYTES NFR BLD AUTO: 6.8 % (ref 0–13.4)
NEUTROPHILS # BLD AUTO: 2.11 K/UL (ref 1.82–7.42)
NEUTROPHILS NFR BLD: 43.3 % (ref 44–72)
NRBC # BLD AUTO: 0 K/UL
NRBC BLD-RTO: 0 /100 WBC
PLATELET # BLD AUTO: 125 K/UL (ref 164–446)
PMV BLD AUTO: 11.9 FL (ref 9–12.9)
POTASSIUM SERPL-SCNC: 3.7 MMOL/L (ref 3.6–5.5)
PROT SERPL-MCNC: 7.1 G/DL (ref 6–8.2)
RBC # BLD AUTO: 4.95 M/UL (ref 4.7–6.1)
SODIUM SERPL-SCNC: 143 MMOL/L (ref 135–145)
WBC # BLD AUTO: 4.9 K/UL (ref 4.8–10.8)

## 2019-08-14 PROCEDURE — 36415 COLL VENOUS BLD VENIPUNCTURE: CPT

## 2019-08-14 PROCEDURE — 85025 COMPLETE CBC W/AUTO DIFF WBC: CPT

## 2019-08-14 PROCEDURE — 80053 COMPREHEN METABOLIC PANEL: CPT

## 2019-08-14 PROCEDURE — 80177 DRUG SCRN QUAN LEVETIRACETAM: CPT

## 2019-08-14 PROCEDURE — 82306 VITAMIN D 25 HYDROXY: CPT

## 2019-08-15 DIAGNOSIS — E55.9 VITAMIN D DEFICIENCY: ICD-10-CM

## 2019-08-15 LAB — LEVETIRACETAM SERPL-MCNC: 10 UG/ML (ref 12–46)

## 2019-08-15 RX ORDER — ERGOCALCIFEROL 1.25 MG/1
50000 CAPSULE ORAL
Qty: 4 CAP | Refills: 5 | Status: SHIPPED | OUTPATIENT
Start: 2019-08-15 | End: 2020-01-13 | Stop reason: SDUPTHER

## 2019-08-16 ENCOUNTER — TELEPHONE (OUTPATIENT)
Dept: NEUROLOGY | Facility: MEDICAL CENTER | Age: 29
End: 2019-08-16

## 2019-08-16 NOTE — TELEPHONE ENCOUNTER
Please fax DMV form and call pt and advise a copy is available if needed.   His labs ok, has vit D deficiency, needs weekly supplement, RX to pharmacy.   Ok to drive.   Thanks,   RA     Patient verbally understood all information given and stated he will come this afternoon to  his dmv copy.

## 2019-09-19 ENCOUNTER — TELEPHONE (OUTPATIENT)
Dept: NEUROLOGY | Facility: MEDICAL CENTER | Age: 29
End: 2019-09-19

## 2019-09-19 NOTE — TELEPHONE ENCOUNTER
Per pt spouse he had 3 seizures last night and 1 on September 21st. She stated pt is compliant with his keppra and would like pt to see Dr Bai in a visit.  Per Dr Bai we will fill out a dmv form and fax to report no driving for pt. I did notify pt's spouse.

## 2019-09-23 ENCOUNTER — OFFICE VISIT (OUTPATIENT)
Dept: NEUROLOGY | Facility: MEDICAL CENTER | Age: 29
End: 2019-09-23
Payer: COMMERCIAL

## 2019-09-23 VITALS
HEIGHT: 69 IN | TEMPERATURE: 96.6 F | WEIGHT: 154 LBS | BODY MASS INDEX: 22.81 KG/M2 | HEART RATE: 54 BPM | OXYGEN SATURATION: 98 % | DIASTOLIC BLOOD PRESSURE: 78 MMHG | RESPIRATION RATE: 14 BRPM | SYSTOLIC BLOOD PRESSURE: 120 MMHG

## 2019-09-23 DIAGNOSIS — G40.109 LOCALIZATION-RELATED EPILEPSY (HCC): ICD-10-CM

## 2019-09-23 DIAGNOSIS — Z13.31 SCREENING FOR DEPRESSION: ICD-10-CM

## 2019-09-23 PROCEDURE — 99215 OFFICE O/P EST HI 40 MIN: CPT | Performed by: PSYCHIATRY & NEUROLOGY

## 2019-09-23 RX ORDER — LEVETIRACETAM 750 MG/1
750 TABLET ORAL 2 TIMES DAILY
Qty: 180 TAB | Refills: 3 | Status: SHIPPED | OUTPATIENT
Start: 2019-09-23 | End: 2020-01-13 | Stop reason: SDUPTHER

## 2019-09-23 NOTE — PROGRESS NOTES
Chief Complaint   Patient presents with   • Follow-Up     Localization-related epilepsy        Problem List Items Addressed This Visit     None      Visit Diagnoses     Localization-related epilepsy (HCC)        Relevant Medications    levetiracetam (KEPPRA) 750 MG tablet    Screening for depression              Interim history:  Barry Griffith returns in follow-up with his father.  Unfortunately, apparently he had 3-4 seizures between September 20 and September 21, 2019.  The patient indicates that he has been religiously compliant with the Keppra, continues on 750 mg twice a day.  He denies any current side effects.  The patient was not necessarily sleep deprived, has slept a minimum of 7 hours the night before.  The patient denies the use of any drugs, including over-the-counter medications like stimulants or any syrup like a medication that may have provoked seizures.  The patient is not driving, and was reported to the DMV last week.  His mood is good, he denies being depressed or suicidal.  His family attest to medication compliance, and not use of any substance.    The patient indicates that the seizures were small, states that he did not have generalized tonic-clonic seizures, however it appears that when the wife called the seizures have been convulsive at that time.      Past medical history:   Past Medical History:   Diagnosis Date   • ASTHMA     Asthma since infancy.   • Seizure (HCC)        Past surgical history:   History reviewed. No pertinent surgical history.    Family history:   Family History   Problem Relation Age of Onset   • Hypertension Mother    • Hyperlipidemia Father    • Hypertension Father    • Cancer Neg Hx    • Diabetes Neg Hx        Social history:   Social History     Socioeconomic History   • Marital status: Single     Spouse name: Not on file   • Number of children: 2   • Years of education: Not on file   • Highest education level: Not on file   Occupational History   •  Occupation: guidance counsellor [employer]   • Occupation: psych coordinator [employer]   Social Needs   • Financial resource strain: Not on file   • Food insecurity:     Worry: Not on file     Inability: Not on file   • Transportation needs:     Medical: Not on file     Non-medical: Not on file   Tobacco Use   • Smoking status: Never Smoker   • Smokeless tobacco: Never Used   Substance and Sexual Activity   • Alcohol use: No     Alcohol/week: 0.0 oz     Comment: rare    • Drug use: Yes     Types: Marijuana     Comment: rare   • Sexual activity: Yes     Partners: Female     Birth control/protection: Condom, Pill   Lifestyle   • Physical activity:     Days per week: Not on file     Minutes per session: Not on file   • Stress: Not on file   Relationships   • Social connections:     Talks on phone: Not on file     Gets together: Not on file     Attends Hoahaoism service: Not on file     Active member of club or organization: Not on file     Attends meetings of clubs or organizations: Not on file     Relationship status: Not on file   • Intimate partner violence:     Fear of current or ex partner: Not on file     Emotionally abused: Not on file     Physically abused: Not on file     Forced sexual activity: Not on file   Other Topics Concern   • Not on file   Social History Narrative   • Not on file       Current medications:   Current Outpatient Medications   Medication   • levetiracetam (KEPPRA) 750 MG tablet   • ergocalciferol (DRISDOL) 24782 UNIT capsule   • albuterol 108 (90 Base) MCG/ACT Aero Soln inhalation aerosol     No current facility-administered medications for this visit.        Medication Allergy:  No Known Allergies      Review of systems:   As indicated in HPI.    Physical examination:   Vitals:    09/23/19 0909   BP: 120/78   BP Location: Left arm   Patient Position: Sitting   BP Cuff Size: Adult   Pulse: (!) 54   Resp: 14   Temp: 35.9 °C (96.6 °F)   TempSrc: Temporal   SpO2: 98%   Weight: 69.9  "kg (154 lb)   Height: 1.753 m (5' 9\")     General: Patient in no acute distress, pleasant and cooperative.  HEENT: Normocephalic, no signs of acute trauma.   Neck: supple, no meningeal signs or carotid bruits. There is normal range of motion. No tenderness on exam.   Chest: clear to auscultation. No cough.   CV: RRR, no murmurs.   Skin: no signs of acute rashes or trauma.   Musculoskeletal: joints exhibit full range of motion, without any pain to palpation. There are no signs of joint or muscle swelling. There is no tenderness to deep palpation of muscles.   Psychiatric: No hallucinatory behavior. Denies symptoms of depression or suicidal ideation. Mood and affect appear anxious on exam.     NEUROLOGICAL EXAM:   Mental status, orientation: Awake, alert and fully oriented.   Speech and language: speech is clear and fluent. The patient is able to name, repeat and comprehend.   Memory: There is intact recollection of recent and remote events.   Cranial nerve exam: Pupils are 3-4 mm bilaterally and equally reactive to light and accommodation. Visual fields are intact by confrontation. There is no nystagmus on primary or secondary gaze. Intact full EOM in all directions of gaze. Face appears symmetric. Sensation in the face is intact to light touch. Uvula is midline. Palate elevates symmetrically. Tongue is midline and without any signs of tongue biting or fasciculations.Sternocleidomastoid muscles exhibit is normal strength bilaterally. Shoulder shrug is intact bilaterally.   Motor exam: Strength is 5/5 in all extremities. Tone is normal. No abnormal movements were seen on exam.   Sensory exam reveals normal sense of light touch in all extremities.   Deep tendon reflexes:  2+ throughout. Plantar responses are flexor. There is no clonus.   Coordination: shows a normal finger-nose-finger. Normal rapidly alternating movements.   Gait: The patient was able to get up from seated position on first attempt without requiring " assistance. Found to be steady when walking. Movements were fluid with normal arm swing. The patient was able to turn without difficulties or tendency to fall. Romberg exam unremarkable.        ANCILLARY DATA REVIEWED:       Lab Data Review:  Reviewed in chart.    Records reviewed:   Chart reviewed.    Imaging:   CTB 18  NO ACUTE ABNORMALITIES ARE NOTED ON CT SCAN OF THE HEA  (I personally reviewed images).      MRI brain w/wo, 8/10/2018:  1. MRI of the brain without and with contrast within normal limits.  2. No evidence of mass lesion, heterotopic gray matter, gross cortical dysplasia or mesial temporal sclerosis.  (I personally reviewed images, no clear abnormalities found).            EE/10/2018:   INTERPRETATION:  This is an abnormal video EEG recording in the awake, drowsy, and sleep state(s). Frequent left temporal sharps. Intermittent, subtle, left temporal slowing. The findings increase risk for seizures and suggest underlying area of cortical irritability and structural abnormality. Clinical and radiological correlation is recommended.         ASSESSMENT AND PLAN:    1. Localization-related epilepsy (HCC)    - levetiracetam (KEPPRA) 750 MG tablet; Take 1 Tab by mouth 2 times a day.  Dispense: 180 Tab; Refill: 3    2. 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  Risk for pharmaco- resistance discussed with patient and father.  Some of his seizures in the past may have been provoked but others were not: sleep deprivation, illegal drug use, stress may have played a role on the first seizure, and for his second one: the use of cough syrup which I learned he had used almost daily since he was a teenager, but since has no longer used.  It appears that the most recent clusters of seizures in  and 2019 were unprovoked.  The patient is compliant with Keppra, continues on 750 mg twice a day.  I recommend the Keppra  dose is increased to at thousand milligrams twice a day, however the patient refuses to do this, and believes that he can make further changes on his lifestyle including sleeping more at nighttime, however this was not clearly a trigger this time around.  He does have a history of drug and stimulant use in the past, but continues to deny any recent use. His most recent Keppra level was mildly subtherapeutic, the patient is aware of this.  His convulsions have exhibited focal features, wife described left arm deviation, and his reported h/o chlorine like smell episodes, suggesting temporal lobe epilepsy.    He was initially reluctant to start AED, but after several seizures, he had agreed to start Keppra, currently on 750 mg twice a day.  States he is compliant.  Again I recommend the patient to increase Keppra, particularly due to recent unprovoked seizures, and mildly subtherapeutic Keppra level, but he refuses.  We discussed compliance at length. We had extensive discussion about possible complications of seizures, including but not limited to injuries, status epilepticus, death.   The patient was reported to the DMV, and is on no driving status at this point.  I will see him back in January 2020, with the family member, and we will discuss possibly resuming driving at that isela  We discussed his job functions and it appears he should be able to continue to work without restrictions.       FOLLOW-UP:   Return in about 3 months (around 12/23/2019).      EDUCATION AND COUNSELING:  -Education was provided to the patient and/or family regarding diagnosis and prognosis. The chronic and unpredictable nature of the condition were discussed. There is increased risk for additional events, which may carry potential for significant injuries and death. Discussed frequent seizure triggers: sleep deprivation, medication non-compliance, use of illegal drugs/alcohol, stress, and others.   -We reviewed in detail the current  antiepileptic regimen. Potential side effects of antiepileptics were discussed at length, including but no limited to: hypersensitivity reactions (rash and others, some of which can be fatal), visual field changes (some of which may be irreversible), glaucoma, diplopia, kidney stones, osteopenia/osteoporosis/bone fractures, hyperthermia/anhydrosis, hyponatremia, tremors/abnormal movements, ataxia, dizziness, fatigue, increased risk for falls, risk for cardiac arrhythmias/syncope, gastrointestinal side effects(hepatitis, pancreatitis, gastritis, ulcers), gingival hypertrophy/bleeding, drowsiness, sedation, anxiety/nervousness, increased risk for suicide, increased risk for depression, and psychosis.   -We also reviewed drug-drug interactions and their potential effect on seizure control and medication side effects.    -Recommend chronic vitamin D supplementation and regular exercise (if not contraindicated).   -Patient/family educated on risk for SUDEP (Sudden Death in Epilepsy). Counseling was provided on the importance of strict medication and follow up compliance. The patient/family understand the risks associated with non-adherence with the medical plan as outlined, including but not limited to an increased risk for breakthrough seizures, which may contribute to injuries, disability, status epilepticus, and even death.   -Counseling was also provided on potential effects of alcohol and other drugs, which may lower seizure threshold and/or affect the metabolism of antiepileptic drugs. We recommend avoidance of alcohol and illegal drugs.  -Avoid sleep deprivation.   -We extensively discussed the aspects related to safety in drivers who suffer from epilepsy. The patient is encourage to report to the Division of Motor Vehicles of any condition and/or spells related to confusion, disorientation, and/or loss of awareness and/or loss of consciousness; as these may pose a safety issue if they occur while operating a  motor vehicle. The patient and/or family are ultimately responsible for exercising caution and abiding to regulations in place.   -Other seizure precautions were discussed at length, including no diving, no skydiving, no climbing or exposure to unprotected heights, no unsupervised swimming, no Jacuzzi or bathing in bathtubs or deep bodies of water. The patient/family have been advised about risks for operating any machinery while suffering from seizures / syncope / epilepsy and/or while taking antiepileptic drugs.   -The patient understands and agrees that due to the complexity of his/her diagnosis, results of any testing and further recommendations will typically be discussed/made during a face to face encounter in my office. The patient and/or family further understands it is their responsibility to keep proper follow up.     Patient/family agree with plan, as outlined.         Manav Bai MD   Epilepsy and Neurodiagnostics.   Clinical  of Neurology [facility].   Diplomate in Neurology, Epilepsy, and Electrodiagnostic Medicine.   Office: [phone]  Fax: [phone]      BILLING DOCUMENTATION:     I have performed physical exam and reviewed and updated ROS and plan today 9/23/2019. In review of that note, there are no new changes except as documented above.     Counseling:  I spent greater than 50% time face-to-face time of a total of 50 minutes visit. Over 50% of the time of the visit today was spent on counseling and or coordination of care wtih the patient and/or family, with greater than 50% of the total discussing my assessment and plan as stated above.

## 2019-09-23 NOTE — LETTER
2019        Barry Griffith  [address] [address]  : 1990      To whom it may concern;    Mr. Griffith is a patient of mine. He suffers from epilepsy. He is not allow to drive at this time because of his condition. He will be reassessed in 2020.      If you would like to discuss this matter or have any questions feel free to contact my office at [phone].    Sincerely,             Manav Bai MD   Epilepsy and Neurodiagnostics.   Clinical  of Neurology [facility].   Diplomate in Neurology, Epilepsy, and Electrodiagnostic Medicine.   Office: [phone]  Fax: [phone]

## 2019-12-04 DIAGNOSIS — J45.20 MILD INTERMITTENT ASTHMA WITHOUT COMPLICATION: ICD-10-CM

## 2019-12-05 RX ORDER — ALBUTEROL SULFATE 90 UG/1
2 AEROSOL, METERED RESPIRATORY (INHALATION) EVERY 6 HOURS PRN
Qty: 8.5 INHALER | Refills: 5 | Status: SHIPPED | OUTPATIENT
Start: 2019-12-05 | End: 2020-03-09 | Stop reason: SDUPTHER

## 2020-01-13 ENCOUNTER — OFFICE VISIT (OUTPATIENT)
Dept: NEUROLOGY | Facility: MEDICAL CENTER | Age: 30
End: 2020-01-13
Payer: COMMERCIAL

## 2020-01-13 VITALS
BODY MASS INDEX: 23.4 KG/M2 | TEMPERATURE: 97.6 F | SYSTOLIC BLOOD PRESSURE: 122 MMHG | HEART RATE: 64 BPM | DIASTOLIC BLOOD PRESSURE: 80 MMHG | OXYGEN SATURATION: 99 % | HEIGHT: 69 IN | WEIGHT: 158 LBS | RESPIRATION RATE: 16 BRPM

## 2020-01-13 DIAGNOSIS — Z02.4 ENCOUNTER FOR EXAMINATION FOR DRIVING LICENSE: ICD-10-CM

## 2020-01-13 DIAGNOSIS — G40.109 LOCALIZATION-RELATED EPILEPSY (HCC): ICD-10-CM

## 2020-01-13 DIAGNOSIS — Z13.31 SCREENING FOR DEPRESSION: ICD-10-CM

## 2020-01-13 DIAGNOSIS — E55.9 VITAMIN D DEFICIENCY: ICD-10-CM

## 2020-01-13 PROCEDURE — 99215 OFFICE O/P EST HI 40 MIN: CPT | Performed by: PSYCHIATRY & NEUROLOGY

## 2020-01-13 RX ORDER — ERGOCALCIFEROL 1.25 MG/1
50000 CAPSULE ORAL
Qty: 4 CAP | Refills: 5 | Status: SHIPPED | OUTPATIENT
Start: 2020-01-13 | End: 2020-11-03

## 2020-01-13 RX ORDER — LEVETIRACETAM 750 MG/1
750 TABLET ORAL 2 TIMES DAILY
Qty: 180 TAB | Refills: 3 | Status: SHIPPED | OUTPATIENT
Start: 2020-01-13 | End: 2020-01-13

## 2020-01-13 RX ORDER — LEVETIRACETAM 1000 MG/1
1000 TABLET ORAL 2 TIMES DAILY
Qty: 180 TAB | Refills: 3 | Status: SHIPPED | OUTPATIENT
Start: 2020-01-13 | End: 2020-04-14 | Stop reason: SDUPTHER

## 2020-01-13 NOTE — LETTER
2020        Barry Griffith  [address] [address]  :       To whom it may concern;    Mr. Griffith is a patient of mine. He is able to return to drive as of today and will remain as such as far as he remains seizure free.     If you would like to discuss this matter or have any questions feel free to contact my office at [phone].    Sincerely,             Manav Bai MD   Epilepsy and Neurodiagnostics.   Clinical  of Neurology [facility].   Diplomate in Neurology, Epilepsy, and Electrodiagnostic Medicine.   Office: [phone]  Fax: [phone]

## 2020-01-14 NOTE — PROGRESS NOTES
No chief complaint on file.      Problem List Items Addressed This Visit     None      Visit Diagnoses     Localization-related epilepsy (HCC)        Relevant Medications    levetiracetam (KEPPRA) 1000 MG tablet    Other Relevant Orders    CBC WITH DIFFERENTIAL    Comp Metabolic Panel    LEVETIRACETAM (KEPPRA), S    VITAMIN D,25 HYDROXY    Vitamin D deficiency        Relevant Medications    ergocalciferol (DRISDOL) 98888 UNIT capsule    Other Relevant Orders    CBC WITH DIFFERENTIAL    Comp Metabolic Panel    LEVETIRACETAM (KEPPRA), S    VITAMIN D,25 HYDROXY    Encounter for examination for driving license        Screening for depression              Interim history:  Barry Griffith returns in follow-up today.  He did not come with family, but he called to his father, and later his fiancée (Althea), with whom I was able to speak on the phone.  The patient has not had any further seizures since September 2019.  According to the patient's fiancé and father, and the patient himself, he remains fully compliant with Keppra 750 mg twice a day.  He denies sleep deprivation or the use of any illegal drugs.  The patient would like to resume driving, both the fiancé and the patient's father believe that he is a safe .  Previously, the patient had refused to increase Keppra further, but appears amenable to that during today's appointment.  The patient continues to take supplementation with vitamin D.  He is fully compliant with Keppra, and denies any current side effects.    His mood is good, he denies any current symptoms of depression, suicidal and or homicidal ideation.    Past medical history:   Past Medical History:   Diagnosis Date   • ASTHMA     Asthma since infancy.   • Seizure (HCC)        Past surgical history:   History reviewed. No pertinent surgical history.    Family history:   Family History   Problem Relation Age of Onset   • Hypertension Mother    • Hyperlipidemia Father    • Hypertension Father     • Cancer Neg Hx    • Diabetes Neg Hx        Social history:   Social History     Socioeconomic History   • Marital status: Single     Spouse name: Not on file   • Number of children: 2   • Years of education: Not on file   • Highest education level: Not on file   Occupational History   • Occupation: guidance counsellor [employer]   • Occupation: psych coordinator [employer]   Social Needs   • Financial resource strain: Not on file   • Food insecurity:     Worry: Not on file     Inability: Not on file   • Transportation needs:     Medical: Not on file     Non-medical: Not on file   Tobacco Use   • Smoking status: Never Smoker   • Smokeless tobacco: Never Used   Substance and Sexual Activity   • Alcohol use: No     Alcohol/week: 0.0 oz     Comment: rare    • Drug use: Yes     Types: Marijuana     Comment: rare   • Sexual activity: Yes     Partners: Female     Birth control/protection: Condom, Pill   Lifestyle   • Physical activity:     Days per week: Not on file     Minutes per session: Not on file   • Stress: Not on file   Relationships   • Social connections:     Talks on phone: Not on file     Gets together: Not on file     Attends Oriental orthodox service: Not on file     Active member of club or organization: Not on file     Attends meetings of clubs or organizations: Not on file     Relationship status: Not on file   • Intimate partner violence:     Fear of current or ex partner: Not on file     Emotionally abused: Not on file     Physically abused: Not on file     Forced sexual activity: Not on file   Other Topics Concern   • Not on file   Social History Narrative   • Not on file       Current medications:   Current Outpatient Medications   Medication   • ergocalciferol (DRISDOL) 86386 UNIT capsule   • levetiracetam (KEPPRA) 1000 MG tablet   • albuterol 108 (90 Base) MCG/ACT Aero Soln inhalation aerosol     No current facility-administered medications for this visit.        Medication Allergy:  No Known  "Allergies      Review of systems:   As reviewed in today's history.      Physical examination:   Vitals:    01/13/20 1050   BP: 122/80   BP Location: Left arm   Patient Position: Sitting   BP Cuff Size: Adult   Pulse: 64   Resp: 16   Temp: 36.4 °C (97.6 °F)   TempSrc: Temporal   SpO2: 99%   Weight: 71.7 kg (158 lb)   Height: 1.753 m (5' 9\")     General: Patient in no acute distress, pleasant and cooperative.  HEENT: Normocephalic, no signs of acute trauma.   Neck: supple, no meningeal signs or carotid bruits. There is normal range of motion. No tenderness on exam.   Chest: clear to auscultation. No cough.   CV: RRR, no murmurs.   Skin: no signs of acute rashes or trauma.   Musculoskeletal: joints exhibit full range of motion, without any pain to palpation. There are no signs of joint or muscle swelling. There is no tenderness to deep palpation of muscles.   Psychiatric: No hallucinatory behavior. Denies symptoms of depression or suicidal ideation. Mood and affect appear normal on exam.     NEUROLOGICAL EXAM:   Mental status, orientation: Awake, alert and fully oriented.   Speech and language: speech is clear and fluent. The patient is able to name, repeat and comprehend.   Memory: There is intact recollection of recent and remote events.   Cranial nerve exam: Pupils are 3-4 mm bilaterally and equally reactive to light and accommodation. Visual fields are intact by confrontation. There is no nystagmus on primary or secondary gaze. Intact full EOM in all directions of gaze. Face appears symmetric. Sensation in the face is intact to light touch. Uvula is midline. Palate elevates symmetrically. Tongue is midline and without any signs of tongue biting or fasciculations.Sternocleidomastoid muscles exhibit is normal strength bilaterally. Shoulder shrug is intact bilaterally.   Motor exam: Strength is 5/5 in all extremities. Tone is normal. No abnormal movements were seen on exam.   Sensory exam reveals normal sense of " light touch in all extremities.   Deep tendon reflexes:  2+ throughout. Plantar responses are flexor. There is no clonus.   Coordination: shows a normal finger-nose-finger. Normal rapidly alternating movements.   Gait: The patient was able to get up from seated position on first attempt without requiring assistance. Found to be steady when walking. Movements were fluid with normal arm swing. The patient was able to turn without difficulties or tendency to fall. Romberg exam unremarkable.        ANCILLARY DATA REVIEWED:       Lab Data Review:  Reviewed in chart.    Records reviewed:   Chart reviewed.     Imaging:   CTB 18  NO ACUTE ABNORMALITIES ARE NOTED ON CT SCAN OF THE HEA  (I personally reviewed images).      MRI brain w/wo, 8/10/2018:  1. MRI of the brain without and with contrast within normal limits.  2. No evidence of mass lesion, heterotopic gray matter, gross cortical dysplasia or mesial temporal sclerosis.  (I personally reviewed images, no clear abnormalities found).            EE/10/2018:   INTERPRETATION:  This is an abnormal video EEG recording in the awake, drowsy, and sleep state(s). Frequent left temporal sharps. Intermittent, subtle, left temporal slowing. The findings increase risk for seizures and suggest underlying area of cortical irritability and structural abnormality. Clinical and radiological correlation is recommended.         ASSESSMENT AND PLAN:    1. Localization-related epilepsy (HCC)  - CBC WITH DIFFERENTIAL; Future  - Comp Metabolic Panel; Future  - LEVETIRACETAM (KEPPRA), S  - VITAMIN D,25 HYDROXY; Future  - levetiracetam (KEPPRA) 1000 MG tablet; Take 1 Tab by mouth 2 Times a Day.  Dispense: 180 Tab; Refill: 3    2. Vitamin D deficiency  - ergocalciferol (DRISDOL) 81541 UNIT capsule; Take 1 Cap by mouth every 7 days.  Dispense: 4 Cap; Refill: 5  - CBC WITH DIFFERENTIAL; Future  - Comp Metabolic Panel; Future  - LEVETIRACETAM (KEPPRA), S  - VITAMIN D,25 HYDROXY;  Future    3. Encounter for examination for driving license      4. Screening for depression      CLINICAL DISCUSSION:   Focal onset epilepsy, suspect structural epilepsy.  However MRI was nonlesional.  Some of his seizures in the past may have been provoked by sleep deprivation, illegal and non-illegal drug use, stress.  Since that time, he has had multiple seizures that have not been provoked.  The patient was initially reluctant to take medication, and accept the possibility of epilepsy.  He had apparently 4 spells between September 20 and September 21, 2019, which were reported by his wife.  The patient indicates that he has been compliant, he was taking Keppra 750 mg twice a day at that time.  He also denied a sleep deprivation or any drug use, but he believes the seizures may have been provoked by fluorescent lights at a particular store that he visited that day.  The patient is now willing to increase Keppra to thousand milligrams twice a day.  He has been compliant and denies any side effects.  A new prescription was provided.  He will undergo blood work including a Keppra trough level, nature of which was discussed with the patient at length.  It appears that he remains seizure-free, this was confirmed not only by the patient, by the patient's father and the patient's fiancé on the phone in separate conversations today.  DMV paperwork has been filled out, he will be allowed to resume driving, the patient understands that he must stop driving should he have additional seizures, and must report to this office immediately.  Compliance with medications of medical care were discussed at length.  We have reviewed his job functions, and it appears that he is able to continue to work there without any restrictions.  Seizure precautions were discussed, as indicated below.      FOLLOW-UP:   Return in about 3 months (around 4/13/2020).      EDUCATION AND COUNSELING:  -Education was provided to the patient and/or family  regarding diagnosis and prognosis. The chronic and unpredictable nature of the condition were discussed. There is increased risk for additional events, which may carry potential for significant injuries and death. Discussed frequent seizure triggers: sleep deprivation, medication non-compliance, use of illegal drugs/alcohol, stress, and others.   -We reviewed in detail the current antiepileptic regimen. Potential side effects of antiepileptics were discussed at length, including but no limited to: hypersensitivity reactions (rash and others, some of which can be fatal), visual field changes (some of which may be irreversible), glaucoma, diplopia, kidney stones, osteopenia/osteoporosis/bone fractures, hyperthermia/anhydrosis, hyponatremia, tremors/abnormal movements, ataxia, dizziness, fatigue, increased risk for falls, risk for cardiac arrhythmias/syncope, gastrointestinal side effects(hepatitis, pancreatitis, gastritis, ulcers), gingival hypertrophy/bleeding, drowsiness, sedation, anxiety/nervousness, increased risk for suicide, increased risk for depression, and psychosis.   -Recommend chronic vitamin D supplementation and regular exercise (if not contraindicated).   -Patient/family educated on risk for SUDEP (Sudden Death in Epilepsy). Counseling was provided on the importance of strict medication and follow up compliance. The patient/family understand the risks associated with non-adherence with the medical plan as outlined, including but not limited to an increased risk for breakthrough seizures, which may contribute to injuries, disability, status epilepticus, and even death.   -Counseling was also provided on potential effects of alcohol and other drugs, which may lower seizure threshold and/or affect the metabolism of antiepileptic drugs. We recommend avoidance of alcohol and illegal drugs.  He denies use.  -Avoid sleep deprivation.   -We extensively discussed the aspects related to safety in drivers who  suffer from epilepsy. The patient is encourage to report to the Division of Motor Vehicles of any condition and/or spells related to confusion, disorientation, and/or loss of awareness and/or loss of consciousness; as these may pose a safety issue if they occur while operating a motor vehicle. The patient and/or family are ultimately responsible for exercising caution and abiding to regulations in place.   -Other seizure precautions were discussed at length, including no diving, no skydiving, no climbing or exposure to unprotected heights, no unsupervised swimming, no Jacuzzi or bathing in bathtubs or deep bodies of water. The patient/family have been advised about risks for operating any machinery while suffering from seizures / syncope / epilepsy and/or while taking antiepileptic drugs.   -The patient understands and agrees that due to the complexity of his/her diagnosis, results of any testing and further recommendations will typically be discussed/made during a face to face encounter in my office. The patient and/or family further understands it is their responsibility to keep proper follow up.     Patient/family agree with plan, as outlined.         Manav Bai MD   Epilepsy and Neurodiagnostics.   Clinical  of Neurology [facility].   Diplomate in Neurology, Epilepsy, and Electrodiagnostic Medicine.   Office: [phone]  Fax: [phone]      BILLING DOCUMENTATION:     I have performed physical exam and reviewed and updated ROS and plan today 1/13/2020. In review of that note, there are no new changes except as documented above.     Counseling:  I spent greater than 50% time face-to-face time of a total of 55 minutes visit. Over 50% of the time of the visit today was spent on counseling and or coordination of care wtih the patient and/or family, with greater than 50% of the total discussing my assessment and plan as stated above.

## 2020-03-01 ENCOUNTER — HOSPITAL ENCOUNTER (EMERGENCY)
Facility: MEDICAL CENTER | Age: 30
End: 2020-03-01
Attending: EMERGENCY MEDICINE
Payer: COMMERCIAL

## 2020-03-01 VITALS
BODY MASS INDEX: 22.86 KG/M2 | WEIGHT: 154.32 LBS | HEART RATE: 58 BPM | TEMPERATURE: 98.2 F | SYSTOLIC BLOOD PRESSURE: 116 MMHG | DIASTOLIC BLOOD PRESSURE: 48 MMHG | RESPIRATION RATE: 20 BRPM | HEIGHT: 69 IN | OXYGEN SATURATION: 96 %

## 2020-03-01 DIAGNOSIS — R56.9 SEIZURE (HCC): ICD-10-CM

## 2020-03-01 DIAGNOSIS — G40.909 SEIZURE DISORDER (HCC): ICD-10-CM

## 2020-03-01 LAB
ALBUMIN SERPL BCP-MCNC: 4.6 G/DL (ref 3.2–4.9)
ALBUMIN/GLOB SERPL: 2.2 G/DL
ALP SERPL-CCNC: 60 U/L (ref 30–99)
ALT SERPL-CCNC: 24 U/L (ref 2–50)
ANION GAP SERPL CALC-SCNC: 13 MMOL/L (ref 0–11.9)
AST SERPL-CCNC: 28 U/L (ref 12–45)
BASOPHILS # BLD AUTO: 0.5 % (ref 0–1.8)
BASOPHILS # BLD: 0.04 K/UL (ref 0–0.12)
BILIRUB SERPL-MCNC: 0.4 MG/DL (ref 0.1–1.5)
BUN SERPL-MCNC: 14 MG/DL (ref 8–22)
CALCIUM SERPL-MCNC: 8.9 MG/DL (ref 8.5–10.5)
CHLORIDE SERPL-SCNC: 107 MMOL/L (ref 96–112)
CO2 SERPL-SCNC: 18 MMOL/L (ref 20–33)
CREAT SERPL-MCNC: 1.32 MG/DL (ref 0.5–1.4)
EOSINOPHIL # BLD AUTO: 0.01 K/UL (ref 0–0.51)
EOSINOPHIL NFR BLD: 0.1 % (ref 0–6.9)
ERYTHROCYTE [DISTWIDTH] IN BLOOD BY AUTOMATED COUNT: 40 FL (ref 35.9–50)
GLOBULIN SER CALC-MCNC: 2.1 G/DL (ref 1.9–3.5)
GLUCOSE SERPL-MCNC: 92 MG/DL (ref 65–99)
HCT VFR BLD AUTO: 46.1 % (ref 42–52)
HGB BLD-MCNC: 15.1 G/DL (ref 14–18)
IMM GRANULOCYTES # BLD AUTO: 0.08 K/UL (ref 0–0.11)
IMM GRANULOCYTES NFR BLD AUTO: 1 % (ref 0–0.9)
LYMPHOCYTES # BLD AUTO: 0.84 K/UL (ref 1–4.8)
LYMPHOCYTES NFR BLD: 10.3 % (ref 22–41)
MCH RBC QN AUTO: 31.3 PG (ref 27–33)
MCHC RBC AUTO-ENTMCNC: 32.8 G/DL (ref 33.7–35.3)
MCV RBC AUTO: 95.4 FL (ref 81.4–97.8)
MONOCYTES # BLD AUTO: 0.47 K/UL (ref 0–0.85)
MONOCYTES NFR BLD AUTO: 5.8 % (ref 0–13.4)
NEUTROPHILS # BLD AUTO: 6.73 K/UL (ref 1.82–7.42)
NEUTROPHILS NFR BLD: 82.3 % (ref 44–72)
NRBC # BLD AUTO: 0 K/UL
NRBC BLD-RTO: 0 /100 WBC
PLATELET # BLD AUTO: 126 K/UL (ref 164–446)
PMV BLD AUTO: 10.9 FL (ref 9–12.9)
POTASSIUM SERPL-SCNC: 3.7 MMOL/L (ref 3.6–5.5)
PROT SERPL-MCNC: 6.7 G/DL (ref 6–8.2)
RBC # BLD AUTO: 4.83 M/UL (ref 4.7–6.1)
SODIUM SERPL-SCNC: 138 MMOL/L (ref 135–145)
WBC # BLD AUTO: 8.2 K/UL (ref 4.8–10.8)

## 2020-03-01 PROCEDURE — 85025 COMPLETE CBC W/AUTO DIFF WBC: CPT

## 2020-03-01 PROCEDURE — 80053 COMPREHEN METABOLIC PANEL: CPT

## 2020-03-01 PROCEDURE — 99284 EMERGENCY DEPT VISIT MOD MDM: CPT

## 2020-03-01 ASSESSMENT — FIBROSIS 4 INDEX: FIB4 SCORE: 1.01

## 2020-03-01 NOTE — ED TRIAGE NOTES
Witnessed tonic clonic seizure at home in bed. Lasting approx 6 minutes. Hx of seizure, on keppra. Football player. 7mg IV versed in route. Patient currently post ictal, confused, trying to get out of bed, restrained.

## 2020-03-01 NOTE — ED PROVIDER NOTES
"ED Provider Note    Chief Complaint:   Seizure    HPI:  Barry Griffith is a 30 y.o. male who presents after a reported witnessed seizure at home.  He does have a past medical history significant for seizure disorder.  He was brought in by paramedics after EMS was activated.  Patient on arrival was postictal, and is uncertain as to who called EMS.  He was noted to be very combative in route and is postictal state.  On arrival to the emergency department he is still confused, but does not seem excessively agitated or aggressive.  He reports no recent headaches, no recent fevers.  States that he has been taking his medication, but this time cannot really recall the name of the dosage.  Further HPI is limited by his postictal state.    Review of Systems:  See HPI for pertinent positives and negatives.  Further ROS limited by postictal state.    Past Medical History:   has a past medical history of ASTHMA and Seizure (HCC).    Social History:  Social History     Tobacco Use   • Smoking status: Never Smoker   • Smokeless tobacco: Never Used   Substance and Sexual Activity   • Alcohol use: No     Alcohol/week: 0.0 oz     Comment: rare    • Drug use: Yes     Types: Marijuana     Comment: rare   • Sexual activity: Yes     Partners: Female     Birth control/protection: Condom, Pill       Surgical History:  patient denies any surgical history    Current Medications:  Home Medications    **Home medications have not yet been reviewed for this encounter**         Allergies:  No Known Allergies    Physical Exam:  Vital Signs: /48   Pulse (!) 58   Temp 36.8 °C (98.2 °F) (Temporal)   Resp 20   Ht 1.753 m (5' 9\")   Wt 70 kg (154 lb 5.2 oz)   SpO2 96%   BMI 22.79 kg/m²   Constitutional: Alert, no acute distress  HENT: Moist mucus membranes, normal posterior pharynx, no intraoral lesions  Eyes: Pupils equal and reactive, normal conjunctiva  Neck: Supple, normal range of motion, no stridor  Cardiovascular: " Extremities are warm and well perfused, no murmur appreciated, normal cardiac auscultation  Pulmonary: No respiratory distress, normal work of breathing, no accessory muscule usage, breath sounds clear and equal bilaterally  Abdomen: Soft, non-distended, non-tender to palpation, no peritoneal signs  Skin: Warm, dry, no rashes or lesions  Musculoskeletal: Normal range of motion in all extremities, no swelling or deformity noted  Neurologic: Alert, responsive, mildly confused, slightly slurred speech, moves all extremities equally, does follow some commands, drowsy appearing  Psychiatric: Normal and appropriate mood and affect    Medical records reviewed for continuity of care.  Neurology clinic note reviewed from 1/13/2020.  Patient is note of a past medical history of localization-related epilepsy, currently being treated with Keppra.  Noted that his most recent seizure had been September 2019.  Most recent MR brain referenced was 8/18 without any significant abnormalities.  Due to some increasing seizure activity in September 2019, his Keppra was increased.    Labs:  Labs Reviewed   CBC WITH DIFFERENTIAL - Abnormal; Notable for the following components:       Result Value    MCHC 32.8 (*)     Platelet Count 126 (*)     Neutrophils-Polys 82.30 (*)     Lymphocytes 10.30 (*)     Immature Granulocytes 1.00 (*)     Lymphs (Absolute) 0.84 (*)     All other components within normal limits   COMP METABOLIC PANEL - Abnormal; Notable for the following components:    Co2 18 (*)     Anion Gap 13.0 (*)     All other components within normal limits   ESTIMATED GFR     Differential diagnosis:  Postictal state, breakthrough seizure, occult infection, medication nonadherence    MDM:  Patient presents after a witnessed seizure at home.  He does have a past medical history of seizure disorder, he is currently under care of neurology.  At the end of last year, he was having some increasing frequency of breakthrough seizures.  He  increased his dose of Keppra at that time.  It appears that his seizures have been well controlled.  He did have one breakthrough seizure today, he was observed in the emergency department with no further seizure-like activity.  On arrival, he did seem mildly confused consistent with a postictal state, his mental status continued to clear and returned to baseline after period of observation.  He was able to ambulate easily with a steady gait.  I did offer a low dose of Ativan to help prevent any further seizures, patient declined stating that he does have seizure medication at home and will continue to take that as prescribed.  As he is only had one seizure with a known history of epilepsy and a known history of intermittent seizures, I do not believe any further diagnostics nor evaluation are necessary.  I did do some screening lab work which notes normal electrolytes, as well as a normal white blood count no evidence of occult infection.    Plan at this time is for discharge home, he will contact his neurologist at the opening of business hours to discuss his emergency department visit today. Return precautions were discussed with the patient, and provided in written form with the patient's discharge instructions.     Disposition:  Discharge home in stable condition    Final Impression:  1. Seizure (HCC)    2. Seizure disorder (HCC)        Electronically signed by: Dana Santiago MD, 3/1/2020 11:12 AM

## 2020-03-01 NOTE — DISCHARGE INSTRUCTIONS
Please call your neurologist at the opening of business hours to let her know that you had a seizure today and were seen in the emergency department.  Return to the emergency department immediately if you have any new or worsening symptoms, including any repeat seizures.  Additionally, please return if you develop headaches, nausea, vomiting, or any further concerns.

## 2020-03-09 ENCOUNTER — HOSPITAL ENCOUNTER (OUTPATIENT)
Facility: MEDICAL CENTER | Age: 30
End: 2020-03-09
Attending: FAMILY MEDICINE
Payer: COMMERCIAL

## 2020-03-09 ENCOUNTER — OFFICE VISIT (OUTPATIENT)
Dept: MEDICAL GROUP | Facility: PHYSICIAN GROUP | Age: 30
End: 2020-03-09
Payer: COMMERCIAL

## 2020-03-09 VITALS
OXYGEN SATURATION: 99 % | BODY MASS INDEX: 23.74 KG/M2 | SYSTOLIC BLOOD PRESSURE: 100 MMHG | HEIGHT: 69 IN | DIASTOLIC BLOOD PRESSURE: 60 MMHG | HEART RATE: 51 BPM | TEMPERATURE: 97.5 F | WEIGHT: 160.25 LBS

## 2020-03-09 DIAGNOSIS — N50.811 RIGHT TESTICULAR PAIN: ICD-10-CM

## 2020-03-09 DIAGNOSIS — L72.0 EPIDERMAL CYST: ICD-10-CM

## 2020-03-09 DIAGNOSIS — J45.20 MILD INTERMITTENT ASTHMA WITHOUT COMPLICATION: ICD-10-CM

## 2020-03-09 LAB — AMBIGUOUS DTTM AMBI4: NORMAL

## 2020-03-09 PROCEDURE — 99214 OFFICE O/P EST MOD 30 MIN: CPT | Performed by: FAMILY MEDICINE

## 2020-03-09 PROCEDURE — 87591 N.GONORRHOEAE DNA AMP PROB: CPT

## 2020-03-09 PROCEDURE — 87491 CHLMYD TRACH DNA AMP PROBE: CPT

## 2020-03-09 RX ORDER — ALBUTEROL SULFATE 90 UG/1
2 AEROSOL, METERED RESPIRATORY (INHALATION) EVERY 6 HOURS PRN
Qty: 8.5 INHALER | Refills: 5 | Status: SHIPPED | OUTPATIENT
Start: 2020-03-09 | End: 2021-07-20

## 2020-03-09 ASSESSMENT — PATIENT HEALTH QUESTIONNAIRE - PHQ9: CLINICAL INTERPRETATION OF PHQ2 SCORE: 0

## 2020-03-09 ASSESSMENT — FIBROSIS 4 INDEX: FIB4 SCORE: 1.315466713716891942

## 2020-03-09 NOTE — PROGRESS NOTES
Subjective:      Barry Griffith is a 30 y.o. male who presents with Pain (right side of testicle) and Medication Refill (vitamin d)        HPI:    The patient is here for acute right testicular pain and medication refills.     Right testicular pain  Patient has new complaints of right testicular discomfort onset one month ago, and continuing this morning. He reports 1 month ago he noticed his right testicular discomfort after oral and sexual intercourse with his fiance. He had oral and sexual intercourse again last night and noticed the discomfort again. His testicular pain seems to be associated with sexual activity. He reports penetration both times. He and his partner use contraception. He notes he has also been wearing tight underwear which he thinks aggravates his testicular pain. He and his fiance have been together for 12+ years. He and his partner both deny a history of STDs. He denies penile discharge, dysuria, urgency or frequency. He denies any recent or direct trauma or injury, increase in activity more than the usual.  He normally works out and recently played basketball without pain.     Epidermal cyst  He has a history of a mass on his left testicle since he was 13 years of age. He reports he has had this checked out previously.  He denies any left testicular pain today.  He had an ultrasound in 2008 to evaluate this area which showed:    IMPRESSION of US Scrotum Contents from 3/2008:   1. NO EVIDENCE OF TESTICULAR MASS OR TORSION.  2. 15 MM LEFT EPIDIDYMAL CYST.    Mild intermittent asthma without complication  Chronic history. He is here requesting medication refills for his inhaler. He has a history of asthma and reports his symptoms have recently been under control. He has not had any recent asthma attacks.       Past medical history, past surgical history, family history reviewed-no changes    Social history reviewed-no changes    Allergies reviewed-no changes    Medications reviewed-no  "changes     ROS:  As per the HPI as shown above, the rest are negative.       Objective:     /60 (BP Location: Left arm, Patient Position: Sitting, BP Cuff Size: Adult)   Pulse (!) 51   Temp 36.4 °C (97.5 °F) (Temporal)   Ht 1.753 m (5' 9\")   Wt 72.7 kg (160 lb 4 oz)   SpO2 99%   BMI 23.67 kg/m²     Physical Exam  Examined alert, awake, oriented, not in distress    Neck-supple, no lymphadenopathy, no thyromegaly  Lungs-clear to auscultation, no rales, no wheezes  Heart-regular rate and rhythm, no murmur  Extremities-no edema, clubbing, cyanosis   : Testicles are descended, circumcised penis, no penile discharge. There is a marble sized palpable mass sitting on top of the left testicle most likely an epididymal cyst with no tenderness. On the right side, there is no palpable masses or tenderness. No hernia.      Assessment/Plan:     1. Right testicular pain  New issue to me today. He has new complaints today of right testicular discomfort onset 1 month ago, and continuing to today. We will further evaluate with an US. His testicular discomfort is initiated with oral/sexual intercourse. He and his fiance have been together for 12+ years and both deny a history of STDs.  We will check for GC and chlamydia to make sure we rule out STD.  We will send him for ultrasound of the scrotum contents.  Further evaluation will depend on results.  Advised to wear jock support briefs.  May take over-the-counter Tylenol or NSAIDs as needed for pain.  - [id number]; Future  - CHLAMYDIA/GC PCR URINE OR SWAB; Future    2. Epidermal cyst  This is a chronic problem.  Reviewed patient's past US imaging from 2008 revealing a 15 mm epididymal cyst. The patient does not have left sided testicular pain at this time.  Since we are sending him for ultrasound we can also reevaluate this cyst at the same time.    3. Mild intermittent asthma without complication  Known chronic history. Patient is provided with a refill as " requested.   - albuterol 108 (90 Base) MCG/ACT Aero Soln inhalation aerosol; Inhale 2 Puffs by mouth every 6 hours as needed for Shortness of Breath.  Dispense: 8.5 Inhaler; Refill: 5      Lorenzo VANN (Scribe), am scribing for, and in the presence of, Mine Nunes MD     Electronically signed by: Lorenzo Olivas (Bhupendraibe), 3/9/2020    Mine VANN MD personally performed the services described in this documentation, as scribed by Lorenzo Olivas in my presence, and it is both accurate and complete.

## 2020-03-10 LAB
C TRACH DNA SPEC QL NAA+PROBE: NEGATIVE
N GONORRHOEA DNA SPEC QL NAA+PROBE: NEGATIVE
SPECIMEN SOURCE: NORMAL

## 2020-03-11 ENCOUNTER — TELEPHONE (OUTPATIENT)
Dept: MEDICAL GROUP | Facility: PHYSICIAN GROUP | Age: 30
End: 2020-03-11

## 2020-03-11 NOTE — TELEPHONE ENCOUNTER
Phone Number Called: [phone] (home) [phone] (work)      Call outcome: Spoke to patient regarding message below.    Message: Pt notified of results.

## 2020-03-11 NOTE — TELEPHONE ENCOUNTER
----- Message from Mine Nunes M.D. sent at 3/11/2020  7:05 AM PDT -----  Please inform patient the test for chlamydia and gonorrhea came back negative.  I will wait for the results of the ultrasound and we will call him as soon as we get them.

## 2020-03-12 ENCOUNTER — HOSPITAL ENCOUNTER (OUTPATIENT)
Dept: RADIOLOGY | Facility: MEDICAL CENTER | Age: 30
End: 2020-03-12
Attending: FAMILY MEDICINE
Payer: COMMERCIAL

## 2020-03-12 DIAGNOSIS — N50.811 RIGHT TESTICULAR PAIN: ICD-10-CM

## 2020-03-12 PROCEDURE — 76870 US EXAM SCROTUM: CPT

## 2020-03-13 ENCOUNTER — TELEPHONE (OUTPATIENT)
Dept: MEDICAL GROUP | Facility: PHYSICIAN GROUP | Age: 30
End: 2020-03-13

## 2020-03-13 NOTE — TELEPHONE ENCOUNTER
Phone Number Called: [phone] (home)     Call outcome: Spoke to patient regarding message below.    Message: patient understood results. No questions

## 2020-03-13 NOTE — TELEPHONE ENCOUNTER
----- Message from Mine Nunes M.D. sent at 3/12/2020  5:44 PM PDT -----  Please inform patient the ultrasound of the testicles came back epididymal cyst on the left side which is about the same size as before.  This is been there for a long time since he was young so we will continue to keep an eye on this.  Otherwise no other abnormal findings.

## 2020-04-14 ENCOUNTER — TELEPHONE (OUTPATIENT)
Dept: NEUROLOGY | Facility: MEDICAL CENTER | Age: 30
End: 2020-04-14

## 2020-04-14 DIAGNOSIS — G40.109 LOCALIZATION-RELATED EPILEPSY (HCC): ICD-10-CM

## 2020-04-14 RX ORDER — LEVETIRACETAM 1000 MG/1
1000 TABLET ORAL 2 TIMES DAILY
Qty: 180 TAB | Refills: 1 | Status: SHIPPED | OUTPATIENT
Start: 2020-04-14 | End: 2020-07-15 | Stop reason: SDUPTHER

## 2020-04-14 NOTE — TELEPHONE ENCOUNTER
Per Dr Bai I called pt informed him that he will be discharged from our practice for non compliance and also informed him that Dr Bai reported him to the [facility]mv and pt is not allowed to drive indefinitely and Dr Bai will refill his keppra for 6 months and pt will need to establish with a new neurologist to continue getting his sz medication refilled. I confirmed pt's address on chart w/ pt and notified him I will mail a copy of the dmv report to him. Pt verbally agreed to all information that was given to him.

## 2020-07-15 ENCOUNTER — OFFICE VISIT (OUTPATIENT)
Dept: NEUROLOGY | Facility: MEDICAL CENTER | Age: 30
End: 2020-07-15
Payer: COMMERCIAL

## 2020-07-15 VITALS
TEMPERATURE: 99 F | RESPIRATION RATE: 16 BRPM | OXYGEN SATURATION: 98 % | DIASTOLIC BLOOD PRESSURE: 75 MMHG | SYSTOLIC BLOOD PRESSURE: 120 MMHG | WEIGHT: 154 LBS | BODY MASS INDEX: 22.05 KG/M2 | HEART RATE: 66 BPM | HEIGHT: 70 IN

## 2020-07-15 DIAGNOSIS — E55.9 VITAMIN D DEFICIENCY: ICD-10-CM

## 2020-07-15 DIAGNOSIS — Z13.31 SCREENING FOR DEPRESSION: ICD-10-CM

## 2020-07-15 DIAGNOSIS — R94.01 ABNORMAL EEG: ICD-10-CM

## 2020-07-15 DIAGNOSIS — Z91.199 NONCOMPLIANCE: ICD-10-CM

## 2020-07-15 DIAGNOSIS — Z02.4 ENCOUNTER FOR EXAMINATION FOR DRIVING LICENSE: ICD-10-CM

## 2020-07-15 DIAGNOSIS — G40.109 LOCALIZATION-RELATED EPILEPSY (HCC): ICD-10-CM

## 2020-07-15 PROCEDURE — 99215 OFFICE O/P EST HI 40 MIN: CPT | Performed by: PSYCHIATRY & NEUROLOGY

## 2020-07-15 RX ORDER — LEVETIRACETAM 1000 MG/1
1000 TABLET ORAL 2 TIMES DAILY
Qty: 180 TAB | Refills: 3 | Status: SHIPPED | OUTPATIENT
Start: 2020-07-15 | End: 2021-07-23 | Stop reason: SDUPTHER

## 2020-07-15 ASSESSMENT — FIBROSIS 4 INDEX: FIB4 SCORE: 1.315466713716891942

## 2020-07-15 NOTE — PROGRESS NOTES
Chief Complaint   Patient presents with   • Follow-Up     Localization-related epilepsy        Problem List Items Addressed This Visit     None      Visit Diagnoses     Localization-related epilepsy (HCC)        Relevant Medications    levetiracetam (KEPPRA) 1000 MG tablet    Other Relevant Orders    CBC WITH DIFFERENTIAL    Comp Metabolic Panel    VITAMIN D,25 HYDROXY    LEVETIRACETAM (KEPPRA), S    Screening for depression        Relevant Orders    CBC WITH DIFFERENTIAL    Comp Metabolic Panel    VITAMIN D,25 HYDROXY    LEVETIRACETAM (KEPPRA), S    Abnormal EEG        Relevant Orders    CBC WITH DIFFERENTIAL    Comp Metabolic Panel    VITAMIN D,25 HYDROXY    LEVETIRACETAM (KEPPRA), S    Encounter for examination for driving license        Vitamin D deficiency        Relevant Orders    CBC WITH DIFFERENTIAL    Comp Metabolic Panel    VITAMIN D,25 HYDROXY    LEVETIRACETAM (KEPPRA), S    Noncompliance              Interim history:  Barry Griffith returns to the office in follow up. He was a NS to his appt on 4/14/20 and states he did not know even though he was reminded of it the day before. His last seizure was 3/1/2020 and he failed to notify our office of such but we found ED visit with documentation about it. I increased his Keppra from 750 mg bid to 1000 mg bid in April 2020. He states he is seizure free since 3/1/2020 and has been fully compliant with medication. He also did not have blood work done. He denies being depressed or suicidal. He is not driving. He denies any side effects from keppra.      Past medical history:   Past Medical History:   Diagnosis Date   • ASTHMA     Asthma since infancy.   • Seizure (HCC)        Past surgical history:   History reviewed. No pertinent surgical history.    Family history:   Family History   Problem Relation Age of Onset   • Hypertension Mother    • Hyperlipidemia Father    • Hypertension Father    • Cancer Neg Hx    • Diabetes Neg Hx        Social history:    Social History     Socioeconomic History   • Marital status: Single     Spouse name: Not on file   • Number of children: 2   • Years of education: Not on file   • Highest education level: Not on file   Occupational History   • Occupation: guidance counsellor [employer]   • Occupation: psych coordinator [employer]   Social Needs   • Financial resource strain: Not on file   • Food insecurity     Worry: Not on file     Inability: Not on file   • Transportation needs     Medical: Not on file     Non-medical: Not on file   Tobacco Use   • Smoking status: Never Smoker   • Smokeless tobacco: Never Used   Substance and Sexual Activity   • Alcohol use: No     Alcohol/week: 0.0 oz     Comment: rare    • Drug use: Yes     Types: Marijuana     Comment: rare   • Sexual activity: Yes     Partners: Female     Birth control/protection: Condom, Pill   Lifestyle   • Physical activity     Days per week: Not on file     Minutes per session: Not on file   • Stress: Not on file   Relationships   • Social connections     Talks on phone: Not on file     Gets together: Not on file     Attends Cheondoism service: Not on file     Active member of club or organization: Not on file     Attends meetings of clubs or organizations: Not on file     Relationship status: Not on file   • Intimate partner violence     Fear of current or ex partner: Not on file     Emotionally abused: Not on file     Physically abused: Not on file     Forced sexual activity: Not on file   Other Topics Concern   • Not on file   Social History Narrative   • Not on file       Current medications:   Current Outpatient Medications   Medication   • levetiracetam (KEPPRA) 1000 MG tablet   • albuterol 108 (90 Base) MCG/ACT Aero Soln inhalation aerosol   • ergocalciferol (DRISDOL) 44737 UNIT capsule     No current facility-administered medications for this visit.        Medication Allergy:  No Known Allergies      Review of systems:   As reviewed in today's history.  "    Physical examination:   Vitals:    07/15/20 1114   BP: 120/75   BP Location: Left arm   Patient Position: Sitting   BP Cuff Size: Adult   Pulse: 66   Resp: 16   Temp: 37.2 °C (99 °F)   TempSrc: Temporal   SpO2: 98%   Weight: 69.9 kg (154 lb)   Height: 1.765 m (5' 9.5\")     General: Patient in no acute distress, pleasant and cooperative.  HEENT: Normocephalic, no signs of acute trauma.   Neck: supple, no meningeal signs or carotid bruits. There is normal range of motion. No tenderness on exam.   Chest: clear to auscultation. No cough.   CV: RRR, no murmurs.   Skin: no signs of acute rashes or trauma.   Musculoskeletal: joints exhibit full range of motion, without any pain to palpation. There are no signs of joint or muscle swelling. There is no tenderness to deep palpation of muscles.   Psychiatric: No hallucinatory behavior. Denies symptoms of depression or suicidal ideation. Mood and affect appear normal on exam.     NEUROLOGICAL EXAM:   Mental status, orientation: Awake, alert and fully oriented.   Speech and language: speech is clear and fluent. The patient is able to name, repeat and comprehend.   Memory: There is intact recollection of recent and remote events.   Cranial nerve exam: Pupils are 3-4 mm bilaterally and equally reactive to light and accommodation. Visual fields are intact by confrontation. There is no nystagmus on primary or secondary gaze. Intact full EOM in all directions of gaze. Face appears symmetric. Sensation in the face is intact to light touch. Uvula is midline. Palate elevates symmetrically. Tongue is midline and without any signs of tongue biting or fasciculations.Sternocleidomastoid muscles exhibit is normal strength bilaterally. Shoulder shrug is intact bilaterally.   Motor exam: Strength is 5/5 in all extremities. Tone is normal. No abnormal movements were seen on exam.   Sensory exam reveals normal sense of light touch in all extremities.   Deep tendon reflexes:  2+ throughout. " Plantar responses are flexor. There is no clonus.   Coordination: shows a normal finger-nose-finger. Normal rapidly alternating movements.   Gait: The patient was able to get up from seated position on first attempt without requiring assistance. Found to be steady when walking. Movements were fluid with normal arm swing. The patient was able to turn without difficulties or tendency to fall. Romberg exam unremarkable.         ANCILLARY DATA REVIEWED:       Lab Data Review:  No results found for this or any previous visit (from the past 24 hour(s)).      Records reviewed:   Chart reviewed including hospital visit.     Imaging:   CTB 18  NO ACUTE ABNORMALITIES ARE NOTED ON CT SCAN OF THE HEA  (I personally reviewed images).      MRI brain w/wo, 8/10/2018:  1. MRI of the brain without and with contrast within normal limits.  2. No evidence of mass lesion, heterotopic gray matter, gross cortical dysplasia or mesial temporal sclerosis.  (I personally reviewed images, no clear abnormalities found).            EE/10/2018:   INTERPRETATION:  This is an abnormal video EEG recording in the awake, drowsy, and sleep state(s). Frequent left temporal sharps. Intermittent, subtle, left temporal slowing. The findings increase risk for seizures and suggest underlying area of cortical irritability and structural abnormality. Clinical and radiological correlation is recommended.           ASSESSMENT AND PLAN:  1. Localization-related epilepsy (HCC)  - levetiracetam (KEPPRA) 1000 MG tablet; Take 1 Tab by mouth 2 Times a Day.  Dispense: 180 Tab; Refill: 3  - CBC WITH DIFFERENTIAL; Future  - Comp Metabolic Panel; Future  - VITAMIN D,25 HYDROXY; Future  - LEVETIRACETAM (KEPPRA), S    2. Screening for depression  - CBC WITH DIFFERENTIAL; Future  - Comp Metabolic Panel; Future  - VITAMIN D,25 HYDROXY; Future  - LEVETIRACETAM (KEPPRA), S    3. Abnormal EEG  - CBC WITH DIFFERENTIAL; Future  - Comp Metabolic Panel; Future  - VITAMIN  D,25 HYDROXY; Future  - LEVETIRACETAM (KEPPRA), S    4. Encounter for examination for driving license    5. Vitamin D deficiency  - CBC WITH DIFFERENTIAL; Future  - Comp Metabolic Panel; Future  - VITAMIN D,25 HYDROXY; Future  - LEVETIRACETAM (KEPPRA), S    6. Noncompliance       CLINICAL DISCUSSION:   Focal onset epilepsy, suspect structural epilepsy.  However MRI was nonlesional.  Some of his seizures in the past may have been provoked by sleep deprivation, illegal and non-illegal drug use, stress.  Since that time, he has had multiple seizures that have not been provoked, including his last seizure of March 1, 2020.  The patient failed to communicate his last seizure to this office.  He has had issues with compliance long-term, initially with his medication, lately with his follow-up appointments and getting blood work done.  I communicated to the patient that I was going to discharge him from my practice, today he ask to remain under my care, and promised to improve compliance.  This involves attendance to all his follow-up appointments, testing and compliance with his Keppra.  Since April 2020, the dose of Keppra was increased from 750 mg twice a day to 1000 mg twice a day.  He indicates he is fully compliant with this dose, and denies any side effects.  The patient has been reported to the DMV, he is not driving.  He had hopes that he was going to be released to driving today, since he has been more than 3-month without a seizure, however in view of his past history of noncompliance and recurrent seizures, I recommend that we observe another 3-month of seizure freedom, and I will readdress towards the end of October 2020, whether he remains compliant and seizure-free, he will be released to drive at that time.  The patient agrees with the plan.    The patient has been reminded that he must get blood work done.  The nature of the testing has been explained to the patient in detail.    The risk for further  seizures have been discussed in details.  The diagnosis of epilepsy has been discussed with the patient in great detail.  The possible risk factors for breakthrough seizures were discussed as well.  The patient denies sleep deprivation, using any illegal drugs or over-the-counter substances that potentially will give him seizures.         FOLLOW-UP:   Return in about 3 months (around 10/15/2020).      EDUCATION AND COUNSELING:  -Education was provided to the patient and/or family regarding diagnosis and prognosis. The chronic and unpredictable nature of the condition were discussed. There is increased risk for additional events, which may carry potential for significant injuries and death. Discussed frequent seizure triggers: sleep deprivation, medication non-compliance, use of illegal drugs/alcohol, stress, and others.   -We reviewed in detail the current antiepileptic regimen. Potential side effects of antiepileptics were discussed at length, including but no limited to: hypersensitivity reactions (rash and others, some of which can be fatal), visual field changes (some of which may be irreversible), glaucoma, diplopia, kidney stones, osteopenia/osteoporosis/bone fractures, hyperthermia/anhydrosis, hyponatremia, tremors/abnormal movements, ataxia, dizziness, fatigue, increased risk for falls, risk for cardiac arrhythmias/syncope, gastrointestinal side effects(hepatitis, pancreatitis, gastritis, ulcers), gingival hypertrophy/bleeding, drowsiness, sedation, anxiety/nervousness, increased risk for suicide, increased risk for depression, and psychosis.   -We also reviewed drug-drug interactions and their potential effect on seizure control and medication side effects.    -Recommend chronic vitamin D supplementation and regular exercise (if not contraindicated).   -Patient/family educated on risk for SUDEP (Sudden Death in Epilepsy). Counseling was provided on the importance of strict medication and follow up  compliance. The patient/family understand the risks associated with non-adherence with the medical plan as outlined, including but not limited to an increased risk for breakthrough seizures, which may contribute to injuries, disability, status epilepticus, and even death.   -Counseling was also provided on potential effects of alcohol and other drugs, which may lower seizure threshold and/or affect the metabolism of antiepileptic drugs. We recommend avoidance of alcohol and illegal drugs. Denies use.   -Avoid sleep deprivation.   -We extensively discussed the aspects related to safety in drivers who suffer from epilepsy. The patient is encourage to report to the Division of Motor Vehicles of any condition and/or spells related to confusion, disorientation, and/or loss of awareness and/or loss of consciousness; as these may pose a safety issue if they occur while operating a motor vehicle. The patient and/or family are ultimately responsible for exercising caution and abiding to regulations in place. He is not driving at this time.   -Other seizure precautions were discussed at length, including no diving, no skydiving, no climbing or exposure to unprotected heights, no unsupervised swimming, no Jacuzzi or bathing in bathtubs or deep bodies of water. The patient/family have been advised about risks for operating any machinery while suffering from seizures / syncope / epilepsy and/or while taking antiepileptic drugs.   -The patient understands and agrees that due to the complexity of his/her diagnosis, results of any testing and further recommendations will typically be discussed/made during a face to face encounter in my office. The patient and/or family further understands it is their responsibility to keep proper follow up.     Patient agrees with plan, as outlined.         Manav Bai MD   Epilepsy and Neurodiagnostics.   Clinical  of Neurology [facility].   Diplomate in Neurology, Epilepsy, and Electrodiagnostic Medicine.   Office: [phone]  Fax: [phone]      BILLING DOCUMENTATION:     I have performed physical exam and reviewed and updated ROS and plan today 7/15/2020. In review of that note, there are no new changes except as documented above.     Counseling:  I spent greater than 50% time face-to-face time of a total of 51 minutes visit. Over 50% of the time of the visit today was spent on counseling and or coordination of care wtih the patient and/or family, with greater than 50% of the total discussing my assessment and plan as stated above.

## 2020-10-26 NOTE — PROGRESS NOTES
Chief Complaint   Patient presents with   • Follow-Up     Localization-related epilepsy (HCC)       Problem List Items Addressed This Visit     None      Visit Diagnoses     Localization-related epilepsy (HCC)        Relevant Orders    REFERRAL TO NEURODIAGNOSTICS (EEG,EP,EMG/NCS/DBS)    Screening for depression        Vitamin D deficiency        Relevant Medications    ergocalciferol (DRISDOL) 29433 UNIT capsule          History of present illness:  Barry Griffith 30 y.o. male presents today for DMV paperwork. Pt of Dr. Bai and was last seen in July 2020.     Pt reports of no seizures. We called his fiance, Jami, and she attests to this. He is taking keppra 1000mg bid. No side effects. Compliant. Denies any mood issues. No suicidal or homicidal thoughts. He states he is taking vit D. He had his lab work done. Jami is requesting for a repeat EEG for him and pt agreed to this. He is not driving and wants clearance to drive again.       Past medical history:   Past Medical History:   Diagnosis Date   • ASTHMA     Asthma since infancy.   • Seizure (HCC)        Past surgical history:   History reviewed. No pertinent surgical history.    Family history:   Family History   Problem Relation Age of Onset   • Hypertension Mother    • Hyperlipidemia Father    • Hypertension Father    • Cancer Neg Hx    • Diabetes Neg Hx        Social history:   Social History     Socioeconomic History   • Marital status: Single     Spouse name: Not on file   • Number of children: 2   • Years of education: Not on file   • Highest education level: Not on file   Occupational History   • Occupation: guidance counsellor [employer]   • Occupation: psych coordinator [employer]   Social Needs   • Financial resource strain: Not on file   • Food insecurity     Worry: Not on file     Inability: Not on file   • Transportation needs     Medical: Not on file     Non-medical: Not on file   Tobacco Use   • Smoking status: Never Smoker   •  Smokeless tobacco: Never Used   Substance and Sexual Activity   • Alcohol use: No     Alcohol/week: 0.0 oz     Comment: rare    • Drug use: Yes     Types: Marijuana     Comment: rare   • Sexual activity: Yes     Partners: Female     Birth control/protection: Condom, Pill   Lifestyle   • Physical activity     Days per week: Not on file     Minutes per session: Not on file   • Stress: Not on file   Relationships   • Social connections     Talks on phone: Not on file     Gets together: Not on file     Attends Religion service: Not on file     Active member of club or organization: Not on file     Attends meetings of clubs or organizations: Not on file     Relationship status: Not on file   • Intimate partner violence     Fear of current or ex partner: Not on file     Emotionally abused: Not on file     Physically abused: Not on file     Forced sexual activity: Not on file   Other Topics Concern   • Not on file   Social History Narrative   • Not on file       Current medications:   Current Outpatient Medications   Medication   • levetiracetam (KEPPRA) 1000 MG tablet   • albuterol 108 (90 Base) MCG/ACT Aero Soln inhalation aerosol   • ergocalciferol (DRISDOL) 71693 UNIT capsule     No current facility-administered medications for this visit.        Medication Allergy:  No Known Allergies      Review of systems:     General: Denies fevers or chills, or nightsweats, or generalized fatigue.    Head: Denies headaches or dizziness or lightheadedness  EENT: Denies vision changes, vision loss or pain, nasal secretion, nasal bleeding, difficulty swallowing, hearing loss, tinnitus, vertigo, ear pain  Respiratory: Denies shortness of breath, cough, sputum, or wheezing  Cardiac: Denies chest pain, palpitations, edema or syncope  Gastrointestinal: Denies nausea, vomiting, no abdominal pain or change in bowel habits, no melena or hematochezia  Urinary: Denies dysuria, frequency, hesitancy, or incontinence.  Dermatologic:  Denies new  rash  Musculoskeletal: Denies muscle pain or swelling, no atrophy, no neck and back pain or stiffness.   Neurologic: Denies facial droopiness, muscle weakness (focal or generalized), paresthesias, ataxia, change in speech or language, memory loss, abnormal movements, seizures, loss of consciousness, or episodes of confusion.   Psychiatric: Denies anxiety, depression, mood swings, suicidal or homicidal thoughts       Physical examination:   Vitals:    11/03/20 1424   BP: 120/78   BP Location: Right arm   Patient Position: Sitting   BP Cuff Size: Adult   Pulse: 70   Resp: 18   Temp: 36.8 °C (98.2 °F)   TempSrc: Temporal   SpO2: 98%     General: Patient in no acute distress, pleasant and cooperative.  HEENT: Normocephalic, no signs of acute trauma.   moist conjunctivae. Nares are patent. Oropharynx clear without lesions and normal  hard and soft palates.   Neck: Supple. There is normal range of motion.   Resp: clear to auscultation bilaterally. No wheezes or crackles.   CV: RRR, no murmurs.   Skin: no signs of acute rashes or trauma.   Musculoskeletal: joints exhibit full range of motion  Psychiatric: No hallucinatory behavior. No symptoms of depression or suicidal ideation. Mood and affect appear normal on exam.     NEUROLOGICAL EXAM:   Mental status, orientation: Awake, alert and fully oriented.   Speech and language: speech is clear and fluent. The patient is able to name, repeat and comprehend.   Memory: There is intact recollection of recent and remote events.   Cranial nerve exam:   CN I: Not examined   CN II: PERRL.   CN III, IV, VI: EOMI; no nystagmus   CN V: Facial sensation intact bilaterally   CN VII: face symmetric   CN VIII: hearing intact to finger rub bilaterally   CN IX, X: palate elevates symmetrically   CN XI: Symmetric shoulder shrug  CN XII: tongue midline. No signs of tongue biting or fasciculations   Motor exam: Strength is 5/5 in all extremities. Tone is normal. No abnormal movements were seen on  exam.   Sensory exam reveals normal sense of light touch, proprioception, vibration and pinprick in all extremities.   Deep tendon reflexes:  2+ throughout. Plantar responses are flexor. There is no clonus.   Coordination: shows a normal finger-nose-finger. Normal rapidly alternating movements.   Gait: The patient was able to get up from seated position on first attempt without requiring assistance. Found to be steady when walking. Movements were fluid with normal arm swing. The patient was able to turn without difficulties or tendency to fall. Romberg exam unremarkable      ANCILLARY DATA REVIEWED:       Lab Data Review:  Reviewed in chart.CBC, CMP, Vit D    Records reviewed:   Reviewed in chart.    Imaging:   CTB 18  NO ACUTE ABNORMALITIES ARE NOTED ON CT SCAN OF THE A     MRI brain w/wo, 8/10/2018:  1. MRI of the brain without and with contrast within normal limits.  2. No evidence of mass lesion, heterotopic gray matter, gross cortical dysplasia or mesial temporal sclerosis         EE/10/2018:   INTERPRETATION:  This is an abnormal video EEG recording in the awake, drowsy, and sleep state(s). Frequent left temporal sharps. Intermittent, subtle, left temporal slowing. The findings increase risk for seizures and suggest underlying area of cortical irritability and structural abnormality. Clinical and radiological correlation is recommended.      ASSESSMENT AND PLAN:  1. Localization-related epilepsy (HCC)  - REFERRAL TO NEURODIAGNOSTICS (EEG,EP,EMG/NCS/DBS)    2. Screening for depression    3. Vitamin D deficiency  - ergocalciferol (DRISDOL) 09141 UNIT capsule; Take 1 Cap by mouth every 7 days.  Dispense: 4 Cap; Refill: 5    4. 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 Hx of non compliance and illegal and non illegal drug use which could have provoked some of his seizures in the past. Seizures are now controlled on keppra  1000mg BID. No side effects. Compliant. His last seizure was on 3/1/2020. He will continue to f/u with Dr. Bai. Jami, his fiance, is requesting another EEG.     Mood is good. No suicidal or homicidal thoughts.     Past ASM's:none    Current ASM's: keppra 1000mg BID    keppra level still pending.     Plan:  - routine EEG.     - Discussed avoidance of spell/sz triggers: alcohol, sleep deprivation, benadryl and stress.    - Discussed Vit D supplementation. Recommended taking 2000-5000u daily after weekly supplementation. Vit D low. Given Rx.     - Discussed driving restrictions. Okay to drive but aware to stop driving immediately if a spell occurs and report to us. DMV paperwork faxed and copy given to pt.     -Labs to be checked for next appointment: none        FOLLOW-UP:   Return in about 6 months (around 5/3/2021), or with Dr. Bai.        EDUCATION AND COUNSELING:  -Education was provided to the patient and/or family regarding diagnosis and prognosis. The chronic and unpredictable nature of the condition were discussed. There is increased risk for additional events, which may carry potential for significant injuries and death. Discussed frequent seizure triggers: sleep deprivation, medication non-compliance, use of illegal drugs/alcohol, stress, and others.   -We reviewed in detail the current antiepileptic regimen. Potential side effects of antiepileptics were discussed at length, including but no limited to: hypersensitivity reactions (rash and others, some of which can be fatal), visual field changes (some of which may be irreversible), glaucoma, diplopia, kidney stones, osteopenia/osteoporosis/bone fractures, hyperthermia/anhydrosis, hyponatremia, tremors/abnormal movements, ataxia, dizziness, fatigue, increased risk for falls, risk for cardiac arrhythmias/syncope, gastrointestinal side effects(hepatitis, pancreatitis, gastritis, ulcers), gingival hypertrophy/bleeding, drowsiness, sedation,  anxiety/nervousness, increased risk for suicide, increased risk for depression, and psychosis.   -We also reviewed drug-drug interactions and their potential effect on seizure control and medication side effects.    -Recommend chronic vitamin D supplementation and regular exercise (if not contraindicated).   -Patient/family educated on risk for SUDEP (Sudden Death in Epilepsy). Counseling was provided on the importance of strict medication and follow up compliance. The patient/family understand the risks associated with non-adherence with the medical plan as outlined, including but not limited to an increased risk for breakthrough seizures, which may contribute to injuries, disability, status epilepticus, and even death.   -Counseling was also provided on potential effects of alcohol and other drugs, which may lower seizure threshold and/or affect the metabolism of antiepileptic drugs. We recommend avoidance of alcohol and illegal drugs.  -Avoid sleep deprivation.   -We extensively discussed the aspects related to safety in drivers who suffer from epilepsy. The patient is encourage to report to the Division of Motor Vehicles of any condition and/or spells related to confusion, disorientation, and/or loss of awareness and/or loss of consciousness; as these may pose a safety issue if they occur while operating a motor vehicle. The patient and/or family are ultimately responsible for exercising caution and abiding to regulations in place.   -Other seizure precautions were discussed at length, including no diving, no skydiving, no climbing or exposure to unprotected heights, no unsupervised swimming, no Jacuzzi or bathing in bathtubs or deep bodies of water. The patient/family have been advised about risks for operating any machinery while suffering from seizures / syncope / epilepsy and/or while taking antiepileptic drugs.   -The patient understands and agrees that due to the complexity of his/her diagnosis, results of  any testing and further recommendations will typically be discussed/made during a face to face encounter in my office. The patient and/or family further understands it is their responsibility to keep proper follow up.     Patient agrees with plan, as outlined.         Luiza Peace, MSN, APRN, FNP-C  [facility]  Office: [phone]  Fax: [phone]

## 2020-11-02 ENCOUNTER — HOSPITAL ENCOUNTER (OUTPATIENT)
Dept: LAB | Facility: MEDICAL CENTER | Age: 30
End: 2020-11-02
Attending: PSYCHIATRY & NEUROLOGY
Payer: COMMERCIAL

## 2020-11-02 DIAGNOSIS — G40.109 LOCALIZATION-RELATED EPILEPSY (HCC): ICD-10-CM

## 2020-11-02 DIAGNOSIS — E55.9 VITAMIN D DEFICIENCY: ICD-10-CM

## 2020-11-02 DIAGNOSIS — R94.01 ABNORMAL EEG: ICD-10-CM

## 2020-11-02 DIAGNOSIS — Z13.31 SCREENING FOR DEPRESSION: ICD-10-CM

## 2020-11-02 LAB
25(OH)D3 SERPL-MCNC: 19 NG/ML (ref 30–100)
ALBUMIN SERPL BCP-MCNC: 4.3 G/DL (ref 3.2–4.9)
ALBUMIN/GLOB SERPL: 1.8 G/DL
ALP SERPL-CCNC: 67 U/L (ref 30–99)
ALT SERPL-CCNC: 21 U/L (ref 2–50)
ANION GAP SERPL CALC-SCNC: 7 MMOL/L (ref 7–16)
AST SERPL-CCNC: 20 U/L (ref 12–45)
BASOPHILS # BLD AUTO: 0.6 % (ref 0–1.8)
BASOPHILS # BLD: 0.04 K/UL (ref 0–0.12)
BILIRUB SERPL-MCNC: 0.6 MG/DL (ref 0.1–1.5)
BUN SERPL-MCNC: 10 MG/DL (ref 8–22)
CALCIUM SERPL-MCNC: 9.7 MG/DL (ref 8.5–10.5)
CHLORIDE SERPL-SCNC: 106 MMOL/L (ref 96–112)
CO2 SERPL-SCNC: 27 MMOL/L (ref 20–33)
CREAT SERPL-MCNC: 1.02 MG/DL (ref 0.5–1.4)
EOSINOPHIL # BLD AUTO: 0.12 K/UL (ref 0–0.51)
EOSINOPHIL NFR BLD: 1.8 % (ref 0–6.9)
ERYTHROCYTE [DISTWIDTH] IN BLOOD BY AUTOMATED COUNT: 42.4 FL (ref 35.9–50)
GLOBULIN SER CALC-MCNC: 2.4 G/DL (ref 1.9–3.5)
GLUCOSE SERPL-MCNC: 77 MG/DL (ref 65–99)
HCT VFR BLD AUTO: 47.5 % (ref 42–52)
HGB BLD-MCNC: 15.3 G/DL (ref 14–18)
IMM GRANULOCYTES # BLD AUTO: 0.01 K/UL (ref 0–0.11)
IMM GRANULOCYTES NFR BLD AUTO: 0.2 % (ref 0–0.9)
LYMPHOCYTES # BLD AUTO: 2.58 K/UL (ref 1–4.8)
LYMPHOCYTES NFR BLD: 39.4 % (ref 22–41)
MCH RBC QN AUTO: 31 PG (ref 27–33)
MCHC RBC AUTO-ENTMCNC: 32.2 G/DL (ref 33.7–35.3)
MCV RBC AUTO: 96.3 FL (ref 81.4–97.8)
MONOCYTES # BLD AUTO: 0.49 K/UL (ref 0–0.85)
MONOCYTES NFR BLD AUTO: 7.5 % (ref 0–13.4)
NEUTROPHILS # BLD AUTO: 3.31 K/UL (ref 1.82–7.42)
NEUTROPHILS NFR BLD: 50.5 % (ref 44–72)
NRBC # BLD AUTO: 0 K/UL
NRBC BLD-RTO: 0 /100 WBC
PLATELET # BLD AUTO: 143 K/UL (ref 164–446)
PMV BLD AUTO: 11.4 FL (ref 9–12.9)
POTASSIUM SERPL-SCNC: 4.2 MMOL/L (ref 3.6–5.5)
PROT SERPL-MCNC: 6.7 G/DL (ref 6–8.2)
RBC # BLD AUTO: 4.93 M/UL (ref 4.7–6.1)
SODIUM SERPL-SCNC: 140 MMOL/L (ref 135–145)
WBC # BLD AUTO: 6.6 K/UL (ref 4.8–10.8)

## 2020-11-02 PROCEDURE — 36415 COLL VENOUS BLD VENIPUNCTURE: CPT

## 2020-11-02 PROCEDURE — 82306 VITAMIN D 25 HYDROXY: CPT

## 2020-11-02 PROCEDURE — 80177 DRUG SCRN QUAN LEVETIRACETAM: CPT

## 2020-11-02 PROCEDURE — 80053 COMPREHEN METABOLIC PANEL: CPT

## 2020-11-02 PROCEDURE — 85025 COMPLETE CBC W/AUTO DIFF WBC: CPT

## 2020-11-03 ENCOUNTER — OFFICE VISIT (OUTPATIENT)
Dept: NEUROLOGY | Facility: MEDICAL CENTER | Age: 30
End: 2020-11-03
Payer: COMMERCIAL

## 2020-11-03 VITALS
SYSTOLIC BLOOD PRESSURE: 120 MMHG | DIASTOLIC BLOOD PRESSURE: 78 MMHG | RESPIRATION RATE: 18 BRPM | OXYGEN SATURATION: 98 % | TEMPERATURE: 98.2 F | HEART RATE: 70 BPM

## 2020-11-03 DIAGNOSIS — Z02.4 ENCOUNTER FOR EXAMINATION FOR DRIVING LICENSE: ICD-10-CM

## 2020-11-03 DIAGNOSIS — Z13.31 SCREENING FOR DEPRESSION: ICD-10-CM

## 2020-11-03 DIAGNOSIS — G40.109 LOCALIZATION-RELATED EPILEPSY (HCC): ICD-10-CM

## 2020-11-03 DIAGNOSIS — E55.9 VITAMIN D DEFICIENCY: ICD-10-CM

## 2020-11-03 PROCEDURE — 99214 OFFICE O/P EST MOD 30 MIN: CPT | Performed by: NURSE PRACTITIONER

## 2020-11-03 RX ORDER — ERGOCALCIFEROL 1.25 MG/1
50000 CAPSULE ORAL
Qty: 4 CAP | Refills: 5 | Status: SHIPPED | OUTPATIENT
Start: 2020-11-03 | End: 2021-07-20

## 2020-11-04 LAB — LEVETIRACETAM SERPL-MCNC: 17 UG/ML (ref 12–46)

## 2021-06-28 ENCOUNTER — HOSPITAL ENCOUNTER (EMERGENCY)
Facility: MEDICAL CENTER | Age: 31
End: 2021-06-28
Attending: EMERGENCY MEDICINE
Payer: COMMERCIAL

## 2021-06-28 VITALS
BODY MASS INDEX: 22.9 KG/M2 | SYSTOLIC BLOOD PRESSURE: 114 MMHG | HEART RATE: 58 BPM | DIASTOLIC BLOOD PRESSURE: 58 MMHG | HEIGHT: 70 IN | WEIGHT: 160 LBS | OXYGEN SATURATION: 99 % | TEMPERATURE: 98.1 F | RESPIRATION RATE: 20 BRPM

## 2021-06-28 DIAGNOSIS — S01.512A TONGUE LACERATION, INITIAL ENCOUNTER: ICD-10-CM

## 2021-06-28 DIAGNOSIS — R56.9 SEIZURE (HCC): ICD-10-CM

## 2021-06-28 LAB
ALBUMIN SERPL BCP-MCNC: 4.1 G/DL (ref 3.2–4.9)
ALBUMIN/GLOB SERPL: 2 G/DL
ALP SERPL-CCNC: 70 U/L (ref 30–99)
ALT SERPL-CCNC: 23 U/L (ref 2–50)
ANION GAP SERPL CALC-SCNC: 16 MMOL/L (ref 7–16)
AST SERPL-CCNC: 34 U/L (ref 12–45)
BASOPHILS # BLD AUTO: 0.4 % (ref 0–1.8)
BASOPHILS # BLD: 0.03 K/UL (ref 0–0.12)
BILIRUB SERPL-MCNC: 0.4 MG/DL (ref 0.1–1.5)
BUN SERPL-MCNC: 8 MG/DL (ref 8–22)
CALCIUM SERPL-MCNC: 8.8 MG/DL (ref 8.5–10.5)
CHLORIDE SERPL-SCNC: 108 MMOL/L (ref 96–112)
CO2 SERPL-SCNC: 19 MMOL/L (ref 20–33)
CREAT SERPL-MCNC: 1.26 MG/DL (ref 0.5–1.4)
EOSINOPHIL # BLD AUTO: 0.07 K/UL (ref 0–0.51)
EOSINOPHIL NFR BLD: 1 % (ref 0–6.9)
ERYTHROCYTE [DISTWIDTH] IN BLOOD BY AUTOMATED COUNT: 41.3 FL (ref 35.9–50)
GLOBULIN SER CALC-MCNC: 2.1 G/DL (ref 1.9–3.5)
GLUCOSE SERPL-MCNC: 96 MG/DL (ref 65–99)
HBV CORE AB SERPL QL IA: NONREACTIVE
HBV SURFACE AB SERPL IA-ACNC: <3.5 MIU/ML (ref 0–10)
HBV SURFACE AG SER QL: ABNORMAL
HCT VFR BLD AUTO: 41.8 % (ref 42–52)
HCV AB SER QL: ABNORMAL
HGB BLD-MCNC: 13.8 G/DL (ref 14–18)
HIV 1+2 AB+HIV1 P24 AG SERPL QL IA: ABNORMAL
IMM GRANULOCYTES # BLD AUTO: 0.08 K/UL (ref 0–0.11)
IMM GRANULOCYTES NFR BLD AUTO: 1.1 % (ref 0–0.9)
LYMPHOCYTES # BLD AUTO: 1.03 K/UL (ref 1–4.8)
LYMPHOCYTES NFR BLD: 14.7 % (ref 22–41)
MCH RBC QN AUTO: 31.5 PG (ref 27–33)
MCHC RBC AUTO-ENTMCNC: 33 G/DL (ref 33.7–35.3)
MCV RBC AUTO: 95.4 FL (ref 81.4–97.8)
MONOCYTES # BLD AUTO: 0.49 K/UL (ref 0–0.85)
MONOCYTES NFR BLD AUTO: 7 % (ref 0–13.4)
NEUTROPHILS # BLD AUTO: 5.29 K/UL (ref 1.82–7.42)
NEUTROPHILS NFR BLD: 75.8 % (ref 44–72)
NRBC # BLD AUTO: 0 K/UL
NRBC BLD-RTO: 0 /100 WBC
PLATELET # BLD AUTO: 107 K/UL (ref 164–446)
PMV BLD AUTO: 11.9 FL (ref 9–12.9)
POTASSIUM SERPL-SCNC: 3.8 MMOL/L (ref 3.6–5.5)
PROT SERPL-MCNC: 6.2 G/DL (ref 6–8.2)
RBC # BLD AUTO: 4.38 M/UL (ref 4.7–6.1)
SODIUM SERPL-SCNC: 143 MMOL/L (ref 135–145)
WBC # BLD AUTO: 7 K/UL (ref 4.8–10.8)

## 2021-06-28 PROCEDURE — 96374 THER/PROPH/DIAG INJ IV PUSH: CPT

## 2021-06-28 PROCEDURE — 86704 HEP B CORE ANTIBODY TOTAL: CPT

## 2021-06-28 PROCEDURE — 86803 HEPATITIS C AB TEST: CPT

## 2021-06-28 PROCEDURE — 700111 HCHG RX REV CODE 636 W/ 250 OVERRIDE (IP)

## 2021-06-28 PROCEDURE — 85025 COMPLETE CBC W/AUTO DIFF WBC: CPT

## 2021-06-28 PROCEDURE — 96375 TX/PRO/DX INJ NEW DRUG ADDON: CPT

## 2021-06-28 PROCEDURE — 80177 DRUG SCRN QUAN LEVETIRACETAM: CPT

## 2021-06-28 PROCEDURE — 87340 HEPATITIS B SURFACE AG IA: CPT

## 2021-06-28 PROCEDURE — 80053 COMPREHEN METABOLIC PANEL: CPT

## 2021-06-28 PROCEDURE — 99285 EMERGENCY DEPT VISIT HI MDM: CPT

## 2021-06-28 PROCEDURE — 700105 HCHG RX REV CODE 258: Performed by: EMERGENCY MEDICINE

## 2021-06-28 PROCEDURE — 86706 HEP B SURFACE ANTIBODY: CPT

## 2021-06-28 PROCEDURE — 87389 HIV-1 AG W/HIV-1&-2 AB AG IA: CPT

## 2021-06-28 PROCEDURE — 700111 HCHG RX REV CODE 636 W/ 250 OVERRIDE (IP): Performed by: EMERGENCY MEDICINE

## 2021-06-28 PROCEDURE — 700101 HCHG RX REV CODE 250: Performed by: EMERGENCY MEDICINE

## 2021-06-28 RX ORDER — SODIUM CHLORIDE, SODIUM LACTATE, POTASSIUM CHLORIDE, CALCIUM CHLORIDE 600; 310; 30; 20 MG/100ML; MG/100ML; MG/100ML; MG/100ML
1000 INJECTION, SOLUTION INTRAVENOUS ONCE
Status: COMPLETED | OUTPATIENT
Start: 2021-06-28 | End: 2021-06-28

## 2021-06-28 RX ORDER — LORAZEPAM 2 MG/ML
INJECTION INTRAMUSCULAR
Status: COMPLETED
Start: 2021-06-28 | End: 2021-06-28

## 2021-06-28 RX ORDER — LEVETIRACETAM 500 MG/1
1000 TABLET ORAL 2 TIMES DAILY
Qty: 120 TABLET | Refills: 0 | Status: SHIPPED | OUTPATIENT
Start: 2021-06-28 | End: 2021-07-20

## 2021-06-28 RX ORDER — LORAZEPAM 2 MG/ML
2 INJECTION INTRAMUSCULAR ONCE
Status: COMPLETED | OUTPATIENT
Start: 2021-06-28 | End: 2021-06-28

## 2021-06-28 RX ORDER — KETAMINE HYDROCHLORIDE 50 MG/ML
50 INJECTION, SOLUTION INTRAMUSCULAR; INTRAVENOUS ONCE
Status: COMPLETED | OUTPATIENT
Start: 2021-06-28 | End: 2021-06-28

## 2021-06-28 RX ADMIN — LORAZEPAM 2 MG: 2 INJECTION INTRAMUSCULAR; INTRAVENOUS at 11:32

## 2021-06-28 RX ADMIN — KETAMINE HYDROCHLORIDE 50 MG: 50 INJECTION INTRAMUSCULAR; INTRAVENOUS at 11:38

## 2021-06-28 RX ADMIN — SODIUM CHLORIDE, POTASSIUM CHLORIDE, SODIUM LACTATE AND CALCIUM CHLORIDE 1000 ML: 600; 310; 30; 20 INJECTION, SOLUTION INTRAVENOUS at 12:23

## 2021-06-28 RX ADMIN — LORAZEPAM 2 MG: 2 INJECTION INTRAMUSCULAR at 11:32

## 2021-06-28 RX ADMIN — SODIUM CHLORIDE 3000 MG: 9 INJECTION, SOLUTION INTRAVENOUS at 11:57

## 2021-06-28 ASSESSMENT — FIBROSIS 4 INDEX: FIB4 SCORE: 0.92

## 2021-06-28 NOTE — ED NOTES
Pt pulling against restraints,  Removed so pt can get in more comfortable position and has fallen asleep.  Soft restraints x 4 removed at this time. Family at bedside.

## 2021-06-28 NOTE — ED NOTES
Late entry    Barry Griffith  Chief Complaint   Patient presents with   • Seizure     hx of being combative while post ictal.    • Combative     Pt biba from work where pt had a sz, upon EMS arrival pt combative.  Medicated with 7mg versed PTA.  Bit paramedic PTA.    ERP called to room and medicated per MAR, and placed in 4 pt soft restraint.   Blood noted around pt's mouth, abrasion noted to chin    Wife contacted and states pt takes 1000mg keppra BID  Pt in gown, on monitor.

## 2021-06-28 NOTE — ED NOTES
Pt now resting with eyes closed, medicated per MAR,  Decision made for soft restraints for pt and staff safety

## 2021-06-28 NOTE — ED PROVIDER NOTES
ED Provider Note    Scribed for Freddy Alba M.D. by Pedro Landers. 6/28/2021  11:28 AM    Primary care provider: Mine Nunes M.D.  Means of arrival: Ambulance  History obtained from: EMS  History limited by: Post Ictal    CHIEF COMPLAINT  Chief Complaint   Patient presents with   • Seizure     hx of being combative while post ictal.    • Combative       HPI  Barry Griffith is a 31 y.o. 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 Patient is currently post ictal and very combative, which is baseline after his seizures. EMS notes it took 6 people to restrain him. He takes Keppra 1000 mg BID at home. Per EMS, patient has been brought in for seizures in the past and was very combative then as well. He was treated with 7 Versed en route. Paramedics note he bit himself and one of the paramedics. Per patient's wife, his Keppra compliance at home is questionable, but she notes his dose was missing this morning. He is an ex football player, and there is concern about history of multiple concussions.     History limited by: Post Ictal    REVIEW OF SYSTEMS  See HPI for details.   Review of Systems Unobtainable by: Post Ictal    PAST MEDICAL HISTORY   has a past medical history of ASTHMA and Seizure (HCC).    SURGICAL HISTORY  patient denies any surgical history    SOCIAL HISTORY  Social History     Tobacco Use   • Smoking status: Never Smoker   • Smokeless tobacco: Never Used   Vaping Use   • Vaping Use: Never used   Substance Use Topics   • Alcohol use: No     Alcohol/week: 0.0 oz     Comment: rare    • Drug use: Yes     Types: Marijuana     Comment: rare      Social History     Substance and Sexual Activity   Drug Use Yes   • Types: Marijuana    Comment: rare       FAMILY HISTORY  Family History   Problem Relation Age of Onset   • Hypertension Mother    • Hyperlipidemia Father    • Hypertension Father    • Cancer  "Neg Hx    • Diabetes Neg Hx        CURRENT MEDICATIONS  Current Outpatient Medications   Medication Instructions   • albuterol 108 (90 Base) MCG/ACT Aero Soln inhalation aerosol 2 Puffs, Inhalation, EVERY 6 HOURS PRN   • ergocalciferol (DRISDOL) 50,000 Units, Oral, EVERY 7 DAYS   • levetiracetam (KEPPRA) 1,000 mg, Oral, 2 TIMES DAILY         ALLERGIES  No Known Allergies    PHYSICAL EXAM  VITAL SIGNS: /70   Pulse 91   Temp 36.7 °C (98.1 °F)   Resp 20   Ht 1.765 m (5' 9.5\")   Wt 72.6 kg (160 lb)   SpO2 93%   BMI 23.29 kg/m²     Constitutional: Well developed, Well nourished, No acute distress, Non-toxic appearance.   HENT: Bit his tongue. Old blood around the mouth. Normocephalic, Bilateral external ears normal. No oral exudates or nasal discharge.   Eyes: Pupils are equal round and reactive, EOMI, Conjunctiva normal, No discharge.   Neck: Normal range of motion, No tenderness, Supple, No stridor. No meningismus.  Lymphatic: No lymphadenopathy noted.   Cardiovascular: Regular rate and rhythm without murmur rub or gallop.  Thorax & Lungs: Clear breath sounds bilaterally without wheezes, rhonchi or rales. There is no chest wall tenderness.   Abdomen: Soft non-tender non-distended. There is no rebound or guarding. No organomegaly is appreciated. Bowel sounds are normal.  Skin: Normal without rash.   Back: No CVA or spinal tenderness.   Extremities: Intact distal pulses, No edema, No tenderness, No cyanosis, No clubbing. Capillary refill is less than 2 seconds.  Musculoskeletal: Good range of motion in all major joints. No tenderness to palpation or major deformities noted.   Neurologic: Not able to follow commands but is able to say some nonsensical things. Remains combative per EMS and continues to be combative in restraints  Psychiatric: Affect normal, Judgment normal, Mood normal. There is no suicidal ideation or patient reported hallucinations.       DIAGNOSTIC STUDIES / PROCEDURES    LABS  Labs " Reviewed   CBC WITH DIFFERENTIAL - Abnormal; Notable for the following components:       Result Value    RBC 4.38 (*)     Hemoglobin 13.8 (*)     Hematocrit 41.8 (*)     MCHC 33.0 (*)     Platelet Count 107 (*)     Neutrophils-Polys 75.80 (*)     Lymphocytes 14.70 (*)     Immature Granulocytes 1.10 (*)     All other components within normal limits   EXPOSED PERSON-SOURCE PT POSITIVE OR UNK (BLOOD & BODY FLUID EXPOSURE) - Abnormal; Notable for the following components:    Co2 19 (*)     All other components within normal limits   KEPPRA   ESTIMATED GFR   COMP METABOLIC PANEL      All labs reviewed by me.      COURSE & MEDICAL DECISION MAKING  Nursing notes, VS, PMSFHx reviewed in chart.    Reviewed past medical records, including past imaging in 2018.     11:28 AM - Patient seen and examined at bedside. Ordered for CBC w diff and CMP to evaluate. Patient was treated with LR infusion, Ativan 3 mg injection, Ketamine 50 mg/mL injection, and Keppra 3,000 mg in  mL IVPB for his symptoms. Patient placed in restraints for combative nature.     11:55 AM - Ordered Blood and Body Fluid exposure due to the patient biting paramedic.    12:12 PM - Ordered CMP and Keppra for further evaluation of the patient.    12:36 PM - Patient was reevaluated at bedside. Patient's wife and grandmother are at bedside. Discussed lab and radiology results with the patients' family. 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 They are informed of the plan to reevaluate when labs return.     Patient has no evidence of leukocytosis, significant shift and no evidence of electrolyte derangements or significant acidosis.  Keppra level is ordered but pending and this is for the benefit of his primary neurologist    I reassessed the patient at 2 PM and he is awake alert conversant.  He understands that he bit his tongue  and will need to do some oral care there is no indication for laceration repair as this is more punctate.  There is no active bleeding.  Patient states that he is compliant with his Keppra therapy and this is a breakthrough seizure in spite of that which is the first 1 is had in a year.     I will fill another 30 days of medication as the patient says that he is on the verge of running out and will follow up with his neurologist and return if any significant change     The patient will return for new or worsening symptoms and is stable at the time of discharge.  He understands he cannot operate a motor vehicle until cleared by neurology      CRITICAL CARE  The very real possibility of a deterioration of this patient's condition required the highest level of my preparedness for sudden, emergent intervention.  I provided critical care services, which included medication orders, frequent reevaluations of the patient's condition and response to treatment, ordering and reviewing test results, and discussing the case with various consultants.  The critical care time associated with the care of the patient was 30 minutes. Review chart for interventions. This time is exclusive of any other billable procedures.       DISPOSITION:  Patient will be discharged home in stable condition.    FINAL IMPRESSION  1. Seizure (HCC)    2. Tongue laceration, initial encounter      The critical care time associated with the care of the patient was 30 minutes.     Pedro VANN (Natalia), am scribing for, and in the presence of, Freddy Alba M.D..    Electronically signed by: Pedro Landers (Natalia), 6/28/2021    Freddy VANN M.D. personally performed the services described in this documentation, as scribed by Pedro Landers in my presence, and it is both accurate and complete. C.    The note accurately reflects work and decisions made by me.  Freddy Alba M.D.  6/28/2021  2:13 PM

## 2021-06-30 LAB — LEVETIRACETAM SERPL-MCNC: 17 UG/ML (ref 12–46)

## 2021-07-16 NOTE — PROGRESS NOTES
Chief Complaint   Patient presents with   • Follow-Up     Localization-related epilepsy        Problem List Items Addressed This Visit     None      Visit Diagnoses     Localization-related epilepsy (HCC)        Vitamin D deficiency              History of present illness:  Barry Griffith 31 y.o. male presents today to establish care with me. Was seeing Dr. Bai before and was last seen by him on 7/15/2020.    Pt reports of having 2-3 seizures since November. Last one was on 7/20/21. He states he has been compliant but he is not sure if he forgot to take his meds when he had those seizures. He had tongue biting but no incontinence. No other triggers that he knows of. He denies stress or sleep deprivation. He is not taking vit D. He has not done his EEG. He drinks alcohol once in a while. He smokes marijuana once in a while. He is not smoking cigarettes. Mood is stable. No SI or HI. He is not driving right now. 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 No other issues.       Past medical history:   Past Medical History:   Diagnosis Date   • ASTHMA     Asthma since infancy.   • Seizure (HCC)        Past surgical history:   History reviewed. No pertinent surgical history.    Family history:   Family History   Problem Relation Age of Onset   • Hypertension Mother    • Hyperlipidemia Father    • Hypertension Father    • Cancer Neg Hx    • Diabetes Neg Hx        Social history:   Social History     Socioeconomic History   • Marital status: Single     Spouse name: Not on file   • Number of children: 2   • Years of education: Not on file   • Highest education level: Not on file   Occupational History   • Occupation: guidance counsellor [employer]   • Occupation: psych coordinator [employer]   Tobacco Use   • Smoking status: Never Smoker   • Smokeless tobacco: Never Used   Vaping Use   • Vaping Use: Never used   Substance and Sexual  Activity   • Alcohol use: No     Alcohol/week: 0.0 oz     Comment: rare    • Drug use: Yes     Types: Marijuana     Comment: rare   • Sexual activity: Yes     Partners: Female     Birth control/protection: Condom, Pill   Other Topics Concern   • Not on file   Social History Narrative   • Not on file     Social Determinants of Health     Financial Resource Strain:    • Difficulty of Paying Living Expenses:    Food Insecurity:    • Worried About Running Out of Food in the Last Year:    • Ran Out of Food in the Last Year:    Transportation Needs:    • Lack of Transportation (Medical):    • Lack of Transportation (Non-Medical):    Physical Activity:    • Days of Exercise per Week:    • Minutes of Exercise per Session:    Stress:    • Feeling of Stress :    Social Connections:    • Frequency of Communication with Friends and Family:    • Frequency of Social Gatherings with Friends and Family:    • Attends [employer] Services:    • Active Member of Clubs or Organizations:    • Attends Club or Organization Meetings:    • Marital Status:    Intimate Partner Violence:    • Fear of Current or Ex-Partner:    • Emotionally Abused:    • Physically Abused:    • Sexually Abused:        Current medications:   Current Outpatient Medications   Medication   • levETIRAcetam (KEPPRA) 500 MG Tab   • ergocalciferol (DRISDOL) 13482 UNIT capsule   • levetiracetam (KEPPRA) 1000 MG tablet   • albuterol 108 (90 Base) MCG/ACT Aero Soln inhalation aerosol     No current facility-administered medications for this visit.       Medication Allergy:  No Known Allergies      Review of systems:     General: Denies fevers or chills, or nightsweats, or generalized fatigue.    Head: Denies headaches or dizziness or lightheadedness  Musculoskeletal: Denies muscle pain or swelling, no atrophy, no neck and back pain or stiffness.   Neurologic: Denies facial droopiness, muscle weakness (focal or generalized), paresthesias, ataxia, change in speech or language,  memory loss, abnormal movements.+ seizures, loss of consciousness  Psychiatric: Denies anxiety, depression, mood swings, suicidal or homicidal thoughts       Physical examination:   Vitals:    07/23/21 1013   BP: 120/70   BP Location: Right arm   Patient Position: Sitting   BP Cuff Size: Adult   Pulse: (!) 58   Resp: 14   Temp: 36.1 °C (96.9 °F)   TempSrc: Temporal   SpO2: 96%   Weight: 71.2 kg (157 lb)     General: Patient in no acute distress, pleasant and cooperative.  HEENT: Normocephalic, no signs of acute trauma.   Neck: Supple. There is normal range of motion.   Resp: clear to auscultation bilaterally. No wheezes or crackles.   CV: RRR, no murmurs.   Skin: no signs of acute rashes or trauma.   Musculoskeletal: joints exhibit full range of motion  Psychiatric: No hallucinatory behavior. No symptoms of depression or suicidal ideation. Mood and affect appear normal on exam.     NEUROLOGICAL EXAM:   Mental status, orientation: Awake, alert and fully oriented.   Speech and language: speech is clear and fluent. The patient is able to name, repeat and comprehend.   Memory: There is intact recollection of recent and remote events.   Motor exam: Strength is 5/5 in all extremities. Tone is normal. No abnormal movements were seen on exam.   Sensory exam reveals normal sense of light touch in all extremities.   Gait: The patient was able to get up from seated position on first attempt without requiring assistance. Found to be steady when walking. Movements were fluid with normal arm swing.     ANCILLARY DATA REVIEWED:       Lab Data Review:  Reviewed in chart.     Records reviewed:   Reviewed in chart.    Imaging:   [facility] 4/22/18  NO ACUTE ABNORMALITIES ARE NOTED ON CT SCAN OF THE [facility]     MRI brain w/wo, 8/10/2018:  1. MRI of the brain without and with contrast within normal limits.  2. No evidence of mass lesion, heterotopic gray matter, gross cortical dysplasia or mesial temporal  "sclerosis         EE/10/2018:   INTERPRETATION:  This is an abnormal video EEG recording in the awake, drowsy, and sleep state(s). Frequent left temporal sharps. Intermittent, subtle, left temporal slowing. The findings increase risk for seizures and suggest underlying area of cortical irritability and structural abnormality. Clinical and radiological correlation is recommended.      ASSESSMENT AND PLAN:    1. Localization-related epilepsy (HCC)  - REFERRAL TO NEURODIAGNOSTICS (EEG,EP,EMG/NCS/DBS)  - levetiracetam (KEPPRA) 1000 MG tablet; Take 1 tablet by mouth 2 times a day.  Dispense: 180 tablet; Refill: 1    2. Vitamin D deficiency  - VITAMIN D,25 HYDROXY; Future    3. Thrombocytopenia (HCC)    4. Screening for depression    5. Noncompliance    6. Hospital discharge follow-up    7. 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 Hx of non compliance and illegal and non illegal drug use which could have provoked some of his seizures in the past. Seizures were initially controlled on keppra as he was seizure free for 1 year then he started having more seizures. He had about 2-3 seizures since Nov and last one was on 21. There was tongue biting but no incontinence. Possibly provoked by missing his ASM doses but pt does not believe he is missing his doses.     Mood is good. No suicidal or homicidal thoughts.     He is not drinking alcohol. Not smoking cigarettes. He is smoking marijuana and we have discussed drug interaction with this. Aware that THC can trigger seizures.      Past ASM's:none     Current ASM's: keppra 1000mg BID     keppra level therapeutic      Plan:  - routine EEG.    -Discussed options for ASMs for him as he wants one to help with mood as he does not know if keppra is causing him to not get into his \"creative spaces\" including  Lamotrigine. We can also try switching him to briviact as this has lesser side effect profile than keppra. After a " lengthy discussion of side effects and timeline with titration, he decided to stay on his keppra dose. Offered to increase dose as he was saying that he is not missing his dose but he refused. Tried to call Jami today during his visit to get some more clarification as far as triggers, but no answer.      - Discussed avoidance of spell/sz triggers: alcohol, sleep deprivation, benadryl and stress.     - Discussed Vit D supplementation. Recommended taking 2000-5000u daily.      - Discussed driving restrictions. Recommended no driving  for at least 3 months. DMV paperwork faxed.       -Labs to be checked for next appointment: Vit D    -recommended urgent f/u with PCP regarding thrombocytopenia.     Cc: PCP        FOLLOW-UP:   Return in about 4 weeks (around 8/20/2021).      EDUCATION AND COUNSELING:  -Education was provided to the patient and/or family regarding diagnosis and prognosis. The chronic and unpredictable nature of the condition were discussed. There is increased risk for additional events, which may carry potential for significant injuries and death. Discussed frequent seizure triggers: sleep deprivation, medication non-compliance, use of illegal drugs/alcohol, stress, and others.   -We reviewed in detail the current antiepileptic regimen. Potential side effects of antiepileptics were discussed at length, including but no limited to: hypersensitivity reactions (rash and others, some of which can be fatal), visual field changes (some of which may be irreversible), glaucoma, diplopia, kidney stones, osteopenia/osteoporosis/bone fractures, hyperthermia/anhydrosis, hyponatremia, tremors/abnormal movements, ataxia, dizziness, fatigue, increased risk for falls, risk for cardiac arrhythmias/syncope, gastrointestinal side effects(hepatitis, pancreatitis, gastritis, ulcers), gingival hypertrophy/bleeding, drowsiness, sedation, anxiety/nervousness, increased risk for suicide, increased risk for depression, and  psychosis.   -We also reviewed drug-drug interactions and their potential effect on seizure control and medication side effects.    -Recommend chronic vitamin D supplementation and regular exercise (if not contraindicated).   -Patient/family educated on risk for SUDEP (Sudden Death in Epilepsy). Counseling was provided on the importance of strict medication and follow up compliance. The patient/family understand the risks associated with non-adherence with the medical plan as outlined, including but not limited to an increased risk for breakthrough seizures, which may contribute to injuries, disability, status epilepticus, and even death.   -Counseling was also provided on potential effects of alcohol and other drugs, which may lower seizure threshold and/or affect the metabolism of antiepileptic drugs. We recommend avoidance of alcohol and illegal drugs.  -Avoid sleep deprivation.   -We extensively discussed the aspects related to safety in drivers who suffer from epilepsy. The patient is encourage to report to the Division of Motor Vehicles of any condition and/or spells related to confusion, disorientation, and/or loss of awareness and/or loss of consciousness; as these may pose a safety issue if they occur while operating a motor vehicle. The patient and/or family are ultimately responsible for exercising caution and abiding to regulations in place.   -Other seizure precautions were discussed at length, including no diving, no skydiving, no climbing or exposure to unprotected heights, no unsupervised swimming, no Jacuzzi or bathing in bathtubs or deep bodies of water. The patient/family have been advised about risks for operating any machinery while suffering from seizures / syncope / epilepsy and/or while taking antiepileptic drugs.   -The patient understands and agrees that due to the complexity of his/her diagnosis, results of any testing and further recommendations will typically be discussed/made during a  face to face encounter in my office. The patient and/or family further understands it is their responsibility to keep proper follow up.     Patient agrees with plan, as outlined.         Luiza Peace, MSN, APRN, FNP-C  [facility]  Office: [phone]  Fax: [phone]    BILLING DOCUMENTATION:     Total time spent including chart review before and after visit was 45min. Over 50% of the time of the visit today was spent on counseling and or coordination of care wtih the patient and/or family, with greater than 50% of the total discussing my assessment and plan as stated above.

## 2021-07-20 ENCOUNTER — HOSPITAL ENCOUNTER (EMERGENCY)
Facility: MEDICAL CENTER | Age: 31
End: 2021-07-20
Attending: EMERGENCY MEDICINE
Payer: COMMERCIAL

## 2021-07-20 ENCOUNTER — TELEPHONE (OUTPATIENT)
Dept: NEUROLOGY | Facility: MEDICAL CENTER | Age: 31
End: 2021-07-20

## 2021-07-20 VITALS
BODY MASS INDEX: 21.2 KG/M2 | RESPIRATION RATE: 25 BRPM | TEMPERATURE: 97.4 F | DIASTOLIC BLOOD PRESSURE: 58 MMHG | HEART RATE: 58 BPM | WEIGHT: 160 LBS | OXYGEN SATURATION: 96 % | HEIGHT: 73 IN | SYSTOLIC BLOOD PRESSURE: 121 MMHG

## 2021-07-20 DIAGNOSIS — R56.9 SEIZURE (HCC): ICD-10-CM

## 2021-07-20 LAB
ALBUMIN SERPL BCP-MCNC: 4.5 G/DL (ref 3.2–4.9)
ALBUMIN/GLOB SERPL: 2 G/DL
ALP SERPL-CCNC: 68 U/L (ref 30–99)
ALT SERPL-CCNC: 23 U/L (ref 2–50)
ANION GAP SERPL CALC-SCNC: 17 MMOL/L (ref 7–16)
AST SERPL-CCNC: 30 U/L (ref 12–45)
BASOPHILS # BLD AUTO: 0.6 % (ref 0–1.8)
BASOPHILS # BLD: 0.03 K/UL (ref 0–0.12)
BILIRUB SERPL-MCNC: 0.4 MG/DL (ref 0.1–1.5)
BUN SERPL-MCNC: 12 MG/DL (ref 8–22)
CALCIUM SERPL-MCNC: 9.4 MG/DL (ref 8.5–10.5)
CHLORIDE SERPL-SCNC: 106 MMOL/L (ref 96–112)
CO2 SERPL-SCNC: 18 MMOL/L (ref 20–33)
CREAT SERPL-MCNC: 1.22 MG/DL (ref 0.5–1.4)
EOSINOPHIL # BLD AUTO: 0.11 K/UL (ref 0–0.51)
EOSINOPHIL NFR BLD: 2 % (ref 0–6.9)
ERYTHROCYTE [DISTWIDTH] IN BLOOD BY AUTOMATED COUNT: 42.2 FL (ref 35.9–50)
GLOBULIN SER CALC-MCNC: 2.2 G/DL (ref 1.9–3.5)
GLUCOSE SERPL-MCNC: 109 MG/DL (ref 65–99)
HCT VFR BLD AUTO: 47.8 % (ref 42–52)
HGB BLD-MCNC: 15.3 G/DL (ref 14–18)
IMM GRANULOCYTES # BLD AUTO: 0.06 K/UL (ref 0–0.11)
IMM GRANULOCYTES NFR BLD AUTO: 1.1 % (ref 0–0.9)
LYMPHOCYTES # BLD AUTO: 1.04 K/UL (ref 1–4.8)
LYMPHOCYTES NFR BLD: 19.1 % (ref 22–41)
MCH RBC QN AUTO: 30.9 PG (ref 27–33)
MCHC RBC AUTO-ENTMCNC: 32 G/DL (ref 33.7–35.3)
MCV RBC AUTO: 96.6 FL (ref 81.4–97.8)
MONOCYTES # BLD AUTO: 0.39 K/UL (ref 0–0.85)
MONOCYTES NFR BLD AUTO: 7.2 % (ref 0–13.4)
NEUTROPHILS # BLD AUTO: 3.81 K/UL (ref 1.82–7.42)
NEUTROPHILS NFR BLD: 70 % (ref 44–72)
NRBC # BLD AUTO: 0 K/UL
NRBC BLD-RTO: 0 /100 WBC
PLATELET # BLD AUTO: 109 K/UL (ref 164–446)
PMV BLD AUTO: 12.3 FL (ref 9–12.9)
POTASSIUM SERPL-SCNC: 3.8 MMOL/L (ref 3.6–5.5)
PROT SERPL-MCNC: 6.7 G/DL (ref 6–8.2)
RBC # BLD AUTO: 4.95 M/UL (ref 4.7–6.1)
SODIUM SERPL-SCNC: 141 MMOL/L (ref 135–145)
WBC # BLD AUTO: 5.4 K/UL (ref 4.8–10.8)

## 2021-07-20 PROCEDURE — 700111 HCHG RX REV CODE 636 W/ 250 OVERRIDE (IP): Performed by: EMERGENCY MEDICINE

## 2021-07-20 PROCEDURE — 80053 COMPREHEN METABOLIC PANEL: CPT

## 2021-07-20 PROCEDURE — 96374 THER/PROPH/DIAG INJ IV PUSH: CPT

## 2021-07-20 PROCEDURE — A9270 NON-COVERED ITEM OR SERVICE: HCPCS | Performed by: EMERGENCY MEDICINE

## 2021-07-20 PROCEDURE — 700102 HCHG RX REV CODE 250 W/ 637 OVERRIDE(OP): Performed by: EMERGENCY MEDICINE

## 2021-07-20 PROCEDURE — 99285 EMERGENCY DEPT VISIT HI MDM: CPT

## 2021-07-20 PROCEDURE — 700105 HCHG RX REV CODE 258: Performed by: EMERGENCY MEDICINE

## 2021-07-20 PROCEDURE — 85025 COMPLETE CBC W/AUTO DIFF WBC: CPT

## 2021-07-20 RX ORDER — HYDROCODONE BITARTRATE AND ACETAMINOPHEN 5; 325 MG/1; MG/1
1 TABLET ORAL ONCE
Status: COMPLETED | OUTPATIENT
Start: 2021-07-20 | End: 2021-07-20

## 2021-07-20 RX ORDER — SODIUM CHLORIDE, SODIUM LACTATE, POTASSIUM CHLORIDE, CALCIUM CHLORIDE 600; 310; 30; 20 MG/100ML; MG/100ML; MG/100ML; MG/100ML
1000 INJECTION, SOLUTION INTRAVENOUS ONCE
Status: COMPLETED | OUTPATIENT
Start: 2021-07-20 | End: 2021-07-20

## 2021-07-20 RX ORDER — LEVETIRACETAM 500 MG/1
1000 TABLET ORAL ONCE
Status: COMPLETED | OUTPATIENT
Start: 2021-07-20 | End: 2021-07-20

## 2021-07-20 RX ORDER — LORAZEPAM 2 MG/ML
1-2 INJECTION INTRAMUSCULAR ONCE
Status: COMPLETED | OUTPATIENT
Start: 2021-07-20 | End: 2021-07-20

## 2021-07-20 RX ADMIN — LORAZEPAM 1 MG: 2 INJECTION INTRAMUSCULAR; INTRAVENOUS at 11:53

## 2021-07-20 RX ADMIN — LEVETIRACETAM 1000 MG: 500 TABLET ORAL at 15:01

## 2021-07-20 RX ADMIN — SODIUM CHLORIDE, POTASSIUM CHLORIDE, SODIUM LACTATE AND CALCIUM CHLORIDE 1000 ML: 600; 310; 30; 20 INJECTION, SOLUTION INTRAVENOUS at 11:54

## 2021-07-20 RX ADMIN — HYDROCODONE BITARTRATE AND ACETAMINOPHEN 1 TABLET: 5; 325 TABLET ORAL at 15:37

## 2021-07-20 ASSESSMENT — LIFESTYLE VARIABLES: DO YOU DRINK ALCOHOL: NO

## 2021-07-20 ASSESSMENT — FIBROSIS 4 INDEX: FIB4 SCORE: 1.99

## 2021-07-20 NOTE — ED NOTES
Violent restraints removed from Pt due to Pt becoming more alert and able to speak with staff about events earlier in the day. Pt is still confused and needs frequent reminders to situation, but Pt is no longer combative.

## 2021-07-20 NOTE — TELEPHONE ENCOUNTER
I spoke with Dr Verma from [facility] er who stated pt was currently admitted for a tonic clonic sz that pt had this morning. The provider wanted to know if he should increase the keppra which was alrady recently increased in pt's last office visit with Luiza Peace also because per pt's wife to the provider in er that pt didn't take his keppra this morning but pt told provider he did. Per Luiza/ Dr Bai no changes to pt;s keppra because he has and was non compliant when it comes to taking his sz meds and also for the provider in the er to report pt to the dmv. Dr Verma verbally understood all information I gave per Luiza/ Dr Bai.

## 2021-07-20 NOTE — ED TRIAGE NOTES
"Chief Complaint   Patient presents with   • Seizure     pt hx of seizure, witness seizure today by wife lasting approx 60 seconds.  Pt on keppra, last seizure 3 weeks ago.  Pt post ictal upon EMS arrival, then very combative, given 30mg Ketamine IM, 5MG Versed IV, 4mg Zofran.  Pt minimally responsive now, PERES.  RA 86%.       Pt BIB [facility] from home with above complaint.  Per EMS wife reports pt has appointment with neurologist next week to evaluate medications.      /78   Pulse 85   Temp 36.3 °C (97.4 °F) (Temporal)   Resp 18   Ht 1.854 m (6' 1\")   Wt 72.6 kg (160 lb)   SpO2 (!) 86%   BMI 21.11 kg/m²     "

## 2021-07-20 NOTE — DISCHARGE INSTRUCTIONS
Do not drive, work at heights, no swimming, or do anything dangerous where you may be being injured if you have another seizure.  You are not allowed to drive until cleared by your neurologist.  Keep your appointment with Dr. Bai this week.

## 2021-07-20 NOTE — ED PROVIDER NOTES
ED Provider Note    CHIEF COMPLAINT  Chief Complaint   Patient presents with   • Seizure     pt hx of seizure, witness seizure today by wife lasting approx 60 seconds.  Pt on keppra, last seizure 3 weeks ago.  Pt post ictal upon EMS arrival, then very combative, given 30mg Ketamine IM, 5MG Versed IV, 4mg Zofran.  Pt minimally responsive now, PERES.  RA 86%.         HPI  Barry Griffith is a 31 y.o. male with a history of epilepsy who presents by EMS after having a witnessed tonic-clonic seizure today.  Per report, wife witnessed a tonic-clonic seizure lasting about 60 seconds.  Patient was postictal on arrival by EMS.  He appeared to be very combative.  He received ketamine, Versed, and Zofran prior to arrival.  On my arrival to the room the patient is in four-point restraints.  He is awake and talking, appears cooperative but still confused..  I have asked nursing to remove the restraints.  The patient still appears to be mildly postictal.  He is unable to give me any information or when his last seizure was.  According to reports his last seizure was 3 weeks ago.  On chart review, he had an appointment with Dr. Bai 5 days ago, and had his Keppra increased to 1000 mg twice a day.  The patient is fiancé says that he did not take his medication today though the patient thinks he did.  His wife notes he was doing well yesterday, he has not been ill with any fever, chills, cough, congestion, difficulty breathing, vomiting, or diarrhea.    REVIEW OF SYSTEMS  See HPI for further details. All other systems are negative.     PAST MEDICAL HISTORY  Past Medical History:   Diagnosis Date   • ASTHMA     Asthma since infancy.   • Seizure (HCC)    • Seizure disorder (HCC)        FAMILY HISTORY  Family History   Problem Relation Age of Onset   • Hypertension Mother    • Hyperlipidemia Father    • Hypertension Father    • Cancer Neg Hx    • Diabetes Neg Hx        SOCIAL HISTORY  Social History     Tobacco Use   • Smoking  "status: Never Smoker   • Smokeless tobacco: Never Used   Vaping Use   • Vaping Use: Never used   Substance Use Topics   • Alcohol use: No     Alcohol/week: 0.0 oz     Comment: rare    • Drug use: Yes     Types: Marijuana     Comment: rare      Social History     Substance and Sexual Activity   Drug Use Yes   • Types: Marijuana    Comment: rare       SURGICAL HISTORY  History reviewed. No pertinent surgical history.    CURRENT MEDICATIONS  Home Medications     Reviewed by Juan Bejarano (Pharmacy Tech) on 07/20/21 at 1112  Med List Status: Complete    Medication Last Dose Status    levetiracetam (KEPPRA) 1000 MG tablet 7/19/2021 Active                ALLERGIES  No Known Allergies    PHYSICAL EXAM0  VITAL SIGNS: Blood Pressure 121/58   Pulse (Abnormal) 58   Temperature 36.3 °C (97.4 °F) (Temporal)   Respiration (Abnormal) 25   Height 1.854 m (6' 1\")   Weight 72.6 kg (160 lb)   Oxygen Saturation 96%   Body Mass Index 21.11 kg/m²   Constitutional: Awake, alert, in no acute distress, Non-toxic appearance.   HENT: Atraumatic. Bilateral external ears normal, mucous membranes moist, no oral trauma, throat nonerythematous without exudates, nose is normal.  Eyes: PERRL, EOMI, conjunctiva moist, noninjected.  Neck: Nontender, Normal range of motion, No nuchal rigidity, No stridor.   Lymphatic: No lymphadenopathy noted.   Cardiovascular: Regular rate and rhythm, no murmurs, rubs, gallops.  Thorax & Lungs:  Good breath sounds bilaterally, no wheezes, rales, or retractions.  No chest tenderness.  Abdomen: Bowel sounds normal, Soft, nontender, nondistended, no rebound, guarding, masses.  Back: No CVA or spinal tenderness.  Extremities: Intact distal pulses, No edema, No tenderness.   Skin: Warm, Dry, No rashes.   Musculoskeletal: No joint swelling or tenderness.  Neurologic: Alert & oriented x 3, sensory and motor function normal. No focal deficits.   Psychiatric: Affect normal, Judgment normal, Mood normal. "         LABS  Labs Reviewed   CBC WITH DIFFERENTIAL - Abnormal; Notable for the following components:       Result Value    MCHC 32.0 (*)     Platelet Count 109 (*)     Lymphocytes 19.10 (*)     Immature Granulocytes 1.10 (*)     All other components within normal limits   COMP METABOLIC PANEL - Abnormal; Notable for the following components:    Co2 18 (*)     Anion Gap 17.0 (*)     Glucose 109 (*)     All other components within normal limits   ESTIMATED GFR       All labs reviewed by me.      RADIOLOGY/PROCEDURES  No orders to display       The radiologist's interpretations of all radiological studies have been reviewed by me.        COURSE & MEDICAL DECISION MAKING  Pertinent Labs & Imaging studies reviewed. (See chart for details)  The patient presents after having a witnessed seizure.  He was initially very combative, and received multiple medications for sedation.  He was in four-point restraints on my arrival.  The patient appeared to be cooperative, and a four-point restraints were removed.    Patient was insistent that he took his medication today though his wife said he did not.  He was given a bolus of lactated Ringer's, and 1 mg of Ativan IV.    CBC is normal with white count 5400, normal differential, chemistry shows a CO2 of 18, and a gap 17, glucose 109, liver function test normal.  I suspect the low CO2 and increased anion gap is secondary to his seizure.  Over time he did clear, became much more awake and alert.  He was given Keppra 1000 mg orally.  I discussed case with Dr. Bai's office.  They note that the patient is often noncompliant with his medications.  They did not wish to change his dosing at this time.  The patient does have appointment to follow-up with him on Friday.  I discussed this with the patient and remind him that he has appointment on Friday and she would not miss the appointment.  He is to make sure he takes his medications.  He was told he is not allowed to drive and a DMV  form was completed.  He was also told to get plenty of rest and sleep.  He is to return to the ER for any recurrent seizures, fever, vomiting, difficulty breathing, or any other problems.    FINAL IMPRESSION  1. Seizure (HCC)          Electronically signed by: Mu Ambrosio M.D., 7/20/2021 11:40 AM

## 2021-07-23 ENCOUNTER — OFFICE VISIT (OUTPATIENT)
Dept: NEUROLOGY | Facility: MEDICAL CENTER | Age: 31
End: 2021-07-23
Attending: NURSE PRACTITIONER
Payer: COMMERCIAL

## 2021-07-23 VITALS
HEART RATE: 58 BPM | WEIGHT: 157 LBS | TEMPERATURE: 96.9 F | OXYGEN SATURATION: 96 % | DIASTOLIC BLOOD PRESSURE: 70 MMHG | SYSTOLIC BLOOD PRESSURE: 120 MMHG | RESPIRATION RATE: 14 BRPM | BODY MASS INDEX: 20.71 KG/M2

## 2021-07-23 DIAGNOSIS — E55.9 VITAMIN D DEFICIENCY: ICD-10-CM

## 2021-07-23 DIAGNOSIS — G40.109 LOCALIZATION-RELATED EPILEPSY (HCC): ICD-10-CM

## 2021-07-23 DIAGNOSIS — Z13.31 SCREENING FOR DEPRESSION: ICD-10-CM

## 2021-07-23 DIAGNOSIS — Z91.89 DRIVING SAFETY ISSUE: ICD-10-CM

## 2021-07-23 DIAGNOSIS — Z09 HOSPITAL DISCHARGE FOLLOW-UP: ICD-10-CM

## 2021-07-23 DIAGNOSIS — Z91.199 NONCOMPLIANCE: ICD-10-CM

## 2021-07-23 DIAGNOSIS — D69.6 THROMBOCYTOPENIA (HCC): ICD-10-CM

## 2021-07-23 PROCEDURE — 99215 OFFICE O/P EST HI 40 MIN: CPT | Performed by: NURSE PRACTITIONER

## 2021-07-23 PROCEDURE — 99211 OFF/OP EST MAY X REQ PHY/QHP: CPT | Performed by: NURSE PRACTITIONER

## 2021-07-23 RX ORDER — LEVETIRACETAM 1000 MG/1
1000 TABLET ORAL 2 TIMES DAILY
Qty: 180 TABLET | Refills: 1 | Status: SHIPPED | OUTPATIENT
Start: 2021-07-23 | End: 2021-12-07 | Stop reason: SDUPTHER

## 2021-07-23 ASSESSMENT — PATIENT HEALTH QUESTIONNAIRE - PHQ9: CLINICAL INTERPRETATION OF PHQ2 SCORE: 0

## 2021-07-23 ASSESSMENT — FIBROSIS 4 INDEX: FIB4 SCORE: 1.72

## 2021-08-25 ENCOUNTER — TELEMEDICINE (OUTPATIENT)
Dept: MEDICAL GROUP | Facility: PHYSICIAN GROUP | Age: 31
End: 2021-08-25
Payer: COMMERCIAL

## 2021-08-25 VITALS — HEIGHT: 69 IN | WEIGHT: 160 LBS | BODY MASS INDEX: 23.7 KG/M2

## 2021-08-25 DIAGNOSIS — E78.5 DYSLIPIDEMIA: ICD-10-CM

## 2021-08-25 DIAGNOSIS — J45.20 MILD INTERMITTENT ASTHMA WITHOUT COMPLICATION: ICD-10-CM

## 2021-08-25 DIAGNOSIS — E55.9 VITAMIN D DEFICIENCY: ICD-10-CM

## 2021-08-25 DIAGNOSIS — D69.6 THROMBOCYTOPENIA (HCC): ICD-10-CM

## 2021-08-25 DIAGNOSIS — G40.109 LOCALIZATION-RELATED EPILEPSY (HCC): ICD-10-CM

## 2021-08-25 PROCEDURE — 99214 OFFICE O/P EST MOD 30 MIN: CPT | Mod: 95,CR | Performed by: FAMILY MEDICINE

## 2021-08-25 RX ORDER — ALBUTEROL SULFATE 90 UG/1
2 AEROSOL, METERED RESPIRATORY (INHALATION) EVERY 6 HOURS PRN
Qty: 18 G | Refills: 2 | Status: SHIPPED | OUTPATIENT
Start: 2021-08-25 | End: 2022-09-29 | Stop reason: SDUPTHER

## 2021-08-25 ASSESSMENT — FIBROSIS 4 INDEX: FIB4 SCORE: 1.72
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
## 2021-12-06 NOTE — PROGRESS NOTES
Chief Complaint   Patient presents with   • Follow-Up     Localization-related epilepsy (HCC)       Problem List Items Addressed This Visit     None      Visit Diagnoses     Localization-related epilepsy (HCC)        Relevant Medications    levetiracetam (KEPPRA) 1000 MG tablet    Screening for depression        Driving safety issue              Interim History:  Barry Griffith 31 y.o. male presents today for seizure f/u.     Pt reports he had one convulsive seizure on 10/16/21. No tongue biting or incontinence. No other seizure since last visit. No known triggers.  He is not taking vit D. Mood is good. No SI or HI. He is still smoking marijuana. He drinks alcohol once in a while. He is not driving. He wants to lower his dose if possible. He has not done his blood work or EEG. No other issues.         Past medical history:   Past Medical History:   Diagnosis Date   • ASTHMA     Asthma since infancy.   • Seizure (HCC)    • Seizure disorder (HCC)        Past surgical history:   No past surgical history on file.    Family history:   Family History   Problem Relation Age of Onset   • Hypertension Mother    • Hyperlipidemia Father    • Hypertension Father    • Cancer Neg Hx    • Diabetes Neg Hx        Social history:   Social History     Socioeconomic History   • Marital status: Single     Spouse name: Not on file   • Number of children: 2   • Years of education: Not on file   • Highest education level: Not on file   Occupational History   • Occupation: guidance counsellor [employer]   • Occupation: psych coordinator [employer]   Tobacco Use   • Smoking status: Never Smoker   • Smokeless tobacco: Never Used   Vaping Use   • Vaping Use: Never used   Substance and Sexual Activity   • Alcohol use: No     Alcohol/week: 0.0 oz     Comment: rare    • Drug use: Yes     Types: Marijuana     Comment: rare   • Sexual activity: Yes     Partners: Female     Birth control/protection: Condom, Pill   Other Topics Concern    • Not on file   Social History Narrative   • Not on file     Social Determinants of Health     Financial Resource Strain:    • Difficulty of Paying Living Expenses: Not on file   Food Insecurity:    • Worried About Running Out of Food in the Last Year: Not on file   • Ran Out of Food in the Last Year: Not on file   Transportation Needs:    • Lack of Transportation (Medical): Not on file   • Lack of Transportation (Non-Medical): Not on file   Physical Activity:    • Days of Exercise per Week: Not on file   • Minutes of Exercise per Session: Not on file   Stress:    • Feeling of Stress : Not on file   Social Connections:    • Frequency of Communication with Friends and Family: Not on file   • Frequency of Social Gatherings with Friends and Family: Not on file   • Attends Mandaeism Services: Not on file   • Active Member of Clubs or Organizations: Not on file   • Attends Club or Organization Meetings: Not on file   • Marital Status: Not on file   Intimate Partner Violence:    • Fear of Current or Ex-Partner: Not on file   • Emotionally Abused: Not on file   • Physically Abused: Not on file   • Sexually Abused: Not on file   Housing Stability:    • Unable to Pay for Housing in the Last Year: Not on file   • Number of Places Lived in the Last Year: Not on file   • Unstable Housing in the Last Year: Not on file       Current medications:   Current Outpatient Medications   Medication   • albuterol 108 (90 Base) MCG/ACT Aero Soln inhalation aerosol   • levetiracetam (KEPPRA) 1000 MG tablet     No current facility-administered medications for this visit.       Medication Allergy:  No Known Allergies      Review of systems:     General: Denies fevers or chills, or nightsweats, or generalized fatigue.    Head: Denies headaches or dizziness or lightheadedness  Dermatologic:  Denies new rash  Musculoskeletal: Denies muscle pain or swelling, no atrophy, no neck and back pain or stiffness.   Neurologic: Denies facial droopiness,  "muscle weakness (focal or generalized), paresthesias, ataxia, change in speech or language, memory loss, abnormal movements. +seizures  Psychiatric: Denies anxiety, depression, mood swings, suicidal or homicidal thoughts       Physical examination:   Vitals:    12/07/21 1127   BP: 124/64   BP Location: Right arm   Patient Position: Sitting   BP Cuff Size: Adult   Pulse: 60   Resp: 12   Temp: 36.9 °C (98.5 °F)   TempSrc: Temporal   SpO2: 99%   Weight: 71.9 kg (158 lb 8.2 oz)   Height: 1.753 m (5' 9\")     General: Patient in no acute distress, pleasant and cooperative.  HEENT: Normocephalic, no signs of acute trauma.   Neck: Supple. There is normal range of motion.   Skin: no signs of acute rashes or trauma.   Musculoskeletal: joints exhibit full range of motion  Psychiatric: No hallucinatory behavior. No symptoms of depression or suicidal ideation. Mood and affect appear normal on exam.     NEUROLOGICAL EXAM:   Mental status, orientation: Awake, alert and fully oriented.   Speech and language: speech is clear and fluent. The patient is able to name, repeat and comprehend.   Memory: There is intact recollection of recent and remote events.   Motor exam: Strength is 5/5 in all extremities. Tone is normal. No abnormal movements were seen on exam.   Sensory exam reveals normal sense of light touch in all extremities.   Gait: The patient was able to get up from seated position on first attempt without requiring assistance. Found to be steady when walking. Movements were fluid with normal arm swing.    ANCILLARY DATA REVIEWED:       Lab Data Review:  Reviewed in chart.     Records reviewed:   Reviewed in chart.    Imaging:   CTB 4/22/18  NO ACUTE ABNORMALITIES ARE NOTED ON CT SCAN OF THE [facility]     MRI brain w/wo, 8/10/2018:  1. MRI of the brain without and with contrast within normal limits.  2. No evidence of mass lesion, heterotopic gray matter, gross cortical dysplasia or mesial temporal " sclerosis         EE/10/2018:   INTERPRETATION:  This is an abnormal video EEG recording in the awake, drowsy, and sleep state(s). Frequent left temporal sharps. Intermittent, subtle, left temporal slowing. The findings increase risk for seizures and suggest underlying area of cortical irritability and structural abnormality. Clinical and radiological correlation is recommended.      ASSESSMENT AND PLAN:    1. Localization-related epilepsy (HCC)  - levetiracetam (KEPPRA) 1000 MG tablet; Take 1 Tablet by mouth 2 times a day.  Dispense: 180 Tablet; Refill: 1    2. Screening for depression    3. 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 Hx of non compliance and illegal and non illegal drug use which could have provoked some of his seizures in the past. Seizures were initially controlled on keppra as he was seizure free for 1 year then he started having more seizures again. He had another seizure on 10/16/21 and it was unprovoked this time. He was not able to get any work-up done. Pt wants to go down on his keppra dose but aware that we may not be able to aicha that he is still having seizures that are unprovoked. Offered to increase dose but refused.      Mood is good. No suicidal or homicidal thoughts.      He is not drinking alcohol. Not smoking cigarettes. He is smoking marijuana and we have discussed drug interaction with this. Aware that THC can trigger seizures.      Past ASM's:none     Current ASM's: keppra 1000mg BID     keppra level therapeutic      Plan:  - routine EEG- reprinted this again.      -continue ASM     - Discussed avoidance of spell/sz triggers: alcohol, sleep deprivation, benadryl and stress.     - Discussed Vit D supplementation. Recommended taking 2000-5000u daily.      - Discussed driving restrictions. Recommended no driving  for at least 3 months. Will reassess next month.      -Labs to be checked for next appointment: Vit D- pcp had  ordered more blood work.            FOLLOW-UP:   Return in about 5 weeks (around 1/11/2022).      EDUCATION AND COUNSELING:  -Education was provided to the patient and/or family regarding diagnosis and prognosis. The chronic and unpredictable nature of the condition were discussed. There is increased risk for additional events, which may carry potential for significant injuries and death. Discussed frequent seizure triggers: sleep deprivation, medication non-compliance, use of illegal drugs/alcohol, stress, and others.   -We reviewed in detail the current antiepileptic regimen. Potential side effects of antiepileptics were discussed at length, including but no limited to: hypersensitivity reactions (rash and others, some of which can be fatal), visual field changes (some of which may be irreversible), glaucoma, diplopia, kidney stones, osteopenia/osteoporosis/bone fractures, hyperthermia/anhydrosis, hyponatremia, tremors/abnormal movements, ataxia, dizziness, fatigue, increased risk for falls, risk for cardiac arrhythmias/syncope, gastrointestinal side effects(hepatitis, pancreatitis, gastritis, ulcers), gingival hypertrophy/bleeding, drowsiness, sedation, anxiety/nervousness, increased risk for suicide, increased risk for depression, and psychosis.   -We also reviewed drug-drug interactions and their potential effect on seizure control and medication side effects.    -Recommend chronic vitamin D supplementation and regular exercise (if not contraindicated).   -Patient/family educated on risk for SUDEP (Sudden Death in Epilepsy). Counseling was provided on the importance of strict medication and follow up compliance. The patient/family understand the risks associated with non-adherence with the medical plan as outlined, including but not limited to an increased risk for breakthrough seizures, which may contribute to injuries, disability, status epilepticus, and even death.   -Counseling was also provided on  potential effects of alcohol and other drugs, which may lower seizure threshold and/or affect the metabolism of antiepileptic drugs. We recommend avoidance of alcohol and illegal drugs.  -Avoid sleep deprivation.   -We extensively discussed the aspects related to safety in drivers who suffer from epilepsy. The patient is encourage to report to the Division of Motor Vehicles of any condition and/or spells related to confusion, disorientation, and/or loss of awareness and/or loss of consciousness; as these may pose a safety issue if they occur while operating a motor vehicle. The patient and/or family are ultimately responsible for exercising caution and abiding to regulations in place.   -Other seizure precautions were discussed at length, including no diving, no skydiving, no climbing or exposure to unprotected heights, no unsupervised swimming, no Jacuzzi or bathing in bathtubs or deep bodies of water. The patient/family have been advised about risks for operating any machinery while suffering from seizures / syncope / epilepsy and/or while taking antiepileptic drugs.   -The patient understands and agrees that due to the complexity of his/her diagnosis, results of any testing and further recommendations will typically be discussed/made during a face to face encounter in my office. The patient and/or family further understands it is their responsibility to keep proper follow up.     Patient agrees with plan, as outlined.         Luiza Peace, MSN, APRN, FNP-C  [facility]  Office: [phone]  Fax: [phone]

## 2021-12-07 ENCOUNTER — OFFICE VISIT (OUTPATIENT)
Dept: NEUROLOGY | Facility: MEDICAL CENTER | Age: 31
End: 2021-12-07
Attending: NURSE PRACTITIONER
Payer: COMMERCIAL

## 2021-12-07 VITALS
HEIGHT: 69 IN | DIASTOLIC BLOOD PRESSURE: 64 MMHG | RESPIRATION RATE: 12 BRPM | TEMPERATURE: 98.5 F | OXYGEN SATURATION: 99 % | HEART RATE: 60 BPM | WEIGHT: 158.51 LBS | SYSTOLIC BLOOD PRESSURE: 124 MMHG | BODY MASS INDEX: 23.48 KG/M2

## 2021-12-07 DIAGNOSIS — Z91.89 DRIVING SAFETY ISSUE: ICD-10-CM

## 2021-12-07 DIAGNOSIS — G40.109 LOCALIZATION-RELATED EPILEPSY (HCC): ICD-10-CM

## 2021-12-07 DIAGNOSIS — Z13.31 SCREENING FOR DEPRESSION: ICD-10-CM

## 2021-12-07 PROCEDURE — 99214 OFFICE O/P EST MOD 30 MIN: CPT | Performed by: NURSE PRACTITIONER

## 2021-12-07 PROCEDURE — 99211 OFF/OP EST MAY X REQ PHY/QHP: CPT | Performed by: NURSE PRACTITIONER

## 2021-12-07 RX ORDER — LEVETIRACETAM 1000 MG/1
1000 TABLET ORAL 2 TIMES DAILY
Qty: 180 TABLET | Refills: 1 | Status: SHIPPED | OUTPATIENT
Start: 2021-12-07 | End: 2022-03-18

## 2021-12-07 ASSESSMENT — FIBROSIS 4 INDEX: FIB4 SCORE: 1.72

## 2021-12-22 ENCOUNTER — HOSPITAL ENCOUNTER (EMERGENCY)
Facility: MEDICAL CENTER | Age: 31
End: 2021-12-22
Attending: EMERGENCY MEDICINE
Payer: COMMERCIAL

## 2021-12-22 VITALS
BODY MASS INDEX: 24.44 KG/M2 | TEMPERATURE: 97 F | DIASTOLIC BLOOD PRESSURE: 68 MMHG | OXYGEN SATURATION: 95 % | HEIGHT: 69 IN | HEART RATE: 86 BPM | RESPIRATION RATE: 18 BRPM | WEIGHT: 165 LBS | SYSTOLIC BLOOD PRESSURE: 110 MMHG

## 2021-12-22 DIAGNOSIS — R56.9 SEIZURE (HCC): ICD-10-CM

## 2021-12-22 LAB
ALBUMIN SERPL BCP-MCNC: 4.8 G/DL (ref 3.2–4.9)
ALBUMIN/GLOB SERPL: 2.3 G/DL
ALP SERPL-CCNC: 60 U/L (ref 30–99)
ALT SERPL-CCNC: 28 U/L (ref 2–50)
ANION GAP SERPL CALC-SCNC: 16 MMOL/L (ref 7–16)
AST SERPL-CCNC: 29 U/L (ref 12–45)
BILIRUB SERPL-MCNC: 0.2 MG/DL (ref 0.1–1.5)
BUN SERPL-MCNC: 14 MG/DL (ref 8–22)
CALCIUM SERPL-MCNC: 9.7 MG/DL (ref 8.5–10.5)
CHLORIDE SERPL-SCNC: 107 MMOL/L (ref 96–112)
CO2 SERPL-SCNC: 17 MMOL/L (ref 20–33)
CREAT SERPL-MCNC: 1.3 MG/DL (ref 0.5–1.4)
GLOBULIN SER CALC-MCNC: 2.1 G/DL (ref 1.9–3.5)
GLUCOSE SERPL-MCNC: 91 MG/DL (ref 65–99)
POTASSIUM SERPL-SCNC: 3.9 MMOL/L (ref 3.6–5.5)
PROT SERPL-MCNC: 6.9 G/DL (ref 6–8.2)
SODIUM SERPL-SCNC: 140 MMOL/L (ref 135–145)

## 2021-12-22 PROCEDURE — 80053 COMPREHEN METABOLIC PANEL: CPT

## 2021-12-22 PROCEDURE — 99283 EMERGENCY DEPT VISIT LOW MDM: CPT

## 2021-12-22 ASSESSMENT — LIFESTYLE VARIABLES
AVERAGE NUMBER OF DAYS PER WEEK YOU HAVE A DRINK CONTAINING ALCOHOL: 0
TOTAL SCORE: 0
EVER HAD A DRINK FIRST THING IN THE MORNING TO STEADY YOUR NERVES TO GET RID OF A HANGOVER: NO
TOTAL SCORE: 0
HOW MANY TIMES IN THE PAST YEAR HAVE YOU HAD 5 OR MORE DRINKS IN A DAY: 0
DO YOU DRINK ALCOHOL: NO
ON A TYPICAL DAY WHEN YOU DRINK ALCOHOL HOW MANY DRINKS DO YOU HAVE: 0
EVER FELT BAD OR GUILTY ABOUT YOUR DRINKING: NO
CONSUMPTION TOTAL: NEGATIVE
HAVE YOU EVER FELT YOU SHOULD CUT DOWN ON YOUR DRINKING: NO
TOTAL SCORE: 0
HAVE PEOPLE ANNOYED YOU BY CRITICIZING YOUR DRINKING: NO

## 2021-12-22 ASSESSMENT — FIBROSIS 4 INDEX: FIB4 SCORE: 1.72
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
## 2021-12-22 NOTE — ED NOTES
.Patient discharged in stable condition per order. Oral and written discharge instructions reviewed. IV removed. All belongings accounted for and taken with patient. Wristband cut off per protocol. Questions answered, and patient agrees with discharge plan. Patient escorted to lobby.

## 2021-12-27 ENCOUNTER — TELEPHONE (OUTPATIENT)
Dept: MEDICAL GROUP | Facility: PHYSICIAN GROUP | Age: 31
End: 2021-12-27

## 2021-12-28 NOTE — TELEPHONE ENCOUNTER
Phone Number Called: [phone]    Call outcome: Left detailed message for patient. Informed to call back with any additional questions.    Message:  Please call Nurse Farias at [phone] to schedule a follow-up visit with your primary care provider following your emergency room visit on 12/22/21.

## 2022-01-03 NOTE — PROGRESS NOTES
"Chief Complaint   Patient presents with   • Follow-Up     Seizures       Problem List Items Addressed This Visit     None      Visit Diagnoses     Localization-related epilepsy (HCC)        Screening for depression        Hospital discharge follow-up          THIS CONSULTATION WAS PERFORMED VIA TELEMEDICINE, UTILIZING AN ENCRYPTED TRANSMISSION, TO PREVENT SPREAD OF COVID19. THE PATIENT CONSENTED TO THIS CONSULTATION.    Interim History:  Barry Griffith 31 y.o. male is f/u for seizures.     Pt reports he had one seizure in December 2021 and he was seen in the hospital. He believes it was because he was \"carb depleted\" that day. He had no recollection of events. He denies any other triggers. Compliant with ASM. Mood is good. No SI or HI. He is not driving. He is taking vit D. He is not drinking alcohol. Still using marijuana occasionally. He has not done his EEG and lab work and states it's been chaotic at home with members of the family sick. No other concerns.     Past medical history:   Past Medical History:   Diagnosis Date   • ASTHMA     Asthma since infancy.   • Seizure (HCC)    • Seizure disorder (HCC)        Past surgical history:   No past surgical history on file.    Family history:   Family History   Problem Relation Age of Onset   • Hypertension Mother    • Hyperlipidemia Father    • Hypertension Father    • Cancer Neg Hx    • Diabetes Neg Hx        Social history:   Social History     Socioeconomic History   • Marital status: Single     Spouse name: Not on file   • Number of children: 2   • Years of education: Not on file   • Highest education level: Not on file   Occupational History   • Occupation: guidance counsellor [employer]   • Occupation: psych coordinator [employer]   Tobacco Use   • Smoking status: Never Smoker   • Smokeless tobacco: Never Used   Vaping Use   • Vaping Use: Never used   Substance and Sexual Activity   • Alcohol use: No     Alcohol/week: 0.0 oz     Comment: rare  "   • Drug use: Yes     Types: Marijuana     Comment: THC   • Sexual activity: Yes     Partners: Female     Birth control/protection: Condom, Pill   Other Topics Concern   • Not on file   Social History Narrative   • Not on file     Social Determinants of Health     Financial Resource Strain:    • Difficulty of Paying Living Expenses: Not on file   Food Insecurity:    • Worried About Running Out of Food in the Last Year: Not on file   • Ran Out of Food in the Last Year: Not on file   Transportation Needs:    • Lack of Transportation (Medical): Not on file   • Lack of Transportation (Non-Medical): Not on file   Physical Activity:    • Days of Exercise per Week: Not on file   • Minutes of Exercise per Session: Not on file   Stress:    • Feeling of Stress : Not on file   Social Connections:    • Frequency of Communication with Friends and Family: Not on file   • Frequency of Social Gatherings with Friends and Family: Not on file   • Attends Voodoo Services: Not on file   • Active Member of Clubs or Organizations: Not on file   • Attends Club or Organization Meetings: Not on file   • Marital Status: Not on file   Intimate Partner Violence:    • Fear of Current or Ex-Partner: Not on file   • Emotionally Abused: Not on file   • Physically Abused: Not on file   • Sexually Abused: Not on file   Housing Stability:    • Unable to Pay for Housing in the Last Year: Not on file   • Number of Places Lived in the Last Year: Not on file   • Unstable Housing in the Last Year: Not on file       Current medications:   Current Outpatient Medications   Medication   • levetiracetam (KEPPRA) 1000 MG tablet   • albuterol 108 (90 Base) MCG/ACT Aero Soln inhalation aerosol     No current facility-administered medications for this visit.       Medication Allergy:  No Known Allergies      Review of systems:   General: Denies fevers or chills, or nightsweats, or generalized fatigue.    Head: Denies headaches or dizziness or  lightheadedness  Musculoskeletal: Denies muscle pain or swelling, no atrophy, no neck and back pain or stiffness.   Neurologic: Denies facial droopiness, muscle weakness (focal or generalized), paresthesias, ataxia, change in speech or language, memory loss, abnormal movements.+ seizures, loss of consciousness, or episodes of confusion.   Psychiatric: Denies anxiety, depression, mood swings, suicidal or homicidal thoughts       Physical examination:   General: Patient in no acute distress, pleasant and cooperative.  HEENT: Normocephalic, no signs of acute trauma.   Neck: There is normal range of motion.   Skin: no signs of acute rashes or trauma in visible areas  Psychiatric: No hallucinatory behavior. No symptoms of depression or suicidal ideation. Mood and affect appear normal on exam.      NEUROLOGICAL EXAM:   Mental status, orientation: Awake, alert and fully oriented.   Speech and language: speech is clear and fluent. The patient is able to name, repeat and comprehend.   Memory: There is intact recollection of recent and remote events.   Cranial nerve exam:   CN I: Not examined   CN II: Unable to assess  CN III, IV, VI: EOMI  CN V: Unable to assess  CN VII: face symmetric   CN VIII: Unable to assess  CN IX, X: Unable to assess  CN XI: Symmetric shoulder shrug  CN XII: tongue midline.   Sensory exam Unable to assess  Deep tendon reflexes: Unable to assess        ANCILLARY DATA REVIEWED:       Lab Data Review:  Reviewed in chart.     Records reviewed:   Reviewed in chart.    Imaging:   [facility]  NO ACUTE ABNORMALITIES ARE NOTED ON CT SCAN OF THE [facility]     MRI brain w/wo, 8/10/2018:  1. MRI of the brain without and with contrast within normal limits.  2. No evidence of mass lesion, heterotopic gray matter, gross cortical dysplasia or mesial temporal sclerosis         EE/10/2018:   INTERPRETATION:  This is an abnormal video EEG recording in the awake, drowsy, and sleep state(s). Frequent left temporal  sharps. Intermittent, subtle, left temporal slowing. The findings increase risk for seizures and suggest underlying area of cortical irritability and structural abnormality. Clinical and radiological correlation is recommended.      ASSESSMENT AND PLAN:    1. Localization-related epilepsy (HCC)    2. Screening for depression    3. Hospital discharge follow-up    4. 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 Hx of non compliance and illegal and non illegal drug use which could have provoked some of his seizures in the past. Seizures were initially controlled on keppra as he was seizure free for 1 year then he started having more seizures again. He had 2 seizures on 12/22/21 that was unprovoked again. Pt does not want to make any adjustment to his medications despite the risks but promises that he will entertain med adjustment if he has another seizure. He wants to be completely off the keppra as he doesn't like how it makes him feel wilson.      Mood is good. No suicidal or homicidal thoughts.      He is not drinking alcohol. Not smoking cigarettes. He is smoking marijuana and we have discussed drug interaction with this. Aware that THC can trigger seizures.      Past ASM's:none     Current ASM's: keppra 1000mg BID     keppra level therapeutic      Plan:  - routine EEG- promised to get this done.      -continue ASM. Discussed compliance.     - Discussed avoidance of spell/sz triggers: alcohol, sleep deprivation, benadryl and stress.     - Discussed Vit D supplementation. Recommended taking 2000-5000u daily.      - Discussed driving restrictions. Recommended no driving  for at least 3 months.      -Labs to be checked for next appointment: Vit D- pcp had ordered more blood work- not done.         FOLLOW-UP:   Return in about 2 months (around 3/17/2022).      EDUCATION AND COUNSELING:  -Education was provided to the patient and/or family regarding diagnosis and prognosis. The  chronic and unpredictable nature of the condition were discussed. There is increased risk for additional events, which may carry potential for significant injuries and death. Discussed frequent seizure triggers: sleep deprivation, medication non-compliance, use of illegal drugs/alcohol, stress, and others.   -We reviewed in detail the current antiepileptic regimen. Potential side effects of antiepileptics were discussed at length, including but no limited to: hypersensitivity reactions (rash and others, some of which can be fatal), visual field changes (some of which may be irreversible), glaucoma, diplopia, kidney stones, osteopenia/osteoporosis/bone fractures, hyperthermia/anhydrosis, hyponatremia, tremors/abnormal movements, ataxia, dizziness, fatigue, increased risk for falls, risk for cardiac arrhythmias/syncope, gastrointestinal side effects(hepatitis, pancreatitis, gastritis, ulcers), gingival hypertrophy/bleeding, drowsiness, sedation, anxiety/nervousness, increased risk for suicide, increased risk for depression, and psychosis.   -We also reviewed drug-drug interactions and their potential effect on seizure control and medication side effects.    -Recommend chronic vitamin D supplementation and regular exercise (if not contraindicated).   -Patient/family educated on risk for SUDEP (Sudden Death in Epilepsy). Counseling was provided on the importance of strict medication and follow up compliance. The patient/family understand the risks associated with non-adherence with the medical plan as outlined, including but not limited to an increased risk for breakthrough seizures, which may contribute to injuries, disability, status epilepticus, and even death.   -Counseling was also provided on potential effects of alcohol and other drugs, which may lower seizure threshold and/or affect the metabolism of antiepileptic drugs. We recommend avoidance of alcohol and illegal drugs.  -Avoid sleep deprivation.   -We  extensively discussed the aspects related to safety in drivers who suffer from epilepsy. The patient is encourage to report to the Division of Motor Vehicles of any condition and/or spells related to confusion, disorientation, and/or loss of awareness and/or loss of consciousness; as these may pose a safety issue if they occur while operating a motor vehicle. The patient and/or family are ultimately responsible for exercising caution and abiding to regulations in place.   -Other seizure precautions were discussed at length, including no diving, no skydiving, no climbing or exposure to unprotected heights, no unsupervised swimming, no Jacuzzi or bathing in bathtubs or deep bodies of water. The patient/family have been advised about risks for operating any machinery while suffering from seizures / syncope / epilepsy and/or while taking antiepileptic drugs.   -The patient understands and agrees that due to the complexity of his/her diagnosis, results of any testing and further recommendations will typically be discussed/made during a face to face encounter in my office. The patient and/or family further understands it is their responsibility to keep proper follow up.     Patient agrees with plan, as outlined.         Luiza Peace, MSN, APRN, FNP-C  [facility]  Office: [phone]  Fax: [phone]      This encounter was conducted via Zoom.   The patient was in a private location in the [facility].    Verbal consent was obtained. Patient's identity was verified.      BILLING DOCUMENTATION:   Total time spent including chart review before and after visit was 31min. Over 50% of the time of the visit today was spent on counseling and or coordination of care wtih the patient and/or family, with greater than 50% of the total discussing my assessment and plan as stated above. This time did not include VNS programming, which was in addition to the total visit time.

## 2022-01-17 ENCOUNTER — TELEMEDICINE (OUTPATIENT)
Dept: NEUROLOGY | Facility: MEDICAL CENTER | Age: 32
End: 2022-01-17
Attending: NURSE PRACTITIONER
Payer: COMMERCIAL

## 2022-01-17 DIAGNOSIS — G40.109 LOCALIZATION-RELATED EPILEPSY (HCC): ICD-10-CM

## 2022-01-17 DIAGNOSIS — Z91.199 NONCOMPLIANCE: ICD-10-CM

## 2022-01-17 DIAGNOSIS — Z09 HOSPITAL DISCHARGE FOLLOW-UP: ICD-10-CM

## 2022-01-17 DIAGNOSIS — Z13.31 SCREENING FOR DEPRESSION: ICD-10-CM

## 2022-01-17 PROCEDURE — 99214 OFFICE O/P EST MOD 30 MIN: CPT | Mod: 95 | Performed by: NURSE PRACTITIONER

## 2022-01-17 ASSESSMENT — PATIENT HEALTH QUESTIONNAIRE - PHQ9: CLINICAL INTERPRETATION OF PHQ2 SCORE: 0

## 2022-01-18 ENCOUNTER — TELEPHONE (OUTPATIENT)
Dept: NEUROLOGY | Facility: MEDICAL CENTER | Age: 32
End: 2022-01-18

## 2022-01-21 ENCOUNTER — TELEPHONE (OUTPATIENT)
Dept: MEDICAL GROUP | Facility: PHYSICIAN GROUP | Age: 32
End: 2022-01-21

## 2022-01-21 ENCOUNTER — HOSPITAL ENCOUNTER (EMERGENCY)
Facility: MEDICAL CENTER | Age: 32
End: 2022-01-21
Attending: EMERGENCY MEDICINE
Payer: COMMERCIAL

## 2022-01-21 VITALS
RESPIRATION RATE: 15 BRPM | DIASTOLIC BLOOD PRESSURE: 82 MMHG | OXYGEN SATURATION: 94 % | TEMPERATURE: 97.7 F | BODY MASS INDEX: 23.7 KG/M2 | WEIGHT: 160 LBS | SYSTOLIC BLOOD PRESSURE: 124 MMHG | HEART RATE: 64 BPM | HEIGHT: 69 IN

## 2022-01-21 DIAGNOSIS — U07.1 COVID-19: ICD-10-CM

## 2022-01-21 DIAGNOSIS — R56.9 SEIZURE (HCC): ICD-10-CM

## 2022-01-21 LAB
ALBUMIN SERPL BCP-MCNC: 4.2 G/DL (ref 3.2–4.9)
ALBUMIN/GLOB SERPL: 1.8 G/DL
ALP SERPL-CCNC: 58 U/L (ref 30–99)
ALT SERPL-CCNC: 36 U/L (ref 2–50)
AMPHET UR QL SCN: NEGATIVE
ANION GAP SERPL CALC-SCNC: 15 MMOL/L (ref 7–16)
AST SERPL-CCNC: 47 U/L (ref 12–45)
BARBITURATES UR QL SCN: NEGATIVE
BASOPHILS # BLD AUTO: 0.3 % (ref 0–1.8)
BASOPHILS # BLD: 0.01 K/UL (ref 0–0.12)
BENZODIAZ UR QL SCN: POSITIVE
BILIRUB SERPL-MCNC: 0.4 MG/DL (ref 0.1–1.5)
BUN SERPL-MCNC: 10 MG/DL (ref 8–22)
BZE UR QL SCN: NEGATIVE
CALCIUM SERPL-MCNC: 8.9 MG/DL (ref 8.5–10.5)
CANNABINOIDS UR QL SCN: POSITIVE
CHLORIDE SERPL-SCNC: 103 MMOL/L (ref 96–112)
CO2 SERPL-SCNC: 21 MMOL/L (ref 20–33)
CREAT SERPL-MCNC: 1.1 MG/DL (ref 0.5–1.4)
EOSINOPHIL # BLD AUTO: 0.03 K/UL (ref 0–0.51)
EOSINOPHIL NFR BLD: 1 % (ref 0–6.9)
ERYTHROCYTE [DISTWIDTH] IN BLOOD BY AUTOMATED COUNT: 40.5 FL (ref 35.9–50)
GLOBULIN SER CALC-MCNC: 2.3 G/DL (ref 1.9–3.5)
GLUCOSE BLD-MCNC: 107 MG/DL (ref 65–99)
GLUCOSE SERPL-MCNC: 104 MG/DL (ref 65–99)
HCT VFR BLD AUTO: 45.7 % (ref 42–52)
HGB BLD-MCNC: 14.9 G/DL (ref 14–18)
IMM GRANULOCYTES # BLD AUTO: 0.01 K/UL (ref 0–0.11)
IMM GRANULOCYTES NFR BLD AUTO: 0.3 % (ref 0–0.9)
LYMPHOCYTES # BLD AUTO: 1.56 K/UL (ref 1–4.8)
LYMPHOCYTES NFR BLD: 54.4 % (ref 22–41)
MAGNESIUM SERPL-MCNC: 2.4 MG/DL (ref 1.5–2.5)
MCH RBC QN AUTO: 31.2 PG (ref 27–33)
MCHC RBC AUTO-ENTMCNC: 32.6 G/DL (ref 33.7–35.3)
MCV RBC AUTO: 95.8 FL (ref 81.4–97.8)
METHADONE UR QL SCN: NEGATIVE
MONOCYTES # BLD AUTO: 0.22 K/UL (ref 0–0.85)
MONOCYTES NFR BLD AUTO: 7.7 % (ref 0–13.4)
NEUTROPHILS # BLD AUTO: 1.04 K/UL (ref 1.82–7.42)
NEUTROPHILS NFR BLD: 36.3 % (ref 44–72)
NRBC # BLD AUTO: 0 K/UL
NRBC BLD-RTO: 0 /100 WBC
OPIATES UR QL SCN: NEGATIVE
OXYCODONE UR QL SCN: NEGATIVE
PCP UR QL SCN: NEGATIVE
PLATELET # BLD AUTO: 91 K/UL (ref 164–446)
PMV BLD AUTO: 11.7 FL (ref 9–12.9)
POTASSIUM SERPL-SCNC: 4 MMOL/L (ref 3.6–5.5)
PROPOXYPH UR QL SCN: NEGATIVE
PROT SERPL-MCNC: 6.5 G/DL (ref 6–8.2)
RBC # BLD AUTO: 4.77 M/UL (ref 4.7–6.1)
SARS-COV+SARS-COV-2 AG RESP QL IA.RAPID: DETECTED
SODIUM SERPL-SCNC: 139 MMOL/L (ref 135–145)
SPECIMEN SOURCE: ABNORMAL
WBC # BLD AUTO: 2.9 K/UL (ref 4.8–10.8)

## 2022-01-21 PROCEDURE — 99285 EMERGENCY DEPT VISIT HI MDM: CPT

## 2022-01-21 PROCEDURE — 700102 HCHG RX REV CODE 250 W/ 637 OVERRIDE(OP): Performed by: EMERGENCY MEDICINE

## 2022-01-21 PROCEDURE — 80307 DRUG TEST PRSMV CHEM ANLYZR: CPT

## 2022-01-21 PROCEDURE — 80177 DRUG SCRN QUAN LEVETIRACETAM: CPT

## 2022-01-21 PROCEDURE — 82962 GLUCOSE BLOOD TEST: CPT

## 2022-01-21 PROCEDURE — 36415 COLL VENOUS BLD VENIPUNCTURE: CPT

## 2022-01-21 PROCEDURE — 83735 ASSAY OF MAGNESIUM: CPT

## 2022-01-21 PROCEDURE — 87426 SARSCOV CORONAVIRUS AG IA: CPT

## 2022-01-21 PROCEDURE — 85025 COMPLETE CBC W/AUTO DIFF WBC: CPT

## 2022-01-21 PROCEDURE — A9270 NON-COVERED ITEM OR SERVICE: HCPCS | Performed by: EMERGENCY MEDICINE

## 2022-01-21 PROCEDURE — 80053 COMPREHEN METABOLIC PANEL: CPT

## 2022-01-21 RX ORDER — ACETAMINOPHEN 325 MG/1
650 TABLET ORAL ONCE
Status: COMPLETED | OUTPATIENT
Start: 2022-01-21 | End: 2022-01-21

## 2022-01-21 RX ORDER — LEVETIRACETAM 500 MG/1
1000 TABLET ORAL ONCE
Status: COMPLETED | OUTPATIENT
Start: 2022-01-21 | End: 2022-01-21

## 2022-01-21 RX ADMIN — LEVETIRACETAM 1000 MG: 500 TABLET, FILM COATED ORAL at 09:49

## 2022-01-21 RX ADMIN — ACETAMINOPHEN 650 MG: 325 TABLET, FILM COATED ORAL at 11:36

## 2022-01-21 ASSESSMENT — FIBROSIS 4 INDEX: FIB4 SCORE: 1.56

## 2022-01-21 NOTE — ED PROVIDER NOTES
"ED Provider Note    CHIEF COMPLAINT  Chief Complaint   Patient presents with   • Seizure       HPI  Barry Griffith is a 32 y.o. male who presents to the emergency department after seizure.  He does not recall anything happening, but he was in bed and had generalized tonic-clonic seizure of unclear duration.  Paramedics were called.  Patient was brought to the ER.  He was quite combative and required restraints.  All he remembers is waking up in the ER.  He denies any injuries.  He denies recent illness.  No fevers, chills, cough, nausea vomiting abdominal pain.  He does not use drugs aside from marijuana.  He has not been drinking alcohol.  He gives a history of seizure disorder and is prescribed Keppra 1000 mg twice daily.  He reports he has been compliant.    Chart reviewed.  Patient followed by Gisell Ramirez.  Patient with history of seizure with noncompliance, legal and nonlegal drug abuse.    REVIEW OF SYSTEMS  As per HPI, otherwise a 10 point review of systems is negative    PAST MEDICAL HISTORY  Past Medical History:   Diagnosis Date   • ASTHMA     Asthma since infancy.   • Seizure (HCC)    • Seizure disorder (HCC)        SOCIAL HISTORY  Social History     Tobacco Use   • Smoking status: Never Smoker   • Smokeless tobacco: Never Used   Vaping Use   • Vaping Use: Never used   Substance Use Topics   • Alcohol use: No     Alcohol/week: 0.0 oz     Comment: rare    • Drug use: Yes     Types: Marijuana     Comment: THC       SURGICAL HISTORY  No past surgical history on file.    CURRENT MEDICATIONS  Home Medications    **Home medications have not yet been reviewed for this encounter**         ALLERGIES  No Known Allergies    PHYSICAL EXAM  VITAL SIGNS: /58   Pulse 92   Temp 36.4 °C (97.5 °F) (Temporal)   Resp 14   Ht 1.753 m (5' 9\")   Wt 72.6 kg (160 lb)   SpO2 98%   BMI 23.63 kg/m²    Constitutional: Awake and alert  HENT: Normal inspection  Eyes: Normal inspection  Neck: Grossly normal range " of motion.  Cardiovascular: Normal heart rate, Normal rhythm.  Symmetric peripheral pulses.   Thorax & Lungs: No respiratory distress, No wheezing, No rales, No rhonchi, No chest tenderness.   Abdomen: Bowel sounds normal, soft, non-distended, nontender, no mass  Skin: No obvious rash.  Back: No tenderness, No CVA tenderness.   Extremities: No clubbing, cyanosis, edema, no Homans or cords.  Neurologic: Grossly normal   Psychiatric: Normal for situation        Labs:  Results for orders placed or performed during the hospital encounter of 01/21/22   CBC WITH DIFFERENTIAL   Result Value Ref Range    WBC 2.9 (L) 4.8 - 10.8 K/uL    RBC 4.77 4.70 - 6.10 M/uL    Hemoglobin 14.9 14.0 - 18.0 g/dL    Hematocrit 45.7 42.0 - 52.0 %    MCV 95.8 81.4 - 97.8 fL    MCH 31.2 27.0 - 33.0 pg    MCHC 32.6 (L) 33.7 - 35.3 g/dL    RDW 40.5 35.9 - 50.0 fL    Platelet Count 91 (L) 164 - 446 K/uL    MPV 11.7 9.0 - 12.9 fL    Neutrophils-Polys 36.30 (L) 44.00 - 72.00 %    Lymphocytes 54.40 (H) 22.00 - 41.00 %    Monocytes 7.70 0.00 - 13.40 %    Eosinophils 1.00 0.00 - 6.90 %    Basophils 0.30 0.00 - 1.80 %    Immature Granulocytes 0.30 0.00 - 0.90 %    Nucleated RBC 0.00 /100 WBC    Neutrophils (Absolute) 1.04 (L) 1.82 - 7.42 K/uL    Lymphs (Absolute) 1.56 1.00 - 4.80 K/uL    Monos (Absolute) 0.22 0.00 - 0.85 K/uL    Eos (Absolute) 0.03 0.00 - 0.51 K/uL    Baso (Absolute) 0.01 0.00 - 0.12 K/uL    Immature Granulocytes (abs) 0.01 0.00 - 0.11 K/uL    NRBC (Absolute) 0.00 K/uL   COMP METABOLIC PANEL   Result Value Ref Range    Sodium 139 135 - 145 mmol/L    Potassium 4.0 3.6 - 5.5 mmol/L    Chloride 103 96 - 112 mmol/L    Co2 21 20 - 33 mmol/L    Anion Gap 15.0 7.0 - 16.0    Glucose 104 (H) 65 - 99 mg/dL    Bun 10 8 - 22 mg/dL    Creatinine 1.10 0.50 - 1.40 mg/dL    Calcium 8.9 8.5 - 10.5 mg/dL    AST(SGOT) 47 (H) 12 - 45 U/L    ALT(SGPT) 36 2 - 50 U/L    Alkaline Phosphatase 58 30 - 99 U/L    Total Bilirubin 0.4 0.1 - 1.5 mg/dL    Albumin 4.2  3.2 - 4.9 g/dL    Total Protein 6.5 6.0 - 8.2 g/dL    Globulin 2.3 1.9 - 3.5 g/dL    A-G Ratio 1.8 g/dL   URINE DRUG SCREEN   Result Value Ref Range    Amphetamines Urine Negative Negative    Barbiturates Negative Negative    Benzodiazepines Positive (A) Negative    Cocaine Metabolite Negative Negative    Methadone Negative Negative    Opiates Negative Negative    Oxycodone Negative Negative    Phencyclidine -Pcp Negative Negative    Propoxyphene Negative Negative    Cannabinoid Metab Positive (A) Negative   MAGNESIUM   Result Value Ref Range    Magnesium 2.4 1.5 - 2.5 mg/dL   SARS-COV Antigen ASHIA: Collect dry nasal swab AND NP swab in VTM   Result Value Ref Range    SARS-CoV-2 Source Nasal Swab     SARS-COV ANTIGEN ASHIA DETECTED (AA) NotDetected   ESTIMATED GFR   Result Value Ref Range    GFR If African American >60 >60 mL/min/1.73 m 2    GFR If Non African American >60 >60 mL/min/1.73 m 2   POCT glucose device results   Result Value Ref Range    Glucose - Accu-Ck 107 (H) 65 - 99 mg/dL       Medications   levETIRAcetam (KEPPRA) tablet 1,000 mg (1,000 mg Oral Given 1/21/22 0949)       COURSE & MEDICAL DECISION MAKING  Patient presents to the ER after having a seizure.  Has history of seizure disorder.  Reports compliance with treatment.  No alarming findings on examination.  Obtained work-up.    Laboratory data returned showing decreased WBC count.  I discussed the plan with the patient.  His fiancée actually has COVID.  This was suspected.  Sent a COVID screen and it returned positive.  Additionally, the patient reports he has not been sleeping well lately.  I suspect breakthrough seizure because of viral illness and sleep disturbance.  We will leave his medications unchanged at this time.  Given seizure precautions and standard instructions on COVID.  Patient is to return to the ER for recurrent seizures, difficulty breathing, worsening symptoms or concern.    FINAL IMPRESSION  1.  Breakthrough seizure  2.   COVID-19       This dictation was created using voice recognition software. The accuracy of the dictation is limited to the abilities of the software.  The nursing notes were reviewed and certain aspects of this information were incorporated into this note.      Electronically signed by: Skip Fox M.D., 1/21/2022 9:42 AM

## 2022-01-21 NOTE — TELEPHONE ENCOUNTER
Phone conversation with patient, scheduled ED follow-up visit his primary care provider for 2/3/22.

## 2022-01-21 NOTE — ED NOTES
Patient resting in bed, dad at bedside. No seizure activity at this time and vital signs are stable.

## 2022-01-21 NOTE — ED TRIAGE NOTES
Patient shane from home s/p witnessed seizure by peace with a history of seizure related to sports related injuries in the past. Patient reports taking keppra and has been compliant. Per ems, patient seen and taken to hospital many times for seizure. OA, patient was on 4pt restraint because patient was very confused and combative, patient verbally redirectable at this time and restraint discontinued. Vital signs stable at this time.

## 2022-01-23 LAB — LEVETIRACETAM SERPL-MCNC: 8 UG/ML (ref 12–46)

## 2022-03-10 NOTE — PROGRESS NOTES
Chief Complaint   Patient presents with   • Follow-Up     Seizures       Problem List Items Addressed This Visit    None     Visit Diagnoses     Localization-related epilepsy (HCC)        Relevant Medications    brivaracetam (BRIVIACT) 100 MG Tab tablet    Other Relevant Orders    CBC WITH DIFFERENTIAL    Comp Metabolic Panel    Vitamin D deficiency        Relevant Orders    VITAMIN D,25 HYDROXY          Interim History:  Barry Griffith 32 y.o. male presents today for seizure f/u.     Pt was seen in the ER on 1/21/22 for breakthrough seizure in the setting on covid19 infection. Compliant with ASM. He denies any side effects but he doesn't like it as it can make him wilson. No SI or HI. He is not driving. He is taking vit D. He had not done his work-up. He denies taking any benzos. He is open to switching the keppra.     Past medical history:   Past Medical History:   Diagnosis Date   • ASTHMA     Asthma since infancy.   • Seizure (HCC)    • Seizure disorder (HCC)        Past surgical history:   No past surgical history on file.    Family history:   Family History   Problem Relation Age of Onset   • Hypertension Mother    • Hyperlipidemia Father    • Hypertension Father    • Cancer Neg Hx    • Diabetes Neg Hx        Social history:   Social History     Socioeconomic History   • Marital status: Single     Spouse name: Not on file   • Number of children: 2   • Years of education: Not on file   • Highest education level: Not on file   Occupational History   • Occupation: guidance counsellor [employer]   • Occupation: psych coordinator [employer]   Tobacco Use   • Smoking status: Never Smoker   • Smokeless tobacco: Never Used   Vaping Use   • Vaping Use: Never used   Substance and Sexual Activity   • Alcohol use: No     Alcohol/week: 0.0 oz     Comment: rare    • Drug use: Yes     Types: Marijuana     Comment: THC   • Sexual activity: Yes     Partners: Female     Birth control/protection: Condom, Pill  "  Other Topics Concern   • Not on file   Social History Narrative   • Not on file     Social Determinants of Health     Financial Resource Strain: Not on file   Food Insecurity: Not on file   Transportation Needs: Not on file   Physical Activity: Not on file   Stress: Not on file   Social Connections: Not on file   Intimate Partner Violence: Not on file   Housing Stability: Not on file       Current medications:   Current Outpatient Medications   Medication   • levetiracetam (KEPPRA) 1000 MG tablet   • albuterol 108 (90 Base) MCG/ACT Aero Soln inhalation aerosol     No current facility-administered medications for this visit.       Medication Allergy:  No Known Allergies      Review of systems:     General: Denies fevers or chills, or nightsweats, or generalized fatigue.    Head: Denies headaches or dizziness or lightheadedness  Dermatologic:  Denies new rash  Musculoskeletal: Denies muscle pain or swelling, no atrophy, no neck and back pain or stiffness.   Neurologic: Denies facial droopiness, muscle weakness (focal or generalized), paresthesias, ataxia, change in speech or language, memory loss, abnormal movements.+ seizures, loss of consciousness  Psychiatric: Denies anxiety, depression,  suicidal or homicidal thoughts. +mood swings       Physical examination:   Vitals:    03/18/22 1420   BP: 110/58   BP Location: Left arm   Patient Position: Sitting   BP Cuff Size: Adult   Pulse: (!) 109   Temp: 37.9 °C (100.3 °F)   TempSrc: Temporal   SpO2: 96%   Weight: 70.5 kg (155 lb 6.8 oz)   Height: 1.753 m (5' 9\")     General: Patient in no acute distress, pleasant and cooperative.  HEENT: Normocephalic, no signs of acute trauma.   Neck: Supple. There is normal range of motion.   Musculoskeletal: joints exhibit full range of motion  Psychiatric: No hallucinatory behavior. No symptoms of depression or suicidal ideation. Mood and affect appear normal on exam.     NEUROLOGICAL EXAM:   Mental status, orientation: Awake, alert " and fully oriented.   Speech and language: speech is clear and fluent. The patient is able to name, repeat and comprehend.   Memory: There is intact recollection of recent and remote events.   Motor exam: Strength is 5/5 in all extremities. Tone is normal. No abnormal movements were seen on exam.   Sensory exam reveals normal sense of light touch in all extremities.   Gait: The patient was able to get up from seated position on first attempt without requiring assistance. Found to be steady when walking. Movements were fluid with normal arm swing.       ANCILLARY DATA REVIEWED:       Lab Data Review:  Reviewed in chart. CBC, CMP, UDS, covid19    Records reviewed:   Reviewed in chart.    Imaging:   CTB 18  NO ACUTE ABNORMALITIES ARE NOTED ON CT SCAN OF THE A     MRI brain w/wo, 8/10/2018:  1. MRI of the brain without and with contrast within normal limits.  2. No evidence of mass lesion, heterotopic gray matter, gross cortical dysplasia or mesial temporal sclerosis         EE/10/2018:   INTERPRETATION:  This is an abnormal video EEG recording in the awake, drowsy, and sleep state(s). Frequent left temporal sharps. Intermittent, subtle, left temporal slowing. The findings increase risk for seizures and suggest underlying area of cortical irritability and structural abnormality. Clinical and radiological correlation is recommended.        ASSESSMENT AND PLAN:    1. Localization-related epilepsy (HCC)  - CBC WITH DIFFERENTIAL; Future  - Comp Metabolic Panel; Future  - brivaracetam (BRIVIACT) 100 MG Tab tablet; Take 1 Tablet by mouth 2 times a day for 180 days.  Dispense: 60 Tablet; Refill: 5    2. Vitamin D deficiency  - VITAMIN D,25 HYDROXY; Future    3. Screening for depression    4. Hospital discharge follow-up    5. 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 Hx of non compliance and illegal and non illegal drug use which could have provoked some of his  seizures in the past. Seizures were initially controlled on keppra as he was seizure free for 1 year then he started having more seizures again. He again had another seizure in the setting of covid19 infection on 1/21/22. UDS + for benzos and cannabinoids but pt is denying use of benzo. He states he has been compliant but keppra level was subtherapeutic then.      Mood is good. No suicidal or homicidal thoughts.      He is not drinking alcohol. Not smoking cigarettes. He is smoking marijuana and we have discussed drug interaction with this. Aware that THC can trigger seizures.      Past ASM's:none     Current ASM's: keppra 1000mg BID     keppra level subtherapeutic.        Plan:  - routine EEG- not done again. We scheduled this for him before leaving the office     -we agreed on switching the keppra to briviact given his mood issues. This has lesser side effect profile than the keppra. Aware of side effects including dizziness, drowsiness and fatigue. Given samples today and UCB application also given if copay is high.     Stop taking keppra tonight then start briviact.      - Discussed avoidance of spell/sz triggers: alcohol, sleep deprivation, benadryl and stress.     - Discussed Vit D supplementation. Recommended taking 2000-5000u daily.      - Discussed driving restrictions. Recommended no driving  for at least 3 months since last seizure     -Labs to be checked for next appointment: Will repeat CBC as he had leukopenia when he was in the hospital. AST was also elevated. Vit D        FOLLOW-UP:   Return in about 4 weeks (around 4/15/2022).      EDUCATION AND COUNSELING:  -Education was provided to the patient and/or family regarding diagnosis and prognosis. The chronic and unpredictable nature of the condition were discussed. There is increased risk for additional events, which may carry potential for significant injuries and death. Discussed frequent seizure triggers: sleep deprivation, medication  non-compliance, use of illegal drugs/alcohol, stress, and others.   -We reviewed in detail the current antiepileptic regimen. Potential side effects of antiepileptics were discussed at length, including but no limited to: hypersensitivity reactions (rash and others, some of which can be fatal), visual field changes (some of which may be irreversible), glaucoma, diplopia, kidney stones, osteopenia/osteoporosis/bone fractures, hyperthermia/anhydrosis, hyponatremia, tremors/abnormal movements, ataxia, dizziness, fatigue, increased risk for falls, risk for cardiac arrhythmias/syncope, gastrointestinal side effects(hepatitis, pancreatitis, gastritis, ulcers), gingival hypertrophy/bleeding, drowsiness, sedation, anxiety/nervousness, increased risk for suicide, increased risk for depression, and psychosis.   -We also reviewed drug-drug interactions and their potential effect on seizure control and medication side effects.    -Recommend chronic vitamin D supplementation and regular exercise (if not contraindicated).   -Patient/family educated on risk for SUDEP (Sudden Death in Epilepsy). Counseling was provided on the importance of strict medication and follow up compliance. The patient/family understand the risks associated with non-adherence with the medical plan as outlined, including but not limited to an increased risk for breakthrough seizures, which may contribute to injuries, disability, status epilepticus, and even death.   -Counseling was also provided on potential effects of alcohol and other drugs, which may lower seizure threshold and/or affect the metabolism of antiepileptic drugs. We recommend avoidance of alcohol and illegal drugs.  -Avoid sleep deprivation.   -We extensively discussed the aspects related to safety in drivers who suffer from epilepsy. The patient is encourage to report to the Division of Motor Vehicles of any condition and/or spells related to confusion, disorientation, and/or loss of  awareness and/or loss of consciousness; as these may pose a safety issue if they occur while operating a motor vehicle. The patient and/or family are ultimately responsible for exercising caution and abiding to regulations in place.   -Other seizure precautions were discussed at length, including no diving, no skydiving, no climbing or exposure to unprotected heights, no unsupervised swimming, no Jacuzzi or bathing in bathtubs or deep bodies of water. The patient/family have been advised about risks for operating any machinery while suffering from seizures / syncope / epilepsy and/or while taking antiepileptic drugs.   -The patient understands and agrees that due to the complexity of his/her diagnosis, results of any testing and further recommendations will typically be discussed/made during a face to face encounter in my office. The patient and/or family further understands it is their responsibility to keep proper follow up.     Patient/family agree with plan, as outlined.         Luiza Peace, MSN, APRN, FNP-C  [facility]  Office: [phone]  Fax: [phone]

## 2022-03-18 ENCOUNTER — OFFICE VISIT (OUTPATIENT)
Dept: NEUROLOGY | Facility: MEDICAL CENTER | Age: 32
End: 2022-03-18
Attending: NURSE PRACTITIONER
Payer: COMMERCIAL

## 2022-03-18 VITALS
HEIGHT: 69 IN | HEART RATE: 109 BPM | DIASTOLIC BLOOD PRESSURE: 58 MMHG | BODY MASS INDEX: 23.02 KG/M2 | TEMPERATURE: 100.3 F | OXYGEN SATURATION: 96 % | SYSTOLIC BLOOD PRESSURE: 110 MMHG | WEIGHT: 155.42 LBS

## 2022-03-18 DIAGNOSIS — Z13.31 SCREENING FOR DEPRESSION: ICD-10-CM

## 2022-03-18 DIAGNOSIS — E55.9 VITAMIN D DEFICIENCY: ICD-10-CM

## 2022-03-18 DIAGNOSIS — Z09 HOSPITAL DISCHARGE FOLLOW-UP: ICD-10-CM

## 2022-03-18 DIAGNOSIS — G40.109 LOCALIZATION-RELATED EPILEPSY (HCC): ICD-10-CM

## 2022-03-18 DIAGNOSIS — Z91.89 DRIVING SAFETY ISSUE: ICD-10-CM

## 2022-03-18 PROCEDURE — 99214 OFFICE O/P EST MOD 30 MIN: CPT | Performed by: NURSE PRACTITIONER

## 2022-03-18 PROCEDURE — 99211 OFF/OP EST MAY X REQ PHY/QHP: CPT | Performed by: NURSE PRACTITIONER

## 2022-03-18 ASSESSMENT — FIBROSIS 4 INDEX: FIB4 SCORE: 2.67

## 2022-03-21 ENCOUNTER — TELEPHONE (OUTPATIENT)
Dept: NEUROLOGY | Facility: MEDICAL CENTER | Age: 32
End: 2022-03-21

## 2022-03-21 NOTE — TELEPHONE ENCOUNTER
Briviact 100mg Tablet    Request rec'd via YAMILETH, jaren TC via Katty, JOSÉ MANUEL paid copay $100.00 #60/30 DS notifying liaison Meggan Green.  - 03/21/2022 9:44am

## 2022-04-01 ENCOUNTER — APPOINTMENT (OUTPATIENT)
Dept: NEUROLOGY | Facility: MEDICAL CENTER | Age: 32
End: 2022-04-01
Attending: PSYCHIATRY & NEUROLOGY
Payer: COMMERCIAL

## 2022-05-05 ENCOUNTER — HOSPITAL ENCOUNTER (EMERGENCY)
Facility: MEDICAL CENTER | Age: 32
End: 2022-05-05
Attending: EMERGENCY MEDICINE
Payer: COMMERCIAL

## 2022-05-05 ENCOUNTER — APPOINTMENT (OUTPATIENT)
Dept: RADIOLOGY | Facility: MEDICAL CENTER | Age: 32
End: 2022-05-05
Attending: EMERGENCY MEDICINE
Payer: COMMERCIAL

## 2022-05-05 VITALS
RESPIRATION RATE: 17 BRPM | HEIGHT: 69 IN | HEART RATE: 61 BPM | DIASTOLIC BLOOD PRESSURE: 57 MMHG | SYSTOLIC BLOOD PRESSURE: 99 MMHG | TEMPERATURE: 97.6 F | WEIGHT: 155 LBS | OXYGEN SATURATION: 94 % | BODY MASS INDEX: 22.96 KG/M2

## 2022-05-05 DIAGNOSIS — R56.9 SEIZURE (HCC): ICD-10-CM

## 2022-05-05 PROCEDURE — 70450 CT HEAD/BRAIN W/O DYE: CPT

## 2022-05-05 PROCEDURE — A9270 NON-COVERED ITEM OR SERVICE: HCPCS | Performed by: EMERGENCY MEDICINE

## 2022-05-05 PROCEDURE — 94760 N-INVAS EAR/PLS OXIMETRY 1: CPT

## 2022-05-05 PROCEDURE — 99284 EMERGENCY DEPT VISIT MOD MDM: CPT

## 2022-05-05 PROCEDURE — 700102 HCHG RX REV CODE 250 W/ 637 OVERRIDE(OP): Performed by: EMERGENCY MEDICINE

## 2022-05-05 RX ADMIN — BRIVARACETAM 100 MG: 50 TABLET, FILM COATED ORAL at 08:50

## 2022-05-05 ASSESSMENT — FIBROSIS 4 INDEX: FIB4 SCORE: 2.67

## 2022-05-05 NOTE — ED PROVIDER NOTES
ED Provider Note    Scribed for Moisés Arechiga M.D. by Venita Carlton. 5/5/2022  5:03 AM    Primary care provider: Mine Nunes M.D.  Means of arrival: Ambulance  History obtained from: RN  History limited by: Acute medical condition    CHIEF COMPLAINT  Chief Complaint   Patient presents with    Seizure     Pt bib ems for seizure. Per ems dont know how long seizures lasted. Pt was given 5mg im versed. Pt still postictal state       HPI  Barry Griffith is a 32 y.o. male with a history of seizure disorder and epilepsy who presents to the Emergency Department for seizure prior to arrival. Per RN, patient gets aggressive after postictal state. He fell at home and had a seizure. It is not known how long the seizure lasted. He has bruising to the left side of the forehead. He was recently taken off Keppra and put on new seizure medication. He is not currently on oxygen.     HPI limited secondary to acute medical condition.     REVIEW OF SYSTEMS  Pertinent positives include seizure, ground level fall, and bruising.   ROS limited secondary to acute medical condition. See HPI for further details.     PAST MEDICAL HISTORY   has a past medical history of ASTHMA, Seizure (HCC), and Seizure disorder (HCC).    SURGICAL HISTORY  patient denies any surgical history    SOCIAL HISTORY  Social History     Tobacco Use    Smoking status: Never Smoker    Smokeless tobacco: Never Used   Vaping Use    Vaping Use: Never used   Substance Use Topics    Alcohol use: No     Alcohol/week: 0.0 oz     Comment: rare     Drug use: Yes     Types: Marijuana     Comment: THC      Social History     Substance and Sexual Activity   Drug Use Yes    Types: Marijuana    Comment: THC       FAMILY HISTORY  Family History   Problem Relation Age of Onset    Hypertension Mother     Hyperlipidemia Father     Hypertension Father     Cancer Neg Hx     Diabetes Neg Hx        CURRENT MEDICATIONS  Home Medications       Reviewed by Han Jason R.N.  "(Registered Nurse) on 05/05/22 at 0457  Med List Status: <None>     Medication Last Dose Status   albuterol 108 (90 Base) MCG/ACT Aero Soln inhalation aerosol  Active   brivaracetam (BRIVIACT) 100 MG Tab tablet  Active                    ALLERGIES  No Known Allergies    PHYSICAL EXAM  VITAL SIGNS: /80   Pulse 74   Temp 35.9 °C (96.7 °F) (Temporal)   Resp 16   Ht 1.753 m (5' 9\")   Wt 70.3 kg (155 lb)   SpO2 95%   BMI 22.89 kg/m²     Nursing note and vitals reviewed.  Constitutional: Well-developed and well-nourished. No distress. Somnolent. Seems postictal.   HENT: Head is normocephalic. Oropharynx is clear and moist without exudate or erythema. Bruising to the left side of the forehead.  Eyes: Pupils are equal, round, and reactive to light. Conjunctiva are normal.   Cardiovascular: Normal rate and regular rhythm. No murmur heard. Normal radial pulses.  Pulmonary/Chest: Breath sounds normal. No wheezes or rales.   Abdominal: Soft and non-tender. No distention    Musculoskeletal: Extremities exhibit normal range of motion without edema or tenderness.   Neurological: No focal deficits noted.  Skin: Skin is warm and dry. No rash.   Psychiatric: Normal mood and affect. Appropriate for clinical situation.    DIAGNOSTIC STUDIES / PROCEDURES    RADIOLOGY  CT-HEAD W/O   Final Result         1. No acute intracranial abnormality. No evidence of acute intracranial hemorrhage or mass lesion.                       The radiologist's interpretation of all radiological studies have been reviewed by me.    COURSE & MEDICAL DECISION MAKING  Nursing notes, VS, PMSFHx reviewed in chart.     Review of past medical records shows the patient has been here on numerous occasions for seizures.     5:03 AM - Patient seen and examined at bedside. Ordered CT-Head to evaluate his symptoms. Patient had a seizure likely related to recent medication change. Patient had evidence of head trauma. Will r/o traumatic injury.     5:13 AM - " Per RN, patient is waking up.     8:33 AM - Patient will be treated with Briviact 100 mg. He was informed of discharge instructions. He is in agreement to this plan of care.     9:45 AM - Per RN, patient returned to the ED and is now lying in a different room. He is still able to answer questions well however he had a seizure in the bathroom. Patient will be discharged upon arrival of his family members.     The patient will return for new or worsening symptoms and is stable at the time of discharge.    DISPOSITION:  Patient will be discharged home in stable condition.    FOLLOW UP:  Mine Nunes M.D.  [address]  [facility] [id number] [phone]    Schedule an appointment as soon as possible for a visit       [facility], Emergency Dept  [address] [facility] [id number] [phone]    If symptoms worsen    your neurologist            OUTPATIENT MEDICATIONS:  New Prescriptions    No medications on file        FINAL IMPRESSION  1. Seizure (HCC)          Venita VANN (Natalia), am scribing for, and in the presence of, Moisés Arechiga M.D..    Electronically signed by: Venita Carlton (Natalia), 5/5/2022    Moisés VANN M.D. personally performed the services described in this documentation, as scribed by Venita Carlton in my presence, and it is both accurate and complete.    The note accurately reflects work and decisions made by me.  Moisés Arechiga M.D.  5/5/2022  11:07 AM

## 2022-05-05 NOTE — ED NOTES
Pt sleeping in bed, VSS on RA, wakes to verbal stimulus, GCS 15, NAD, aware POC is CT scan and results

## 2022-05-05 NOTE — ED TRIAGE NOTES
Chief Complaint   Patient presents with   • Seizure     Pt bib ems for seizure. Per ems dont know how long seizures lasted. Pt was given 5mg im versed. Pt still postictal state

## 2022-05-05 NOTE — ED NOTES
Pt wakes to verbal stimulus, GCS 15, NAD, VSS on RA, pt aware POC to dc now, pt is arranging ride home with family now

## 2022-06-05 ENCOUNTER — APPOINTMENT (OUTPATIENT)
Dept: RADIOLOGY | Facility: MEDICAL CENTER | Age: 32
DRG: 100 | End: 2022-06-05
Attending: EMERGENCY MEDICINE
Payer: COMMERCIAL

## 2022-06-05 ENCOUNTER — HOSPITAL ENCOUNTER (INPATIENT)
Facility: MEDICAL CENTER | Age: 32
LOS: 3 days | DRG: 100 | End: 2022-06-08
Attending: EMERGENCY MEDICINE | Admitting: INTERNAL MEDICINE
Payer: COMMERCIAL

## 2022-06-05 DIAGNOSIS — G40.901 STATUS EPILEPTICUS (HCC): ICD-10-CM

## 2022-06-05 DIAGNOSIS — J96.01 ACUTE RESPIRATORY FAILURE WITH HYPOXIA (HCC): ICD-10-CM

## 2022-06-05 DIAGNOSIS — N17.9 AKI (ACUTE KIDNEY INJURY) (HCC): ICD-10-CM

## 2022-06-05 DIAGNOSIS — G40.109 LOCALIZATION-RELATED EPILEPSY (HCC): ICD-10-CM

## 2022-06-05 DIAGNOSIS — D64.9 NORMOCYTIC ANEMIA: ICD-10-CM

## 2022-06-05 PROBLEM — R74.8 ELEVATED LIPASE: Status: ACTIVE | Noted: 2022-06-05

## 2022-06-05 PROBLEM — E87.20 ACIDOSIS: Status: ACTIVE | Noted: 2022-06-05

## 2022-06-05 LAB
ALBUMIN SERPL BCP-MCNC: 4.3 G/DL (ref 3.2–4.9)
ALBUMIN SERPL BCP-MCNC: 5.4 G/DL (ref 3.2–4.9)
ALBUMIN/GLOB SERPL: 1.9 G/DL
ALBUMIN/GLOB SERPL: 2.3 G/DL
ALP SERPL-CCNC: 56 U/L (ref 30–99)
ALP SERPL-CCNC: 88 U/L (ref 30–99)
ALT SERPL-CCNC: 19 U/L (ref 2–50)
ALT SERPL-CCNC: 24 U/L (ref 2–50)
AMPHET UR QL SCN: NEGATIVE
ANION GAP SERPL CALC-SCNC: 14 MMOL/L (ref 7–16)
ANION GAP SERPL CALC-SCNC: 39 MMOL/L (ref 7–16)
APPEARANCE UR: CLEAR
AST SERPL-CCNC: 33 U/L (ref 12–45)
AST SERPL-CCNC: 36 U/L (ref 12–45)
BACTERIA #/AREA URNS HPF: NEGATIVE /HPF
BARBITURATES UR QL SCN: NEGATIVE
BASE EXCESS BLDA CALC-SCNC: -14 MMOL/L (ref -4–3)
BASE EXCESS BLDA CALC-SCNC: -9 MMOL/L (ref -4–3)
BASOPHILS # BLD AUTO: 0 % (ref 0–1.8)
BASOPHILS # BLD: 0 K/UL (ref 0–0.12)
BENZODIAZ UR QL SCN: POSITIVE
BILIRUB SERPL-MCNC: 0.3 MG/DL (ref 0.1–1.5)
BILIRUB SERPL-MCNC: 0.4 MG/DL (ref 0.1–1.5)
BILIRUB UR QL STRIP.AUTO: NEGATIVE
BLOOD CULTURE HOLD CXBCH: NORMAL
BODY TEMPERATURE: ABNORMAL DEGREES
BODY TEMPERATURE: ABNORMAL DEGREES
BREATHS SETTING VENT: 20
BREATHS SETTING VENT: 20
BUN SERPL-MCNC: 14 MG/DL (ref 8–22)
BUN SERPL-MCNC: 15 MG/DL (ref 8–22)
BZE UR QL SCN: NEGATIVE
CALCIUM SERPL-MCNC: 10.7 MG/DL (ref 8.5–10.5)
CALCIUM SERPL-MCNC: 8.7 MG/DL (ref 8.5–10.5)
CANNABINOIDS UR QL SCN: POSITIVE
CHLORIDE SERPL-SCNC: 102 MMOL/L (ref 96–112)
CHLORIDE SERPL-SCNC: 106 MMOL/L (ref 96–112)
CK SERPL-CCNC: 276 U/L (ref 0–154)
CK SERPL-CCNC: 663 U/L (ref 0–154)
CO2 BLDA-SCNC: 17 MMOL/L (ref 20–33)
CO2 BLDA-SCNC: 20 MMOL/L (ref 20–33)
CO2 SERPL-SCNC: 18 MMOL/L (ref 20–33)
CO2 SERPL-SCNC: 3 MMOL/L (ref 20–33)
COLOR UR: YELLOW
CREAT SERPL-MCNC: 1.45 MG/DL (ref 0.5–1.4)
CREAT SERPL-MCNC: 1.59 MG/DL (ref 0.5–1.4)
DELSYS IDSYS: ABNORMAL
DELSYS IDSYS: ABNORMAL
EKG IMPRESSION: NORMAL
EOSINOPHIL # BLD AUTO: 0 K/UL (ref 0–0.51)
EOSINOPHIL NFR BLD: 0 % (ref 0–6.9)
EPI CELLS #/AREA URNS HPF: ABNORMAL /HPF
ERYTHROCYTE [DISTWIDTH] IN BLOOD BY AUTOMATED COUNT: 46.4 FL (ref 35.9–50)
FLUAV RNA SPEC QL NAA+PROBE: NEGATIVE
FLUBV RNA SPEC QL NAA+PROBE: NEGATIVE
GFR SERPLBLD CREATININE-BSD FMLA CKD-EPI: 59 ML/MIN/1.73 M 2
GFR SERPLBLD CREATININE-BSD FMLA CKD-EPI: 66 ML/MIN/1.73 M 2
GLOBULIN SER CALC-MCNC: 1.9 G/DL (ref 1.9–3.5)
GLOBULIN SER CALC-MCNC: 2.9 G/DL (ref 1.9–3.5)
GLUCOSE BLD STRIP.AUTO-MCNC: 83 MG/DL (ref 65–99)
GLUCOSE SERPL-MCNC: 84 MG/DL (ref 65–99)
GLUCOSE SERPL-MCNC: 92 MG/DL (ref 65–99)
GLUCOSE UR STRIP.AUTO-MCNC: NEGATIVE MG/DL
HCO3 BLDA-SCNC: 15.4 MMOL/L (ref 17–25)
HCO3 BLDA-SCNC: 18.5 MMOL/L (ref 17–25)
HCT VFR BLD AUTO: 56.4 % (ref 42–52)
HGB BLD-MCNC: 16.6 G/DL (ref 14–18)
HOROWITZ INDEX BLDA+IHG-RTO: 185 MM[HG]
HOROWITZ INDEX BLDA+IHG-RTO: 50 MM[HG]
KETONES UR STRIP.AUTO-MCNC: NEGATIVE MG/DL
LACTATE BLD-SCNC: 2.9 MMOL/L (ref 0.5–2)
LEUKOCYTE ESTERASE UR QL STRIP.AUTO: NEGATIVE
LIPASE SERPL-CCNC: 235 U/L (ref 11–82)
LYMPHOCYTES # BLD AUTO: 14.56 K/UL (ref 1–4.8)
LYMPHOCYTES NFR BLD: 80 % (ref 22–41)
MANUAL DIFF BLD: NORMAL
MCH RBC QN AUTO: 31 PG (ref 27–33)
MCHC RBC AUTO-ENTMCNC: 29.4 G/DL (ref 33.7–35.3)
MCV RBC AUTO: 105.4 FL (ref 81.4–97.8)
METHADONE UR QL SCN: NEGATIVE
MICRO URNS: ABNORMAL
MODE IMODE: ABNORMAL
MODE IMODE: ABNORMAL
MONOCYTES # BLD AUTO: 0.8 K/UL (ref 0–0.85)
MONOCYTES NFR BLD AUTO: 4.4 % (ref 0–13.4)
MORPHOLOGY BLD-IMP: NORMAL
MYELOCYTES NFR BLD MANUAL: 2.6 %
NEUTROPHILS # BLD AUTO: 2.37 K/UL (ref 1.82–7.42)
NEUTROPHILS NFR BLD: 13 % (ref 44–72)
NITRITE UR QL STRIP.AUTO: NEGATIVE
NRBC # BLD AUTO: 0.02 K/UL
NRBC BLD-RTO: 0.1 /100 WBC
O2/TOTAL GAS SETTING VFR VENT: 60 %
O2/TOTAL GAS SETTING VFR VENT: 60 %
OPIATES UR QL SCN: NEGATIVE
OXYCODONE UR QL SCN: NEGATIVE
PCO2 BLDA: 43.7 MMHG (ref 26–37)
PCO2 BLDA: 48.2 MMHG (ref 26–37)
PCP UR QL SCN: NEGATIVE
PEEP END EXPIRATORY PRESSURE IPEEP: 8 CMH20
PEEP END EXPIRATORY PRESSURE IPEEP: 8 CMH20
PH BLDA: 7.11 [PH] (ref 7.4–7.5)
PH BLDA: 7.23 [PH] (ref 7.4–7.5)
PH UR STRIP.AUTO: 5 [PH] (ref 5–8)
PLATELET # BLD AUTO: 154 K/UL (ref 164–446)
PLATELET BLD QL SMEAR: NORMAL
PMV BLD AUTO: 12.6 FL (ref 9–12.9)
PO2 BLDA: 111 MMHG (ref 64–87)
PO2 BLDA: 30 MMHG (ref 64–87)
POLYCHROMASIA BLD QL SMEAR: NORMAL
POTASSIUM SERPL-SCNC: 3.9 MMOL/L (ref 3.6–5.5)
POTASSIUM SERPL-SCNC: 4.1 MMOL/L (ref 3.6–5.5)
PROPOXYPH UR QL SCN: NEGATIVE
PROT SERPL-MCNC: 6.2 G/DL (ref 6–8.2)
PROT SERPL-MCNC: 8.3 G/DL (ref 6–8.2)
PROT UR QL STRIP: 100 MG/DL
RBC # BLD AUTO: 5.35 M/UL (ref 4.7–6.1)
RBC # URNS HPF: ABNORMAL /HPF
RBC BLD AUTO: PRESENT
RBC UR QL AUTO: ABNORMAL
RSV RNA SPEC QL NAA+PROBE: NEGATIVE
SAO2 % BLDA: 39 % (ref 93–99)
SAO2 % BLDA: 97 % (ref 93–99)
SARS-COV-2 RNA RESP QL NAA+PROBE: NOTDETECTED
SMUDGE CELLS BLD QL SMEAR: NORMAL
SODIUM SERPL-SCNC: 138 MMOL/L (ref 135–145)
SODIUM SERPL-SCNC: 144 MMOL/L (ref 135–145)
SP GR UR STRIP.AUTO: 1.01
SPECIMEN DRAWN FROM PATIENT: ABNORMAL
SPECIMEN DRAWN FROM PATIENT: ABNORMAL
SPECIMEN SOURCE: NORMAL
TIDAL VOLUME IVT: 470 ML
TIDAL VOLUME IVT: 470 ML
TRIGL SERPL-MCNC: 279 MG/DL (ref 0–149)
UROBILINOGEN UR STRIP.AUTO-MCNC: 0.2 MG/DL
WBC # BLD AUTO: 18.2 K/UL (ref 4.8–10.8)
WBC #/AREA URNS HPF: ABNORMAL /HPF

## 2022-06-05 PROCEDURE — 95720 EEG PHY/QHP EA INCR W/VEEG: CPT | Performed by: PSYCHIATRY & NEUROLOGY

## 2022-06-05 PROCEDURE — 85025 COMPLETE CBC W/AUTO DIFF WBC: CPT

## 2022-06-05 PROCEDURE — 5A1935Z RESPIRATORY VENTILATION, LESS THAN 24 CONSECUTIVE HOURS: ICD-10-PCS | Performed by: EMERGENCY MEDICINE

## 2022-06-05 PROCEDURE — 99223 1ST HOSP IP/OBS HIGH 75: CPT | Mod: 25 | Performed by: PSYCHIATRY & NEUROLOGY

## 2022-06-05 PROCEDURE — 700111 HCHG RX REV CODE 636 W/ 250 OVERRIDE (IP): Performed by: INTERNAL MEDICINE

## 2022-06-05 PROCEDURE — 36600 WITHDRAWAL OF ARTERIAL BLOOD: CPT

## 2022-06-05 PROCEDURE — 99291 CRITICAL CARE FIRST HOUR: CPT | Performed by: INTERNAL MEDICINE

## 2022-06-05 PROCEDURE — 96366 THER/PROPH/DIAG IV INF ADDON: CPT

## 2022-06-05 PROCEDURE — 83605 ASSAY OF LACTIC ACID: CPT

## 2022-06-05 PROCEDURE — 0BH18EZ INSERTION OF ENDOTRACHEAL AIRWAY INTO TRACHEA, VIA NATURAL OR ARTIFICIAL OPENING ENDOSCOPIC: ICD-10-PCS | Performed by: EMERGENCY MEDICINE

## 2022-06-05 PROCEDURE — 700105 HCHG RX REV CODE 258: Performed by: INTERNAL MEDICINE

## 2022-06-05 PROCEDURE — 95700 EEG CONT REC W/VID EEG TECH: CPT | Performed by: PSYCHIATRY & NEUROLOGY

## 2022-06-05 PROCEDURE — 303105 HCHG CATHETER EXTRA

## 2022-06-05 PROCEDURE — 94002 VENT MGMT INPAT INIT DAY: CPT

## 2022-06-05 PROCEDURE — 85007 BL SMEAR W/DIFF WBC COUNT: CPT

## 2022-06-05 PROCEDURE — 700105 HCHG RX REV CODE 258: Performed by: EMERGENCY MEDICINE

## 2022-06-05 PROCEDURE — 82550 ASSAY OF CK (CPK): CPT

## 2022-06-05 PROCEDURE — 700111 HCHG RX REV CODE 636 W/ 250 OVERRIDE (IP): Performed by: EMERGENCY MEDICINE

## 2022-06-05 PROCEDURE — 36415 COLL VENOUS BLD VENIPUNCTURE: CPT

## 2022-06-05 PROCEDURE — 99291 CRITICAL CARE FIRST HOUR: CPT

## 2022-06-05 PROCEDURE — 70450 CT HEAD/BRAIN W/O DYE: CPT

## 2022-06-05 PROCEDURE — 96376 TX/PRO/DX INJ SAME DRUG ADON: CPT

## 2022-06-05 PROCEDURE — 80503 PATH CLIN CONSLTJ SF 5-20: CPT

## 2022-06-05 PROCEDURE — 71045 X-RAY EXAM CHEST 1 VIEW: CPT

## 2022-06-05 PROCEDURE — 82962 GLUCOSE BLOOD TEST: CPT

## 2022-06-05 PROCEDURE — 80307 DRUG TEST PRSMV CHEM ANLYZR: CPT

## 2022-06-05 PROCEDURE — 51702 INSERT TEMP BLADDER CATH: CPT

## 2022-06-05 PROCEDURE — 84478 ASSAY OF TRIGLYCERIDES: CPT

## 2022-06-05 PROCEDURE — 302214 INTUBATION BOX: Performed by: EMERGENCY MEDICINE

## 2022-06-05 PROCEDURE — 83690 ASSAY OF LIPASE: CPT

## 2022-06-05 PROCEDURE — 93005 ELECTROCARDIOGRAM TRACING: CPT | Performed by: EMERGENCY MEDICINE

## 2022-06-05 PROCEDURE — 31500 INSERT EMERGENCY AIRWAY: CPT

## 2022-06-05 PROCEDURE — 81001 URINALYSIS AUTO W/SCOPE: CPT

## 2022-06-05 PROCEDURE — 96365 THER/PROPH/DIAG IV INF INIT: CPT

## 2022-06-05 PROCEDURE — 700111 HCHG RX REV CODE 636 W/ 250 OVERRIDE (IP)

## 2022-06-05 PROCEDURE — 80053 COMPREHEN METABOLIC PANEL: CPT

## 2022-06-05 PROCEDURE — 4A00X4Z MEASUREMENT OF CENTRAL NERVOUS ELECTRICAL ACTIVITY, EXTERNAL APPROACH: ICD-10-PCS | Performed by: STUDENT IN AN ORGANIZED HEALTH CARE EDUCATION/TRAINING PROGRAM

## 2022-06-05 PROCEDURE — 95714 VEEG EA 12-26 HR UNMNTR: CPT | Performed by: PSYCHIATRY & NEUROLOGY

## 2022-06-05 PROCEDURE — 302214 INTUBATION BOX: Performed by: INTERNAL MEDICINE

## 2022-06-05 PROCEDURE — 96375 TX/PRO/DX INJ NEW DRUG ADDON: CPT

## 2022-06-05 PROCEDURE — 93005 ELECTROCARDIOGRAM TRACING: CPT | Performed by: INTERNAL MEDICINE

## 2022-06-05 PROCEDURE — 93010 ELECTROCARDIOGRAM REPORT: CPT | Performed by: INTERNAL MEDICINE

## 2022-06-05 PROCEDURE — 82803 BLOOD GASES ANY COMBINATION: CPT

## 2022-06-05 PROCEDURE — 0241U HCHG SARS-COV-2 COVID-19 NFCT DS RESP RNA 4 TRGT MIC: CPT

## 2022-06-05 PROCEDURE — 770022 HCHG ROOM/CARE - ICU (200)

## 2022-06-05 RX ORDER — SUCCINYLCHOLINE CHLORIDE 20 MG/ML
100 INJECTION INTRAMUSCULAR; INTRAVENOUS ONCE
Status: COMPLETED | OUTPATIENT
Start: 2022-06-05 | End: 2022-06-05

## 2022-06-05 RX ORDER — PROPOFOL 10 MG/ML
100 INJECTION, EMULSION INTRAVENOUS ONCE
Status: COMPLETED | OUTPATIENT
Start: 2022-06-05 | End: 2022-06-05

## 2022-06-05 RX ORDER — PROCHLORPERAZINE EDISYLATE 5 MG/ML
5-10 INJECTION INTRAMUSCULAR; INTRAVENOUS EVERY 4 HOURS PRN
Status: DISCONTINUED | OUTPATIENT
Start: 2022-06-05 | End: 2022-06-08 | Stop reason: HOSPADM

## 2022-06-05 RX ORDER — LORAZEPAM 2 MG/ML
1 INJECTION INTRAMUSCULAR ONCE
Status: DISCONTINUED | OUTPATIENT
Start: 2022-06-05 | End: 2022-06-05

## 2022-06-05 RX ORDER — POLYETHYLENE GLYCOL 3350 17 G/17G
1 POWDER, FOR SOLUTION ORAL
Status: DISCONTINUED | OUTPATIENT
Start: 2022-06-05 | End: 2022-06-07

## 2022-06-05 RX ORDER — LEVETIRACETAM 500 MG/5ML
1500 INJECTION, SOLUTION, CONCENTRATE INTRAVENOUS EVERY 12 HOURS
Status: DISCONTINUED | OUTPATIENT
Start: 2022-06-05 | End: 2022-06-06

## 2022-06-05 RX ORDER — ENOXAPARIN SODIUM 100 MG/ML
40 INJECTION SUBCUTANEOUS DAILY
Status: DISCONTINUED | OUTPATIENT
Start: 2022-06-06 | End: 2022-06-08 | Stop reason: HOSPADM

## 2022-06-05 RX ORDER — LORAZEPAM 2 MG/ML
INJECTION INTRAMUSCULAR
Status: COMPLETED
Start: 2022-06-05 | End: 2022-06-05

## 2022-06-05 RX ORDER — AMOXICILLIN 250 MG
2 CAPSULE ORAL 2 TIMES DAILY
Status: DISCONTINUED | OUTPATIENT
Start: 2022-06-05 | End: 2022-06-07

## 2022-06-05 RX ORDER — DEXTROSE MONOHYDRATE 25 G/50ML
25 INJECTION, SOLUTION INTRAVENOUS
Status: DISCONTINUED | OUTPATIENT
Start: 2022-06-05 | End: 2022-06-06

## 2022-06-05 RX ORDER — SODIUM CHLORIDE, SODIUM LACTATE, POTASSIUM CHLORIDE, CALCIUM CHLORIDE 600; 310; 30; 20 MG/100ML; MG/100ML; MG/100ML; MG/100ML
1000 INJECTION, SOLUTION INTRAVENOUS ONCE
Status: COMPLETED | OUTPATIENT
Start: 2022-06-05 | End: 2022-06-05

## 2022-06-05 RX ORDER — PROPOFOL 10 MG/ML
40 INJECTION, EMULSION INTRAVENOUS ONCE
Status: COMPLETED | OUTPATIENT
Start: 2022-06-05 | End: 2022-06-05

## 2022-06-05 RX ORDER — PHENYLEPHRINE HCL IN 0.9% NACL 0.5 MG/5ML
100 SYRINGE (ML) INTRAVENOUS
Status: ACTIVE | OUTPATIENT
Start: 2022-06-05 | End: 2022-06-05

## 2022-06-05 RX ORDER — LORAZEPAM 2 MG/ML
4 INJECTION INTRAMUSCULAR
Status: DISCONTINUED | OUTPATIENT
Start: 2022-06-05 | End: 2022-06-08 | Stop reason: HOSPADM

## 2022-06-05 RX ORDER — FAMOTIDINE 20 MG/1
20 TABLET, FILM COATED ORAL EVERY 12 HOURS
Status: DISCONTINUED | OUTPATIENT
Start: 2022-06-05 | End: 2022-06-06

## 2022-06-05 RX ORDER — LORAZEPAM 2 MG/ML
2 INJECTION INTRAMUSCULAR ONCE
Status: DISCONTINUED | OUTPATIENT
Start: 2022-06-05 | End: 2022-06-05

## 2022-06-05 RX ORDER — LEVETIRACETAM 500 MG/5ML
3500 INJECTION, SOLUTION, CONCENTRATE INTRAVENOUS ONCE
Status: COMPLETED | OUTPATIENT
Start: 2022-06-05 | End: 2022-06-05

## 2022-06-05 RX ORDER — BISACODYL 10 MG
10 SUPPOSITORY, RECTAL RECTAL
Status: DISCONTINUED | OUTPATIENT
Start: 2022-06-05 | End: 2022-06-07

## 2022-06-05 RX ORDER — LEVETIRACETAM 500 MG/5ML
500 INJECTION, SOLUTION, CONCENTRATE INTRAVENOUS ONCE
Status: COMPLETED | OUTPATIENT
Start: 2022-06-05 | End: 2022-06-05

## 2022-06-05 RX ORDER — SODIUM CHLORIDE 9 MG/ML
INJECTION, SOLUTION INTRAVENOUS CONTINUOUS
Status: DISCONTINUED | OUTPATIENT
Start: 2022-06-05 | End: 2022-06-06

## 2022-06-05 RX ORDER — PROMETHAZINE HYDROCHLORIDE 25 MG/1
12.5-25 TABLET ORAL EVERY 4 HOURS PRN
Status: DISCONTINUED | OUTPATIENT
Start: 2022-06-05 | End: 2022-06-07

## 2022-06-05 RX ORDER — ONDANSETRON 4 MG/1
4 TABLET, ORALLY DISINTEGRATING ORAL EVERY 4 HOURS PRN
Status: DISCONTINUED | OUTPATIENT
Start: 2022-06-05 | End: 2022-06-07

## 2022-06-05 RX ORDER — LORAZEPAM 2 MG/ML
1 INJECTION INTRAMUSCULAR ONCE
Status: COMPLETED | OUTPATIENT
Start: 2022-06-05 | End: 2022-06-05

## 2022-06-05 RX ORDER — ONDANSETRON 2 MG/ML
4 INJECTION INTRAMUSCULAR; INTRAVENOUS EVERY 4 HOURS PRN
Status: DISCONTINUED | OUTPATIENT
Start: 2022-06-05 | End: 2022-06-08 | Stop reason: HOSPADM

## 2022-06-05 RX ORDER — PROMETHAZINE HYDROCHLORIDE 25 MG/1
12.5-25 SUPPOSITORY RECTAL EVERY 4 HOURS PRN
Status: DISCONTINUED | OUTPATIENT
Start: 2022-06-05 | End: 2022-06-08 | Stop reason: HOSPADM

## 2022-06-05 RX ORDER — ACETAMINOPHEN 325 MG/1
650 TABLET ORAL EVERY 6 HOURS PRN
Status: DISCONTINUED | OUTPATIENT
Start: 2022-06-05 | End: 2022-06-06

## 2022-06-05 RX ADMIN — LORAZEPAM 1 MG: 2 INJECTION INTRAMUSCULAR; INTRAVENOUS at 16:02

## 2022-06-05 RX ADMIN — FENTANYL CITRATE 100 MCG: 50 INJECTION, SOLUTION INTRAMUSCULAR; INTRAVENOUS at 20:35

## 2022-06-05 RX ADMIN — LORAZEPAM 1 MG: 2 INJECTION INTRAMUSCULAR; INTRAVENOUS at 16:00

## 2022-06-05 RX ADMIN — PROPOFOL 70 MCG/KG/MIN: 10 INJECTION, EMULSION INTRAVENOUS at 23:54

## 2022-06-05 RX ADMIN — LEVETIRACETAM 3500 MG: 100 INJECTION, SOLUTION INTRAVENOUS at 16:15

## 2022-06-05 RX ADMIN — PROPOFOL 100 MG: 10 INJECTION, EMULSION INTRAVENOUS at 16:04

## 2022-06-05 RX ADMIN — LORAZEPAM 1 MG: 2 INJECTION INTRAMUSCULAR at 16:02

## 2022-06-05 RX ADMIN — PROPOFOL 60 MCG/KG/MIN: 10 INJECTION, EMULSION INTRAVENOUS at 20:34

## 2022-06-05 RX ADMIN — SODIUM CHLORIDE: 9 INJECTION, SOLUTION INTRAVENOUS at 20:37

## 2022-06-05 RX ADMIN — PROPOFOL 40 MG: 10 INJECTION, EMULSION INTRAVENOUS at 16:23

## 2022-06-05 RX ADMIN — LEVETIRACETAM 1500 MG: 100 INJECTION, SOLUTION INTRAVENOUS at 21:30

## 2022-06-05 RX ADMIN — SODIUM CHLORIDE, POTASSIUM CHLORIDE, SODIUM LACTATE AND CALCIUM CHLORIDE 1000 ML: 600; 310; 30; 20 INJECTION, SOLUTION INTRAVENOUS at 16:59

## 2022-06-05 RX ADMIN — SODIUM CHLORIDE, POTASSIUM CHLORIDE, SODIUM LACTATE AND CALCIUM CHLORIDE 1000 ML: 600; 310; 30; 20 INJECTION, SOLUTION INTRAVENOUS at 17:29

## 2022-06-05 RX ADMIN — LORAZEPAM 2 MG: 2 INJECTION INTRAMUSCULAR; INTRAVENOUS at 16:12

## 2022-06-05 RX ADMIN — PROPOFOL 40 MCG/KG/MIN: 10 INJECTION, EMULSION INTRAVENOUS at 17:22

## 2022-06-05 RX ADMIN — LORAZEPAM 4 MG: 2 INJECTION INTRAMUSCULAR; INTRAVENOUS at 17:53

## 2022-06-05 RX ADMIN — LEVETIRACETAM 500 MG: 100 INJECTION, SOLUTION INTRAVENOUS at 16:19

## 2022-06-05 RX ADMIN — PROPOFOL 60 MCG/KG/MIN: 10 INJECTION, EMULSION INTRAVENOUS at 16:16

## 2022-06-05 RX ADMIN — SUCCINYLCHOLINE CHLORIDE 100 MG: 20 INJECTION, SOLUTION INTRAMUSCULAR; INTRAVENOUS at 16:05

## 2022-06-05 RX ADMIN — FENTANYL CITRATE 100 MCG: 50 INJECTION, SOLUTION INTRAMUSCULAR; INTRAVENOUS at 17:18

## 2022-06-05 ASSESSMENT — PAIN DESCRIPTION - PAIN TYPE
TYPE: ACUTE PAIN

## 2022-06-05 ASSESSMENT — FIBROSIS 4 INDEX
FIB4 SCORE: 2.67
FIB4 SCORE: 1.66

## 2022-06-05 NOTE — ED NOTES
1605 ambu bag oxygenation prior to intubation  1606 ETT placed, breath sounds bilat, co2 color change, 24 cm teeth, 8.0 ett  1612 2 mg ativan

## 2022-06-05 NOTE — CONSULTS
Critical Care Consultation    Date of consult: 6/5/2022    Referring Physician  IRINA Taylor    Reason for Consultation  Status epilepticus     History of Presenting Illness  32 y.o. male with a seizure disorder and history of non-compliance who presented 6/5/2022 with seizures. He was given benzodiazepines in the field by EMS, continued to be altered so given additional benzodiazepines then intubated.     Wife states he was overall in his usual state of health - had complained of some abdominal discomfort but this is not unusual for him per her report. She questions his compliance with medications.     CTH no acute process; bicarb 3, Cr 1.59, AG 39; lipase 235    Code Status  Full Code    Review of Systems  Review of Systems   Unable to perform ROS: Intubated       Past Medical History   has a past medical history of ASTHMA, Mild intermittent asthma with acute exacerbation (6/10/2016), Seizure (HCC), and Seizure disorder (HCC).    Surgical History   has no past surgical history on file.    Family History  family history includes Hyperlipidemia in his father; Hypertension in his father and mother.    Social History   reports that he has never smoked. He has never used smokeless tobacco. He reports current drug use. Drug: Marijuana. He reports that he does not drink alcohol.    Medications  Home Medications     Reviewed by Juan Rodriguez (Pharmacy Tech) on 06/05/22 at 1710  Med List Status: Complete   Medication Last Dose Status   albuterol 108 (90 Base) MCG/ACT Aero Soln inhalation aerosol unknown Active   brivaracetam (BRIVIACT) 100 MG Tab tablet unknown Active              Current Facility-Administered Medications   Medication Dose Route Frequency Provider Last Rate Last Admin   • Respiratory Therapy Consult   Nebulization Continuous RT Gómez Marks M.D.       • famotidine (PEPCID) tablet 20 mg  20 mg Enteral Tube Q12HRS Gómez Marks M.D.        Or   • famotidine (PEPCID) injection 20 mg  20 mg  Intravenous Q12HRS Gómez Marks M.D.       • senna-docusate (PERICOLACE or SENOKOT S) 8.6-50 MG per tablet 2 Tablet  2 Tablet Enteral Tube BID Gómez Marks M.D.        And   • polyethylene glycol/lytes (MIRALAX) PACKET 1 Packet  1 Packet Enteral Tube QDAY PRN Gómez Marks M.D.        And   • magnesium hydroxide (MILK OF MAGNESIA) suspension 30 mL  30 mL Enteral Tube QDAY PRN Gómez Marks M.D.        And   • bisacodyl (DULCOLAX) suppository 10 mg  10 mg Rectal QDAY PRN Gómez Marks M.D.       • MD Alert...ICU Electrolyte Replacement per Pharmacy   Other PHARMACY TO DOSE Gómez Marks M.D.       • lidocaine (XYLOCAINE) 1 % injection 2 mL  2 mL Tracheal Tube Q30 MIN PRN Gómez Marks M.D.       • Pharmacy Consult: Enteral tube insertion - review meds/change route/product selection  1 Each Other PHARMACY TO DOSE Gómez Marks M.D.       • insulin regular (HumuLIN R,NovoLIN R) injection  1-6 Units Subcutaneous Q6HRS Gómez Marks M.D.        And   • dextrose 50% (D50W) injection 25 g  25 g Intravenous Q15 MIN PRN Gómez Marks M.D.       • propofol (DIPRIVAN) injection  40-80 mcg/kg/min Intravenous Continuous Gómez Marks M.D. 26.1 mL/hr at 06/05/22 2034 60 mcg/kg/min at 06/05/22 2034   • fentaNYL (SUBLIMAZE) injection  mcg   mcg Intravenous Q HOUR PRN Gómez Marks M.D.   100 mcg at 06/05/22 2035   • levETIRAcetam (Keppra) injection 1,500 mg  1,500 mg Intravenous Q12HRS Gómez Marks M.D.       • LORazepam (ATIVAN) injection 4 mg  4 mg Intravenous Q10 MIN PRN Gómez Marks M.D.   4 mg at 06/05/22 1753   • NS infusion   Intravenous Continuous Gómez Marks M.D. 75 mL/hr at 06/05/22 2037 New Bag at 06/05/22 2037   • enoxaparin (Lovenox) inj 40 mg  40 mg Subcutaneous DAILY AT 1800 Gómez Marks M.D.       • acetaminophen (Tylenol) tablet 650 mg  650 mg Oral Q6HRS PRN Gómez Marks M.D.       • ondansetron (ZOFRAN) syringe/vial injection 4  mg  4 mg Intravenous Q4HRS PRN Gómez Marks M.D.       • ondansetron (ZOFRAN ODT) dispertab 4 mg  4 mg Oral Q4HRS PRN Gómez Marks M.D.       • promethazine (PHENERGAN) tablet 12.5-25 mg  12.5-25 mg Oral Q4HRS PRN Gómez Marks M.D.       • promethazine (PHENERGAN) suppository 12.5-25 mg  12.5-25 mg Rectal Q4HRS PRN Gómez Marks M.D.       • prochlorperazine (COMPAZINE) injection 5-10 mg  5-10 mg Intravenous Q4HRS PRN Gómez Marks M.D.           Allergies  No Known Allergies    Vital Signs last 24 hours  Temp:  [36.3 °C (97.3 °F)] 36.3 °C (97.3 °F)  Pulse:  [] 70  Resp:  [20-48] 35  BP: ()/(35-97) 150/97  SpO2:  [87 %-100 %] 99 %    Physical Exam  Physical Exam  Vitals and nursing note reviewed.   Constitutional:       Appearance: He is ill-appearing.   HENT:      Head: Normocephalic and atraumatic.      Right Ear: External ear normal.      Left Ear: External ear normal.      Nose: Nose normal.      Mouth/Throat:      Mouth: Mucous membranes are moist.   Eyes:      Pupils: Pupils are equal, round, and reactive to light.   Cardiovascular:      Rate and Rhythm: Normal rate and regular rhythm.      Pulses: Normal pulses.   Pulmonary:      Comments: Equal and symmetric breath sounds with mechanical ventilation   Abdominal:      General: Abdomen is flat. There is no distension.      Palpations: Abdomen is soft.      Tenderness: There is no abdominal tenderness. There is no guarding or rebound.   Musculoskeletal:      Right lower leg: No edema.      Left lower leg: No edema.   Skin:     General: Skin is warm and dry.   Neurological:      Comments: Deeply sedated         Fluids    Intake/Output Summary (Last 24 hours) at 6/5/2022 2153  Last data filed at 6/5/2022 2000  Gross per 24 hour   Intake 2611.43 ml   Output --   Net 2611.43 ml       Laboratory  Recent Results (from the past 48 hour(s))   EKG    Collection Time: 06/05/22  3:38 PM   Result Value Ref Range    Report       [facility] Emergency Dept[facility]    Test Date:  2022  Pt Name:    DUANE AGUILAR                Department: ER  MRN:        [id number]                      Room:       BL 13  Gender:     Male                         Technician: [id number]  :        1990                   Requested By:VIN WEI  Order #:    [id number]                    Reading MD:    Measurements  Intervals                                Axis  Rate:       55                           P:          79  KY:         128                          QRS:        84  QRSD:       112                          T:          57  QT:         412  QTc:        394    Interpretive Statements  SINUS BRADYCARDIA  INCOMPLETE RIGHT BUNDLE BRANCH BLOCK  Compared to ECG 2019 19:14:06  Incomplete right bundle-branch block now present  Sinus rhythm no longer present  Atrial abnormality no longer present  ST (T wave) deviation no longer present     CBC WITH DIFFERENTIAL    Collection Time: 22  3:47 PM   Result Value Ref Range    WBC 18.2 (H) 4.8 - 10.8 K/uL    RBC 5.35 4.70 - 6.10 M/uL    Hemoglobin 16.6 14.0 - 18.0 g/dL    Hematocrit 56.4 (H) 42.0 - 52.0 %    .4 (H) 81.4 - 97.8 fL    MCH 31.0 27.0 - 33.0 pg    MCHC 29.4 (L) 33.7 - 35.3 g/dL    RDW 46.4 35.9 - 50.0 fL    Platelet Count 154 (L) 164 - 446 K/uL    MPV 12.6 9.0 - 12.9 fL    Neutrophils-Polys 13.00 (L) 44.00 - 72.00 %    Lymphocytes 80.00 (H) 22.00 - 41.00 %    Monocytes 4.40 0.00 - 13.40 %    Eosinophils 0.00 0.00 - 6.90 %    Basophils 0.00 0.00 - 1.80 %    Nucleated RBC 0.10 /100 WBC    Neutrophils (Absolute) 2.37 1.82 - 7.42 K/uL    Lymphs (Absolute) 14.56 (H) 1.00 - 4.80 K/uL    Monos (Absolute) 0.80 0.00 - 0.85 K/uL    Eos (Absolute) 0.00 0.00 - 0.51 K/uL    Baso (Absolute) 0.00 0.00 - 0.12 K/uL    NRBC (Absolute) 0.02 K/uL   COMP METABOLIC PANEL    Collection Time: 22  3:47 PM   Result Value Ref Range    Sodium 144 135 - 145 mmol/L    Potassium 4.1 3.6 - 5.5 mmol/L     Chloride 102 96 - 112 mmol/L    Co2 3 (LL) 20 - 33 mmol/L    Anion Gap 39.0 (H) 7.0 - 16.0    Glucose 92 65 - 99 mg/dL    Bun 14 8 - 22 mg/dL    Creatinine 1.59 (H) 0.50 - 1.40 mg/dL    Calcium 10.7 (H) 8.5 - 10.5 mg/dL    AST(SGOT) 33 12 - 45 U/L    ALT(SGPT) 24 2 - 50 U/L    Alkaline Phosphatase 88 30 - 99 U/L    Total Bilirubin 0.3 0.1 - 1.5 mg/dL    Albumin 5.4 (H) 3.2 - 4.9 g/dL    Total Protein 8.3 (H) 6.0 - 8.2 g/dL    Globulin 2.9 1.9 - 3.5 g/dL    A-G Ratio 1.9 g/dL   LIPASE    Collection Time: 06/05/22  3:47 PM   Result Value Ref Range    Lipase 235 (H) 11 - 82 U/L   Triglyceride    Collection Time: 06/05/22  3:47 PM   Result Value Ref Range    Triglycerides 279 (H) 0 - 149 mg/dL   ESTIMATED GFR    Collection Time: 06/05/22  3:47 PM   Result Value Ref Range    GFR (CKD-EPI) 59 (A) >60 mL/min/1.73 m 2   Blood Culture,Hold    Collection Time: 06/05/22  3:47 PM   Result Value Ref Range    Blood Culture Hold Collected    CREATINE KINASE    Collection Time: 06/05/22  3:47 PM   Result Value Ref Range    CPK Total 276 (H) 0 - 154 U/L   DIFFERENTIAL MANUAL    Collection Time: 06/05/22  3:47 PM   Result Value Ref Range    Myelocytes 2.60 %    Manual Diff Status PERFORMED    PERIPHERAL SMEAR REVIEW    Collection Time: 06/05/22  3:47 PM   Result Value Ref Range    Peripheral Smear Review see below    PLATELET ESTIMATE    Collection Time: 06/05/22  3:47 PM   Result Value Ref Range    Plt Estimation Decreased    MORPHOLOGY    Collection Time: 06/05/22  3:47 PM   Result Value Ref Range    RBC Morphology Present     Polychromia 1+     Smudge Cells Few    POCT glucose device results    Collection Time: 06/05/22  3:58 PM   Result Value Ref Range    POC Glucose, Blood 83 65 - 99 mg/dL   URINE DRUG SCREEN    Collection Time: 06/05/22  4:23 PM   Result Value Ref Range    Amphetamines Urine Negative Negative    Barbiturates Negative Negative    Benzodiazepines Positive (A) Negative    Cocaine Metabolite Negative Negative     Methadone Negative Negative    Opiates Negative Negative    Oxycodone Negative Negative    Phencyclidine -Pcp Negative Negative    Propoxyphene Negative Negative    Cannabinoid Metab Positive (A) Negative   URINALYSIS    Collection Time: 06/05/22  4:23 PM    Specimen: Urine   Result Value Ref Range    Color Yellow     Character Clear     Specific Gravity 1.011 <1.035    Ph 5.0 5.0 - 8.0    Glucose Negative Negative mg/dL    Ketones Negative Negative mg/dL    Protein 100 (A) Negative mg/dL    Bilirubin Negative Negative    Urobilinogen, Urine 0.2 Negative    Nitrite Negative Negative    Leukocyte Esterase Negative Negative    Occult Blood Moderate (A) Negative    Micro Urine Req Microscopic    URINE MICROSCOPIC (W/UA)    Collection Time: 06/05/22  4:23 PM   Result Value Ref Range    WBC 2-5 (A) /hpf    RBC 2-5 (A) /hpf    Bacteria Negative None /hpf    Epithelial Cells Few /hpf   COV-2, FLU A/B, AND RSV BY PCR (2-4 HOURS CEPHEID): Collect NP swab in VTM    Collection Time: 06/05/22  5:07 PM    Specimen: Respirate   Result Value Ref Range    Influenza virus A RNA Negative Negative    Influenza virus B, PCR Negative Negative    RSV, PCR Negative Negative    SARS-CoV-2 by PCR NotDetected     SARS-CoV-2 Source NP Swab    POCT arterial blood gas device results    Collection Time: 06/05/22  5:14 PM   Result Value Ref Range    Ph 7.112 (LL) 7.400 - 7.500    Pco2 48.2 (H) 26.0 - 37.0 mmHg    Po2 30 (LL) 64 - 87 mmHg    Tco2 17 (L) 20 - 33 mmol/L    S02 39 (L) 93 - 99 %    Hco3 15.4 (L) 17.0 - 25.0 mmol/L    BE -14 (L) -4 - 3 mmol/L    Body Temp see below degrees    O2 Therapy 60 %    iPF Ratio 50     Specimen Arterial     DelSys Vent     Tidal Volume 470 mL    Peep End Expiratory Pressure 8 cmh20    Set Rate 20     Mode APV-CMV    POCT arterial blood gas device results    Collection Time: 06/05/22  5:31 PM   Result Value Ref Range    Ph 7.234 (LL) 7.400 - 7.500    Pco2 43.7 (H) 26.0 - 37.0 mmHg    Po2 111 (H) 64 - 87 mmHg     Tco2 20 20 - 33 mmol/L    S02 97 93 - 99 %    Hco3 18.5 17.0 - 25.0 mmol/L    BE -9 (L) -4 - 3 mmol/L    Body Temp see below degrees    O2 Therapy 60 %    iPF Ratio 185     Specimen Arterial     DelSys Vent     Tidal Volume 470 mL    Peep End Expiratory Pressure 8 cmh20    Set Rate 20     Mode APV-CMV    LACTIC ACID    Collection Time: 06/05/22  8:45 PM   Result Value Ref Range    Lactic Acid 2.9 (H) 0.5 - 2.0 mmol/L       Imaging  CT-HEAD W/O   Final Result      Head CT without contrast within normal limits. No evidence of acute cerebral infarction, hemorrhage or mass lesion.         DX-CHEST-PORTABLE (1 VIEW)   Final Result      No acute cardiac or pulmonary abnormalities are identified.      Endotracheal tube in place.      DX-CHEST-PORTABLE (1 VIEW)    (Results Pending)       Assessment/Plan  * Status epilepticus (HCC)- (present on admission)  Assessment & Plan  Intubated in the ER after persistent seizures   CTH reassuring    Deep sedation with propofol  Keppra 1500 mg BID  cVEEG  Neuro consulted by ERP    Elevated lipase  Assessment & Plan  No abdominal tenderness  Trend  Workup if increases or there are clinical features of acute pancreatitis     Acute respiratory failure with hypoxia (HCC)  Assessment & Plan  Intubated for persistent seizures    Lung protective ventilation strategies  Optimize oxygenation, ventilation, and acid base balance  ABCDEF Bundle     Acidosis  Assessment & Plan  Presumably from lactate during seizure  Volume resuscitate   Trend    LILO (acute kidney injury) (HCC)  Assessment & Plan  Pre-renal, acidosis from lactate elevation    Volume resuscitate   Check CK  Strict I/Os  Renally dosed medications  Avoid nephrotoxic agents as able  Trend     Seizure (HCC)- (present on admission)  Assessment & Plan  Suppose to be on brivaracetam, unclear if these seizures represent treatment failure or non-compliance although family is concerned he does not take his medication as he should.      Asthma  Assessment & Plan  Albuterol prn wheezing      Discussed patient condition and risk of morbidity and/or mortality with RN, RT, Therapies, Pharmacy and Charge nurse / hot rounds.    The patient remains critically ill.  Critical care time = 68 minutes in directly providing and coordinating critical care and extensive data review.  No time overlap and excludes procedures.

## 2022-06-05 NOTE — ED NOTES
Erp, pharmacy, rt, tech, rn x 2 bedside for intubation  Pt GCS not improving, hypoxic on cannula, not able to adequately protect airway

## 2022-06-05 NOTE — ED PROVIDER NOTES
Scribed for Philippe Taylor M.D. by Andrew Diaz. 6/5/2022  3:43 PM    Primary care provider: Mine Nunes M.D.  Means of arrival: EMS  History obtained from: EMS  History limited by: Acute medical condition    CHIEF COMPLAINT  Chief Complaint   Patient presents with   • Seizure     BIBA for seizure, witnessed seizure at home approx 5 min, GCS 3 after 1st, 2nd seizure with EMS for approx 2 min  5 versed IM PTA, not answering questions, not following commands reactive to painful stimuli, arrived in 4 point soft restraints on NRB  Hx of seizures, changed medications about 1 month ago       HPI  Barry Griffith is a 32 y.o. male who presents to the Emergency Department via EMS for seizure-like activity. Per EMS, patient has a history of seizures and they were called today for a witnessed episode. Fire arrived on scene and he continued to seize for another 5 minutes before medics arrived and administered 5 mg IM versed. EMS also noted some drool and vomiting in his mouth. He seized for another 2 minutes before entering a postictal state, where he has remained since with waxing and waning GCS. FSBS was 76. HR was 80's with intermittent episodes of tachycardia. He was 97% on 15L NRB. No signs of trauma. Patient maintained airway throughout. Per SO, he has a history of seizure with around 1 episode of month. He currently takes Briviact, which he was recently switched from Keppra to as of March.     HPI limited secondary to patient's acute medical condition.     REVIEW OF SYSTEMS  Pertinent positives include: seizures. See history of present illness.     ROS limited secondary to patient's acute medical condition.    PAST MEDICAL HISTORY   has a past medical history of ASTHMA, Mild intermittent asthma with acute exacerbation (6/10/2016), Seizure (HCC), and Seizure disorder (HCC).    SURGICAL HISTORY  patient denies any surgical history    SOCIAL HISTORY  Social History     Tobacco Use   • Smoking status: Never  Smoker   • Smokeless tobacco: Never Used   Vaping Use   • Vaping Use: Never used   Substance Use Topics   • Alcohol use: No     Alcohol/week: 0.0 oz     Comment: rare    • Drug use: Yes     Types: Marijuana     Comment: THC      Social History     Substance and Sexual Activity   Drug Use Yes   • Types: Marijuana    Comment: THC       FAMILY HISTORY  Family History   Problem Relation Age of Onset   • Hypertension Mother    • Hyperlipidemia Father    • Hypertension Father    • Cancer Neg Hx    • Diabetes Neg Hx        CURRENT MEDICATIONS  Current Outpatient Medications   Medication Instructions   • albuterol 108 (90 Base) MCG/ACT Aero Soln inhalation aerosol 2 Puffs, Inhalation, EVERY 6 HOURS PRN   • brivaracetam (BRIVIACT) 100 mg, Oral, 2 TIMES DAILY       ALLERGIES  No Known Allergies    PHYSICAL EXAM  VITAL SIGNS: BP (!) 148/85   Pulse 97   Temp 36.3 °C (97.3 °F)   Resp (!) 39   Ht 1.829 m (6')   Wt 72.6 kg (160 lb)   SpO2 94%   BMI 21.70 kg/m²     Constitutional: Altered, unable to follow commands.   HENT: No signs of trauma, Bilateral external ears normal, Nose normal. Uvula midline.   Eyes: Leftward gaze. Pupils are equal and reactive, Conjunctiva normal, Non-icteric.   Neck: Normal range of motion, No tenderness, Supple, No stridor.   Lymphatic: No lymphadenopathy noted.   Cardiovascular: Tachycardic, Regular rhythm, no murmurs.   Thorax & Lungs: Rhonchorous breath sounds at bases, left greater than right. No wheezing, No chest tenderness.   Abdomen:  Soft, No tenderness, No peritoneal signs, No masses, No pulsatile masses.   Skin: Warm, Dry, No erythema, No rash.   Back: No bony tenderness, No CVA tenderness.   Extremities: Intact distal pulses, No edema, No tenderness, No cyanosis.  Musculoskeletal: Good range of motion in all major joints. No tenderness to palpation or major deformities noted.   Neurologic: Altered, unable to follow commands but moves all 4 extremities.       DIAGNOSTIC STUDIES /  PROCEDURES    LABS  Labs Reviewed   CBC WITH DIFFERENTIAL - Abnormal; Notable for the following components:       Result Value    WBC 18.2 (*)     Hematocrit 56.4 (*)     .4 (*)     MCHC 29.4 (*)     Platelet Count 154 (*)     Neutrophils-Polys 13.00 (*)     Lymphocytes 80.00 (*)     Lymphs (Absolute) 14.56 (*)     All other components within normal limits   COMP METABOLIC PANEL - Abnormal; Notable for the following components:    Co2 3 (*)     Anion Gap 39.0 (*)     Creatinine 1.59 (*)     Calcium 10.7 (*)     Albumin 5.4 (*)     Total Protein 8.3 (*)     All other components within normal limits   URINE DRUG SCREEN - Abnormal; Notable for the following components:    Benzodiazepines Positive (*)     Cannabinoid Metab Positive (*)     All other components within normal limits   URINALYSIS - Abnormal; Notable for the following components:    Protein 100 (*)     Occult Blood Moderate (*)     All other components within normal limits   LIPASE - Abnormal; Notable for the following components:    Lipase 235 (*)     All other components within normal limits   TRIGLYCERIDE - Abnormal; Notable for the following components:    Triglycerides 279 (*)     All other components within normal limits   ESTIMATED GFR - Abnormal; Notable for the following components:    GFR (CKD-EPI) 59 (*)     All other components within normal limits   URINE MICROSCOPIC (W/UA) - Abnormal; Notable for the following components:    WBC 2-5 (*)     RBC 2-5 (*)     All other components within normal limits   CREATINE KINASE - Abnormal; Notable for the following components:    CPK Total 276 (*)     All other components within normal limits   POCT ARTERIAL BLOOD GAS DEVICE RESULTS - Abnormal; Notable for the following components:    Ph 7.112 (*)     Pco2 48.2 (*)     Po2 30 (*)     Tco2 17 (*)     S02 39 (*)     Hco3 15.4 (*)     BE -14 (*)     All other components within normal limits   POCT ARTERIAL BLOOD GAS DEVICE RESULTS - Abnormal; Notable  for the following components:    Ph 7.234 (*)     Pco2 43.7 (*)     Po2 111 (*)     BE -9 (*)     All other components within normal limits   COV-2, FLU A/B, AND RSV BY PCR ([employer])   BLOOD CULTURE,HOLD   DIFFERENTIAL MANUAL   PERIPHERAL SMEAR REVIEW   PLATELET ESTIMATE   MORPHOLOGY   ARTERIAL BLOOD GAS   PATH INTERP HEME   COMP METABOLIC PANEL   CREATINE KINASE   POCT GLUCOSE DEVICE RESULTS      All labs reviewed by me.    EKG  12 Lead EKG interpreted by me to show:  Results for orders placed or performed during the hospital encounter of 22   EKG   Result Value Ref Range    Report       [facility] Emergency Dept.    Test Date:  2022  Pt Name:    DUANE AGUILAR                Department: ER  MRN:        [id number]                      Room:        13  Gender:     Male                         Technician: [id number]  :        1990                   Requested By:VIN WEI  Order #:    [id number]                    Reading MD:    Measurements  Intervals                                Axis  Rate:       55                           P:          79  TX:         128                          QRS:        84  QRSD:       112                          T:          57  QT:         412  QTc:        394    Interpretive Statements  SINUS BRADYCARDIA  INCOMPLETE RIGHT BUNDLE BRANCH BLOCK  Compared to ECG 2019 19:14:06  Incomplete right bundle-branch block now present  Sinus rhythm no longer present  Atrial abnormality no longer present  ST (T wave) deviation no longer present       RADIOLOGY  CT-HEAD W/O   Final Result      Head CT without contrast within normal limits. No evidence of acute cerebral infarction, hemorrhage or mass lesion.         DX-CHEST-PORTABLE (1 VIEW)   Final Result      No acute cardiac or pulmonary abnormalities are identified.      Endotracheal tube in place.        The radiologist's interpretation of all radiological studies have been reviewed by  me.    Intubation Procedure Note    Indication: airway protection    Consent: Unable to be obtained due to the emergent nature of this procedure.    Medications Used: propofol intravenously, succ    Procedure: The patient was placed in the appropriate position.  Cricoid pressure was not required.  Intubation was performed video laryngoscopy using a glidescope an 8.0 cuffed endotracheal tube.  The cuff was then inflated and the tube was secured appropriately at a distance of 24 cm to the dental ridge.  Initial confirmation of placement included bilateral breath sounds.  A chest x-ray to verify correct placement of the tube showed appropriate tube position.    The patient tolerated the procedure well.     Complications: None      COURSE & MEDICAL DECISION MAKING  Nursing notes, VS, PMSFHx reviewed in chart.    32 y.o. male p/w chief complaint of seizure.    3:43 PM Patient seen and examined at bedside.      The differential diagnoses include but are not limited to:     #Seizure    Patient with bicarb of 3 with seizures.  No obvious recent infections to suggest benefit in antibiotics at this time.    Pt given Ativan and Keppra for concerns for status epilepticus to avoid airway compromise.     3:58 PM Patient's oxygen destating; he reacts minimally to IV placement but does not make any purposeful movement.  Patient continuing to have seizure-like activity despite multiple doses of benzodiazepines and Keppra load.   Plan to intubate given concern for status epilepticus and need for emergent imaging and airway protection    4:06 PM Patient intubated at this time as outlined above. CT head ordered. COVID swab ordered as patient has history of seizure episodes in the setting of COVID. LR running on pressure bag. Neuro and Intensivist paged.     4:25 PM CXR reviewed by myself appeared reassuring.     4:34 PM Patient given phenylephrine in the setting of hypotension after initiating propofol drip.     Plan to give patient  100 mcg of phenylephrine. patient given 1000 mcg of phenylephrine.  This dose of 1000 mcg is within anticipated push dose pressor range in the setting of hypotension 2/2 propofol drip and recent intubation.  Patient responded well to 1000 mcg of phenylephrine.  Second 1 L bolus of LR also given      4:39 PM Pressure improved.     4:41 PM I discussed the patient's case and the above findings with Dr. Marks (Intensivist) who agrees to evaluate the patient for admission.    4:41 PM I discussed the patient's case and the above findings with Dr. Bradshaw (Neuro) who agrees to consult; will follow up on EEG and CT results.       5:03 PM Spoke with grandmother and fiance who are unsure if missed any doses  No recent illnesses, vomiting or diarrhea.      DISPOSITION:  Patient will be admitted to ICU by Dr. Marks in critical condition.    The total critical care time on this patient is 40 minutes, resuscitating patient, speaking with admitting physician, and deciphering test results. This 40 minutes is exclusive of separately billable procedures.      FINAL IMPRESSION  1. Status epilepticus (HCC)    2. Acute respiratory failure with hypoxia (HCC)    3. LILO (acute kidney injury) (HCC)          Andrew VANN (Scribe), am scribing for, and in the presence of, Philippe Taylor M.D..    Electronically signed by: Andrew Diaz (Scribe), 6/5/2022    IPhilippe M.D. personally performed the services described in this documentation, as scribed by Andrew Diaz in my presence, and it is both accurate and complete.    The note accurately reflects work and decisions made by me.  Philippe Taylor M.D.  6/5/2022  5:06 PM

## 2022-06-05 NOTE — ED TRIAGE NOTES
Chief Complaint   Patient presents with   • Seizure     BIBA for seizure, witnessed seizure at home approx 5 min, GCS 3 after 1st, 2nd seizure with EMS for approx 2 min  5 versed IM PTA, not answering questions, not following commands reactive to painful stimuli, arrived in 4 point soft restraints on NRB  Hx of seizures, changed medications about 1 month ago       /70   Pulse (!) 52   Temp 36.3 °C (97.3 °F)   Resp (!) 24   Ht 1.829 m (6')   Wt 72.6 kg (160 lb)   SpO2 94%   BMI 21.70 kg/m²

## 2022-06-05 NOTE — ED NOTES
Lab called with a critical CO2 of 3. Lab results read back to lab. ERP notified and lab results read back by ERP

## 2022-06-06 ENCOUNTER — APPOINTMENT (OUTPATIENT)
Dept: RADIOLOGY | Facility: MEDICAL CENTER | Age: 32
DRG: 100 | End: 2022-06-06
Attending: INTERNAL MEDICINE
Payer: COMMERCIAL

## 2022-06-06 PROBLEM — E87.20 ACIDOSIS: Status: RESOLVED | Noted: 2022-06-05 | Resolved: 2022-06-06

## 2022-06-06 LAB
ALBUMIN SERPL BCP-MCNC: 4 G/DL (ref 3.2–4.9)
ALBUMIN/GLOB SERPL: 2 G/DL
ALP SERPL-CCNC: 54 U/L (ref 30–99)
ALT SERPL-CCNC: 16 U/L (ref 2–50)
ANION GAP SERPL CALC-SCNC: 12 MMOL/L (ref 7–16)
APPEARANCE UR: ABNORMAL
AST SERPL-CCNC: 43 U/L (ref 12–45)
BACTERIA #/AREA URNS HPF: ABNORMAL /HPF
BASE EXCESS BLDA CALC-SCNC: -1 MMOL/L (ref -4–3)
BILIRUB SERPL-MCNC: 0.4 MG/DL (ref 0.1–1.5)
BILIRUB UR QL STRIP.AUTO: NEGATIVE
BODY TEMPERATURE: ABNORMAL DEGREES
BREATHS SETTING VENT: 24
BUN SERPL-MCNC: 17 MG/DL (ref 8–22)
CALCIUM SERPL-MCNC: 8.7 MG/DL (ref 8.5–10.5)
CHLORIDE SERPL-SCNC: 109 MMOL/L (ref 96–112)
CK SERPL-CCNC: 800 U/L (ref 0–154)
CO2 BLDA-SCNC: 21 MMOL/L (ref 20–33)
CO2 SERPL-SCNC: 20 MMOL/L (ref 20–33)
COLOR UR: YELLOW
CREAT SERPL-MCNC: 1.75 MG/DL (ref 0.5–1.4)
CRP SERPL HS-MCNC: 0.98 MG/DL (ref 0–0.75)
DELSYS IDSYS: ABNORMAL
EPI CELLS #/AREA URNS HPF: ABNORMAL /HPF
GFR SERPLBLD CREATININE-BSD FMLA CKD-EPI: 53 ML/MIN/1.73 M 2
GLOBULIN SER CALC-MCNC: 2 G/DL (ref 1.9–3.5)
GLUCOSE BLD STRIP.AUTO-MCNC: 100 MG/DL (ref 65–99)
GLUCOSE BLD STRIP.AUTO-MCNC: 76 MG/DL (ref 65–99)
GLUCOSE SERPL-MCNC: 93 MG/DL (ref 65–99)
GLUCOSE UR STRIP.AUTO-MCNC: NEGATIVE MG/DL
HCO3 BLDA-SCNC: 20.7 MMOL/L (ref 17–25)
HOROWITZ INDEX BLDA+IHG-RTO: 246 MM[HG]
KETONES UR STRIP.AUTO-MCNC: 15 MG/DL
LACTATE BLD-SCNC: 2.5 MMOL/L (ref 0.5–2)
LEUKOCYTE ESTERASE UR QL STRIP.AUTO: ABNORMAL
LIPASE SERPL-CCNC: 34 U/L (ref 11–82)
MAGNESIUM SERPL-MCNC: 2.7 MG/DL (ref 1.5–2.5)
MICRO URNS: ABNORMAL
MODE IMODE: ABNORMAL
NITRITE UR QL STRIP.AUTO: NEGATIVE
O2/TOTAL GAS SETTING VFR VENT: 50 %
PATH REV: NORMAL
PATH REV: NORMAL
PCO2 BLDA: 26.9 MMHG (ref 26–37)
PCO2 TEMP ADJ BLDA: 27.4 MMHG (ref 26–37)
PEEP END EXPIRATORY PRESSURE IPEEP: 8 CMH20
PH BLDA: 7.49 [PH] (ref 7.4–7.5)
PH TEMP ADJ BLDA: 7.49 [PH] (ref 7.4–7.5)
PH UR STRIP.AUTO: 5.5 [PH] (ref 5–8)
PHOSPHATE SERPL-MCNC: 3.2 MG/DL (ref 2.5–4.5)
PO2 BLDA: 123 MMHG (ref 64–87)
PO2 TEMP ADJ BLDA: 125 MMHG (ref 64–87)
POTASSIUM SERPL-SCNC: 3.7 MMOL/L (ref 3.6–5.5)
PREALB SERPL-MCNC: 31.6 MG/DL (ref 18–38)
PROT SERPL-MCNC: 6 G/DL (ref 6–8.2)
PROT UR QL STRIP: 30 MG/DL
RBC # URNS HPF: ABNORMAL /HPF
RBC UR QL AUTO: ABNORMAL
SAO2 % BLDA: 99 % (ref 93–99)
SODIUM SERPL-SCNC: 141 MMOL/L (ref 135–145)
SP GR UR STRIP.AUTO: 1.01
SPECIMEN DRAWN FROM PATIENT: ABNORMAL
TIDAL VOLUME IVT: 470 ML
TRIGL SERPL-MCNC: 179 MG/DL (ref 0–149)
URATE CRY #/AREA URNS HPF: POSITIVE /HPF
UROBILINOGEN UR STRIP.AUTO-MCNC: 0.2 MG/DL
WBC #/AREA URNS HPF: ABNORMAL /HPF

## 2022-06-06 PROCEDURE — 82962 GLUCOSE BLOOD TEST: CPT

## 2022-06-06 PROCEDURE — 80053 COMPREHEN METABOLIC PANEL: CPT

## 2022-06-06 PROCEDURE — 36600 WITHDRAWAL OF ARTERIAL BLOOD: CPT

## 2022-06-06 PROCEDURE — 700105 HCHG RX REV CODE 258: Performed by: INTERNAL MEDICINE

## 2022-06-06 PROCEDURE — A9270 NON-COVERED ITEM OR SERVICE: HCPCS | Performed by: NURSE PRACTITIONER

## 2022-06-06 PROCEDURE — 700111 HCHG RX REV CODE 636 W/ 250 OVERRIDE (IP): Performed by: INTERNAL MEDICINE

## 2022-06-06 PROCEDURE — 86140 C-REACTIVE PROTEIN: CPT

## 2022-06-06 PROCEDURE — 95711 VEEG 2-12 HR UNMONITORED: CPT | Performed by: STUDENT IN AN ORGANIZED HEALTH CARE EDUCATION/TRAINING PROGRAM

## 2022-06-06 PROCEDURE — 95718 EEG PHYS/QHP 2-12 HR W/VEEG: CPT | Performed by: STUDENT IN AN ORGANIZED HEALTH CARE EDUCATION/TRAINING PROGRAM

## 2022-06-06 PROCEDURE — A9270 NON-COVERED ITEM OR SERVICE: HCPCS | Performed by: INTERNAL MEDICINE

## 2022-06-06 PROCEDURE — 82550 ASSAY OF CK (CPK): CPT

## 2022-06-06 PROCEDURE — 99233 SBSQ HOSP IP/OBS HIGH 50: CPT | Performed by: PSYCHIATRY & NEUROLOGY

## 2022-06-06 PROCEDURE — 83735 ASSAY OF MAGNESIUM: CPT

## 2022-06-06 PROCEDURE — 82803 BLOOD GASES ANY COMBINATION: CPT

## 2022-06-06 PROCEDURE — 99291 CRITICAL CARE FIRST HOUR: CPT | Performed by: INTERNAL MEDICINE

## 2022-06-06 PROCEDURE — 84100 ASSAY OF PHOSPHORUS: CPT

## 2022-06-06 PROCEDURE — 84478 ASSAY OF TRIGLYCERIDES: CPT

## 2022-06-06 PROCEDURE — 83605 ASSAY OF LACTIC ACID: CPT

## 2022-06-06 PROCEDURE — 700102 HCHG RX REV CODE 250 W/ 637 OVERRIDE(OP): Performed by: NURSE PRACTITIONER

## 2022-06-06 PROCEDURE — 83690 ASSAY OF LIPASE: CPT

## 2022-06-06 PROCEDURE — 770022 HCHG ROOM/CARE - ICU (200)

## 2022-06-06 PROCEDURE — 71045 X-RAY EXAM CHEST 1 VIEW: CPT

## 2022-06-06 PROCEDURE — 94003 VENT MGMT INPAT SUBQ DAY: CPT

## 2022-06-06 PROCEDURE — 700102 HCHG RX REV CODE 250 W/ 637 OVERRIDE(OP): Performed by: INTERNAL MEDICINE

## 2022-06-06 PROCEDURE — 84134 ASSAY OF PREALBUMIN: CPT

## 2022-06-06 RX ORDER — SODIUM CHLORIDE, SODIUM LACTATE, POTASSIUM CHLORIDE, CALCIUM CHLORIDE 600; 310; 30; 20 MG/100ML; MG/100ML; MG/100ML; MG/100ML
INJECTION, SOLUTION INTRAVENOUS CONTINUOUS
Status: DISCONTINUED | OUTPATIENT
Start: 2022-06-06 | End: 2022-06-08 | Stop reason: HOSPADM

## 2022-06-06 RX ORDER — ACETAMINOPHEN 325 MG/1
650 TABLET ORAL EVERY 6 HOURS PRN
Status: DISCONTINUED | OUTPATIENT
Start: 2022-06-06 | End: 2022-06-07

## 2022-06-06 RX ADMIN — SODIUM CHLORIDE, POTASSIUM CHLORIDE, SODIUM LACTATE AND CALCIUM CHLORIDE: 600; 310; 30; 20 INJECTION, SOLUTION INTRAVENOUS at 07:49

## 2022-06-06 RX ADMIN — FAMOTIDINE 20 MG: 10 INJECTION INTRAVENOUS at 05:16

## 2022-06-06 RX ADMIN — PROPOFOL 60 MCG/KG/MIN: 10 INJECTION, EMULSION INTRAVENOUS at 07:18

## 2022-06-06 RX ADMIN — ACETAMINOPHEN 650 MG: 325 TABLET ORAL at 17:12

## 2022-06-06 RX ADMIN — PROPOFOL 60 MCG/KG/MIN: 10 INJECTION, EMULSION INTRAVENOUS at 03:20

## 2022-06-06 RX ADMIN — SODIUM CHLORIDE, POTASSIUM CHLORIDE, SODIUM LACTATE AND CALCIUM CHLORIDE: 600; 310; 30; 20 INJECTION, SOLUTION INTRAVENOUS at 16:52

## 2022-06-06 RX ADMIN — LEVETIRACETAM 1500 MG: 100 INJECTION, SOLUTION INTRAVENOUS at 05:16

## 2022-06-06 RX ADMIN — ENOXAPARIN SODIUM 40 MG: 40 INJECTION SUBCUTANEOUS at 17:02

## 2022-06-06 RX ADMIN — BRIVARACETAM 100 MG: 50 TABLET, FILM COATED ORAL at 17:02

## 2022-06-06 ASSESSMENT — PAIN DESCRIPTION - PAIN TYPE
TYPE: ACUTE PAIN

## 2022-06-06 NOTE — ASSESSMENT & PLAN NOTE
Suppose to be on brivaracetam, unclear if these seizures represent treatment failure or non-compliance although family is concerned he does not take his medication as he should.   -switch back from keppra to briviact 100 bid.  -q2 neurochecks  -continuous VEEG, neurology on board

## 2022-06-06 NOTE — PROGRESS NOTES
[facility] ICU Progress Note      Admit Date: 6/5/2022    Resident(s): Heidi Arreola M.D.   Attending:  NEYMAR GOMEZ/ Dr. Arriaga    Patient ID:    Name:  Barry Griffith   YOB: 1990  Age:  32 y.o.  male   MRN:  [id number]    Hospital Course (carried forward and updated):  32 y.o. male with a seizure disorder and history of non-compliance who presented 6/5/2022 with seizures. He was given benzodiazepines in the field by EMS, continued to be altered so given additional benzodiazepines then intubated. Transferred to ICU for further management.    Wife states he was overall in his usual state of health - had complained of some abdominal discomfort but this is not unusual for him per her report. She questions his compliance with medications.   CTH no acute process; bicarb 3, Cr 1.59, AG 39; lipase 235    Consultants:  Critical Care  Neurology    Interval Events:  6/6: No acute events overnight, patient sedated this a.m., titrating down sedation with plan to extubate later today.          Vitals Range last 24h:  Temp:  [36.3 °C (97.3 °F)] 36.3 °C (97.3 °F)  Pulse:  [] 98  Resp:  [19-48] 21  BP: ()/(35-97) 140/81  SpO2:  [87 %-100 %] 99 %      Intake/Output Summary (Last 24 hours) at 6/6/2022 1107  Last data filed at 6/6/2022 1000  Gross per 24 hour   Intake 4056.42 ml   Output 1375 ml   Net 2681.42 ml        ROS unable to perform given acuity of condition    PHYSICAL EXAM:  Vitals:    06/06/22 0800 06/06/22 0900 06/06/22 1000 06/06/22 1100   BP:  128/82 133/75 (!) 140/81   Pulse: 73 (!) 58 71 98   Resp: (!) 22 (!) 43 (!) 23 (!) 21   Temp:       TempSrc: Bladder  Bladder    SpO2: 99% 100% 100% 99%   Weight:       Height:        Body mass index is 20.63 kg/m².    O2 therapy: Pulse Oximetry: 99 %, O2 (LPM): 5, O2 Delivery Device: Ventilator    Date 06/06/22 0700 - 06/07/22 0659   Shift 5467-7120 4153-9927 2192-7455 24 Hour Total   INTAKE   I.V. 234   234     Propofol Volume 74.8   74.8      Volume (mL) (NS infusion) 136.3   136.3     Volume (mL) (lactated ringers infusion) 22.9   22.9   NG/   100     Intake (mL) (Enteral Tube 06/06/22 Cortrak - Gastric Right nare) 100   100   Shift Total 334   334   OUTPUT   Urine 705   705     Output (mL) (Urethral Catheter Temperature probe 16 Fr.) 705   705   Shift Total 705   705   NET -371   -371        Physical Exam  Constitutional:       General: He is in acute distress.      Appearance: He is ill-appearing.   HENT:      Head: Normocephalic.   Eyes:      Conjunctiva/sclera: Conjunctivae normal.      Pupils: Pupils are equal, round, and reactive to light.   Cardiovascular:      Rate and Rhythm: Normal rate and regular rhythm.      Heart sounds: Normal heart sounds.   Pulmonary:      Effort: No respiratory distress.      Breath sounds: Normal breath sounds.   Abdominal:      General: There is no distension.      Palpations: Abdomen is soft.   Musculoskeletal:      Right lower leg: No edema.      Left lower leg: No edema.   Skin:     Capillary Refill: Capillary refill takes less than 2 seconds.   Neurological:      Comments: Unable to assess, spontaneous movements while weaning sedation, not following commands.         Recent Labs     06/05/22  1714 06/05/22  1731 06/06/22  0324   ISTATAPH 7.112* 7.234* 7.493   ISTATAPCO2 48.2* 43.7* 26.9   ISTATAPO2 30* 111* 123*   ISTATATCO2 17* 20 21   [id number] 39* 97 99   ISTATARTHCO3 15.4* 18.5 20.7   ISTATARTBE -14* -9* -1   ISTATTEMP see below see below 99.3 F   ISTATFIO2 60 60 50   ISTATSPEC Arterial Arterial Arterial   ISTATAPHTC  --   --  7.487   [id number]  --   --  125*     Recent Labs     06/05/22  1547 06/05/22  2045 06/06/22  0428   SODIUM 144 138 141   POTASSIUM 4.1 3.9 3.7   CHLORIDE 102 106 109   CO2 3* 18* 20   BUN 14 15 17   CREATININE 1.59* 1.45* 1.75*   MAGNESIUM  --   --  2.7*   PHOSPHORUS  --   --  3.2   CALCIUM 10.7* 8.7 8.7     Recent Labs     06/05/22  1547 06/05/22  2045 06/06/22  0428    ALTSGPT 24 19 16   ASTSGOT 33 36 43   ALKPHOSPHAT 88 56 54   TBILIRUBIN 0.3 0.4 0.4   LIPASE 235*  --  34   PREALBUMIN  --   --  31.6   GLUCOSE 92 84 93     Recent Labs     06/05/22  1547   RBC 5.35   HEMOGLOBIN 16.6   HEMATOCRIT 56.4*   PLATELETCT 154*     Recent Labs     06/05/22  1547 06/05/22  2045 06/06/22  0428   WBC 18.2*  --   --    NEUTSPOLYS 13.00*  --   --    LYMPHOCYTES 80.00*  --   --    MONOCYTES 4.40  --   --    EOSINOPHILS 0.00  --   --    BASOPHILS 0.00  --   --    ASTSGOT 33 36 43   ALTSGPT 24 19 16   ALKPHOSPHAT 88 56 54   TBILIRUBIN 0.3 0.4 0.4       Meds:  • LR   125 mL/hr at 06/06/22 0749   • propofol  0-80 mcg/kg/min     • brivaracetam  100 mg     • Respiratory Therapy Consult       • famotidine  20 mg     • senna-docusate  2 Tablet      And   • polyethylene glycol/lytes  1 Packet      And   • magnesium hydroxide  30 mL      And   • bisacodyl  10 mg     • MD Alert...Adult ICU Electrolyte Replacement per Pharmacy       • lidocaine  2 mL     • Pharmacy  1 Each     • insulin regular  1-6 Units      And   • dextrose bolus  25 g     • fentaNYL   mcg     • LORazepam  4 mg     • enoxaparin (LOVENOX) injection  40 mg     • acetaminophen  650 mg     • ondansetron  4 mg     • ondansetron  4 mg     • promethazine  12.5-25 mg     • promethazine  12.5-25 mg     • prochlorperazine  5-10 mg          Procedures:  none    Imaging:  DX-ABDOMEN FOR TUBE PLACEMENT   Final Result      Enteric tube has been placed and the tip projects over the gastroduodenal junction      DX-CHEST-PORTABLE (1 VIEW)   Final Result      1.  No acute cardiopulmonary abnormality identified.      2.  Endotracheal tube and enteric catheter appear appropriately located      CT-HEAD W/O   Final Result      Head CT without contrast within normal limits. No evidence of acute cerebral infarction, hemorrhage or mass lesion.         DX-CHEST-PORTABLE (1 VIEW)   Final Result      No acute cardiac or pulmonary abnormalities are  identified.      Endotracheal tube in place.          ASSESSEMENT and PLAN:    * Status epilepticus (HCC)- (present on admission)  Assessment & Plan  Intubated in the ER after persistent seizures   CTH reassuring    Switch to [id number] BID  cVEEG  Neuro consulted     Elevated lipase  Assessment & Plan  No abdominal tenderness  Trended down      Acute respiratory failure with hypoxia (HCC)  Assessment & Plan  Intubated for persistent seizures    Lung protective ventilation strategies  Optimize oxygenation, ventilation, and acid base balance  ABCDEF Bundle   -wean down sedation and aim for extubation later today    LILO (acute kidney injury) (HCC)  Assessment & Plan  Pre-renal, acidosis from lactate elevation, ck 800, cr uptrending currently at 1.75, UA no significant pyuria or casts    -Increase LR to 125cc/hr  Strict I/Os  Renally dosed medications  Avoid nephrotoxic agents as able  Trend     Seizure (HCC)- (present on admission)  Assessment & Plan  Suppose to be on brivaracetam, unclear if these seizures represent treatment failure or non-compliance although family is concerned he does not take his medication as he should.   -switch back from keppra to briviact 100 bid.  -q2 neurochecks  -continuous VEEG, neurology on board      Asthma  Assessment & Plan  Albuterol prn wheezing      DISPO: ICU    CODE STATUS: Full code    Quality Measures  Analgesia: fentanyl  Sedation: propofol  Thromboprophylaxis: lovenox  Head of bed: >30 degrees  Ulcer prophylaxis: famotidine  Glycemic control: ISS  Indwelling lines: 2 peripheral IV  Deescalation of antibiotics: BARRY Arreola M.D.

## 2022-06-06 NOTE — CONSULTS
Referring Physician: Dr. Philippe Taylor    Referral Reason: Status epilepticus    HPI:  Mr. Barry Griffith is a 32 y.o. male with history of seizure disorder currently followed [facility] clinic [facility] was supposed to be on Briviact 100 mg twice a day, was brought to emergency room by paramedics due to intractable seizure.  Apparently after paramedics arrived he continued to have seizure for 5 minutes requiring administration of 5 mg IM Versed.  He reported to have some drooling and vomiting in his mouth.  He had recurrent seizure en route to emergency room requiring administration of Ativan and subsequently was intubated in the emergency room.  He was previously on Keppra but apparently had some behavioral issues for which he was switched to Briviact.  He has history of being noncompliant with seizure medication in the past and also history of legal and in legal drug use.  It is not clear whether he was taking his medication as prescribed.  He underwent a brain CT which not reveal acute abnormalities.    His brain MRI with and without contrast on 8/10/2018 was unremarkable.  His EEG on 7/10/2018 revealed frequent left temporal sharps with subtle, left temporal slowing.    ROS:   Unable to obtain.    Past Medical History:   Past Medical History:   Diagnosis Date   • ASTHMA     Asthma since infancy.   • Mild intermittent asthma with acute exacerbation 6/10/2016   • Seizure (HCC)    • Seizure disorder (HCC)        Past Surgical History: No past surgical history on file.    Social History:   Social History     Socioeconomic History   • Marital status: Single     Spouse name: Not on file   • Number of children: 2   • Years of education: Not on file   • Highest education level: Not on file   Occupational History   • Occupation: guidance counsellor [employer]   • Occupation: psych coordinator [employer]   Tobacco Use   • Smoking status: Never Smoker   • Smokeless tobacco: Never Used   Vaping Use   •  Vaping Use: Never used   Substance and Sexual Activity   • Alcohol use: No     Alcohol/week: 0.0 oz     Comment: rare    • Drug use: Yes     Types: Marijuana     Comment: THC   • Sexual activity: Yes     Partners: Female     Birth control/protection: Condom, Pill   Other Topics Concern   • Not on file   Social History Narrative   • Not on file     Social Determinants of Health     Financial Resource Strain: Not on file   Food Insecurity: Not on file   Transportation Needs: Not on file   Physical Activity: Not on file   Stress: Not on file   Social Connections: Not on file   Intimate Partner Violence: Not on file   Housing Stability: Not on file       Family Hx:   Family History   Problem Relation Age of Onset   • Hypertension Mother    • Hyperlipidemia Father    • Hypertension Father    • Cancer Neg Hx    • Diabetes Neg Hx        Current Medications:   Current Facility-Administered Medications   Medication Dose Route Frequency Provider Last Rate Last Admin   • phenylephrine (LEILA-SYNEPHRINE) 100 mcg/mL inj (IV Push Syringe) 100 mcg  100 mcg Intravenous Q15 MIN PRN Philippe Taylor M.D.       • Respiratory Therapy Consult   Nebulization Continuous RT Gómez Marks M.D.       • famotidine (PEPCID) tablet 20 mg  20 mg Enteral Tube Q12HRS Gómez Marks M.D.        Or   • famotidine (PEPCID) injection 20 mg  20 mg Intravenous Q12HRS Gómez Marks M.D.       • senna-docusate (PERICOLACE or SENOKOT S) 8.6-50 MG per tablet 2 Tablet  2 Tablet Enteral Tube BID Gómez Marks M.D.        And   • polyethylene glycol/lytes (MIRALAX) PACKET 1 Packet  1 Packet Enteral Tube QDAY PRN Gómze Marks M.D.        And   • magnesium hydroxide (MILK OF MAGNESIA) suspension 30 mL  30 mL Enteral Tube QDAY PRN Gómez Marks M.D.        And   • bisacodyl (DULCOLAX) suppository 10 mg  10 mg Rectal QDAY PRN Gómez Marks M.D.       • MD Alert...ICU Electrolyte Replacement per Pharmacy   Other PHARMACY TO DOSE Gómez JUÁREZ  IRINA Marks       • lidocaine (XYLOCAINE) 1 % injection 2 mL  2 mL Tracheal Tube Q30 MIN PRN Gómez Marks M.D.       • Pharmacy Consult: Enteral tube insertion - review meds/change route/product selection  1 Each Other PHARMACY TO DOSE Gómez Marks M.D.       • insulin regular (HumuLIN R,NovoLIN R) injection  1-6 Units Subcutaneous Q6HRS Gómez Marks M.D.        And   • dextrose 50% (D50W) injection 25 g  25 g Intravenous Q15 MIN PRN Gómez Marks M.D.       • propofol (DIPRIVAN) injection  40-80 mcg/kg/min Intravenous Continuous Gómez Marks M.D. 17.4 mL/hr at 06/05/22 1722 40 mcg/kg/min at 06/05/22 1722   • fentaNYL (SUBLIMAZE) injection  mcg   mcg Intravenous Q HOUR PRN Gómez Marks M.D.   100 mcg at 06/05/22 1718   • levETIRAcetam (Keppra) injection 1,500 mg  1,500 mg Intravenous Q12HRS Gómez Marks M.D.       • LORazepam (ATIVAN) injection 4 mg  4 mg Intravenous Q10 MIN PRN Gómez Marks M.D.       • NS infusion   Intravenous Continuous Gómez Marks M.D.         Current Outpatient Medications   Medication Sig Dispense Refill   • brivaracetam (BRIVIACT) 100 MG Tab tablet Take 1 Tablet by mouth 2 times a day for 180 days. 60 Tablet 5   • albuterol 108 (90 Base) MCG/ACT Aero Soln inhalation aerosol Inhale 2 Puffs every 6 hours as needed for Shortness of Breath. 18 g 2       Allergies: No Known Allergies    Physical Exam:   Vitals:    06/05/22 1657 06/05/22 1705 06/05/22 1710 06/05/22 1720   BP:  116/54 124/59 122/60   Pulse: 93 (!) 101 (!) 107 88   Resp: (!) 23 (!) 25 (!) 21 (!) 22   Temp:       SpO2: 100% 97% 96% 98%   Weight:       Height:           Physical Exam   GENERAL:  Lying in the hospital bed intubated and sedated with propofol.  Head: Normocephalic and atraumatic.   Eyes: Pupils are equal, round, and reactive to light.   Cardiovascular: Normal rate and regular rhythm.    Pulmonary/Chest: Breath sounds normal.   Abdominal: Soft. Bowel sounds are  normal. He exhibits no distension. There is no tenderness.   Skin: Skin is warm and dry. No rash noted. No erythema.  Neuro Exam  The patient is intubated, sedated.  His pupils are 4 mm equal and reactive.  I do not appreciate facial asymmetry.  Facial sensation cannot be tested.  He has some shivering of his chest and upper body.  He withdraws to physical stimulation in all 4 extremity.  Motor, sensory and coordination cannot be tested.  Deep tendon reflexes are diffusely diminished.  Gait was deferred.     Labs:  Recent Labs     06/05/22  1547   WBC 18.2*   RBC 5.35   HEMOGLOBIN 16.6   HEMATOCRIT 56.4*   .4*   MCH 31.0   MCHC 29.4*   RDW 46.4   PLATELETCT 154*   MPV 12.6     Recent Labs     06/05/22  1547   SODIUM 144   POTASSIUM 4.1   CHLORIDE 102   CO2 3*   GLUCOSE 92   BUN 14   CREATININE 1.59*   CALCIUM 10.7*             Recent Labs     06/05/22  1547   CPKTOTAL 276*     Recent Labs     06/05/22  1547   TRIGLYCERIDE 279*     Recent Labs     06/05/22  1547   SODIUM 144   POTASSIUM 4.1   CHLORIDE 102   CO2 3*   GLUCOSE 92   BUN 14   CPKTOTAL 276*     Recent Labs     06/05/22  1547   SODIUM 144   POTASSIUM 4.1   CHLORIDE 102   CO2 3*   BUN 14   CREATININE 1.59*   CALCIUM 10.7*         No results found for this or any previous visit.      Imaging reviewed:    CT-HEAD W/O   Final Result      Head CT without contrast within normal limits. No evidence of acute cerebral infarction, hemorrhage or mass lesion.         DX-CHEST-PORTABLE (1 VIEW)   Final Result      No acute cardiac or pulmonary abnormalities are identified.      Endotracheal tube in place.             Assessment/Plan:  32 y.o. male with history of seizure disorder currently followed at [facility] epilepsy clinic who is supposed to be on Brivicat after he had some behavioral changes on Keppra, brought to emergency room for intractable seizure.  He is currently intubated and sedated with propofol.  At time of my visit he had some shivering of upper body  and chest area.  I suggested to administer some fentanyl to reduce his shivering.  He is on propofol 40 and he is slightly hypotensive and cannot increase propofol at this time.  He was loaded with 4 g of Keppra and placed on maintenance 1500 mg twice a day.  Brain CT does not reveal acute abnormalities.  He will be placed on continuous video EEG monitoring to make sure he does not have subclinical seizure.  He should be reported to [facility] for driving restriction.    We will continue to follow and adjust seizure medication based on his EEG monitoring.  Discussed with Dr. Taylor in the emergency room.  Total critical care time spent was 50 minutes.

## 2022-06-06 NOTE — PROGRESS NOTES
Replaced patient's pruitt secondary to urine leaking out around catheter. Tip of first catheter occluded with yellowish sediment. Replaced catheter and sent UA.

## 2022-06-06 NOTE — PROGRESS NOTES
NEUROLOGY PROGRESS NOTE      BACKGROUND:    32 y.o. male was admitted on 6/5/2022  3:42 PM for Status epilepticus (HCC) [G40.901].      SUBJECTIVE:   No seizure activity reported overnight.  Remained intubated till this morning and was successfully extubated this morning.  Currently very drowsy.    VITALS:  Vitals:    06/06/22 0900 06/06/22 1000 06/06/22 1100 06/06/22 1120   BP: 128/82 133/75 (!) 140/81    Pulse: (!) 58 71 98 81   Resp: (!) 43 (!) 23 (!) 21 (!) 23   Temp:       TempSrc:  Bladder     SpO2: 100% 100% 99% 100%   Weight:       Height:           NEUROLOGICAL EXAM:  He is a status post extubation this morning.  Very drowsy but arousable and able to follow simple commands.  Pupils are equal and reactive.  Extraocular movement full.  Visual fields are full to confrontation.  There is no facial asymmetry.  Facial sensation is intact.  He has generalized weakness but was able to squeeze my finger with both hands and move his leg with gravity eliminated.  Sensation appears intact to light touch.    OBJECTIVE:    NEUROIMAGING:    DX-ABDOMEN FOR TUBE PLACEMENT   Final Result      Enteric tube has been placed and the tip projects over the gastroduodenal junction      DX-CHEST-PORTABLE (1 VIEW)   Final Result      1.  No acute cardiopulmonary abnormality identified.      2.  Endotracheal tube and enteric catheter appear appropriately located      CT-HEAD W/O   Final Result      Head CT without contrast within normal limits. No evidence of acute cerebral infarction, hemorrhage or mass lesion.         DX-CHEST-PORTABLE (1 VIEW)   Final Result      No acute cardiac or pulmonary abnormalities are identified.      Endotracheal tube in place.          MEDICATIONS:  Current Facility-Administered Medications   Medication Dose Route Frequency Provider Last Rate Last Admin   • lactated ringers infusion   Intravenous Continuous Barry Arriaga M.D. 125 mL/hr at 06/06/22 0749 New Bag at 06/06/22 0749   • brivaracetam  (Briviact) tablet 100 mg  100 mg Enteral BID KATHLEEN Lima       • Respiratory Therapy Consult   Nebulization Continuous RT Gómez Marks M.D.       • senna-docusate (PERICOLACE or SENOKOT S) 8.6-50 MG per tablet 2 Tablet  2 Tablet Enteral Tube BID Gómez Marks M.D.        And   • polyethylene glycol/lytes (MIRALAX) PACKET 1 Packet  1 Packet Enteral Tube QDAY PRN Gómez Marks M.D.        And   • magnesium hydroxide (MILK OF MAGNESIA) suspension 30 mL  30 mL Enteral Tube QDAY PRN Gómez Marks M.D.        And   • bisacodyl (DULCOLAX) suppository 10 mg  10 mg Rectal QDAY PRN Gómez Marks M.D.       • MD Alert...ICU Electrolyte Replacement per Pharmacy   Other PHARMACY TO DOSE Gómez Marks M.D.       • lidocaine (XYLOCAINE) 1 % injection 2 mL  2 mL Tracheal Tube Q30 MIN PRN Gómez Marks M.D.       • Pharmacy Consult: Enteral tube insertion - review meds/change route/product selection  1 Each Other PHARMACY TO DOSE Gómez Marks M.D.       • insulin regular (HumuLIN R,NovoLIN R) injection  1-6 Units Subcutaneous Q6HRS Gómez Marks M.D.        And   • dextrose 50% (D50W) injection 25 g  25 g Intravenous Q15 MIN PRN Gómez Marks M.D.       • fentaNYL (SUBLIMAZE) injection  mcg   mcg Intravenous Q HOUR PRN Gómez Marks M.D.   100 mcg at 06/05/22 2035   • LORazepam (ATIVAN) injection 4 mg  4 mg Intravenous Q10 MIN PRN Gómez Marks M.D.   4 mg at 06/05/22 1753   • enoxaparin (Lovenox) inj 40 mg  40 mg Subcutaneous DAILY AT 1800 Gómez Marks M.D.       • acetaminophen (Tylenol) tablet 650 mg  650 mg Oral Q6HRS PRN Gómez Marks M.D.       • ondansetron (ZOFRAN) syringe/vial injection 4 mg  4 mg Intravenous Q4HRS PRN Gómez Marks M.D.       • ondansetron (ZOFRAN ODT) dispertab 4 mg  4 mg Enteral Tube Q4HRS PRN Gómez Marks M.D.       • promethazine (PHENERGAN) tablet 12.5-25 mg  12.5-25 mg Enteral Tube Q4HRS PRN Gómez Marks,  M.D.       • promethazine (PHENERGAN) suppository 12.5-25 mg  12.5-25 mg Rectal Q4HRS PRN Gómez Marks M.D.       • prochlorperazine (COMPAZINE) injection 5-10 mg  5-10 mg Intravenous Q4HRS PRN Gómez Marks M.D.           LABS:  Recent Labs     06/05/22  1547 06/05/22 2045 06/06/22 0423   CPKTOTAL 276* 663* 800*     Recent Labs     06/05/22 1547   WBC 18.2*   RBC 5.35   HEMOGLOBIN 16.6   HEMATOCRIT 56.4*   .4*   MCH 31.0   MCHC 29.4*   RDW 46.4   PLATELETCT 154*   MPV 12.6     Recent Labs     06/05/22  1547 06/05/22 2045 06/06/22 0423 06/06/22 0428   SODIUM 144 138  --  141   POTASSIUM 4.1 3.9  --  3.7   CHLORIDE 102 106  --  109   CO2 3* 18*  --  20   GLUCOSE 92 84  --  93   BUN 14 15  --  17   CPKTOTAL 276* 663* 800*  --      INR   Date Value Ref Range Status   09/26/2018 1.02 0.87 - 1.13 Final     Comment:     INR - Non-therapeutic Reference Range: 0.87-1.13  INR - Therapeutic Reference Range: 2.0-4.0       No results found for: POCINR  Lab Results   Component Value Date/Time    CREATININE 1.75 (H) 06/06/2022 0428     Lab Results   Component Value Date/Time    IFAFR[place] >60 01/21/2022 0928    IFNOTAFR >60 01/21/2022 0928     EEG 6/6/2022:  Abnormal video EEG recording in the drowsy/sleep and/or comatose state(s):  - No focal asymmetries   - No definitive epileptiform discharges or other epileptiform phenomena seen   - No definitive seizures. Clinical correlation is recommended.   - One event at 1046 PM with fine body tremors with no abnormal EEG correlate.      ASSESSMENT AND PLAN:  32-year-old male with history of seizure disorder and questionable compliance with seizure medication who was brought to emergency room yesterday for intractable seizure.  He was loaded with Keppra and placed on 1500 mg twice a day.  He has not had any seizure.  He was successfully extubated this morning.  Discussed with his fiancée and grandmother at bedside.  They are not sure if he is taking his antiepileptic  medication as prescribed.  Discussed the importance of being compliant with seizure medication with them and they are committed to make sure he takes his seizure medication.  We will discontinue Keppra and restart brivaracetam 100 mg twice a day.  He will follow-up with neurology clinic as an outpatient.  Will discontinue EEG monitoring.

## 2022-06-06 NOTE — ED NOTES
Med rec updated and complete. Allergies reviewed. Pt is unable at this time to participate in an interview. Placed call to listed home pharmacy ( [facility]) . Unable to confirm last doses taken.  No antibiotics noted on file.        Home pharmacy [facility] [phone]

## 2022-06-06 NOTE — PROGRESS NOTES
2157- Notified Dr. Marks of ST elevation alarm. STAT EKG ordered.    2206- EKG complete. Early repolarization shown. Okay per Dr. Marks.

## 2022-06-06 NOTE — PROGRESS NOTES
New lab orders rcvd. Unable to draw labs on pt. Dr. Arriaga at bedside.  CK lab priority.  Lab notified. CK added to morning labs. CAROLINE phlebotomist contacted via Voal[employer] to attempt lab draw.

## 2022-06-06 NOTE — PROGRESS NOTES
Pt transported to RICU 107 with RT, RN, CNA on EKG, NIBP, SpO2 monitors. VSS enroute.     Prior to transfer pt observed having upper body rhythmic shaking at 1750 x 1 minute. 4mg ativan administered.     Transferred to ICU bed. Monitoring continued. Full Skin check done x 2 RN (Henrik Ling). Skin intact with exception of infiltated IV in RUE. IV removed, cannula intact.

## 2022-06-06 NOTE — DIETARY
"Nutrition Support Assessment:  Day 1 of admit.  Barry Griffith is a 32 y.o. male with admitting DX of Status epilepticus.     Current problem list:  1. Status epilepticus  2. LILO  3. Acute respiratory failure with hypoxia  4. Elevated lipase  5. Seizure  6. Asthma     Assessment:  Estimated Nutritional Needs based on:   Height: 182.9 cm (6' 0.01\")  Weight: 69 kg (152 lb 1.9 oz) - bed scale  Weight to Use in Calculations: 69 kg (152 lb 1.9 oz)  Body mass index is 20.63 kg/m²., BMI classification: normal    Calculation/Equation: MSJ x 1.2 = 202 kcals  Total Calories / day:  - 2200  (Calories / k - 32)  Total Grams Protein / day: 69 - 83  (Grams Protein / k - 1.2)     Evaluation:   1. Consult received For TF.  2. Cortrak in place over gastroduodenal junction on KUB for enteral access.  3. Pt is on 3 L NC, extubated this am.  4. Skin: No wounds or edema noted.  5. Labs: Creat 1.75, GFR 53, Mag 2.7,   6. Meds: SSI not yet needed, colace, zofran prn, bowel protocol, LR infusion  7. Last BM: PTA  8. Standard formula indicated.     Malnutrition Risk: Unable to fully assess.     Recommendations/Plan:  1. Start TF Fibersource HN at 25 ml/hr and advance per protocol to goal rate of 65 ml/hr to provide 1872 kcals, 84 gm protein, and 1264 ml free water per day.  2. Fluids per MD.  3. Diet when appropriate.    RD following.              "

## 2022-06-06 NOTE — ASSESSMENT & PLAN NOTE
Intubated for persistent seizures    Lung protective ventilation strategies  Optimize oxygenation, ventilation, and acid base balance  ABCDEF Bundle   -wean down sedation and aim for extubation later today

## 2022-06-06 NOTE — PROCEDURES
CONTINUOUS VIDEO ELECTROENCEPHALOGRAM REPORT      Referring provider: Dr. Marks    DOS: 6/5/2022 (13 hours and 31 minutes of total recording time).     INDICATION:  Barry Griffith 32 y.o. male presenting with seizure(s)    CURRENT ANTIEPILEPTIC AND/OR SEDATING REGIMEN:   Propofol gtt 60  keppra    TECHNIQUE: CVEEG was set up by a Neurodiagnostic technologist who performed education to the patient and staff. A minimum of 23 electrodes and 23 channel recording was setup and performed by Neurodiagnostic technologist, in accordance with the international 10-20 system. Impedence, electrode integrity, and technical impressions were documented a minimum of every 2-24 hour period by a Neurodiagnostic Technologist and reviewed by Interpreting Physician. The study was reviewed in bipolar and referential montages. The recording examined the patient in the drowsy/sleep and/or comatose state(s).     DESCRIPTION OF THE RECORD:  The background was initially continuous,  symmetrical, and without a waking posterior dominant rhythm. However, the last 4 hours there was the emergence of 7-8 Hz posterior dominant rhythm as sedation was decreased. The background was also initially composed diffuse delta, at times rhythmic at 1-1.5 Hz, with overriding alpha/beta activity . Reactivity was presentt. Spontaneous variability was present. State changes were initially absent but were present as sedation was weaned.   Sleep was not captured.    ACTIVATION PROCEDURES:   NA    ICTAL AND INTERICTAL FINDINGS:   No focal or generalized epileptiform activity noted.     No regional slowing was seen during this routine study.      No electroclinical or electrographic seizures were reported or recorded during the study.     EKG: Sampling of the EKG recording did not demonstrate any abnormalities    EVENTS:  One event at 1046 PM with fine body tremors with no abnormal EEG correlate.     INTERPRETATION:  Abnormal video EEG recording in the  drowsy/sleep and/or comatose state(s):  -Moderate improving to mild background slowing suggestive of a non-specific encephalopathy. The effects of sedation may be contributing. Clinical correlation recommended.   - No focal asymmetries   - No definitive epileptiform discharges or other epileptiform phenomena seen   - No definitive seizures. Clinical correlation is recommended.   - One event at 1046 PM with fine body tremors with no abnormal EEG correlate.       Rosalino Baker MD  Epilepsy and General Neurology  Department of Neurology  Instructor of Clinical Neurology [facility].   Phone: [phone]

## 2022-06-06 NOTE — PROGRESS NOTES
Critical Care Medicine Faculty Progress Note    Brief HPI/problem list:  32y M w/ hx of seizure d/o presents with seizure 6/5 intubated and admitted to ICU. + THC on UDS. Prior intolerance to keppra rage placed on briviact.      Daily exam: with weaning sedation he is awake following commands and moving ext while intubated, lungs cta, heart rrr, abdomen soft, ext without edema, skin intact    Daily Multi discipline Rounding Report:  Neuro: cEEG rass -4 weaning this am Q2 neuro checks prop at 20, moving all not following yet  HR: 60-80's  SBP: goal < 180 100-150's  Tmax: 100.9  GI: TF, BM pta  UOP: pruitt sediment, clog on first pruitt  Lines: peripheral IV's  Resp: vent day 2 8 24 470 8 40% SBT not preformed   Vte: lovenox  PPI/H2:pepcid  Antibx: none    Plan of care:  Net + 3L (334ml + last 24hrs)  Cpk  LR @ 125ml  Wean sedation SAT/SBT/extubate  k replace  Keppra load and 1500mg BID -> briviact 100mg BID  Patient extubated well with family at bedside, discussed plan of care  Stop cEEG  Continue seizure precautions    Patient remains in critical condition from extubation trial and weaning sedation. Critical care time provided was 47 minutes. This excludes all separate billable procedures.     Please see [employer]/NP notes for additional documentation    Barry Arriaga MD  Critical Care Medicine

## 2022-06-06 NOTE — PROGRESS NOTES
Cortrak Placement    Tube Team verified patient name and medical record number prior to tube placement.  Cortrak tube (43 inches, 8 Citizen of [place]) placed at 85 cm in right nare.  Per Cortrak picture, tube appears to be in the stomach.  Nursing Instructions: Awaiting KUB to confirm placement before use for medications or feeding. Once placement confirmed, flush tube with 30 ml of water, and then remove and save stylet, in patient medication drawer.

## 2022-06-06 NOTE — ASSESSMENT & PLAN NOTE
Pre-renal, acidosis from lactate elevation, ck 800, cr uptrending currently at 1.75, UA no significant pyuria or casts    -Increase LR to 125cc/hr  Strict I/Os  Renally dosed medications  Avoid nephrotoxic agents as able  Trend

## 2022-06-06 NOTE — ASSESSMENT & PLAN NOTE
Intubated in the ER after persistent seizures   CTH reassuring    Switch to [id number] BID  cVEEG  Neuro consulted

## 2022-06-07 ENCOUNTER — HOSPITAL ENCOUNTER (OUTPATIENT)
Dept: RADIOLOGY | Facility: MEDICAL CENTER | Age: 32
End: 2022-06-07
Attending: INTERNAL MEDICINE
Payer: COMMERCIAL

## 2022-06-07 PROBLEM — D69.6 THROMBOCYTOPENIA (HCC): Status: ACTIVE | Noted: 2022-06-07

## 2022-06-07 PROBLEM — F19.10 SUBSTANCE ABUSE (HCC): Status: ACTIVE | Noted: 2022-06-07

## 2022-06-07 LAB
ANION GAP SERPL CALC-SCNC: 11 MMOL/L (ref 7–16)
BASOPHILS # BLD AUTO: 0.3 % (ref 0–1.8)
BASOPHILS # BLD: 0.03 K/UL (ref 0–0.12)
BUN SERPL-MCNC: 17 MG/DL (ref 8–22)
CALCIUM SERPL-MCNC: 8.7 MG/DL (ref 8.5–10.5)
CHLORIDE SERPL-SCNC: 115 MMOL/L (ref 96–112)
CO2 SERPL-SCNC: 20 MMOL/L (ref 20–33)
CREAT SERPL-MCNC: 1.99 MG/DL (ref 0.5–1.4)
EOSINOPHIL # BLD AUTO: 0.07 K/UL (ref 0–0.51)
EOSINOPHIL NFR BLD: 0.8 % (ref 0–6.9)
ERYTHROCYTE [DISTWIDTH] IN BLOOD BY AUTOMATED COUNT: 42.1 FL (ref 35.9–50)
GFR SERPLBLD CREATININE-BSD FMLA CKD-EPI: 45 ML/MIN/1.73 M 2
GLUCOSE SERPL-MCNC: 84 MG/DL (ref 65–99)
HCT VFR BLD AUTO: 38.2 % (ref 42–52)
HGB BLD-MCNC: 12.7 G/DL (ref 14–18)
IMM GRANULOCYTES # BLD AUTO: 0.04 K/UL (ref 0–0.11)
IMM GRANULOCYTES NFR BLD AUTO: 0.4 % (ref 0–0.9)
LYMPHOCYTES # BLD AUTO: 1.91 K/UL (ref 1–4.8)
LYMPHOCYTES NFR BLD: 20.5 % (ref 22–41)
MAGNESIUM SERPL-MCNC: 2.3 MG/DL (ref 1.5–2.5)
MCH RBC QN AUTO: 31.4 PG (ref 27–33)
MCHC RBC AUTO-ENTMCNC: 33.2 G/DL (ref 33.7–35.3)
MCV RBC AUTO: 94.3 FL (ref 81.4–97.8)
MONOCYTES # BLD AUTO: 0.79 K/UL (ref 0–0.85)
MONOCYTES NFR BLD AUTO: 8.5 % (ref 0–13.4)
NEUTROPHILS # BLD AUTO: 6.46 K/UL (ref 1.82–7.42)
NEUTROPHILS NFR BLD: 69.5 % (ref 44–72)
NRBC # BLD AUTO: 0 K/UL
NRBC BLD-RTO: 0 /100 WBC
PHOSPHATE SERPL-MCNC: 3.6 MG/DL (ref 2.5–4.5)
PLATELET # BLD AUTO: 90 K/UL (ref 164–446)
PMV BLD AUTO: 11.4 FL (ref 9–12.9)
POTASSIUM SERPL-SCNC: 3.9 MMOL/L (ref 3.6–5.5)
RBC # BLD AUTO: 4.05 M/UL (ref 4.7–6.1)
SODIUM SERPL-SCNC: 146 MMOL/L (ref 135–145)
WBC # BLD AUTO: 9.3 K/UL (ref 4.8–10.8)

## 2022-06-07 PROCEDURE — 99232 SBSQ HOSP IP/OBS MODERATE 35: CPT | Performed by: PSYCHIATRY & NEUROLOGY

## 2022-06-07 PROCEDURE — 85025 COMPLETE CBC W/AUTO DIFF WBC: CPT

## 2022-06-07 PROCEDURE — 84100 ASSAY OF PHOSPHORUS: CPT

## 2022-06-07 PROCEDURE — 80048 BASIC METABOLIC PNL TOTAL CA: CPT

## 2022-06-07 PROCEDURE — 71045 X-RAY EXAM CHEST 1 VIEW: CPT

## 2022-06-07 PROCEDURE — A9270 NON-COVERED ITEM OR SERVICE: HCPCS | Performed by: STUDENT IN AN ORGANIZED HEALTH CARE EDUCATION/TRAINING PROGRAM

## 2022-06-07 PROCEDURE — 700102 HCHG RX REV CODE 250 W/ 637 OVERRIDE(OP): Performed by: NURSE PRACTITIONER

## 2022-06-07 PROCEDURE — 700105 HCHG RX REV CODE 258: Performed by: INTERNAL MEDICINE

## 2022-06-07 PROCEDURE — 700102 HCHG RX REV CODE 250 W/ 637 OVERRIDE(OP): Performed by: STUDENT IN AN ORGANIZED HEALTH CARE EDUCATION/TRAINING PROGRAM

## 2022-06-07 PROCEDURE — A9270 NON-COVERED ITEM OR SERVICE: HCPCS | Performed by: NURSE PRACTITIONER

## 2022-06-07 PROCEDURE — 99223 1ST HOSP IP/OBS HIGH 75: CPT | Performed by: STUDENT IN AN ORGANIZED HEALTH CARE EDUCATION/TRAINING PROGRAM

## 2022-06-07 PROCEDURE — 770001 HCHG ROOM/CARE - MED/SURG/GYN PRIV*

## 2022-06-07 PROCEDURE — 83735 ASSAY OF MAGNESIUM: CPT

## 2022-06-07 RX ORDER — ACETAMINOPHEN 325 MG/1
650 TABLET ORAL EVERY 6 HOURS PRN
Status: DISCONTINUED | OUTPATIENT
Start: 2022-06-07 | End: 2022-06-08 | Stop reason: HOSPADM

## 2022-06-07 RX ORDER — POTASSIUM CHLORIDE 20 MEQ/1
40 TABLET, EXTENDED RELEASE ORAL ONCE
Status: COMPLETED | OUTPATIENT
Start: 2022-06-07 | End: 2022-06-07

## 2022-06-07 RX ORDER — POLYETHYLENE GLYCOL 3350 17 G/17G
1 POWDER, FOR SOLUTION ORAL
Status: DISCONTINUED | OUTPATIENT
Start: 2022-06-07 | End: 2022-06-08 | Stop reason: HOSPADM

## 2022-06-07 RX ORDER — PROMETHAZINE HYDROCHLORIDE 25 MG/1
12.5-25 TABLET ORAL EVERY 4 HOURS PRN
Status: DISCONTINUED | OUTPATIENT
Start: 2022-06-07 | End: 2022-06-08 | Stop reason: HOSPADM

## 2022-06-07 RX ORDER — AMOXICILLIN 250 MG
2 CAPSULE ORAL 2 TIMES DAILY
Status: DISCONTINUED | OUTPATIENT
Start: 2022-06-07 | End: 2022-06-08 | Stop reason: HOSPADM

## 2022-06-07 RX ORDER — BISACODYL 10 MG
10 SUPPOSITORY, RECTAL RECTAL
Status: DISCONTINUED | OUTPATIENT
Start: 2022-06-07 | End: 2022-06-08 | Stop reason: HOSPADM

## 2022-06-07 RX ORDER — ONDANSETRON 4 MG/1
4 TABLET, ORALLY DISINTEGRATING ORAL EVERY 4 HOURS PRN
Status: DISCONTINUED | OUTPATIENT
Start: 2022-06-07 | End: 2022-06-08 | Stop reason: HOSPADM

## 2022-06-07 RX ADMIN — BRIVARACETAM 100 MG: 50 TABLET, FILM COATED ORAL at 17:07

## 2022-06-07 RX ADMIN — POTASSIUM CHLORIDE 40 MEQ: 20 TABLET, EXTENDED RELEASE ORAL at 10:48

## 2022-06-07 RX ADMIN — BRIVARACETAM 100 MG: 50 TABLET, FILM COATED ORAL at 05:50

## 2022-06-07 RX ADMIN — SODIUM CHLORIDE, POTASSIUM CHLORIDE, SODIUM LACTATE AND CALCIUM CHLORIDE: 600; 310; 30; 20 INJECTION, SOLUTION INTRAVENOUS at 08:38

## 2022-06-07 ASSESSMENT — COPD QUESTIONNAIRES
DURING THE PAST 4 WEEKS HOW MUCH DID YOU FEEL SHORT OF BREATH: NONE/LITTLE OF THE TIME
HAVE YOU SMOKED AT LEAST 100 CIGARETTES IN YOUR ENTIRE LIFE: NO/DON'T KNOW
COPD SCREENING SCORE: 0
DO YOU EVER COUGH UP ANY MUCUS OR PHLEGM?: NO/ONLY WITH OCCASIONAL COLDS OR INFECTIONS

## 2022-06-07 ASSESSMENT — ENCOUNTER SYMPTOMS
VOMITING: 0
MYALGIAS: 0
DIZZINESS: 0
CHILLS: 0
PALPITATIONS: 0
HEARTBURN: 0
INSOMNIA: 0
TINGLING: 0
FALLS: 0
COUGH: 0
HEMOPTYSIS: 0
MEMORY LOSS: 0
BRUISES/BLEEDS EASILY: 0
BLURRED VISION: 0
DOUBLE VISION: 0
DEPRESSION: 0
ABDOMINAL PAIN: 0
FEVER: 0
HEADACHES: 0
NAUSEA: 0
FLANK PAIN: 0
NECK PAIN: 0

## 2022-06-07 ASSESSMENT — PAIN DESCRIPTION - PAIN TYPE
TYPE: ACUTE PAIN
TYPE: ACUTE PAIN

## 2022-06-07 NOTE — PROGRESS NOTES
[facility] ICU Progress Note      Admit Date: 6/5/2022    Resident(s): Heidi Arreola M.D.   Attending:  NEYMAR GOMEZ/ Dr. Arriaga    Patient ID:    Name:  Barry Griffith   YOB: 1990  Age:  32 y.o.  male   MRN:  [id number]    Hospital Course (carried forward and updated):  32 y.o. male with a seizure disorder and history of non-compliance who presented 6/5/2022 with seizures. He was given benzodiazepines in the field by EMS, continued to be altered so given additional benzodiazepines then intubated. Transferred to ICU for further management.    Wife states he was overall in his usual state of health - had complained of some abdominal discomfort but this is not unusual for him per her report. She questions his compliance with medications.   CTH no acute process; bicarb 3, Cr 1.59, AG 39; lipase 235    Consultants:  Critical Care  Neurology    Interval Events:  6/6: No acute events overnight, patient sedated this a.m., titrating down sedation with plan to extubate later today.  6/7: Extubated yesterday am, doing well on RA, no further seizure episode, patient alert today and states he took his medications on and off but says it works well for him.          Vitals Range last 24h:  Pulse:  [49-98] 56  Resp:  [10-43] 24  BP: (106-146)/(56-82) 135/77  SpO2:  [92 %-100 %] 95 %      Intake/Output Summary (Last 24 hours) at 6/7/2022 0817  Last data filed at 6/7/2022 0600  Gross per 24 hour   Intake 3191.08 ml   Output 2725 ml   Net 466.08 ml        Review of Systems   Constitutional: Negative for chills and fever.   HENT: Negative for hearing loss and tinnitus.    Eyes: Negative for blurred vision and double vision.   Respiratory: Negative for cough and hemoptysis.    Cardiovascular: Negative for chest pain and palpitations.   Gastrointestinal: Negative for abdominal pain, nausea and vomiting.   Genitourinary: Negative for dysuria and flank pain.   Musculoskeletal: Negative for falls and myalgias.    Neurological: Negative for tingling and headaches.   Psychiatric/Behavioral: Negative for memory loss. The patient does not have insomnia.         PHYSICAL EXAM:  Vitals:    06/07/22 0300 06/07/22 0400 06/07/22 0500 06/07/22 0600   BP: 116/62 121/59  135/77   Pulse: (!) 56 63 (!) 49 (!) 56   Resp: (!) 21 18 20 (!) 24   Temp:       TempSrc:  Bladder     SpO2: 94% 94% 94% 95%   Weight:       Height:        Body mass index is 20.63 kg/m².    O2 therapy: Pulse Oximetry: 95 %, O2 (LPM): 1, O2 Delivery Device: None - Room Air         Physical Exam  Constitutional:       General: He is not in acute distress.     Appearance: He is not ill-appearing.   HENT:      Head: Normocephalic.   Eyes:      Conjunctiva/sclera: Conjunctivae normal.      Pupils: Pupils are equal, round, and reactive to light.   Cardiovascular:      Rate and Rhythm: Normal rate and regular rhythm.      Heart sounds: Normal heart sounds.   Pulmonary:      Effort: No respiratory distress.      Breath sounds: Normal breath sounds.   Abdominal:      General: There is no distension.      Palpations: Abdomen is soft.   Musculoskeletal:      Right lower leg: No edema.      Left lower leg: No edema.   Skin:     Capillary Refill: Capillary refill takes less than 2 seconds.   Neurological:      General: No focal deficit present.      Mental Status: He is oriented to person, place, and time.      Cranial Nerves: No cranial nerve deficit.      Sensory: No sensory deficit.      Motor: No weakness.         Recent Labs     06/05/22  1714 06/05/22  1731 06/06/22  0324   ISTATAPH 7.112* 7.234* 7.493   ISTATAPCO2 48.2* 43.7* 26.9   ISTATAPO2 30* 111* 123*   ISTATATCO2 17* 20 21   OHXMZPU5WML 39* 97 99   ISTATARTHCO3 15.4* 18.5 20.7   ISTATARTBE -14* -9* -1   ISTATTEMP see below see below 99.3 F   ISTATFIO2 60 60 50   ISTATSPEC Arterial Arterial Arterial   ISTATAPHTC  --   --  7.487   IEFLLUEF9TM  --   --  125*     Recent Labs     06/05/22  2045 06/06/22  0424  06/07/22  0415   SODIUM 138 141 146*   POTASSIUM 3.9 3.7 3.9   CHLORIDE 106 109 115*   CO2 18* 20 20   BUN 15 17 17   CREATININE 1.45* 1.75* 1.99*   MAGNESIUM  --  2.7* 2.3   PHOSPHORUS  --  3.2 3.6   CALCIUM 8.7 8.7 8.7     Recent Labs     06/05/22  1547 06/05/22  2045 06/06/22  0428 06/07/22  0415   ALTSGPT 24 19 16  --    ASTSGOT 33 36 43  --    ALKPHOSPHAT 88 56 54  --    TBILIRUBIN 0.3 0.4 0.4  --    LIPASE 235*  --  34  --    PREALBUMIN  --   --  31.6  --    GLUCOSE 92 84 93 84     Recent Labs     06/05/22  1547   RBC 5.35   HEMOGLOBIN 16.6   HEMATOCRIT 56.4*   PLATELETCT 154*     Recent Labs     06/05/22  1547 06/05/22 2045 06/06/22  0428   WBC 18.2*  --   --    NEUTSPOLYS 13.00*  --   --    LYMPHOCYTES 80.00*  --   --    MONOCYTES 4.40  --   --    EOSINOPHILS 0.00  --   --    BASOPHILS 0.00  --   --    ASTSGOT 33 36 43   ALTSGPT 24 19 16   ALKPHOSPHAT 88 56 54   TBILIRUBIN 0.3 0.4 0.4       Meds:  • LR   125 mL/hr at 06/06/22 1652   • brivaracetam  100 mg     • acetaminophen  650 mg     • Respiratory Therapy Consult       • senna-docusate  2 Tablet      And   • polyethylene glycol/lytes  1 Packet      And   • magnesium hydroxide  30 mL      And   • bisacodyl  10 mg     • MD Alert...Adult ICU Electrolyte Replacement per Pharmacy       • Pharmacy  1 Each     • LORazepam  4 mg     • enoxaparin (LOVENOX) injection  40 mg     • ondansetron  4 mg     • ondansetron  4 mg     • promethazine  12.5-25 mg     • promethazine  12.5-25 mg     • prochlorperazine  5-10 mg          Procedures:  none    Imaging:  DX-CHEST-PORTABLE (1 VIEW)   Final Result      1.  There is no acute cardiopulmonary process.      DX-ABDOMEN FOR TUBE PLACEMENT   Final Result      Enteric tube has been placed and the tip projects over the gastroduodenal junction      DX-CHEST-PORTABLE (1 VIEW)   Final Result      1.  No acute cardiopulmonary abnormality identified.      2.  Endotracheal tube and enteric catheter appear appropriately located       CT-HEAD W/O   Final Result      Head CT without contrast within normal limits. No evidence of acute cerebral infarction, hemorrhage or mass lesion.         DX-CHEST-PORTABLE (1 VIEW)   Final Result      No acute cardiac or pulmonary abnormalities are identified.      Endotracheal tube in place.          ASSESSEMENT and PLAN:    * Status epilepticus (HCC)- (present on admission)  Assessment & Nelson  -switched back to briviact 100 bid  -needs to be reported to [facility]    Elevated lipase  Assessment & Plan  No abdominal tenderness  Trended down      Acute respiratory failure with hypoxia (HCC)  Assessment & Plan  Intubated for persistent seizures on presentation, extubated 6/6, currently on RA.    LILO (acute kidney injury) (HCC)  Assessment & Plan  Pre-renal, acidosis from lactate elevation, ck 800, cr uptrending, UA no significant pyuria or casts    Continue LR to 125cc/hr  Strict I/Os  Renally dosed medications  Avoid nephrotoxic agents as able  Trend , if increasing needs renal imaging.    Seizure (HCC)- (present on admission)  Assessment & Plan  Suppose to be on brivaracetam, unclear if these seizures represent treatment failure or non-compliance although family is concerned he does not take his medication as he should.   -switched back from keppra to briviact 100 bid.  -q4 neurochecks  - neurology on board  -transfer to medical floor      Asthma  Assessment & Plan  Albuterol prn wheezing      DISPO: transfer to medical floor    CODE STATUS: Full code        Heidi Arreola M.D.

## 2022-06-07 NOTE — ASSESSMENT & PLAN NOTE
LILO on CKD   Continue with IVF   Monitor Cr  Avoid nephrotoxic agents  Renally adjust all medications   Consult nephrology if LILO not resolving

## 2022-06-07 NOTE — DIETARY
Nutrition services: Brief Update    Pt extubated 6/6, passed RN bedside swallow eval and regular diet started. Agree with regular diet. RD will follow and monitor PO intake, first meal of % noted.    RD following.

## 2022-06-07 NOTE — CONSULTS
Hospital Medicine Consultation    Date of Service  6/7/2022    Referring Physician  Garcia Sanches M.D.    Consulting Physician  Garcia Sanches M.D.    Reason for Consultation  Status epilepticus s/p extubation - transfer to floor     History of Presenting Illness  32 y.o. male past medical history of seizure disorder who presented 6/5/2022 with seizures was subsequently intubated in the ER admitted to ICU.  Patient was extubated yesterday 6/6/2022 is currently doing well this morning sitting on chair. He is being followed by neurology. Question compliance as his family reports he has not been taking his medications appropriately. He is being transferred to neuroscience floor as he continues to have kidney dysfunction currently receiving IVF.     Patient examined bedside is AAOx 4 not in any distress. He denies any chest pain, shortness of breath, nausea or vomiting.    Review of Systems  Review of Systems   Constitutional: Negative for chills and fever.   HENT: Negative for hearing loss and tinnitus.    Eyes: Negative for blurred vision and double vision.   Respiratory: Negative for cough and hemoptysis.    Cardiovascular: Negative for chest pain and palpitations.   Gastrointestinal: Negative for heartburn and nausea.   Genitourinary: Negative for dysuria and urgency.   Musculoskeletal: Negative for myalgias and neck pain.   Skin: Negative for itching and rash.   Neurological: Negative for dizziness and headaches.   Endo/Heme/Allergies: Does not bruise/bleed easily.   Psychiatric/Behavioral: Negative for depression and suicidal ideas.       Past Medical History   has a past medical history of ASTHMA, Mild intermittent asthma with acute exacerbation (6/10/2016), Seizure (HCC), and Seizure disorder (HCC).    Surgical History  Reviewed and not pertinent     Family History  family history includes Hyperlipidemia in his father; Hypertension in his father and mother.    Social History   reports that he has never  smoked. He has never used smokeless tobacco. He reports current drug use. Drug: Marijuana. He reports that he does not drink alcohol.    Medications  Current Facility-Administered Medications   Medication Dose Route Frequency Provider Last Rate Last Admin   • potassium chloride SA (Kdur) tablet 40 mEq  40 mEq Oral Once Garcia Sanches M.D.       • lactated ringers infusion   Intravenous Continuous Barry Arriaga M.D. 125 mL/hr at 06/07/22 0838 New Bag at 06/07/22 0838   • brivaracetam (Briviact) tablet 100 mg  100 mg Enteral BID FAWAD [place].FIDEL   100 mg at 06/07/22 0550   • acetaminophen (Tylenol) tablet 650 mg  650 mg Enteral Tube Q6HRS PRN Barry Arriaga M.D.   650 mg at 06/06/22 1712   • Respiratory Therapy Consult   Nebulization Continuous RT Gómez Marks M.D.       • senna-docusate (PERICOLACE or SENOKOT S) 8.6-50 MG per tablet 2 Tablet  2 Tablet Enteral Tube BID Gómez Marks M.D.        And   • polyethylene glycol/lytes (MIRALAX) PACKET 1 Packet  1 Packet Enteral Tube QDAY PRN Gómez Marks M.D.        And   • magnesium hydroxide (MILK OF MAGNESIA) suspension 30 mL  30 mL Enteral Tube QDAY PRN Gómez Marks M.D.        And   • bisacodyl (DULCOLAX) suppository 10 mg  10 mg Rectal QDAY PRN Gómez Marks M.D.       • MD Alert...ICU Electrolyte Replacement per Pharmacy   Other PHARMACY TO DOSE Gómez Marks M.D.       • Pharmacy Consult: Enteral tube insertion - review meds/change route/product selection  1 Each Other PHARMACY TO DOSE Gómez Marks M.D.       • LORazepam (ATIVAN) injection 4 mg  4 mg Intravenous Q10 MIN PRN Gómez Marks M.D.   4 mg at 06/05/22 1753   • enoxaparin (Lovenox) inj 40 mg  40 mg Subcutaneous DAILY AT 1800 Gómez Marks M.D.   40 mg at 06/06/22 1702   • ondansetron (ZOFRAN) syringe/vial injection 4 mg  4 mg Intravenous Q4HRS PRN Gómez Marks M.D.       • ondansetron (ZOFRAN ODT) dispertab 4 mg  4 mg Enteral Tube Q4HRS PRN Gómez  MARQUITA Marks M.D.       • promethazine (PHENERGAN) tablet 12.5-25 mg  12.5-25 mg Enteral Tube Q4HRS PRN Gómez Marks M.D.       • promethazine (PHENERGAN) suppository 12.5-25 mg  12.5-25 mg Rectal Q4HRS PRN Gómez Marks M.D.       • prochlorperazine (COMPAZINE) injection 5-10 mg  5-10 mg Intravenous Q4HRS PRN Gómez Marks M.D.           Allergies  No Known Allergies    Physical Exam  Pulse:  [49-98] 95  Resp:  [10-33] 23  BP: (106-146)/(55-82) 141/78  SpO2:  [91 %-100 %] 91 %    Physical Exam  Vitals and nursing note reviewed.   Constitutional:       General: He is not in acute distress.     Appearance: Normal appearance. He is not ill-appearing.   HENT:      Head: Normocephalic and atraumatic.      Right Ear: Tympanic membrane normal.      Left Ear: Tympanic membrane normal.      Nose: Nose normal.      Mouth/Throat:      Mouth: Mucous membranes are moist.      Pharynx: Oropharynx is clear.   Eyes:      Extraocular Movements: Extraocular movements intact.      Pupils: Pupils are equal, round, and reactive to light.   Cardiovascular:      Rate and Rhythm: Normal rate and regular rhythm.      Pulses: Normal pulses.      Heart sounds: Normal heart sounds.   Pulmonary:      Effort: Pulmonary effort is normal. No respiratory distress.      Breath sounds: Normal breath sounds. No stridor. No wheezing or rhonchi.   Abdominal:      General: Bowel sounds are normal. There is no distension.      Palpations: Abdomen is soft. There is no mass.      Tenderness: There is no abdominal tenderness.      Hernia: No hernia is present.   Musculoskeletal:         General: No swelling, tenderness, deformity or signs of injury. Normal range of motion.      Cervical back: Neck supple.      Right lower leg: No edema.   Skin:     General: Skin is warm.      Capillary Refill: Capillary refill takes less than 2 seconds.      Coloration: Skin is not jaundiced or pale.      Findings: No bruising or erythema.   Neurological:       General: No focal deficit present.      Mental Status: He is alert and oriented to person, place, and time. Mental status is at baseline.      Cranial Nerves: No cranial nerve deficit.      Sensory: No sensory deficit.      Motor: No weakness.      Coordination: Coordination normal.   Psychiatric:         Mood and Affect: Mood normal.         Behavior: Behavior normal.         Fluids      Laboratory  Recent Labs     06/05/22  1547   WBC 18.2*   RBC 5.35   HEMOGLOBIN 16.6   HEMATOCRIT 56.4*   .4*   MCH 31.0   MCHC 29.4*   RDW 46.4   PLATELETCT 154*   MPV 12.6     Recent Labs     06/05/22  2045 06/06/22  0428 06/07/22  0415   SODIUM 138 141 146*   POTASSIUM 3.9 3.7 3.9   CHLORIDE 106 109 115*   CO2 18* 20 20   GLUCOSE 84 93 84   BUN 15 17 17   CREATININE 1.45* 1.75* 1.99*   CALCIUM 8.7 8.7 8.7              Recent Labs     06/05/22  1547 06/06/22  0428   TRIGLYCERIDE 279* 179*        Assessment/Plan  * Status epilepticus (HCC)- (present on admission)  Assessment & Plan  Resolved   Transfer out of ICU to Neuroscience floor   Continue with 100 mg BID  IV ativan prn    Seizure precautions       Seizure (HCC)- (present on admission)  Assessment & Plan  Question compliance  Continue with briviact 100 mg BID   Ativan prn   Seizure precautions     Thrombocytopenia (HCC)  Assessment & Plan  Chronic  No signs of active bleeding   Monitor     Substance abuse ([facility])  Assessment & Plan  Utox positive for THC   Counseled on cessation and hazards associated with continued use.    LILO (acute kidney injury) ([facility])  Assessment & Plan  LILO on CKD   Continue with IVF   Monitor Cr  Avoid nephrotoxic agents  Renally adjust all medications   Consult nephrology if LILO not resolving       DVT ppx: Lovenox

## 2022-06-07 NOTE — ASSESSMENT & PLAN NOTE
Resolved   Transfer out of ICU to Neuroscience floor   Continue with 100 mg BID  IV ativan prn    Seizure precautions

## 2022-06-07 NOTE — DISCHARGE PLANNING
Case Management Discharge Planning    Admission Date: 6/5/2022  GMLOS: 4.3  ALOS: 2    6-Clicks ADL Score:    6-Clicks Mobility Score:        Anticipated Discharge Dispo: Discharge Disposition: Discharged to home/self care (01)  Discharge Address: [place] [address]  Discharge Contact Phone Number: [phone]    DME Needed: No    Action(s) Taken: Referred to MSW to have partner retrieve his vehicle from tow yard.  Pt states significant other will transport patient to home and assist him as needed once he returns home.  Pt states he does not have any DME at home.  Pt lives in a one story apartment on the second floor with 16 steps up to the door.    Escalations Completed: Social Work    Medically Clear: Yes    Next Steps: monitor for emerging needs    Barriers to Discharge: Pending Placement    Is the patient up for discharge tomorrow: No     Care Transition Team Assessment    Information Source  Orientation Level: Oriented X4  Information Given By: Patient       Elopement Risk  Legal Hold: No  Ambulatory or Self Mobile in Wheelchair: No-Not an Elopement Risk  Elopement Risk: Not at Risk for Elopement    Interdisciplinary Discharge Planning  Does Admitting Nurse Feel This Could be a Complex Discharge?: No  Primary Care Physician: Mine Nunes  Lives with - Patient's Self Care Capacity: Significant Other  Support Systems: Spouse / Significant Other  Housing / Facility: 1 Story Apartment / Condo  Do You Take your Prescribed Medications Regularly: No  Mobility Issues: No    Discharge Preparedness  What is your plan after discharge?: Home with help  What are your discharge supports?: Partner  Prior Functional Level: Ambulatory, Drives Self, Independent with Activities of Daily Living  Difficulity with ADLs: None  Difficulity with IADLs: None    Functional Assesment  Prior Functional Level: Ambulatory, Drives Self, Independent with Activities of Daily Living     Anticipated Discharge  Information  Discharge Disposition: Discharged to home/self care (01)  Discharge Address: [facility] [place] [address]  Discharge Contact Phone Number: [phone]

## 2022-06-07 NOTE — DISCHARGE PLANNING
LMSW faxed tow rd letter to [facility] so that pt's S/O can retrieve his vehicle and belongings within. (F: [phone])    LMSW spoke with S/O and updated her. LMSW emailed copy of letter to her as well. ([email or web address])     No additional needs at this time.

## 2022-06-07 NOTE — PROGRESS NOTES
NEUROLOGY PROGRESS NOTE      BACKGROUND:    32 y.o. male was admitted on 6/5/2022  3:42 PM for Status epilepticus (HCC) [G40.901].      SUBJECTIVE:   No new complaints.  Awake, alert and fully oriented.  No more seizure reported.    VITALS:  Vitals:    06/07/22 1412 06/07/22 1500 06/07/22 1528 06/07/22 1600   BP: 128/75 128/75 130/77 130/77   Pulse: (!) 55  (!) 51    Resp: (!) 21  18    Temp: 36.9 °C (98.5 °F)  37.2 °C (99 °F)    TempSrc: Temporal  Temporal    SpO2: 99% 99% 99% 98%   Weight:       Height:           NEUROLOGICAL EXAM:  He is awake, alert and fully oriented.  Speech and memory within normal limit.  Facial motor and sensations are intact.  Pupils are equal, round and reactive to light.  Motor examination reveals normal strength direct testing of both upper and lower extremity, proximal and distal.  Sensation intact to light touch temperature and pinprick.  Coordination intact finger-to-nose testing..    OBJECTIVE:    NEUROIMAGING:    DX-CHEST-PORTABLE (1 VIEW)   Final Result      1.  There is no acute cardiopulmonary process.      DX-ABDOMEN FOR TUBE PLACEMENT   Final Result      Enteric tube has been placed and the tip projects over the gastroduodenal junction      DX-CHEST-PORTABLE (1 VIEW)   Final Result      1.  No acute cardiopulmonary abnormality identified.      2.  Endotracheal tube and enteric catheter appear appropriately located      CT-HEAD W/O   Final Result      Head CT without contrast within normal limits. No evidence of acute cerebral infarction, hemorrhage or mass lesion.         DX-CHEST-PORTABLE (1 VIEW)   Final Result      No acute cardiac or pulmonary abnormalities are identified.      Endotracheal tube in place.          MEDICATIONS:  Current Facility-Administered Medications   Medication Dose Route Frequency Provider Last Rate Last Admin   • brivaracetam (Briviact) tablet 100 mg  100 mg Oral BID Garcia Sanches M.D.       • senna-docusate (PERICOLACE or SENOKOT S) 8.6-50 MG per  tablet 2 Tablet  2 Tablet Oral BID Garcia Sanches M.D.        And   • polyethylene glycol/lytes (MIRALAX) PACKET 1 Packet  1 Packet Oral QDAY PRN Garcia Sanches M.D.        And   • magnesium hydroxide (MILK OF MAGNESIA) suspension 30 mL  30 mL Oral QDAY PRN Garcia Sanches M.D.        And   • bisacodyl (DULCOLAX) suppository 10 mg  10 mg Rectal QDAY PRN Garcia Sanches M.D.       • acetaminophen (Tylenol) tablet 650 mg  650 mg Oral Q6HRS PRN Garcia Sanches M.D.       • ondansetron (ZOFRAN ODT) dispertab 4 mg  4 mg Oral Q4HRS PRLUISA Sanches M.D.       • promethazine (PHENERGAN) tablet 12.5-25 mg  12.5-25 mg Oral Q4HRS PRLUISA Sanches M.D.       • lactated ringers infusion   Intravenous Continuous Barry Arriaga M.D. 125 mL/hr at 06/07/22 0838 New Bag at 06/07/22 0838   • Respiratory Therapy Consult   Nebulization Continuous RT Gómez Marks M.D.       • LORazepam (ATIVAN) injection 4 mg  4 mg Intravenous Q10 MIN PRN Gómez Marks M.D.   4 mg at 06/05/22 1753   • enoxaparin (Lovenox) inj 40 mg  40 mg Subcutaneous DAILY AT 1800 Gómez Marks M.D.   40 mg at 06/06/22 1702   • ondansetron (ZOFRAN) syringe/vial injection 4 mg  4 mg Intravenous Q4HRS PRN Gómez Marks M.D.       • promethazine (PHENERGAN) suppository 12.5-25 mg  12.5-25 mg Rectal Q4HRS PRN Gómez Marks M.D.       • prochlorperazine (COMPAZINE) injection 5-10 mg  5-10 mg Intravenous Q4HRS PRN Gómez Marks M.D.           LABS:  Recent Labs     06/05/22  1547 06/05/22  2045 06/06/22  0423   CPKTOTAL 276* 663* 800*     Recent Labs     06/05/22 1547 06/07/22  0415   WBC 18.2* 9.3   RBC 5.35 4.05*   HEMOGLOBIN 16.6 12.7*   HEMATOCRIT 56.4* 38.2*   .4* 94.3   MCH 31.0 31.4   MCHC 29.4* 33.2*   RDW 46.4 42.1   PLATELETCT 154* 90*   MPV 12.6 11.4     Recent Labs     06/05/22  1547 06/05/22  2045 06/06/22  0423 06/06/22  0428 06/07/22  0415   SODIUM 144 138  --  141 146*   POTASSIUM 4.1 3.9  --  3.7 3.9   CHLORIDE 102  106  --  109 115*   CO2 3* 18*  --  20 20   GLUCOSE 92 84  --  93 84   BUN 14 15  --  17 17   CPKTOTAL 276* 663* 800*  --   --      INR   Date Value Ref Range Status   09/26/2018 1.02 0.87 - 1.13 Final     Comment:     INR - Non-therapeutic Reference Range: 0.87-1.13  INR - Therapeutic Reference Range: 2.0-4.0       No results found for: POCINR  Lab Results   Component Value Date/Time    CREATININE 1.75 (H) 06/06/2022 0428     Lab Results   Component Value Date/Time    IFAFRICA >60 01/21/2022 0928    IFNOTAFR >60 01/21/2022 0928     EEG 6/6/2022:  Abnormal video EEG recording in the drowsy/sleep and/or comatose state(s):  - No focal asymmetries   - No definitive epileptiform discharges or other epileptiform phenomena seen   - No definitive seizures. Clinical correlation is recommended.   - One event at 1046 PM with fine body tremors with no abnormal EEG correlate.      ASSESSMENT AND PLAN:  32-year-old male with history of seizure disorder and questionable compliance with seizure medication who was brought to emergency room on 6/5/2022 for intractable seizure and status epilepticus.  He was loaded with Keppra and placed on 1500 mg twice a day.  He has not had any seizure.  He was successfully extubated on 6/6/2022.  Discussed the importance of being compliant with seizure medication with the patient again.  He will continue with brivaracetam 100 mg twice a day.  He will follow-up with neurology clinic as an outpatient.  Neurology will sign off, please call me if there is any question.

## 2022-06-07 NOTE — PROGRESS NOTES
Report called to PRINCESS schaffer on Neurosciences. All questions answered. No concerns noted.  Pt taken via wheelchair to [id number] by KADIE Oh with all personal belongings. Accompanied by wife and brother.

## 2022-06-07 NOTE — DISCHARGE PLANNING
Call from Ashlee in [facility] asking if CM can call Suzette Ingram) to notify them that pt is in the hospital. Something to do with having their car released back to the girlfriend.    This is not urgent tonight and will pass on to ED CM to pass on to [facility] CM in am.

## 2022-06-08 VITALS
SYSTOLIC BLOOD PRESSURE: 145 MMHG | WEIGHT: 152.12 LBS | RESPIRATION RATE: 17 BRPM | DIASTOLIC BLOOD PRESSURE: 99 MMHG | HEIGHT: 72 IN | BODY MASS INDEX: 20.6 KG/M2 | TEMPERATURE: 98.2 F | HEART RATE: 61 BPM | OXYGEN SATURATION: 99 %

## 2022-06-08 PROBLEM — G40.909 SEIZURE DISORDER (HCC): Status: ACTIVE | Noted: 2018-05-01

## 2022-06-08 PROBLEM — D64.9 NORMOCYTIC ANEMIA: Status: ACTIVE | Noted: 2022-06-08

## 2022-06-08 PROBLEM — R56.9 SEIZURE (HCC): Status: RESOLVED | Noted: 2018-05-01 | Resolved: 2022-06-08

## 2022-06-08 PROBLEM — G93.89 ENCEPHALOMALACIA ON IMAGING STUDY: Status: ACTIVE | Noted: 2022-06-08

## 2022-06-08 PROBLEM — R74.8 ELEVATED LIPASE: Status: RESOLVED | Noted: 2022-06-05 | Resolved: 2022-06-08

## 2022-06-08 PROBLEM — G40.909 SEIZURE DISORDER (HCC): Status: RESOLVED | Noted: 2018-05-01 | Resolved: 2022-06-08

## 2022-06-08 PROBLEM — D69.6 THROMBOCYTOPENIA (HCC): Status: RESOLVED | Noted: 2022-06-07 | Resolved: 2022-06-08

## 2022-06-08 PROBLEM — E87.29 HIGH ANION GAP METABOLIC ACIDOSIS: Status: RESOLVED | Noted: 2022-06-05 | Resolved: 2022-06-06

## 2022-06-08 PROBLEM — J96.01 ACUTE RESPIRATORY FAILURE WITH HYPOXIA (HCC): Status: RESOLVED | Noted: 2022-06-05 | Resolved: 2022-06-08

## 2022-06-08 PROBLEM — Z91.148 NONCOMPLIANCE WITH MEDICATION REGIMEN: Status: ACTIVE | Noted: 2022-06-08

## 2022-06-08 PROBLEM — Z87.820 HISTORY OF TRAUMATIC BRAIN INJURY: Status: ACTIVE | Noted: 2022-06-08

## 2022-06-08 PROBLEM — F12.90 MARIJUANA USE: Status: ACTIVE | Noted: 2022-06-07

## 2022-06-08 LAB
ANION GAP SERPL CALC-SCNC: 11 MMOL/L (ref 7–16)
BUN SERPL-MCNC: 15 MG/DL (ref 8–22)
CALCIUM SERPL-MCNC: 8.7 MG/DL (ref 8.5–10.5)
CHLORIDE SERPL-SCNC: 111 MMOL/L (ref 96–112)
CK SERPL-CCNC: 460 U/L (ref 0–154)
CO2 SERPL-SCNC: 21 MMOL/L (ref 20–33)
CREAT SERPL-MCNC: 1.69 MG/DL (ref 0.5–1.4)
GFR SERPLBLD CREATININE-BSD FMLA CKD-EPI: 55 ML/MIN/1.73 M 2
GLUCOSE SERPL-MCNC: 97 MG/DL (ref 65–99)
POTASSIUM SERPL-SCNC: 3.7 MMOL/L (ref 3.6–5.5)
SODIUM SERPL-SCNC: 143 MMOL/L (ref 135–145)

## 2022-06-08 PROCEDURE — 82550 ASSAY OF CK (CPK): CPT

## 2022-06-08 PROCEDURE — A9270 NON-COVERED ITEM OR SERVICE: HCPCS | Performed by: STUDENT IN AN ORGANIZED HEALTH CARE EDUCATION/TRAINING PROGRAM

## 2022-06-08 PROCEDURE — 700102 HCHG RX REV CODE 250 W/ 637 OVERRIDE(OP): Performed by: STUDENT IN AN ORGANIZED HEALTH CARE EDUCATION/TRAINING PROGRAM

## 2022-06-08 PROCEDURE — 99239 HOSP IP/OBS DSCHRG MGMT >30: CPT | Performed by: STUDENT IN AN ORGANIZED HEALTH CARE EDUCATION/TRAINING PROGRAM

## 2022-06-08 PROCEDURE — 80048 BASIC METABOLIC PNL TOTAL CA: CPT

## 2022-06-08 RX ORDER — POTASSIUM CHLORIDE 20 MEQ/1
40 TABLET, EXTENDED RELEASE ORAL EVERY 6 HOURS
Status: DISCONTINUED | OUTPATIENT
Start: 2022-06-08 | End: 2022-06-08 | Stop reason: HOSPADM

## 2022-06-08 RX ADMIN — BRIVARACETAM 100 MG: 50 TABLET, FILM COATED ORAL at 05:04

## 2022-06-08 RX ADMIN — POTASSIUM CHLORIDE 40 MEQ: 1500 TABLET, EXTENDED RELEASE ORAL at 08:08

## 2022-06-08 ASSESSMENT — PAIN DESCRIPTION - PAIN TYPE: TYPE: ACUTE PAIN

## 2022-06-08 NOTE — DISCHARGE SUMMARY
Discharge Summary    CHIEF COMPLAINT ON ADMISSION  Chief Complaint   Patient presents with   • Seizure     BIBA for seizure, witnessed seizure at home approx 5 min, GCS 3 after 1st, 2nd seizure with EMS for approx 2 min  5 versed IM PTA, not answering questions, not following commands reactive to painful stimuli, arrived in 4 point soft restraints on NRB  Hx of seizures, changed medications about 1 month ago       Reason for Admission  Status epilepticus    Admission Date  6/5/2022    CODE STATUS  Full Code    HPI & HOSPITAL COURSE  Barry Griffith is a 32-year-old gentleman who presented on 6/5/2022 with seizures.  This is a pleasant gentleman with a history of traumatic brain injury from football and epilepsy followed by [facility] neurology.  He was brought into the emergency emergency room by paramedics due to intractable seizures.  He had been seizing for 5 minutes requiring administration of midazolam 5 mg IM.  He was reported to have drooling as well as vomiting in his mouth.  He continued to have recurrent seizures on route requiring administration of lorazepam and was subsequently intubated in the emergency room.  Neurology was consulted, and he was loaded with levetiracetam.  His urine drug screen was positive for cannabinoids.  He was admitted to the ICU for further care and was extubated on 6/6/2022 was found to be neurologically intact.  Video EEG monitoring did not show any further definitive seizure activity.  He developed an acute kidney injury with creatinine increasing to 1.99 but then trending down to 1.69 on IV fluids (baseline 1.1-1.3).  Hospitalist medicine was consulted and he was subsequently transferred to the neuroscience floor.    Patient stated that he was previously on levetiracetam, which the patient did not tolerate well.  Per report, he had some behavioral issues with the levetiracetam.  He was recently changed to Briviact 100 mg twice a day, but the patient stated that he had been  only taking only 1 dose a day.  He stated that he did not use marijuana on the day of his presentation and that he started seizing while he was watching sleeping beauty with his children.  Patient was counseled on the importance of complying with his medications with close follow-up with his neurologist at [facility].  Patient was given the option to continue inpatient stay for IV fluids given that his creatinine was not back to baseline although it was improving, patient requested to be discharged to home.  He was encouraged to drink electrolyte containing fluids to maintain good hydration with close follow-up with his PCP with labs to make sure that his renal function returned back to normal.    Therefore, he is discharged in good and stable condition to home with close outpatient follow-up.    The patient met 2-midnight criteria for an inpatient stay at the time of discharge.    Discharge Date  6/3/2022    FOLLOW UP ITEMS POST DISCHARGE  -Follow-up with PCP in 3 to 5 days of labs.  -Follow-up with [facility] neurology soon as possible.    DISCHARGE DIAGNOSES  Principal Problem:    Status epilepticus (HCC) POA: Yes  Active Problems:    Seizure disorder (HCC) POA: Yes    Asthma POA: Yes    LILO (acute kidney injury) (HCC) POA: Yes    Marijuana use POA: Yes    Thrombocytopenia (HCC) POA: Yes    History of traumatic brain injury POA: Yes    Noncompliance with medication regimen POA: Yes    Normocytic anemia POA: Yes  Resolved Problems:    Mild intermittent asthma with acute exacerbation POA: Yes    High anion gap metabolic acidosis POA: Yes    Acute respiratory failure with hypoxia (HCC) POA: Yes    Elevated lipase POA: Yes      FOLLOW UP  No future appointments.  Mine Nunes M.D.  [address]  [place] [id number] [phone]    Call today  Hospital follow-up    [facility]  [address]  [place] [address] [phone]  Follow up        MEDICATIONS ON DISCHARGE     Medication  List      Continue taking these medications      Instructions   albuterol 108 (90 Base) MCG/ACT Aers inhalation aerosol   Doctor's comments: DX Code Needed  .  Inhale 2 Puffs every 6 hours as needed for Shortness of Breath.  Dose: 2 Puff     brivaracetam 100 MG Tabs tablet  Commonly known as: Briviact   Take 1 Tablet by mouth 2 times a day for 180 days.  Dose: 100 mg            Allergies  No Known Allergies    DIET  Orders Placed This Encounter   Procedures   • Diet Order Diet: Regular     Standing Status:   Standing     Number of Occurrences:   1     Order Specific Question:   Diet:     Answer:   Regular [1]       ACTIVITY  As tolerated.  Exercise encouraged.  Weight bearing as tolerated    CONSULTATIONS  Neurology  Critical Care    PROCEDURES  Video electroencephalogram 6/5/2022    LABORATORY  Lab Results   Component Value Date    SODIUM 143 06/08/2022    POTASSIUM 3.7 06/08/2022    CHLORIDE 111 06/08/2022    CO2 21 06/08/2022    GLUCOSE 97 06/08/2022    BUN 15 06/08/2022    CREATININE 1.69 (H) 06/08/2022        Lab Results   Component Value Date    WBC 9.3 06/07/2022    HEMOGLOBIN 12.7 (L) 06/07/2022    HEMATOCRIT 38.2 (L) 06/07/2022    PLATELETCT 90 (L) 06/07/2022        Total time of the discharge process exceeds 35 minutes.

## 2022-06-08 NOTE — CARE PLAN
The patient is Stable - Low risk of patient condition declining or worsening    Shift Goals  Clinical Goals: Stable Neuro Checks, Absence of Seizures  Patient Goals: Discharge and Rest  Family Goals: LATONIA    Progress made toward(s) clinical / shift goals:  POC discussed with pt. Pt A/Ox4. Pt verbalized understanding. Pt eager to go home. Fall precautions in place. Call light within reach.     Problem: Fall Risk  Goal: Patient will remain free from falls  Outcome: Progressing  Note: Fall precautions in place. Bed locked and in lowest position, bed alarm in place, treaded socks used when ambulated, call light within reach. Pt educated to call for assistance. Pt rounded on frequently throughout shift.       Problem: Seizure Precautions  Goal: Implementation of seizure precautions  Outcome: Progressing  Note: Seizure precautions are in place. Bed locked in lowest position, all four side rails are padded, three side rails up at any time, suction in place, and oxygen in place. Pt educated on seizure precautions. Pt educated to call for assistance. Pt verbalized understanding. Call light within reach.         Patient is not progressing towards the following goals:

## 2022-06-08 NOTE — PROGRESS NOTES
4 Eyes Skin Assessment Completed by PRINCESS Ribeiro and PRINCESS Mayen.    Head WDL  Ears WDL  Nose WDL  Mouth WDL  Neck WDL  Breast/Chest WDL  Shoulder Blades WDL  Spine WDL  (R) Arm/Elbow/Hand WDL  (L) Arm/Elbow/Hand WDL  Abdomen WDL  Groin WDL  Scrotum/Coccyx/Buttocks WDL  (R) Leg WDL with small healing abrasion on R shin  (L) Leg WDL  (R) Heel/Foot/Toe WDL  (L) Heel/Foot/Toe WDL          Devices In Places Blood Pressure Cuff      Interventions In Place Pillows and Pressure Redistribution Mattress    Possible Skin Injury No    Pictures Uploaded Into Epic N/A  Wound Consult Placed N/A  RN Wound Prevention Protocol Ordered No

## 2022-06-08 NOTE — DISCHARGE INSTRUCTIONS
Discharge Instructions    Discharged to home by car with relative. Discharged via walking, hospital escort: Refused.  Special equipment needed: Not Applicable    Be sure to schedule a follow-up appointment with your primary care doctor or any specialists as instructed.     Discharge Plan:   Diet Plan: Discussed  Activity Level: Discussed  Confirmed Follow up Appointment: Patient to Call and Schedule Appointment  Confirmed Symptoms Management: Discussed  Medication Reconciliation Updated: Yes    I understand that a diet low in cholesterol, fat, and sodium is recommended for good health. Unless I have been given specific instructions below for another diet, I accept this instruction as my diet prescription.   Other diet: Regular    Special Instructions: None    Is patient discharged on Warfarin / Coumadin?   No     Depression / Suicide Risk    As you are discharged from this [facility] facility, it is important to learn how to keep safe from harming yourself.    Recognize the warning signs:  Abrupt changes in personality, positive or negative- including increase in energy   Giving away possessions  Change in eating patterns- significant weight changes-  positive or negative  Change in sleeping patterns- unable to sleep or sleeping all the time   Unwillingness or inability to communicate  Depression  Unusual sadness, discouragement and loneliness  Talk of wanting to die  Neglect of personal appearance   Rebelliousness- reckless behavior  Withdrawal from people/activities they love  Confusion- inability to concentrate     If you or a loved one observes any of these behaviors or has concerns about self-harm, here's what you can do:  Talk about it- your feelings and reasons for harming yourself  Remove any means that you might use to hurt yourself (examples: pills, rope, extension cords, firearm)  Get professional help from the community (Mental Health, Substance Abuse, psychological counseling)  Do not be alone:Call  your Safe Contact- someone whom you trust who will be there for you.  Call your local CRISIS HOTLINE 175-8622 or 808-956-9253  Call your local Children's Mobile Crisis Response Team [place] (634) 470-9740 or www.[employer]  Call the toll free National Suicide Prevention Hotlines   National Suicide Prevention Lifeline 488-618-EMJR (1842)  [place] Preventice Line Network 800-SUICIDE (851-7552)

## 2022-06-11 LAB — EKG IMPRESSION: NORMAL

## 2022-06-22 ENCOUNTER — HOSPITAL ENCOUNTER (OUTPATIENT)
Dept: LAB | Facility: MEDICAL CENTER | Age: 32
End: 2022-06-22
Attending: FAMILY MEDICINE
Payer: COMMERCIAL

## 2022-06-22 ENCOUNTER — OFFICE VISIT (OUTPATIENT)
Dept: MEDICAL GROUP | Facility: PHYSICIAN GROUP | Age: 32
End: 2022-06-22
Payer: COMMERCIAL

## 2022-06-22 VITALS
OXYGEN SATURATION: 100 % | WEIGHT: 151.9 LBS | DIASTOLIC BLOOD PRESSURE: 70 MMHG | TEMPERATURE: 99.4 F | SYSTOLIC BLOOD PRESSURE: 100 MMHG | HEIGHT: 69 IN | HEART RATE: 66 BPM | BODY MASS INDEX: 22.5 KG/M2

## 2022-06-22 DIAGNOSIS — N17.9 ACUTE KIDNEY INJURY (HCC): ICD-10-CM

## 2022-06-22 DIAGNOSIS — G40.919 INTRACTABLE SEIZURES (HCC): ICD-10-CM

## 2022-06-22 DIAGNOSIS — D69.6 THROMBOCYTOPENIA (HCC): ICD-10-CM

## 2022-06-22 DIAGNOSIS — E55.9 VITAMIN D DEFICIENCY: ICD-10-CM

## 2022-06-22 LAB
25(OH)D3 SERPL-MCNC: 43 NG/ML (ref 30–100)
ALBUMIN SERPL BCP-MCNC: 4.5 G/DL (ref 3.2–4.9)
ALBUMIN/GLOB SERPL: 1.9 G/DL
ALP SERPL-CCNC: 58 U/L (ref 30–99)
ALT SERPL-CCNC: 25 U/L (ref 2–50)
ANION GAP SERPL CALC-SCNC: 10 MMOL/L (ref 7–16)
AST SERPL-CCNC: 22 U/L (ref 12–45)
BASOPHILS # BLD AUTO: 0.7 % (ref 0–1.8)
BASOPHILS # BLD: 0.04 K/UL (ref 0–0.12)
BILIRUB SERPL-MCNC: 0.3 MG/DL (ref 0.1–1.5)
BUN SERPL-MCNC: 10 MG/DL (ref 8–22)
CALCIUM SERPL-MCNC: 9.6 MG/DL (ref 8.5–10.5)
CHLORIDE SERPL-SCNC: 102 MMOL/L (ref 96–112)
CO2 SERPL-SCNC: 26 MMOL/L (ref 20–33)
CREAT SERPL-MCNC: 1.1 MG/DL (ref 0.5–1.4)
EOSINOPHIL # BLD AUTO: 0.11 K/UL (ref 0–0.51)
EOSINOPHIL NFR BLD: 1.9 % (ref 0–6.9)
ERYTHROCYTE [DISTWIDTH] IN BLOOD BY AUTOMATED COUNT: 44.9 FL (ref 35.9–50)
GFR SERPLBLD CREATININE-BSD FMLA CKD-EPI: 92 ML/MIN/1.73 M 2
GLOBULIN SER CALC-MCNC: 2.4 G/DL (ref 1.9–3.5)
GLUCOSE SERPL-MCNC: 73 MG/DL (ref 65–99)
HCT VFR BLD AUTO: 44.5 % (ref 42–52)
HGB BLD-MCNC: 14.3 G/DL (ref 14–18)
IMM GRANULOCYTES # BLD AUTO: 0.03 K/UL (ref 0–0.11)
IMM GRANULOCYTES NFR BLD AUTO: 0.5 % (ref 0–0.9)
LYMPHOCYTES # BLD AUTO: 2.42 K/UL (ref 1–4.8)
LYMPHOCYTES NFR BLD: 41 % (ref 22–41)
MCH RBC QN AUTO: 31.8 PG (ref 27–33)
MCHC RBC AUTO-ENTMCNC: 32.1 G/DL (ref 33.7–35.3)
MCV RBC AUTO: 98.9 FL (ref 81.4–97.8)
MONOCYTES # BLD AUTO: 0.38 K/UL (ref 0–0.85)
MONOCYTES NFR BLD AUTO: 6.4 % (ref 0–13.4)
NEUTROPHILS # BLD AUTO: 2.92 K/UL (ref 1.82–7.42)
NEUTROPHILS NFR BLD: 49.5 % (ref 44–72)
NRBC # BLD AUTO: 0 K/UL
NRBC BLD-RTO: 0 /100 WBC
PLATELET # BLD AUTO: 176 K/UL (ref 164–446)
PMV BLD AUTO: 11.4 FL (ref 9–12.9)
POTASSIUM SERPL-SCNC: 4.2 MMOL/L (ref 3.6–5.5)
PROT SERPL-MCNC: 6.9 G/DL (ref 6–8.2)
RBC # BLD AUTO: 4.5 M/UL (ref 4.7–6.1)
SODIUM SERPL-SCNC: 138 MMOL/L (ref 135–145)
WBC # BLD AUTO: 5.9 K/UL (ref 4.8–10.8)

## 2022-06-22 PROCEDURE — 80053 COMPREHEN METABOLIC PANEL: CPT

## 2022-06-22 PROCEDURE — 82306 VITAMIN D 25 HYDROXY: CPT

## 2022-06-22 PROCEDURE — 99214 OFFICE O/P EST MOD 30 MIN: CPT | Performed by: FAMILY MEDICINE

## 2022-06-22 PROCEDURE — 85025 COMPLETE CBC W/AUTO DIFF WBC: CPT

## 2022-06-22 PROCEDURE — 36415 COLL VENOUS BLD VENIPUNCTURE: CPT

## 2022-06-22 ASSESSMENT — FIBROSIS 4 INDEX: FIB4 SCORE: 3.7

## 2022-07-11 NOTE — PROGRESS NOTES
Chief Complaint   Patient presents with   • Follow-Up     EPILEPSY       Problem List Items Addressed This Visit    None     Visit Diagnoses     Localization-related epilepsy (HCC)        Relevant Medications    brivaracetam (BRIVIACT) 100 MG Tab tablet    Screening for depression        Hospital discharge follow-up        Noncompliance              Interim History:  Barry Griffith 32 y.o. male presents today for seizure f/u.     He reports he only had one seizure since he last saw me in March. Last one was last month and possibly d/t not taking medications regularly. Compliant now. Using a pill box. Tolerating briviact. No clear side effects. He is taking vit D. He is not driving. He wants to lower the dose of his briviact as he doesn't like taking meds. No other concerns.        Past medical history:   Past Medical History:   Diagnosis Date   • ASTHMA     Asthma since infancy.   • Mild intermittent asthma with acute exacerbation 6/10/2016   • Seizure (HCC)    • Seizure disorder (HCC)        Past surgical history:   No past surgical history on file.    Family history:   Family History   Problem Relation Age of Onset   • Hypertension Mother    • Hyperlipidemia Father    • Hypertension Father    • Cancer Neg Hx    • Diabetes Neg Hx        Social history:   Social History     Socioeconomic History   • Marital status: Single     Spouse name: Not on file   • Number of children: 2   • Years of education: Not on file   • Highest education level: Not on file   Occupational History   • Occupation: guidance counsellor [employer]   • Occupation: psych coordinator [employer]   Tobacco Use   • Smoking status: Never Smoker   • Smokeless tobacco: Never Used   Vaping Use   • Vaping Use: Never used   Substance and Sexual Activity   • Alcohol use: No     Alcohol/week: 0.0 oz     Comment: rare    • Drug use: Yes     Types: Marijuana     Comment: THC   • Sexual activity: Yes     Partners: Female     Birth  "control/protection: Condom, Pill   Other Topics Concern   • Not on file   Social History Narrative   • Not on file     Social Determinants of Health     Financial Resource Strain: Not on file   Food Insecurity: Not on file   Transportation Needs: Not on file   Physical Activity: Not on file   Stress: Not on file   Social Connections: Not on file   Intimate Partner Violence: Not on file   Housing Stability: Not on file       Current medications:   Current Outpatient Medications   Medication   • brivaracetam (BRIVIACT) 100 MG Tab tablet   • albuterol 108 (90 Base) MCG/ACT Aero Soln inhalation aerosol     No current facility-administered medications for this visit.       Medication Allergy:  No Known Allergies      Review of systems:     General: Denies fevers or chills, or nightsweats, or generalized fatigue.    Head: Denies headaches or dizziness or lightheadedness  Urinary: Denies dysuria, frequency, hesitancy, or incontinence.  Dermatologic:  Denies new rash  Musculoskeletal: Denies muscle pain or swelling, no atrophy, no neck and back pain or stiffness.   Neurologic: Denies facial droopiness, muscle weakness (focal or generalized), paresthesias, ataxia, change in speech or language, memory loss, abnormal movements.+ seizures, loss of consciousness  Psychiatric: Denies anxiety, depression, mood swings, suicidal or homicidal thoughts       Physical examination:   Vitals:    07/18/22 1310   BP: 122/80   BP Location: Right arm   Patient Position: Sitting   BP Cuff Size: Adult   Pulse: 69   SpO2: 98%   Weight: 70.4 kg (155 lb 4.8 oz)   Height: 1.753 m (5' 9\")     General: Patient in no acute distress, pleasant and cooperative.  HEENT: Normocephalic, no signs of acute trauma.   Neck: Supple. There is normal range of motion.   Skin: no signs of acute rashes or trauma.   Musculoskeletal: joints exhibit full range of motion  Psychiatric: No hallucinatory behavior. No symptoms of depression or suicidal ideation. Mood and " affect appear normal on exam.     NEUROLOGICAL EXAM:   Mental status, orientation: Awake, alert and fully oriented.   Speech and language: speech is clear and fluent. The patient is able to name, repeat and comprehend.   Memory: There is intact recollection of recent and remote events.   Motor exam: Strength is 5/5 in all extremities. Tone is normal. No abnormal movements were seen on exam.   Sensory exam reveals normal sense of light touch in all extremities.   Gait: The patient was able to get up from seated position on first attempt without requiring assistance. Found to be steady when walking. Movements were fluid with normal arm swing.       ANCILLARY DATA REVIEWED:       Lab Data Review:  Reviewed in chart. CBC, Vit D, CMP    Records reviewed:   Reviewed in chart.    Imaging:   CTB 18  NO ACUTE ABNORMALITIES ARE NOTED ON CT SCAN OF THE A     MRI brain w/wo, 8/10/2018:  1. MRI of the brain without and with contrast within normal limits.  2. No evidence of mass lesion, heterotopic gray matter, gross cortical dysplasia or mesial temporal sclerosis         EE/10/2018:   INTERPRETATION:  This is an abnormal video EEG recording in the awake, drowsy, and sleep state(s). Frequent left temporal sharps. Intermittent, subtle, left temporal slowing. The findings increase risk for seizures and suggest underlying area of cortical irritability and structural abnormality. Clinical and radiological correlation is recommended.    EEG 22  Abnormal video EEG recording in the drowsy/sleep and/or comatose state(s):  -Moderate improving to mild background slowing suggestive of a non-specific encephalopathy. The effects of sedation may be contributing. Clinical correlation recommended.   - No focal asymmetries   - No definitive epileptiform discharges or other epileptiform phenomena seen   - No definitive seizures. Clinical correlation is recommended.   - One event at 1046 PM with fine body tremors with no abnormal  EEG correlate.       ASSESSMENT AND PLAN:    1. Localization-related epilepsy (HCC)  - brivaracetam (BRIVIACT) 100 MG Tab tablet; Take 1 Tablet by mouth 2 times a day for 180 days.  Dispense: 60 Tablet; Refill: 5    2. Screening for depression    3. Hospital discharge follow-up    4. 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 Hx of non compliance and illegal and non illegal drug use which could have provoked some of his seizures in the past. Seizures were initially controlled on keppra as he was seizure free for 1 year then he started having more seizures again. He continues to have seizures and was seen in the ER in May and June 2022 possibly d/t non compliance.      Mood is good. No suicidal or homicidal thoughts.      He is not drinking alcohol. Not smoking cigarettes. He is smoking marijuana and we have discussed drug interaction with this. Aware that THC can trigger seizures.      Past ASM's:Keppra     Current ASM's: briviact 100mg BID        Plan:  - continue ASM for now. Pt wants to wean down on dose but this may not be a good time for this as he continues to have seizures. Discussed compliance at length.      - Discussed avoidance of spell/sz triggers: alcohol, sleep deprivation, benadryl and stress.     - Discussed Vit D supplementation. Recommended taking 2000-5000u daily.      - Discussed driving restrictions. Recommended no driving  for at least 3 months since last seizure     -Labs to be checked for next appointment: none    -Aware that I will no longer be with [facility] after Sept 2022.         FOLLOW-UP:   Return in about 3 months (around 10/18/2022).      EDUCATION AND COUNSELING:  -Education was provided to the patient and/or family regarding diagnosis and prognosis. The chronic and unpredictable nature of the condition were discussed. There is increased risk for additional events, which may carry potential for significant injuries and death. Discussed frequent  seizure triggers: sleep deprivation, medication non-compliance, use of illegal drugs/alcohol, stress, and others.   - There are potential side effects of antiepileptics including but no limited to: hypersensitivity reactions (rash and others, some of which can be fatal), visual field changes (some of which may be irreversible), glaucoma, diplopia, kidney stones, osteopenia/osteoporosis/bone fractures, hyperthermia/anhydrosis, hyponatremia, tremors/abnormal movements, ataxia, dizziness, fatigue, increased risk for falls, risk for cardiac arrhythmias/syncope, gastrointestinal side effects(hepatitis, pancreatitis, gastritis, ulcers), gingival hypertrophy/bleeding, drowsiness, sedation, anxiety/nervousness, increased risk for suicide, increased risk for depression, and psychosis.   -Recommend chronic vitamin D supplementation and regular exercise (if not contraindicated).   -Patient/family educated on risk for SUDEP (Sudden Death in Epilepsy). Counseling was provided on the importance of strict medication and follow up compliance. The patient/family understand the risks associated with non-adherence with the medical plan as outlined, including but not limited to an increased risk for breakthrough seizures, which may contribute to injuries, disability, status epilepticus, and even death.   -There are potential effects of alcohol and other drugs, which may lower seizure threshold and/or affect the metabolism of antiepileptic drugs. We recommend avoidance of alcohol and illegal drugs.  -Avoid sleep deprivation.   -The patient is encourage to report to the Division of Motor Vehicles of any condition and/or spells related to confusion, disorientation, and/or loss of awareness and/or loss of consciousness; as these may pose a safety issue if they occur while operating a motor vehicle. The patient and/or family are ultimately responsible for exercising caution and abiding to regulations in place.   -Other seizure precautions  were discussed at length, including no diving, no skydiving, no climbing or exposure to unprotected heights, no unsupervised swimming, no Jacuzzi or bathing in bathtubs or deep bodies of water. There are risks for operating any machinery while suffering from seizures / syncope / epilepsy and/or while taking antiepileptic drugs.   -The patient understands and agrees that due to the complexity of his/her diagnosis, results of any testing and further recommendations will typically be discussed/made during a face to face encounter in my office. The patient and/or family further understands it is their responsibility to keep proper follow up.     Patient agrees with plan, as outlined.         Luiza Peace, MSN, APRN, FNP-C  [facility]  Office: [phone]  Fax: [phone]

## 2022-07-18 ENCOUNTER — OFFICE VISIT (OUTPATIENT)
Dept: NEUROLOGY | Facility: MEDICAL CENTER | Age: 32
End: 2022-07-18
Attending: NURSE PRACTITIONER
Payer: COMMERCIAL

## 2022-07-18 ENCOUNTER — TELEPHONE (OUTPATIENT)
Dept: NEUROLOGY | Facility: MEDICAL CENTER | Age: 32
End: 2022-07-18

## 2022-07-18 VITALS
HEART RATE: 69 BPM | OXYGEN SATURATION: 98 % | HEIGHT: 69 IN | WEIGHT: 155.3 LBS | BODY MASS INDEX: 23 KG/M2 | DIASTOLIC BLOOD PRESSURE: 80 MMHG | SYSTOLIC BLOOD PRESSURE: 122 MMHG

## 2022-07-18 DIAGNOSIS — Z13.31 SCREENING FOR DEPRESSION: ICD-10-CM

## 2022-07-18 DIAGNOSIS — G40.109 LOCALIZATION-RELATED EPILEPSY (HCC): ICD-10-CM

## 2022-07-18 DIAGNOSIS — Z91.199 NONCOMPLIANCE: ICD-10-CM

## 2022-07-18 DIAGNOSIS — Z09 HOSPITAL DISCHARGE FOLLOW-UP: ICD-10-CM

## 2022-07-18 PROCEDURE — 99214 OFFICE O/P EST MOD 30 MIN: CPT | Performed by: NURSE PRACTITIONER

## 2022-07-18 PROCEDURE — 99211 OFF/OP EST MAY X REQ PHY/QHP: CPT | Performed by: NURSE PRACTITIONER

## 2022-07-18 ASSESSMENT — FIBROSIS 4 INDEX: FIB4 SCORE: .775

## 2022-07-18 NOTE — TELEPHONE ENCOUNTER
Briviact 100mg Tablet    Request rec'd via YAMILETH, JOSÉ MANUEL Avila paid copay $100 #180/90 DS or $50.00 #60/30 DS notifying liaison Meggan Green. - 07/18/2022 3:45pm

## 2022-07-19 PROCEDURE — RXMED WILLOW AMBULATORY MEDICATION CHARGE: Performed by: NURSE PRACTITIONER

## 2022-07-20 ENCOUNTER — PHARMACY VISIT (OUTPATIENT)
Dept: PHARMACY | Facility: MEDICAL CENTER | Age: 32
End: 2022-07-20
Payer: COMMERCIAL

## 2022-07-23 ENCOUNTER — HOSPITAL ENCOUNTER (EMERGENCY)
Facility: MEDICAL CENTER | Age: 32
End: 2022-07-24
Attending: STUDENT IN AN ORGANIZED HEALTH CARE EDUCATION/TRAINING PROGRAM
Payer: COMMERCIAL

## 2022-07-23 DIAGNOSIS — R56.9 SEIZURE (HCC): ICD-10-CM

## 2022-07-23 DIAGNOSIS — E86.0 DEHYDRATION: ICD-10-CM

## 2022-07-23 LAB
ALBUMIN SERPL BCP-MCNC: 4.3 G/DL (ref 3.2–4.9)
ALBUMIN/GLOB SERPL: 2 G/DL
ALP SERPL-CCNC: 58 U/L (ref 30–99)
ALT SERPL-CCNC: 18 U/L (ref 2–50)
ANION GAP SERPL CALC-SCNC: 25 MMOL/L (ref 7–16)
AST SERPL-CCNC: 29 U/L (ref 12–45)
BASOPHILS # BLD AUTO: 0.6 % (ref 0–1.8)
BASOPHILS # BLD: 0.04 K/UL (ref 0–0.12)
BILIRUB SERPL-MCNC: 0.2 MG/DL (ref 0.1–1.5)
BUN SERPL-MCNC: 10 MG/DL (ref 8–22)
CALCIUM SERPL-MCNC: 9.1 MG/DL (ref 8.5–10.5)
CHLORIDE SERPL-SCNC: 108 MMOL/L (ref 96–112)
CO2 SERPL-SCNC: 11 MMOL/L (ref 20–33)
CREAT SERPL-MCNC: 1.42 MG/DL (ref 0.5–1.4)
EOSINOPHIL # BLD AUTO: 0.12 K/UL (ref 0–0.51)
EOSINOPHIL NFR BLD: 1.7 % (ref 0–6.9)
ERYTHROCYTE [DISTWIDTH] IN BLOOD BY AUTOMATED COUNT: 44.3 FL (ref 35.9–50)
GFR SERPLBLD CREATININE-BSD FMLA CKD-EPI: 68 ML/MIN/1.73 M 2
GLOBULIN SER CALC-MCNC: 2.1 G/DL (ref 1.9–3.5)
GLUCOSE SERPL-MCNC: 67 MG/DL (ref 65–99)
HCT VFR BLD AUTO: 43 % (ref 42–52)
HGB BLD-MCNC: 13.7 G/DL (ref 14–18)
IMM GRANULOCYTES # BLD AUTO: 0.07 K/UL (ref 0–0.11)
IMM GRANULOCYTES NFR BLD AUTO: 1 % (ref 0–0.9)
LYMPHOCYTES # BLD AUTO: 4.6 K/UL (ref 1–4.8)
LYMPHOCYTES NFR BLD: 64.2 % (ref 22–41)
MCH RBC QN AUTO: 31.4 PG (ref 27–33)
MCHC RBC AUTO-ENTMCNC: 31.9 G/DL (ref 33.7–35.3)
MCV RBC AUTO: 98.4 FL (ref 81.4–97.8)
MONOCYTES # BLD AUTO: 0.42 K/UL (ref 0–0.85)
MONOCYTES NFR BLD AUTO: 5.9 % (ref 0–13.4)
NEUTROPHILS # BLD AUTO: 1.92 K/UL (ref 1.82–7.42)
NEUTROPHILS NFR BLD: 26.6 % (ref 44–72)
NRBC # BLD AUTO: 0 K/UL
NRBC BLD-RTO: 0 /100 WBC
PLATELET # BLD AUTO: 147 K/UL (ref 164–446)
PMV BLD AUTO: 10.8 FL (ref 9–12.9)
POTASSIUM SERPL-SCNC: 3.7 MMOL/L (ref 3.6–5.5)
PROT SERPL-MCNC: 6.4 G/DL (ref 6–8.2)
RBC # BLD AUTO: 4.37 M/UL (ref 4.7–6.1)
SODIUM SERPL-SCNC: 144 MMOL/L (ref 135–145)
WBC # BLD AUTO: 7.2 K/UL (ref 4.8–10.8)

## 2022-07-23 PROCEDURE — 700105 HCHG RX REV CODE 258: Performed by: STUDENT IN AN ORGANIZED HEALTH CARE EDUCATION/TRAINING PROGRAM

## 2022-07-23 PROCEDURE — 85025 COMPLETE CBC W/AUTO DIFF WBC: CPT

## 2022-07-23 PROCEDURE — 99284 EMERGENCY DEPT VISIT MOD MDM: CPT

## 2022-07-23 PROCEDURE — 80053 COMPREHEN METABOLIC PANEL: CPT

## 2022-07-23 PROCEDURE — 36415 COLL VENOUS BLD VENIPUNCTURE: CPT

## 2022-07-23 RX ORDER — SODIUM CHLORIDE 9 MG/ML
1000 INJECTION, SOLUTION INTRAVENOUS ONCE
Status: COMPLETED | OUTPATIENT
Start: 2022-07-24 | End: 2022-07-24

## 2022-07-23 RX ORDER — SODIUM CHLORIDE 9 MG/ML
1000 INJECTION, SOLUTION INTRAVENOUS ONCE
Status: COMPLETED | OUTPATIENT
Start: 2022-07-23 | End: 2022-07-24

## 2022-07-23 RX ADMIN — SODIUM CHLORIDE 1000 ML: 9 INJECTION, SOLUTION INTRAVENOUS at 23:57

## 2022-07-23 ASSESSMENT — FIBROSIS 4 INDEX: FIB4 SCORE: .775

## 2022-07-24 VITALS
TEMPERATURE: 97 F | DIASTOLIC BLOOD PRESSURE: 63 MMHG | RESPIRATION RATE: 18 BRPM | WEIGHT: 150 LBS | HEART RATE: 62 BPM | OXYGEN SATURATION: 93 % | HEIGHT: 69 IN | BODY MASS INDEX: 22.22 KG/M2 | SYSTOLIC BLOOD PRESSURE: 122 MMHG

## 2022-07-24 LAB
ANION GAP SERPL CALC-SCNC: 10 MMOL/L (ref 7–16)
BUN SERPL-MCNC: 10 MG/DL (ref 8–22)
CALCIUM SERPL-MCNC: 7.9 MG/DL (ref 8.5–10.5)
CHLORIDE SERPL-SCNC: 114 MMOL/L (ref 96–112)
CO2 SERPL-SCNC: 18 MMOL/L (ref 20–33)
CREAT SERPL-MCNC: 1.17 MG/DL (ref 0.5–1.4)
GFR SERPLBLD CREATININE-BSD FMLA CKD-EPI: 85 ML/MIN/1.73 M 2
GLUCOSE SERPL-MCNC: 108 MG/DL (ref 65–99)
POTASSIUM SERPL-SCNC: 3.9 MMOL/L (ref 3.6–5.5)
SODIUM SERPL-SCNC: 142 MMOL/L (ref 135–145)

## 2022-07-24 PROCEDURE — 700105 HCHG RX REV CODE 258: Performed by: STUDENT IN AN ORGANIZED HEALTH CARE EDUCATION/TRAINING PROGRAM

## 2022-07-24 PROCEDURE — 80048 BASIC METABOLIC PNL TOTAL CA: CPT

## 2022-07-24 RX ADMIN — SODIUM CHLORIDE 1000 ML: 9 INJECTION, SOLUTION INTRAVENOUS at 02:54

## 2022-07-24 NOTE — ED NOTES
Pt discharged, all appropriate hospital equipment removed (IV, monitor, pulse ox, etc.). Pt left unit via walking with family member to vehicle for home. Personal belongings with pt when leaving unit. Pt given discharge instructions prior to leaving unit including where to  prescriptions and when to follow-up; verbalizes understanding. Pt informed to return to ED if symptoms worsen/return or altered status develop. Copy of discharge instructions signed and turned into DC basket and copy sent with pt. F/u with neuro

## 2022-07-24 NOTE — ED NOTES
Report received from prior RN. Pt alert. Resp normal/unlabored. Bed side rails up/in low position. Pt updated to POC and all questions answered.     Family at bedside.

## 2022-07-24 NOTE — ED TRIAGE NOTES
"Chief Complaint   Patient presents with   • Seizure     Pt had witnessed seizure lasting approx 5 mins. EMS gave 5mg versed and 4mg zofran. Pt was combative for 15 mins with EMS. Currently postictal. Father with pt.     /58   Pulse (!) 105   Temp 36.6 °C (97.8 °F) (Temporal)   Resp 13   Ht 1.753 m (5' 9\")   Wt 68 kg (150 lb)   SpO2 95%   BMI 22.15 kg/m²     "

## 2022-09-29 DIAGNOSIS — J45.20 MILD INTERMITTENT ASTHMA WITHOUT COMPLICATION: ICD-10-CM

## 2022-09-29 RX ORDER — ALBUTEROL SULFATE 90 UG/1
2 AEROSOL, METERED RESPIRATORY (INHALATION) EVERY 6 HOURS PRN
Qty: 18 G | Refills: 0 | Status: SHIPPED | OUTPATIENT
Start: 2022-09-29 | End: 2022-10-24

## 2022-10-12 ENCOUNTER — DOCUMENTATION (OUTPATIENT)
Dept: PHARMACY | Facility: MEDICAL CENTER | Age: 32
End: 2022-10-12
Payer: COMMERCIAL

## 2022-10-12 NOTE — PROGRESS NOTES
I spoke to pt regarding Briviact refill. Pt would like to refill this and pick it up at pharmacy tomorrow 10/13. Pt is doing well on medication and reports no changes. Pt had no questions at this time.

## 2022-10-13 PROCEDURE — RXMED WILLOW AMBULATORY MEDICATION CHARGE: Performed by: NURSE PRACTITIONER

## 2022-10-22 DIAGNOSIS — J45.20 MILD INTERMITTENT ASTHMA WITHOUT COMPLICATION: ICD-10-CM

## 2022-10-24 RX ORDER — ALBUTEROL SULFATE 90 UG/1
AEROSOL, METERED RESPIRATORY (INHALATION)
Qty: 18 EACH | Refills: 0 | Status: SHIPPED | OUTPATIENT
Start: 2022-10-24 | End: 2022-11-23

## 2022-11-01 ENCOUNTER — HOSPITAL ENCOUNTER (EMERGENCY)
Facility: MEDICAL CENTER | Age: 32
End: 2022-11-01
Attending: EMERGENCY MEDICINE
Payer: COMMERCIAL

## 2022-11-01 VITALS
TEMPERATURE: 98.9 F | WEIGHT: 150 LBS | BODY MASS INDEX: 22.22 KG/M2 | HEIGHT: 69 IN | DIASTOLIC BLOOD PRESSURE: 72 MMHG | SYSTOLIC BLOOD PRESSURE: 117 MMHG | RESPIRATION RATE: 19 BRPM | HEART RATE: 59 BPM | OXYGEN SATURATION: 95 %

## 2022-11-01 DIAGNOSIS — R56.9 SEIZURE (HCC): ICD-10-CM

## 2022-11-01 LAB
ANION GAP SERPL CALC-SCNC: 10 MMOL/L (ref 7–16)
BUN SERPL-MCNC: 8 MG/DL (ref 8–22)
CALCIUM SERPL-MCNC: 9.1 MG/DL (ref 8.5–10.5)
CHLORIDE SERPL-SCNC: 104 MMOL/L (ref 96–112)
CO2 SERPL-SCNC: 23 MMOL/L (ref 20–33)
CREAT SERPL-MCNC: 1.24 MG/DL (ref 0.5–1.4)
GFR SERPLBLD CREATININE-BSD FMLA CKD-EPI: 79 ML/MIN/1.73 M 2
GLUCOSE SERPL-MCNC: 87 MG/DL (ref 65–99)
POTASSIUM SERPL-SCNC: 4 MMOL/L (ref 3.6–5.5)
SODIUM SERPL-SCNC: 137 MMOL/L (ref 135–145)

## 2022-11-01 PROCEDURE — 36415 COLL VENOUS BLD VENIPUNCTURE: CPT

## 2022-11-01 PROCEDURE — 99284 EMERGENCY DEPT VISIT MOD MDM: CPT

## 2022-11-01 PROCEDURE — 80048 BASIC METABOLIC PNL TOTAL CA: CPT

## 2022-11-01 RX ORDER — LEVETIRACETAM 500 MG/5ML
1500 INJECTION, SOLUTION, CONCENTRATE INTRAVENOUS EVERY 12 HOURS
Status: DISCONTINUED | OUTPATIENT
Start: 2022-11-01 | End: 2022-11-01

## 2022-11-01 ASSESSMENT — FIBROSIS 4 INDEX: FIB4 SCORE: 1.44

## 2022-11-01 NOTE — ED PROVIDER NOTES
"ED Provider Note    CHIEF COMPLAINT  Chief Complaint   Patient presents with    Seizure     Per EMS, pt had witnessed (by wife) tonic clonic seizure lasting apx 3-4 min. Pt has hx of same        HPI  Barry Griffith is a 32 y.o. male who presents following seizure at with seizure activity at home.  Patient was last seen in this emergency department in July for seizure.  He was given 10 mg of Versed prior to arrival.    According to prior medical history, he was seen by neurology back in July.  He has a history of intermittent compliance with his medications.  He is on Briviact.    REVIEW OF SYSTEMS  See HPI for further details.  History limited secondary to postictal state / altered mental status    PAST MEDICAL HISTORY   has a past medical history of ASTHMA, Mild intermittent asthma with acute exacerbation (6/10/2016), Seizure (HCC), and Seizure disorder (HCC).    SOCIAL HISTORY  Social History     Tobacco Use    Smoking status: Never    Smokeless tobacco: Never   Vaping Use    Vaping Use: Never used   Substance and Sexual Activity    Alcohol use: No     Alcohol/week: 0.0 oz     Comment: rare     Drug use: Yes     Types: Marijuana     Comment: THC    Sexual activity: Yes     Partners: Female     Birth control/protection: Condom, Pill       SURGICAL HISTORY  patient denies any surgical history    CURRENT MEDICATIONS  Home Medications       Reviewed by Janice Valdes R.N. (Registered Nurse) on 11/01/22 at 0600  Med List Status: Not Addressed     Medication Last Dose Status   albuterol 108 (90 Base) MCG/ACT Aero Soln inhalation aerosol  Active   brivaracetam (BRIVIACT) 100 MG Tab tablet  Active                    ALLERGIES  No Known Allergies    PHYSICAL EXAM  VITAL SIGNS: /87   Pulse 89   Resp 15   Ht 1.753 m (5' 9\")   Wt 68 kg (150 lb)   SpO2 95%   BMI 22.15 kg/m²   Pulse ox interpretation: I interpret this pulse ox as normal.  Constitutional: Lethargic, in no apparent distress.  HENT: No " signs of trauma, Bilateral external ears normal, Nose normal.   Eyes: Pupils are equal and reactive, Conjunctiva normal, Non-icteric.   Neck: Normal range of motion, Supple, No stridor.   Cardiovascular: Regular rate and rhythm.   Thorax & Lungs: Normal breath sounds, No respiratory distress   Abdomen: Soft, No masses, No pulsatile masses. No peritoneal signs.  Skin: Warm, Dry, No erythema, No rash.   Back: No bony tenderness, No CVA tenderness.   Musculoskeletal: Good range of motion in all major joints. No major deformities noted.   Neurologic: Somnolent, Normal motor function, Normal sensory function, No focal deficits noted.       DIAGNOSTIC STUDIES / PROCEDURES      LABS  Labs Reviewed   BASIC METABOLIC PANEL   ESTIMATED GFR         RADIOLOGY  No orders to display         COURSE & MEDICAL DECISION MAKING    Medications - No data to display    Pertinent Labs & Imaging studies reviewed. (See chart for details)  32 y.o. male with a history of seizures presenting with seizure activity.  Upon arrival, he is slightly somnolent and it is consistent with a postictal state.  Has a history of questionable noncompliance.  He is on Briviact.  Patient was observed here in the emergency department.  No evidence of hyponatremia or hypoglycemia.  He reports feeling much improved upon reevaluation.  Alert and awake and oriented.  Reports taking his medications with sometimes missing some doses.  Denies drug or alcohol abuse except for cannabis.  Recommending prompt outpatient follow-up with neurology for further management and further control of his seizures.  Last seizure was a few months ago.  Patient appears stable for discharge.    The patient was instructed to follow-up with primary care physician for further management.  To return immediately for any worsening symptoms or development of any other concerning signs or symptoms. The patient verbalizes understanding in their own words.    /87   Pulse 89   Resp 15    "Ht 1.753 m (5' 9\")   Wt 68 kg (150 lb)   SpO2 95%   BMI 22.15 kg/m²     The patient was referred to primary care where they will receive further BP management.      [facility], Emergency Dept  [address] [facility] [address] [phone]    As needed, If symptoms worsen    Primary care doctor    Schedule an appointment as soon as possible for a visit       Manav Bai M.D.  [address]   [address] [address] [phone]    Schedule an appointment as soon as possible for a visit       FINAL IMPRESSION  1. Seizure (HCC)            Electronically signed by: Kory Hurley M.D., 11/1/2022 6:12 AM    "

## 2022-11-01 NOTE — ED NOTES
Pt has discharge orders. Pt educated on discharge instructions.  Pt verbalizes understanding. PIV removed. Pt ambulatory to lobby with steady gait. Pt going home with wife.

## 2022-11-01 NOTE — ED NOTES
Rounded on pt, pt calm, A&O x4, stating he feels better. Pt requesting to go home. Pt updated on POC. Pt provided with warm blankets per request.

## 2022-11-01 NOTE — ED NOTES
Pt now awake and alert, GCS 10 at this time with some confusion to time. Pt states that he takes his seizure medication BID with last dose last noc. Pt states that his last seizure was apx 2-3 mo ago. Pt states that he is unsure of the name of the medication that he currently takes

## 2022-11-01 NOTE — ED TRIAGE NOTES
"Chief Complaint   Patient presents with   • Seizure     Per EMS, pt had witnessed (by wife) tonic clonic seizure lasting apx 3-4 min. Pt has hx of same      BIB [facility] in 4pt restraints for above complaint. Per EMS, pt began walking into things and had \"a few falls\" that included head trauma. Pt was reportedly combative and post ictal on scene. Pt received 10mg Versed IM pta. Per pt's wife, pt taking keppra, last dose was apx yesterday. Pt has no signs of oral trauma and was not reportedly incontinent.     /87   Pulse 89   Resp 15   Ht 1.753 m (5' 9\")   Wt 68 kg (150 lb)   SpO2 95%   BMI 22.15 kg/m²     "

## 2022-11-02 ENCOUNTER — TELEPHONE (OUTPATIENT)
Dept: NEUROLOGY | Facility: MEDICAL CENTER | Age: 32
End: 2022-11-02
Payer: COMMERCIAL

## 2022-11-02 DIAGNOSIS — G40.109 LOCALIZATION-RELATED EPILEPSY (HCC): ICD-10-CM

## 2022-11-02 NOTE — TELEPHONE ENCOUNTER
Received request via: Patient    Was the patient seen in the last year in this department? Yes    Does the patient have an active prescription (recently filled or refills available) for medication(s) requested? No    brivaracetam (BRIVIACT) 100 MG Tab tablet - Patient is completely out of his medication and is currently on a wait list to get in with provider. Please send in refill ASAP.

## 2022-11-04 ENCOUNTER — TELEPHONE (OUTPATIENT)
Dept: NEUROLOGY | Facility: MEDICAL CENTER | Age: 32
End: 2022-11-04
Payer: COMMERCIAL

## 2022-11-04 ENCOUNTER — PHARMACY VISIT (OUTPATIENT)
Dept: PHARMACY | Facility: MEDICAL CENTER | Age: 32
End: 2022-11-04
Payer: COMMERCIAL

## 2022-11-04 NOTE — TELEPHONE ENCOUNTER
Patient of Luiza's came into the office asking why his refill was not filled. Looked in chart and it was sent to   by Marly on 11- . sent refill in on that same day. Called pharmacy to see if they had it and it was ready ,waiting for patient to be picked up.

## 2022-11-23 DIAGNOSIS — J45.20 MILD INTERMITTENT ASTHMA WITHOUT COMPLICATION: ICD-10-CM

## 2022-11-23 RX ORDER — ALBUTEROL SULFATE 90 UG/1
AEROSOL, METERED RESPIRATORY (INHALATION)
Qty: 18 EACH | Refills: 0 | Status: SHIPPED | OUTPATIENT
Start: 2022-11-23 | End: 2022-12-22

## 2022-11-23 NOTE — TELEPHONE ENCOUNTER
Received request via: Pharmacy    Was the patient seen in the last year in this department? Yes    Does the patient have an active prescription (recently filled or refills available) for medication(s) requested? No    Does the patient have senior living Plus and need 100 day supply (blood pressure, diabetes and cholesterol meds only)? Patient does not have SCP

## 2022-12-22 ENCOUNTER — OFFICE VISIT (OUTPATIENT)
Dept: URGENT CARE | Facility: CLINIC | Age: 32
End: 2022-12-22
Payer: COMMERCIAL

## 2022-12-22 VITALS
TEMPERATURE: 98.3 F | DIASTOLIC BLOOD PRESSURE: 60 MMHG | SYSTOLIC BLOOD PRESSURE: 102 MMHG | HEIGHT: 70 IN | RESPIRATION RATE: 16 BRPM | OXYGEN SATURATION: 97 % | WEIGHT: 158 LBS | HEART RATE: 114 BPM | BODY MASS INDEX: 22.62 KG/M2

## 2022-12-22 DIAGNOSIS — R05.1 ACUTE COUGH: ICD-10-CM

## 2022-12-22 PROCEDURE — 99213 OFFICE O/P EST LOW 20 MIN: CPT | Performed by: PHYSICIAN ASSISTANT

## 2022-12-22 RX ORDER — BENZONATATE 100 MG/1
100 CAPSULE ORAL 3 TIMES DAILY PRN
Qty: 60 CAPSULE | Refills: 0 | Status: SHIPPED | OUTPATIENT
Start: 2022-12-22 | End: 2023-05-10

## 2022-12-22 RX ORDER — PREDNISONE 10 MG/1
40 TABLET ORAL 2 TIMES DAILY
Qty: 40 TABLET | Refills: 0 | Status: SHIPPED | OUTPATIENT
Start: 2022-12-22 | End: 2022-12-27

## 2022-12-22 ASSESSMENT — ENCOUNTER SYMPTOMS
DIARRHEA: 0
SHORTNESS OF BREATH: 0
SORE THROAT: 0
WHEEZING: 0
HEADACHES: 0
FEVER: 0
CHILLS: 0
SINUS PAIN: 0
EYE REDNESS: 0
DIZZINESS: 0
DIAPHORESIS: 0
EYE DISCHARGE: 0
VOMITING: 0
NAUSEA: 0
COUGH: 1
ABDOMINAL PAIN: 1
CONSTIPATION: 0
EYE PAIN: 0

## 2022-12-22 ASSESSMENT — FIBROSIS 4 INDEX: FIB4 SCORE: 1.44

## 2022-12-22 NOTE — PROGRESS NOTES
"  Subjective:     Barry Griffith  is a 32 y.o. male who presents for Cough (X 2 days and started getting worse last night and feels phlegm in his chest )       He presents today with cough x2 days.  Denies any recent known close sick contacts.  Does have a history of asthma and has been using his albuterol inhaler during his coughing episodes.  Does have associated chest pain and abdominal pain associated with these coughing episodes.  Denies fever/chills/sweats, chest pain, shortness of breath, nausea/vomiting, abdominal pain, diarrhea.     Review of Systems   Constitutional:  Negative for chills, diaphoresis, fever and malaise/fatigue.   HENT:  Negative for congestion, ear discharge, sinus pain and sore throat.    Eyes:  Negative for pain, discharge and redness.   Respiratory:  Positive for cough. Negative for shortness of breath and wheezing.    Cardiovascular:  Positive for chest pain.   Gastrointestinal:  Positive for abdominal pain. Negative for constipation, diarrhea, nausea and vomiting.   Genitourinary:  Negative for dysuria, frequency and urgency.   Neurological:  Negative for dizziness and headaches.    No Known Allergies  Past Medical History:   Diagnosis Date    ASTHMA     Asthma since infancy.    Mild intermittent asthma with acute exacerbation 6/10/2016    Seizure ([facility])     Seizure disorder ([facility])         Objective:   /60 (BP Location: Left arm, Patient Position: Sitting, BP Cuff Size: Adult)   Pulse (!) 114   Temp 36.8 °C (98.3 °F) (Temporal)   Resp 16   Ht 1.765 m (5' 9.5\")   Wt 71.7 kg (158 lb)   SpO2 97%   BMI 23.00 kg/m²   Physical Exam  Vitals and nursing note reviewed.   Constitutional:       General: He is not in acute distress.     Appearance: Normal appearance. He is not ill-appearing, toxic-appearing or diaphoretic.   HENT:      Head: Normocephalic.      Right Ear: Tympanic membrane, ear canal and external ear normal. There is no impacted cerumen.      Left Ear: Tympanic " membrane, ear canal and external ear normal. There is no impacted cerumen.      Nose: No congestion or rhinorrhea.      Mouth/Throat:      Mouth: Mucous membranes are moist.      Pharynx: No oropharyngeal exudate or posterior oropharyngeal erythema.   Eyes:      General:         Right eye: No discharge.         Left eye: No discharge.      Conjunctiva/sclera: Conjunctivae normal.   Cardiovascular:      Rate and Rhythm: Normal rate and regular rhythm.   Pulmonary:      Effort: Pulmonary effort is normal. No respiratory distress.      Breath sounds: Normal breath sounds. No stridor. No wheezing or rhonchi.   Musculoskeletal:      Cervical back: Neck supple.   Lymphadenopathy:      Cervical: No cervical adenopathy.   Neurological:      General: No focal deficit present.      Mental Status: He is alert and oriented to person, place, and time.   Psychiatric:         Mood and Affect: Mood normal.         Behavior: Behavior normal.         Thought Content: Thought content normal.         Judgment: Judgment normal.           Diagnostic testing: None    Assessment/Plan:     Encounter Diagnoses   Name Primary?    Acute cough           Plan for care for today's complaint includes prednisone and Tessalon Perles for acute cough.  Continue use albuterol inhaler as needed for additional cough support.  No wheezing on exam today, no evidence of acute asthma exacerbation.  Continue to monitor symptoms and return to urgent care or follow-up with primary care provider if symptoms remain ongoing.  Follow-up in the emergency department if symptoms become severe, ER precautions discussed in office today..  Prescription for prednisone, Tessalon Perles provided.    See AVS Instructions below for written guidance provided to patient on after-visit management and care in addition to our verbal discussion during the visit.    Please note that this dictation was created using voice recognition software. I have attempted to correct all errors,  but there may be sound-alike, spelling, grammar and possibly content errors that I did not discover before finalizing the note.    [place] Cristopher TORRES

## 2023-01-02 ENCOUNTER — APPOINTMENT (OUTPATIENT)
Dept: RADIOLOGY | Facility: MEDICAL CENTER | Age: 33
End: 2023-01-02
Attending: EMERGENCY MEDICINE
Payer: COMMERCIAL

## 2023-01-02 ENCOUNTER — HOSPITAL ENCOUNTER (EMERGENCY)
Facility: MEDICAL CENTER | Age: 33
End: 2023-01-02
Attending: EMERGENCY MEDICINE
Payer: COMMERCIAL

## 2023-01-02 ENCOUNTER — APPOINTMENT (OUTPATIENT)
Dept: RADIOLOGY | Facility: MEDICAL CENTER | Age: 33
End: 2023-01-02
Payer: COMMERCIAL

## 2023-01-02 VITALS
HEIGHT: 71 IN | BODY MASS INDEX: 24.5 KG/M2 | DIASTOLIC BLOOD PRESSURE: 63 MMHG | OXYGEN SATURATION: 95 % | SYSTOLIC BLOOD PRESSURE: 120 MMHG | WEIGHT: 175 LBS | TEMPERATURE: 97.5 F | HEART RATE: 106 BPM | RESPIRATION RATE: 15 BRPM

## 2023-01-02 DIAGNOSIS — R56.9 SEIZURE (HCC): ICD-10-CM

## 2023-01-02 DIAGNOSIS — R41.0 DISORIENTATION: ICD-10-CM

## 2023-01-02 LAB
ABO GROUP BLD: NORMAL
ALBUMIN SERPL BCP-MCNC: 4.1 G/DL (ref 3.2–4.9)
ALBUMIN/GLOB SERPL: 1.7 G/DL
ALP SERPL-CCNC: 55 U/L (ref 30–99)
ALT SERPL-CCNC: 45 U/L (ref 2–50)
ANION GAP SERPL CALC-SCNC: 16 MMOL/L (ref 7–16)
APTT PPP: <20 SEC (ref 24.7–36)
AST SERPL-CCNC: 45 U/L (ref 12–45)
BILIRUB SERPL-MCNC: 0.3 MG/DL (ref 0.1–1.5)
BLD GP AB SCN SERPL QL: NORMAL
BUN SERPL-MCNC: 13 MG/DL (ref 8–22)
CALCIUM ALBUM COR SERPL-MCNC: 9 MG/DL (ref 8.5–10.5)
CALCIUM SERPL-MCNC: 9.1 MG/DL (ref 8.5–10.5)
CHLORIDE SERPL-SCNC: 105 MMOL/L (ref 96–112)
CO2 SERPL-SCNC: 17 MMOL/L (ref 20–33)
CREAT SERPL-MCNC: 1.3 MG/DL (ref 0.5–1.4)
ETHANOL BLD-MCNC: <10.1 MG/DL
GFR SERPLBLD CREATININE-BSD FMLA CKD-EPI: 75 ML/MIN/1.73 M 2
GLOBULIN SER CALC-MCNC: 2.4 G/DL (ref 1.9–3.5)
GLUCOSE SERPL-MCNC: 83 MG/DL (ref 65–99)
INR PPP: 0.9 (ref 0.87–1.13)
POTASSIUM SERPL-SCNC: 4.5 MMOL/L (ref 3.6–5.5)
PROT SERPL-MCNC: 6.5 G/DL (ref 6–8.2)
PROTHROMBIN TIME: 12.1 SEC (ref 12–14.6)
RH BLD: NORMAL
SODIUM SERPL-SCNC: 138 MMOL/L (ref 135–145)

## 2023-01-02 PROCEDURE — 82077 ASSAY SPEC XCP UR&BREATH IA: CPT

## 2023-01-02 PROCEDURE — 72125 CT NECK SPINE W/O DYE: CPT

## 2023-01-02 PROCEDURE — 96374 THER/PROPH/DIAG INJ IV PUSH: CPT

## 2023-01-02 PROCEDURE — 96375 TX/PRO/DX INJ NEW DRUG ADDON: CPT

## 2023-01-02 PROCEDURE — 99285 EMERGENCY DEPT VISIT HI MDM: CPT

## 2023-01-02 PROCEDURE — 85610 PROTHROMBIN TIME: CPT

## 2023-01-02 PROCEDURE — 85730 THROMBOPLASTIN TIME PARTIAL: CPT

## 2023-01-02 PROCEDURE — 700111 HCHG RX REV CODE 636 W/ 250 OVERRIDE (IP): Performed by: EMERGENCY MEDICINE

## 2023-01-02 PROCEDURE — 36415 COLL VENOUS BLD VENIPUNCTURE: CPT

## 2023-01-02 PROCEDURE — 86900 BLOOD TYPING SEROLOGIC ABO: CPT

## 2023-01-02 PROCEDURE — 96372 THER/PROPH/DIAG INJ SC/IM: CPT

## 2023-01-02 PROCEDURE — 70450 CT HEAD/BRAIN W/O DYE: CPT

## 2023-01-02 PROCEDURE — 86901 BLOOD TYPING SEROLOGIC RH(D): CPT

## 2023-01-02 PROCEDURE — 86850 RBC ANTIBODY SCREEN: CPT

## 2023-01-02 PROCEDURE — 305948 HCHG GREEN TRAUMA ACT PRE-NOTIFY NO CC

## 2023-01-02 PROCEDURE — 80053 COMPREHEN METABOLIC PANEL: CPT

## 2023-01-02 RX ORDER — ZIPRASIDONE MESYLATE 20 MG/ML
10 INJECTION, POWDER, LYOPHILIZED, FOR SOLUTION INTRAMUSCULAR ONCE
Status: COMPLETED | OUTPATIENT
Start: 2023-01-02 | End: 2023-01-02

## 2023-01-02 RX ORDER — HALOPERIDOL 5 MG/ML
INJECTION INTRAMUSCULAR
Status: COMPLETED | OUTPATIENT
Start: 2023-01-02 | End: 2023-01-02

## 2023-01-02 RX ORDER — DIPHENHYDRAMINE HYDROCHLORIDE 50 MG/ML
50 INJECTION INTRAMUSCULAR; INTRAVENOUS ONCE
Status: COMPLETED | OUTPATIENT
Start: 2023-01-02 | End: 2023-01-02

## 2023-01-02 RX ADMIN — HALOPERIDOL LACTATE 2.5 MG: 5 INJECTION, SOLUTION INTRAMUSCULAR at 12:49

## 2023-01-02 RX ADMIN — DIPHENHYDRAMINE HYDROCHLORIDE 50 MG: 50 INJECTION, SOLUTION INTRAMUSCULAR; INTRAVENOUS at 12:53

## 2023-01-02 RX ADMIN — ZIPRASIDONE MESYLATE 10 MG: 20 INJECTION, POWDER, LYOPHILIZED, FOR SOLUTION INTRAMUSCULAR at 13:08

## 2023-01-02 RX ADMIN — HALOPERIDOL LACTATE 2.5 MG: 5 INJECTION, SOLUTION INTRAMUSCULAR at 12:56

## 2023-01-02 NOTE — ED PROVIDER NOTES
"  ER Provider Note    Scribed for Freddy Alba M.d. by Randa Blanton. 1/2/2023  12:58 PM    Primary Care Provider: No primary care provider noted.    CHIEF COMPLAINT  Chief Complaint   Patient presents with    Trauma Green    Seizure     Takes Briviact for epilepsy, two seizures today, took medications a prescribed.  See neurologist, per reports epilepsy d/t old football injury.        HPI  LIMITATION TO HISTORY   Select: Altered mental status / Confusion  OUTSIDE HISTORIAN(S):  Select: EMS corroborated the patients history.     Kae Jones is a 32 y.o. male who presents to the ED via EMS as a Trauma Green. The patient has a history of epilepsy. He had a siezure earlier this morning and took his seizure medication then went to bed. He woke up and began to walk down stairs when he had another seizure, then feel down  the stairs, hitting his head in a wall. Per, EMS, there is a a large whole in the wall. His seizures were witnessed by his girlfriend. It is unknown how many stairs he fell cyrus. He arrived in a c-collar.  He has a medical history of asthma and TBI. He was previously a football player. History limited secondary to altered mental status.    REVIEW OF SYSTEMS  Pertinent positives include seizure and head injury.difficult to obtain review of systems secondary to altered mental status    PAST MEDICAL HISTORY  Past Medical History:   Diagnosis Date    Asthma     Epilepsy (HCC)        SURGICAL HISTORY  History reviewed. No pertinent surgical history.    FAMILY HISTORY  History reviewed. No pertinent family history.    SOCIAL HISTORY   None noted.     CURRENT MEDICATIONS  Previous Medications    BRIVARACETAM (BRIVIACT PO)    Take  by mouth.    PREDNISONE PO    Take  by mouth.       ALLERGIES  Patient has no allergy information on record.    PHYSICAL EXAM  BP (!) 140/88   Pulse (!) 107   Temp 35.9 °C (96.7 °F)   Resp 20   Ht 1.803 m (5' 11\")   Wt 79.4 kg (175 lb)   SpO2 99%   BMI 24.41 " Verbal permission granted by patient to leave a detailed message with medical information at phone number listed in Epic. yes    Patient is female, are you pregnant or breastfeeding ? No    Pt is here today with Self    CHIEF COMPLAINT/HISTORY OF PRESENT ILLNESS: Suma Herrera is a 50 year old White female who seeks medical evaluation for Derm Problem (Skin exam-new pt.  ) and Office Visit.    Patient presents today for a total skin examination.  She denies a personal history of skin cancers, precancers, or dysplastic nevi.      Patient describes lesion under  her left beast.  She reports this has been present for the past one year.  She reports it is itchy.  She reports it is unchanged over time.  She reports she has tried nothing in the past to treat it with no improvement.    Patient is requesting a refill for triamcinolone, prescribed previously by her former dermatologist for a rash of unknown etiology.     Other concerns: None       Yuko Davis RN  9/22/2022  4:11 PM  Signed  Denies Latex allergy or sensitivity.    Medication verified and med list updated  Refills needed today: No    PERTINENT DERMATOLOGIC HISTORY   History of skin cancer: Negative  Family history of skin cancer: basal cell carcinoma-- mother   History of sunburns/tanning bed use: sunburns as a child/teenager, sunburns occasionally as an adult and no tanning bed use  Sun protective measures: sunscreen and hat rarely  Outdoor hobbies: no   Occupation: stay at home parent      PAST MEDICAL HISTORY:  []  None  []  Melanoma    []  Asthma []  Skin Cancer  []  Eczema []  Keloids  []  Allergies [x]  Psoriasis  []  Vitiligo  []  Alopecia areata    REVIEW OF SYSTEMS:  Yes No  []  [x]  Headaches/vision changes  []  [x]  Fevers/chills/sweats   []  [x]  Unintentional weight loss  []  [x]  Sore throat  []  [x]  Runny nose/cough  []  [x]  Nausea/vomiting  []  [x]  Abdominal pain  []  [x]  Chest pain  []  [x]  Difficulty breathing  []  [x]  Easy  bruising or bleeding  []  [x]  Diarrhea/constipation   Yes No    []  [x]  Pacemaker  []  [x]  Blood thinners   []  [x]  Allergy to lidocaine or epinephrine sensitivity    Verbal consent obtained to examine breasts/genitals/buttocks?  yes.    PAST MEDICAL HISTORY:   Past Medical History:   Diagnosis Date   • Acquired hypothyroidism 5/18/2022   • Allergy 5/18/2022   • Anxiety disorder 5/18/2022   • Migraine without aura and without status migrainosus, not intractable 5/18/2022   • TMJ (temporomandibular joint syndrome) 5/18/2022   • Varicose veins of both lower extremities with pain 5/18/2022         FAMILY HISTORY:   Family History   Problem Relation Age of Onset   • Thyroid Mother    • Heart Father    • Cancer, Breast Maternal Grandmother    • Rheumatoid Arthritis Maternal Grandmother         SOCIAL HISTORY:   Social History     Tobacco Use   • Smoking status: Never Smoker   • Smokeless tobacco: Never Used   Substance Use Topics   • Alcohol use: Never   • Drug use: Never        MEDICATIONS:   Current Outpatient Medications   Medication Sig Dispense Refill   • famotidine (PEPCID) 10 MG tablet Take 10 mg by mouth daily.     • escitalopram (LEXAPRO) 10 MG tablet Take 1 tablet by mouth daily. 90 tablet 3   • glycopyrrolate (ROBINUL) 1 MG tablet Take 1 mg by mouth 3 times daily as needed.     • propRANolol (INDERAL) 20 MG tablet Take 20 mg by mouth 2 times daily as needed for Anxiety.     • levothyroxine 50 MCG tablet Take 50 mcg by mouth daily.     • cyclobenzaprine (FLEXERIL) 10 MG tablet Take 1 tablet by mouth 3 times daily as needed for Muscle spasms. 20 tablet 0   • meloxicam (MOBIC) 15 MG tablet Take 1 tablet by mouth daily as needed for Pain. Take with food and water. 20 tablet 0   • topiramate (TOPAMAX) 50 MG tablet Take 1 tablet by mouth at bedtime. 30 tablet 1   • loratadine (CLARITIN) 10 MG tablet Take 10 mg by mouth daily.     • omeprazole (PriLOSEC) 40 MG capsule Take 1 capsule by mouth daily. 30 capsule 0  kg/m²     Constitutional: Well developed, Well nourished, No acute distress, Non-toxic appearance. Incomprehensible words, yawning.   HENT: Normocephalic, Atraumatic, Bilateral external ears normal, Oropharynx is clear mucous membranes are moist. No oral exudates or nasal discharge. Scrape on his forehead.  Eyes: Pupils are equal round and reactive, EOMI, Conjunctiva normal, No discharge. Disconjugate gaze  Neck: Normal range of motion, No tenderness, Supple, No stridor. No meningismus.  Lymphatic: No lymphadenopathy noted.   Cardiovascular: Regular rate and rhythm without murmur rub or gallop.  Thorax & Lungs: Clear breath sounds bilaterally without wheezes, rhonchi or rales. There is no chest wall tenderness.   Abdomen: Soft non-tender non-distended. There is no rebound or guarding. No organomegaly is appreciated. Bowel sounds are normal.  Skin: Normal without rash.   Back: No CVA or spinal tenderness.   Extremities: Intact distal pulses, No edema, No tenderness, No cyanosis, No clubbing. Capillary refill is less than 2 seconds.  Musculoskeletal: Good range of motion in all major joints. No tenderness to palpation or major deformities noted.   Neurologic: Alert & oriented x 3, Normal motor function, Normal sensory function, No focal deficits noted. Reflexes are normal.  Psychiatric: Affect normal, Judgment normal, Mood normal. There is no suicidal ideation or patient reported hallucinations.      DIAGNOSTIC STUDIES    Labs:   Labs Reviewed   APTT - Abnormal; Notable for the following components:       Result Value    APTT <20.0 (*)     All other components within normal limits   COMP METABOLIC PANEL - Abnormal; Notable for the following components:    Co2 17 (*)     All other components within normal limits   PROTHROMBIN TIME   DIAGNOSTIC ALCOHOL   COD (ADULT)   CORRECTED CALCIUM   ESTIMATED GFR   COMPONENT CELLULAR   ABO RH CONFIRM   CBC WITHOUT DIFFERENTIAL      Radiology:   The attending emergency physician has      No current facility-administered medications for this visit.        ALLERGIES:   ALLERGIES:  No Known Allergies     PHYSICAL EXAM:  Visit Vitals  Pulse 94   LMP 10/23/2021   SpO2 98%     General: Patient is a well developed, well nourished and healthy appearing, average weight,  female in no acute distress  and appears of stated age.    Skin: Examination of the scalp/hair, face, neck, ears, eyelids/conjunctivae, lips, chest, back, abdomen, right upper extremity, left upper extremity, right lower extremity, left lower extremity, digits/nails and genitals/buttocks was performed.      Type III skin.    There is a 0.4 x 0.3 cm, angioma involving the medial left inframammary crease.    There are no lesions of concern on entire exam.  There are no eczematous processes.    ASSESSMENT:  1. Screening for skin cancer, unremarkable.     PLAN:  1. Explained to patient that the findings on her skin exam were unremarkable.  2. Photoprotection, including the use of sunscreen, discussed and recommended to patient.  3. Follow up in 1 year for a total skin exam. Follow-up sooner PRN new lesions or concerns arise.    ASSESSMENT:  2. Cherry Hemangioma: [medial left inframammary crease]    PLAN:  1. Explained diagnosis, vascular etiology, and benign nature of lesion to patient.  2. No treatment required or desired.   3. Follow-up PRN lesion changes.      ASSESSMENT:  3. Eczematous Dermatitis, by history.     PLAN:  1. Patient given a prescription for triamcinolone 0.1% cream- Apply to affected areas of the rash sparingly, rub in thoroughly, twice daily PRN rash recurs.  2. Follow-up PRN rash recurs.    contact office sooner if condition is worsening or with new concerns.    On 9/22/2022, Marlys CHAPMAN CMA scribed the services personally performed by Sheng Petersen MD     This is an attestation that the documentation recorded by the scribe accurately and completely reflects the service(s) Sheng CHAPMAN,  independently interpreted the diagnostic imaging associated with this visit and am waiting the final reading from the radiologist.     CT-CSPINE WITHOUT PLUS RECONS   Final Result      No acute fracture or dislocation of the cervical spine.      CT-HEAD W/O   Final Result      1.  No acute intracranial hemorrhage or displaced calvarial fracture.              COURSE & MEDICAL DECISION MAKING     Nursing notes, vital signs, PMSFSHx reviewed in chart      ED Observation Status? Yes; Patient placed in observation status at 1:01 PM, 1/2/2023.     INITIAL ASSESSMENT AND PLAN  Prior records reviewed and reviewed previous medications.    12:33 PM - Patient seen and examined at bedside.  The patient will be medicated with Geodon 10 mg, Benadryl 50 mg, and Haldol. Ordered for CT-spine w/o, CT-head w/o, INR, APTTT, diagnostic alcohol, CMP, component cellular, corrected calcium, eGFR, COD-adult, ABO RH confirm, and CBC w/o diff to evaluate his symptoms.     1:01 PM - The patient is still combative following medication administration . Ordered     1:49 PM - Patient was reevaluated at bedside. Oriented to place and name.     2:25 PM - Patient was reevaluated at bedside. He is back to baseline. Patient states that he is compliant his seizure medications. He is unsure when he last had break through seizures.     Conscious Sedation Procedure Note    Indication: Sedation for imaging and safety of staff and patient    Consent: Consent was unable to be obtained due to patient's condition.    Physician Involvement: The attending physician was present and supervising this procedure.    Pre-Sedation Documentation and Exam: I have personally completed a history, physical exam & review of systems for this patient (see notes).  Airway Assessment: normal  f3  Prior History of Anesthesia Complications: none    ASA Classification: Class 1 - A normal healthy patient    Sedation/ Anesthesia Plan: intravenous sedation    Medications Used:  MD, personally performed and the decisions made by me.   midazolam (Versed) by EMS x10 mg just prior to arrival and then I added Benadryl and Haldol and then subsequently Geodon 10 mg    Monitoring and Safety: The patient was placed on a cardiac monitor and vital signs, pulse oximetry and level of consciousness were continuously evaluated throughout the procedure. The patient was closely monitored until recovery from the medications was complete and the patient had returned to baseline status. Respiratory therapy was on standby at all times during the procedure.      (The following sections must be completed)  Post-Sedation Vital Signs: Vital signs were reviewed and were stable after the procedure (see flow sheet for vitals)            Intraservice Time: Greater than 10 minutes    Post-Sedation Exam: Lungs: clear           Complications: none    I provided both the sedation and procedure, a nurse was present at the bedside for the entire procedure.      Escalation of care considered, and ultimately not performed: acute inpatient care management, however at this time, the patient is most appropriate for outpatient management.    Barriers to care at this time, including but not limited to: Select: Patient is difficult to communicate with even as he reached his baseline mental status and he did not give very good history about his medications but states that he is compliant .     Diagnostic tests and prescription drugs considered including, but not limited to: Select: Medication modification we suggest he speak with his neurologist about any medication modification .    FINAL PROBLEM LIST AND DISPOSITION  1.  Seizure.  Continue medications and discuss his plan of care with neurology as he may need a change in medication or an increase in his dose    2.  Altered mental status.  His disorientation has improved over his emergency department course.      CRITICAL CARE  The very real possibilty of a deterioration of this patient's condition required the highest level of my  preparedness for sudden, emergent intervention.  I provided critical care services, which included medication orders, frequent reevaluations of the patient's condition and response to treatment, ordering and reviewing test results, and discussing the case with various consultants.  The critical care time associated with the care of the patient was 30 minutes. Review chart for interventions. This time is exclusive of any other billable procedures.    The patient will return for new or worsening symptoms and is stable at the time of discharge.    The patient is referred to a primary physician for blood pressure management, diabetic screening, and for all other preventative health concerns.      DISPOSITION:  Patient will be discharged home in stable condition.    FOLLOW UP:  No follow-up provider specified.      FINAL IMPRESSION   1. Seizure (HCC)    2. Disorientation    3.  Critical care time, 30 minutes  4.  Conscious sedation with Haldol, 20 minutes   Randa VANN (Natalia), am scribing for, and in the presence of, Freddy Alba M.D..    Electronically signed by: Randa Wolf), 1/2/2023    Freddy VANN M.D. personally performed the services described in this documentation, as scribed by Randa Blanton in my presence, and it is both accurate and complete.     The note accurately reflects work and decisions made by me.  Freddy Alba M.D.  1/2/2023  3:24 PM

## 2023-01-02 NOTE — ED NOTES
33 y/o male. Seizure activity fell going down stairs and hit head, c-collar in placed.  Pt was given 10 of versed IM and initial GCS 14, and decreased to 3 after medications.  Pt on 4 liters NC per report.  H/o seizures.

## 2023-01-02 NOTE — ED NOTES
Spoke with s/o Jami. She is concerned as pt has had respiratory congestion x 1 week with hx asthma. Pt also had reported lower back pain post fall.

## 2023-01-02 NOTE — ED NOTES
Pt ambulated to BR with steady gait.  Remains slightly sedated, but answering questions appropriately.

## 2023-01-02 NOTE — ED NOTES
c-collar removed by ERP.  Pt A/Ox3, restraints removed.  Pt voices wanting to go home.  Will inform ERP.

## 2023-01-02 NOTE — DISCHARGE INSTRUCTIONS
Please speak with your neurologist about your dosing as you are having breakthrough seizures in spite of taking your medications as directed    You cannot drive until you are cleared by your neurologist and usually that seizure-free for at least 90 days

## 2023-01-03 NOTE — ED NOTES
Pt discharged home with s/o pt to follow up with neurology re seizure and medication dosages. Pt understands he is not to drive for 90 days. Ambulated to lobby with steady gait

## 2023-01-09 DIAGNOSIS — G40.109 LOCALIZATION-RELATED EPILEPSY (HCC): ICD-10-CM

## 2023-01-09 NOTE — TELEPHONE ENCOUNTER
Received request via: Pharmacy    Was the patient seen in the last year in this department? No    Does the patient have an active prescription (recently filled or refills available) for medication(s) requested? No    Does the patient have senior living Plus and need 100 day supply (blood pressure, diabetes and cholesterol meds only)? Patient does not have SCP     patient Can you please refill

## 2023-03-17 ENCOUNTER — TELEPHONE (OUTPATIENT)
Dept: HEALTH INFORMATION MANAGEMENT | Facility: OTHER | Age: 33
End: 2023-03-17
Payer: COMMERCIAL

## 2023-04-04 ENCOUNTER — HOSPITAL ENCOUNTER (EMERGENCY)
Facility: MEDICAL CENTER | Age: 33
End: 2023-04-04
Attending: EMERGENCY MEDICINE
Payer: COMMERCIAL

## 2023-04-04 VITALS
OXYGEN SATURATION: 96 % | RESPIRATION RATE: 16 BRPM | TEMPERATURE: 97.4 F | SYSTOLIC BLOOD PRESSURE: 132 MMHG | DIASTOLIC BLOOD PRESSURE: 60 MMHG | HEIGHT: 69 IN | WEIGHT: 160 LBS | HEART RATE: 90 BPM | BODY MASS INDEX: 23.7 KG/M2

## 2023-04-04 DIAGNOSIS — R56.9 SEIZURE (HCC): ICD-10-CM

## 2023-04-04 DIAGNOSIS — Z53.29 LEFT AGAINST MEDICAL ADVICE: ICD-10-CM

## 2023-04-04 PROCEDURE — 302449 STATCHG TRIAGE ONLY (STATISTIC)

## 2023-04-04 ASSESSMENT — FIBROSIS 4 INDEX: FIB4 SCORE: 1.46

## 2023-04-04 ASSESSMENT — PAIN DESCRIPTION - PAIN TYPE: TYPE: OTHER (COMMENT)

## 2023-04-04 NOTE — ED NOTES
VSS, pt able to ambulate from [address] to the lobby with steady gait. Per pt he is getting driven home by his father.

## 2023-04-04 NOTE — ED NOTES
Pt gave verbal permission to update Jami (significant other) with any treatment/POC updates. Phone number updated in chart.

## 2023-04-04 NOTE — ED NOTES
AMA paperwork signed, pt is aware he can return at any time for new Sz like activity, HA, unilateral weakness, or any other new/concerning symptoms.

## 2023-04-04 NOTE — ED TRIAGE NOTES
"Chief Complaint   Patient presents with    Seizure     Pt BIB [facility] witnessed seizure like activity x1 hour PTA. Family noted a 1 min episode of tonic-clonic like shaking, pt with noted abrasion over R eyebrow. Pt with Hx of seizures currently taking Breviac regularly. Pt AAOx4 GCS 15.   BP (P) 135/62   Pulse (P) 96   Temp (P) 36.7 °C (98 °F) (Temporal)   Resp (P) 16   Ht (P) 1.753 m (5' 9\")   Wt (P) 72.6 kg (160 lb)   SpO2 (P) 94%   BMI (P) 23.63 kg/m²   "

## 2023-04-04 NOTE — ED NOTES
Pt able to ambulate from the ambulance gurney to bed with steady gait. Pt changed into gown and placed on cardiac monitor. Seizure precautions in place (padded side rails up, suction at bedside, call light within reach). Chart up for ERP.

## 2023-04-04 NOTE — ED PROVIDER NOTES
ED Provider Note    Patient presents he is A&O x4 perfectly appropriate has a history of seizures takes medications for it only has seizures once every 3 to 4 months.  The patient was brought by ambulance and at this point the patient does not wish to be evaluated and will sign an AGAINST MEDICAL ADVICE form.  At this point he is very appropriate he has no signs of injuries and I feel comfortable with this decision.      Electronically signed by: Jax Navarro M.D., 4/4/2023 8:42 AM

## 2023-04-20 DIAGNOSIS — G40.109 LOCALIZATION-RELATED EPILEPSY (HCC): ICD-10-CM

## 2023-04-25 ENCOUNTER — OFFICE VISIT (OUTPATIENT)
Dept: URGENT CARE | Facility: PHYSICIAN GROUP | Age: 33
End: 2023-04-25
Payer: COMMERCIAL

## 2023-04-25 VITALS
TEMPERATURE: 97.6 F | DIASTOLIC BLOOD PRESSURE: 64 MMHG | RESPIRATION RATE: 18 BRPM | HEIGHT: 70 IN | HEART RATE: 72 BPM | OXYGEN SATURATION: 96 % | WEIGHT: 160 LBS | SYSTOLIC BLOOD PRESSURE: 132 MMHG | BODY MASS INDEX: 22.9 KG/M2

## 2023-04-25 DIAGNOSIS — J45.20 MILD INTERMITTENT ASTHMA WITHOUT COMPLICATION: ICD-10-CM

## 2023-04-25 DIAGNOSIS — J45.21 MILD INTERMITTENT ASTHMA WITH EXACERBATION: ICD-10-CM

## 2023-04-25 DIAGNOSIS — R05.1 ACUTE COUGH: ICD-10-CM

## 2023-04-25 PROCEDURE — 99214 OFFICE O/P EST MOD 30 MIN: CPT | Performed by: PHYSICIAN ASSISTANT

## 2023-04-25 RX ORDER — ALBUTEROL SULFATE 90 UG/1
2 AEROSOL, METERED RESPIRATORY (INHALATION) EVERY 6 HOURS PRN
Qty: 8.5 G | Refills: 0 | Status: SHIPPED | OUTPATIENT
Start: 2023-04-25 | End: 2023-05-10 | Stop reason: SDUPTHER

## 2023-04-25 RX ORDER — DEXTROMETHORPHAN HYDROBROMIDE AND PROMETHAZINE HYDROCHLORIDE 15; 6.25 MG/5ML; MG/5ML
5 SYRUP ORAL 4 TIMES DAILY PRN
Qty: 118 ML | Refills: 0 | Status: SHIPPED | OUTPATIENT
Start: 2023-04-25 | End: 2023-05-10

## 2023-04-25 RX ORDER — DEXAMETHASONE SODIUM PHOSPHATE 10 MG/ML
10 INJECTION INTRAMUSCULAR; INTRAVENOUS ONCE
Status: COMPLETED | OUTPATIENT
Start: 2023-04-25 | End: 2023-04-25

## 2023-04-25 RX ORDER — METHYLPREDNISOLONE 4 MG/1
TABLET ORAL
Qty: 21 TABLET | Refills: 0 | Status: SHIPPED | OUTPATIENT
Start: 2023-04-25 | End: 2023-05-10

## 2023-04-25 RX ORDER — ALBUTEROL SULFATE 90 UG/1
2 AEROSOL, METERED RESPIRATORY (INHALATION) EVERY 6 HOURS PRN
Qty: 18 EACH | Refills: 0 | OUTPATIENT
Start: 2023-04-25

## 2023-04-25 RX ADMIN — DEXAMETHASONE SODIUM PHOSPHATE 10 MG: 10 INJECTION INTRAMUSCULAR; INTRAVENOUS at 18:19

## 2023-04-25 ASSESSMENT — FIBROSIS 4 INDEX: FIB4 SCORE: 1.46

## 2023-04-25 ASSESSMENT — ENCOUNTER SYMPTOMS: COUGH: 1

## 2023-04-26 ASSESSMENT — ENCOUNTER SYMPTOMS
MYALGIAS: 0
SORE THROAT: 0
TROUBLE SWALLOWING: 0
CHEST TIGHTNESS: 1
ORTHOPNEA: 0
WHEEZING: 1
SHORTNESS OF BREATH: 1
RHINORRHEA: 1
HOARSE VOICE: 1
FREQUENT THROAT CLEARING: 1
PND: 0

## 2023-04-26 NOTE — PROGRESS NOTES
Subjective:   Barry Griffith is a 33 y.o. male who presents for Cough (X 1.5 wk Cough, chest pain, wheezing. Pt. States he has asthma)        Asthma  He complains of chest tightness, cough, frequent throat clearing, hoarse voice, shortness of breath and wheezing. Chronicity: acute on chronic. Episode onset: 10 days. The problem has been gradually worsening. The cough is hoarse, dry and non-productive. Associated symptoms include rhinorrhea. Pertinent negatives include no myalgias, nasal congestion, orthopnea, PND, sore throat or trouble swallowing. His symptoms are aggravated by exercise. Relieved by: ran out of rescue inhaler. He reports moderate improvement on treatment. His symptoms are not alleviated by rest. There are no known risk factors for lung disease. His past medical history is significant for asthma.   Review of Systems   HENT:  Positive for hoarse voice and rhinorrhea. Negative for sore throat and trouble swallowing.    Respiratory:  Positive for cough, shortness of breath and wheezing.    Cardiovascular:  Negative for PND.   Musculoskeletal:  Negative for myalgias.     PMH:  has a past medical history of ASTHMA, Asthma, Epilepsy ([facility]), Mild intermittent asthma with acute exacerbation (6/10/2016), Seizure ([facility]), and Seizure disorder ([facility]).  MEDS:   Current Outpatient Medications:     albuterol 108 (90 Base) MCG/ACT Aero Soln inhalation aerosol, Inhale 2 Puffs every 6 hours as needed for Shortness of Breath., Disp: 8.5 g, Rfl: 0    promethazine-dextromethorphan (PROMETHAZINE-DM) 6.25-15 MG/5ML syrup, Take 5 mL by mouth 4 times a day as needed for Cough., Disp: 118 mL, Rfl: 0    methylPREDNISolone (MEDROL DOSEPAK) 4 MG Tablet Therapy Pack, Follow schedule on package instructions., Disp: 21 Tablet, Rfl: 0    brivaracetam (BRIVIACT) 100 MG Tab tablet, Take 1 Tablet by mouth 2 times a day for 180 days., Disp: 60 Tablet, Rfl: 5    Brivaracetam (BRIVIACT PO), Take  by mouth., Disp: , Rfl:      "benzonatate (TESSALON) 100 MG Cap, Take 1 Capsule by mouth 3 times a day as needed for Cough. (Patient not taking: Reported on 4/25/2023), Disp: 60 Capsule, Rfl: 0  ALLERGIES: No Known Allergies  SURGHX: History reviewed. No pertinent surgical history.  SOCHX:  reports that he has an unknown smoking status. He has never used smokeless tobacco. He reports current alcohol use. He reports current drug use. Drug: Marijuana.  FH: Family history was reviewed, no pertinent findings to report   Objective:   /64 (BP Location: Left arm, Patient Position: Sitting, BP Cuff Size: Adult)   Pulse 72   Temp 36.4 °C (97.6 °F) (Temporal)   Resp 18   Ht 1.778 m (5' 10\")   Wt 72.6 kg (160 lb)   SpO2 96%   BMI 22.96 kg/m²   Physical Exam  Vitals reviewed.   Constitutional:       General: He is not in acute distress.     Appearance: Normal appearance. He is well-developed. He is not toxic-appearing.   HENT:      Head: Normocephalic and atraumatic.      Right Ear: External ear normal.      Left Ear: External ear normal.      Nose: Nose normal.   Cardiovascular:      Rate and Rhythm: Normal rate and regular rhythm.      Heart sounds: Normal heart sounds, S1 normal and S2 normal.   Pulmonary:      Effort: Pulmonary effort is normal. No respiratory distress.      Breath sounds: Normal breath sounds. No stridor.      Comments: No tachypnea.  Patient speaks comfortably in full sentences.  Patient has mild fine expiratory wheezes bilaterally.  No rhonchi or rales.    Frequent bronchospasm.  Skin:     General: Skin is dry.   Neurological:      Comments: Alert and oriented.    Psychiatric:         Speech: Speech normal.         Behavior: Behavior normal.         Assessment/Plan:   1. Mild intermittent asthma with exacerbation  - albuterol 108 (90 Base) MCG/ACT Aero Soln inhalation aerosol; Inhale 2 Puffs every 6 hours as needed for Shortness of Breath.  Dispense: 8.5 g; Refill: 0  - methylPREDNISolone (MEDROL DOSEPAK) 4 MG Tablet " Therapy Pack; Follow schedule on package instructions.  Dispense: 21 Tablet; Refill: 0  - dexamethasone (DECADRON) injection (check route below) 10 mg    2. Acute cough  - promethazine-dextromethorphan (PROMETHAZINE-DM) 6.25-15 MG/5ML syrup; Take 5 mL by mouth 4 times a day as needed for Cough.  Dispense: 118 mL; Refill: 0    History and exam today consistent with asthma exacerbation.  Single dose of Decadron administered in clinic.  Patient will start Medrol Dosepak tomorrow.  Continue albuterol as needed.  May also consider taking an antihistamine as needed.    Strict return and ED precautions.

## 2023-04-27 ENCOUNTER — APPOINTMENT (OUTPATIENT)
Dept: MEDICAL GROUP | Facility: PHYSICIAN GROUP | Age: 33
End: 2023-04-27
Payer: COMMERCIAL

## 2023-04-28 ENCOUNTER — APPOINTMENT (OUTPATIENT)
Dept: RADIOLOGY | Facility: MEDICAL CENTER | Age: 33
End: 2023-04-28
Attending: EMERGENCY MEDICINE
Payer: COMMERCIAL

## 2023-04-28 ENCOUNTER — HOSPITAL ENCOUNTER (EMERGENCY)
Facility: MEDICAL CENTER | Age: 33
End: 2023-04-28
Attending: EMERGENCY MEDICINE
Payer: COMMERCIAL

## 2023-04-28 VITALS
HEIGHT: 70 IN | SYSTOLIC BLOOD PRESSURE: 103 MMHG | TEMPERATURE: 97.9 F | BODY MASS INDEX: 22.9 KG/M2 | RESPIRATION RATE: 15 BRPM | HEART RATE: 85 BPM | OXYGEN SATURATION: 95 % | WEIGHT: 160 LBS | DIASTOLIC BLOOD PRESSURE: 58 MMHG

## 2023-04-28 DIAGNOSIS — R56.9 SEIZURE (HCC): ICD-10-CM

## 2023-04-28 DIAGNOSIS — S01.81XA FACIAL LACERATION, INITIAL ENCOUNTER: ICD-10-CM

## 2023-04-28 LAB
ALBUMIN SERPL BCP-MCNC: 4.6 G/DL (ref 3.2–4.9)
ALBUMIN/GLOB SERPL: 1.9 G/DL
ALP SERPL-CCNC: 68 U/L (ref 30–99)
ALT SERPL-CCNC: 28 U/L (ref 2–50)
ANION GAP SERPL CALC-SCNC: 20 MMOL/L (ref 7–16)
AST SERPL-CCNC: 41 U/L (ref 12–45)
BILIRUB SERPL-MCNC: 0.3 MG/DL (ref 0.1–1.5)
BUN SERPL-MCNC: 9 MG/DL (ref 8–22)
CALCIUM ALBUM COR SERPL-MCNC: 8.3 MG/DL (ref 8.5–10.5)
CALCIUM SERPL-MCNC: 8.8 MG/DL (ref 8.5–10.5)
CHLORIDE SERPL-SCNC: 105 MMOL/L (ref 96–112)
CO2 SERPL-SCNC: 17 MMOL/L (ref 20–33)
CREAT SERPL-MCNC: 1.37 MG/DL (ref 0.5–1.4)
ETHANOL BLD-MCNC: <10.1 MG/DL
GFR SERPLBLD CREATININE-BSD FMLA CKD-EPI: 70 ML/MIN/1.73 M 2
GLOBULIN SER CALC-MCNC: 2.4 G/DL (ref 1.9–3.5)
GLUCOSE SERPL-MCNC: 90 MG/DL (ref 65–99)
MAGNESIUM SERPL-MCNC: 3.4 MG/DL (ref 1.5–2.5)
PHOSPHATE SERPL-MCNC: 2.9 MG/DL (ref 2.5–4.5)
POTASSIUM SERPL-SCNC: 3.8 MMOL/L (ref 3.6–5.5)
PROT SERPL-MCNC: 7 G/DL (ref 6–8.2)
SODIUM SERPL-SCNC: 142 MMOL/L (ref 135–145)

## 2023-04-28 PROCEDURE — 36415 COLL VENOUS BLD VENIPUNCTURE: CPT

## 2023-04-28 PROCEDURE — 83735 ASSAY OF MAGNESIUM: CPT

## 2023-04-28 PROCEDURE — 90715 TDAP VACCINE 7 YRS/> IM: CPT | Performed by: EMERGENCY MEDICINE

## 2023-04-28 PROCEDURE — 82077 ASSAY SPEC XCP UR&BREATH IA: CPT

## 2023-04-28 PROCEDURE — 700111 HCHG RX REV CODE 636 W/ 250 OVERRIDE (IP): Performed by: EMERGENCY MEDICINE

## 2023-04-28 PROCEDURE — 99285 EMERGENCY DEPT VISIT HI MDM: CPT

## 2023-04-28 PROCEDURE — 96375 TX/PRO/DX INJ NEW DRUG ADDON: CPT

## 2023-04-28 PROCEDURE — 304999 HCHG REPAIR-SIMPLE/INTERMED LEVEL 1

## 2023-04-28 PROCEDURE — 90471 IMMUNIZATION ADMIN: CPT

## 2023-04-28 PROCEDURE — 96374 THER/PROPH/DIAG INJ IV PUSH: CPT

## 2023-04-28 PROCEDURE — 303353 HCHG DERMABOND SKIN ADHESIVE

## 2023-04-28 PROCEDURE — 84100 ASSAY OF PHOSPHORUS: CPT

## 2023-04-28 PROCEDURE — 80053 COMPREHEN METABOLIC PANEL: CPT

## 2023-04-28 PROCEDURE — 700105 HCHG RX REV CODE 258: Performed by: EMERGENCY MEDICINE

## 2023-04-28 PROCEDURE — 304217 HCHG IRRIGATION SYSTEM

## 2023-04-28 RX ORDER — LORAZEPAM 2 MG/ML
2 INJECTION INTRAMUSCULAR ONCE
Status: COMPLETED | OUTPATIENT
Start: 2023-04-28 | End: 2023-04-28

## 2023-04-28 RX ORDER — LEVETIRACETAM 500 MG/5ML
2000 INJECTION, SOLUTION, CONCENTRATE INTRAVENOUS ONCE
Status: COMPLETED | OUTPATIENT
Start: 2023-04-28 | End: 2023-04-28

## 2023-04-28 RX ORDER — SODIUM CHLORIDE 9 MG/ML
1000 INJECTION, SOLUTION INTRAVENOUS ONCE
Status: COMPLETED | OUTPATIENT
Start: 2023-04-28 | End: 2023-04-28

## 2023-04-28 RX ORDER — LORAZEPAM 2 MG/ML
2 INJECTION INTRAMUSCULAR
Status: DISCONTINUED | OUTPATIENT
Start: 2023-04-28 | End: 2023-04-28

## 2023-04-28 RX ADMIN — LEVETIRACETAM 2000 MG: 100 INJECTION, SOLUTION INTRAVENOUS at 16:59

## 2023-04-28 RX ADMIN — LORAZEPAM 2 MG: 2 INJECTION INTRAMUSCULAR; INTRAVENOUS at 16:49

## 2023-04-28 RX ADMIN — CLOSTRIDIUM TETANI TOXOID ANTIGEN (FORMALDEHYDE INACTIVATED), CORYNEBACTERIUM DIPHTHERIAE TOXOID ANTIGEN (FORMALDEHYDE INACTIVATED), BORDETELLA PERTUSSIS TOXOID ANTIGEN (GLUTARALDEHYDE INACTIVATED), BORDETELLA PERTUSSIS FILAMENTOUS HEMAGGLUTININ ANTIGEN (FORMALDEHYDE INACTIVATED), BORDETELLA PERTUSSIS PERTACTIN ANTIGEN, AND BORDETELLA PERTUSSIS FIMBRIAE 2/3 ANTIGEN 0.5 ML: 5; 2; 2.5; 5; 3; 5 INJECTION, SUSPENSION INTRAMUSCULAR at 19:26

## 2023-04-28 RX ADMIN — SODIUM CHLORIDE 1000 ML: 9 INJECTION, SOLUTION INTRAVENOUS at 20:57

## 2023-04-28 RX ADMIN — LORAZEPAM 2 MG: 2 INJECTION INTRAMUSCULAR; INTRAVENOUS at 16:52

## 2023-04-28 ASSESSMENT — FIBROSIS 4 INDEX: FIB4 SCORE: 1.46

## 2023-04-28 NOTE — ED PROVIDER NOTES
ED Provider Note    CHIEF COMPLAINT  Chief Complaint   Patient presents with    Seizure     Pt BIBA from home after witnessed 10 minutes seizure. Prior to pt coming home pt had seizure at work. Pt combative and altered on EMS arrival. EMS placed pt in 4-point restraints and gave 10 mg of versed and 50 mg of ketamine. Pt postictal on arrival, GCS 8.        EXTERNAL RECORDS REVIEWED  Multiple emergency medicine notes with seizure activity and the patient wake up and left the hospital.    HPI/ROS  LIMITATION TO HISTORY   Select: Altered mental status / Confusion  OUTSIDE HISTORIAN(S):  Parent stating that the patient had a witnessed seizure, slight fall and hit the corner of a table with his head resulting in a small laceration.  EMS states that he had a seizure, stopping his seizure and he went back to baseline had a seizure again therefore received 10 of Versed and significant dose of ketamine and continue to be extremely agitated as the patient became more postictal.      Barry Griffith is a 33 y.o. male who presents with complaint of seizure.  Per EMS, the patient had a witnessed seizure at home, stop seizing never came back to his baseline had another seizure.  He received 10 of Versed as well as dose of ketamine in route and patient was extremely agitated.  In addition, the patient slightly fell and hit the corner of a table with his head per his father.  Patient is well-known to us for seizure activity and not being compliant on his medication and does take Briviact there was recently changed by neurology from [facility].    PAST MEDICAL HISTORY   has a past medical history of ASTHMA, Asthma, Epilepsy ([facility]), Mild intermittent asthma with acute exacerbation (6/10/2016), Seizure ([facility]), and Seizure disorder ([facility]).    SURGICAL HISTORY  patient denies any surgical history    FAMILY HISTORY  Family History   Problem Relation Age of Onset    Hypertension Mother     Hyperlipidemia Father     Hypertension Father      "Cancer Neg Hx     Diabetes Neg Hx        SOCIAL HISTORY  Social History     Tobacco Use    Smoking status: Unknown    Smokeless tobacco: Never   Vaping Use    Vaping Use: Never used   Substance and Sexual Activity    Alcohol use: Yes     Comment: rare     Drug use: Yes     Types: Marijuana     Comment: THC    Sexual activity: Yes     Partners: Female     Birth control/protection: Condom, Pill       CURRENT MEDICATIONS  Home Medications       Reviewed by Malina Lala R.N. (Registered Nurse) on 04/28/23 at 1702  Med List Status: Partial     Medication Last Dose Status   albuterol 108 (90 Base) MCG/ACT Aero Soln inhalation aerosol  Active   benzonatate (TESSALON) 100 MG Cap  Active   Brivaracetam (BRIVIACT PO)  Active   brivaracetam (BRIVIACT) 100 MG Tab tablet  Active   methylPREDNISolone (MEDROL DOSEPAK) 4 MG Tablet Therapy Pack  Active   promethazine-dextromethorphan (PROMETHAZINE-DM) 6.25-15 MG/5ML syrup  Active                    ALLERGIES  No Known Allergies    PHYSICAL EXAM  VITAL SIGNS: /58   Pulse 85   Temp 36.6 °C (97.9 °F)   Resp 15   Ht 1.778 m (5' 10\")   Wt 72.6 kg (160 lb)   SpO2 95%   BMI 22.96 kg/m²      Nursing notes and vitals reviewed.  Constitutional: Well developed, Well nourished, severe distress, in four-point restraints with paramedics calling him down, yelling and moaning  Eyes: PERRLA, EOMI, Conjunctiva normal, No discharge.   HENT: 0.5 cm laceration in the anterior forehead, abrasion of the left eyelid  Cardiovascular: Normal heart rate, Normal rhythm, No murmurs, No rubs, No gallops.   Thorax & Lungs: No respiratory distress, No rales, No rhonchi, No wheezing, No chest tenderness.   Abdomen: Bowel sounds normal, Soft, No tenderness, No guarding, No rebound, No masses, No pulsatile masses.   Skin: 0.5 cm laceration anterior forehead, abrasion to left, skin is diaphoretic.   Extremities: No deformity, no edema, good range of motion range of motion upper lower extremes " bilaterally  Neurologic: Alert & oriented x 0, agitated, moving his arms and legs out difficulty, responding to verbal stimulus slightly by moaning and yelling Psychiatric: Affect normal for clinical presentation.      DIAGNOSTIC STUDIES / PROCEDURES  EKG  I have independently interpreted this EKG  Sinus tachycardia on monitor    LABS  Results for orders placed or performed during the hospital encounter of 04/28/23   COMP METABOLIC PANEL   Result Value Ref Range    Sodium 142 135 - 145 mmol/L    Potassium 3.8 3.6 - 5.5 mmol/L    Chloride 105 96 - 112 mmol/L    Co2 17 (L) 20 - 33 mmol/L    Anion Gap 20.0 (H) 7.0 - 16.0    Glucose 90 65 - 99 mg/dL    Bun 9 8 - 22 mg/dL    Creatinine 1.37 0.50 - 1.40 mg/dL    Calcium 8.8 8.5 - 10.5 mg/dL    AST(SGOT) 41 12 - 45 U/L    ALT(SGPT) 28 2 - 50 U/L    Alkaline Phosphatase 68 30 - 99 U/L    Total Bilirubin 0.3 0.1 - 1.5 mg/dL    Albumin 4.6 3.2 - 4.9 g/dL    Total Protein 7.0 6.0 - 8.2 g/dL    Globulin 2.4 1.9 - 3.5 g/dL    A-G Ratio 1.9 g/dL   MAGNESIUM   Result Value Ref Range    Magnesium 3.4 (H) 1.5 - 2.5 mg/dL   PHOSPHORUS   Result Value Ref Range    Phosphorus 2.9 2.5 - 4.5 mg/dL   DIAGNOSTIC ALCOHOL   Result Value Ref Range    Diagnostic Alcohol <10.1 <10.1 mg/dL   CORRECTED CALCIUM   Result Value Ref Range    Correct Calcium 8.3 (L) 8.5 - 10.5 mg/dL   ESTIMATED GFR   Result Value Ref Range    GFR (CKD-EPI) 70 >60 mL/min/1.73 m 2     Laceration Repair Procedure Note    Indication: Laceration    Procedure: The patient was placed in the appropriate position . The area was then irrigated with normal saline. The laceration was closed with Dermabond. There were no additional lacerations requiring repair.   Total repaired wound length: 0.5 cm.     Other Items: None    The patient tolerated the procedure well.    Complications: None      COURSE & MEDICAL DECISION MAKING    ED Observation Status? Yes; I am placing the patient in to an observation status due to a diagnostic  uncertainty as well as therapeutic intensity. Patient placed in observation status at 1647PM, 4/28/2023.     Observation plan is as follows:     Upon Reevaluation, the patient's condition has: Improved; and will be discharged.    Patient discharged from ED Observation status at 2230 (Time) 4/28/23 (Date).     INITIAL ASSESSMENT, COURSE AND PLAN  Care Narrative: Is a pleasant 33-year-old gentleman presents with seizure-like activity.  Here in the emergency department, the patient was very combative and postictal.  He received Versed and ketamine in route which made him extremely combative.  For this reason, the patient received Ativan 2 mg x 2 and initial presentation and became more sedate.  The patient's father did come the bedside that he witnessed the seizure, did not a significant trauma and slightly nicked his head on  a table.  The patient's father states this happens frequently, and he will normalize and be fine to go home.  Patient is likewise have no significant abnormality except for slight elevated anion gap and decreased CO2 of 17.  For this reason patient received 1 L normal saline IV fluid.  The patient was evaluated for his laceration, he says he does not sutures therefore Dermabond was placed.  He also has abrasion to left eyebrow region is not amenable for suturing.  The patient was evaluated for several hours and on reevaluation is alert and oriented, walking without difficulty, had no focal neurological vascular deficits.  He is instructed take his medications as prescribed as he has been not taking his medication noncompliant.  He believes is part of the source of his seizures.  The patient will be following with his neurologist and strict return precautions have been given.          ADDITIONAL PROBLEM LIST  Chronic epilepsy and noncompliant on medications  DISPOSITION AND DISCUSSIONS    FINAL DIAGNOSIS  1. Seizure (HCC) Active   2. Facial laceration, initial encounter Active        DISPOSITION:  Patient will be discharged home in stable condition.    FOLLOW UP:  [facility], Emergency Dept  [address] [address] [phone]    If symptoms worsen      Electronically signed by: Dixon Villegas D.O., 4/28/2023 4:53 PM

## 2023-04-29 NOTE — ED NOTES
Bedside report from Marianne SÁNCHEZ. Rounded on pt. Pt resting comfortably in bed at this time. On vitals monitor. Respirations equal and unlabored. Vitals signs stable. No acute distress at this time. Call light within reach.

## 2023-04-29 NOTE — ED NOTES
Discharge instructions reviewed with patient/family.  Patient verbalizes understanding of follow up care, medication management and reasons to return to the ER. PIV Removed with no complications.

## 2023-04-29 NOTE — ED NOTES
Patient resting on stretcher in no acute distress. Equal chest rise noted. VS stable on monitor. Bed locked and in low position. Call bell within reach.   Lac irrigated on forehead

## 2023-04-29 NOTE — ED TRIAGE NOTES
"Chief Complaint   Patient presents with    Seizure     Pt BIBA from home after witnessed 10 minutes seizure. Prior to pt coming home pt had seizure at work. Pt combative and altered on EMS arrival. EMS placed pt in 4-point restraints and gave 10 mg of versed and 50 mg of ketamine. Pt postictal on arrival, GCS 8.          Pt brought into ED for above complaint.Pt A+Ox0 on arrival, combative and on arrival. ERP called to bedside, ativan ordered. Security at bedside to assist getting pt in to hospital bed and restrained. Pt has abrasion on posterior head. No other signs of trauma noted.     /70   Pulse (!) 102   Resp (!) 22   Ht 1.778 m (5' 10\")   Wt 72.6 kg (160 lb)   SpO2 98%     "

## 2023-04-29 NOTE — ED NOTES
Pt placed in 4-point soft restraints. Pt combative and confused upon arrival. Order received per

## 2023-04-29 NOTE — DISCHARGE INSTRUCTIONS
Please follow-up with your neurologist for further evaluation and management, take your medications as prescribed.  Do not drive your vehicle until you are cleared by neurology.  You may get the glue on your laceration wet but do not place any ointment on it.  Return to the emergency room for severe pain, nausea, vomiting, lightness, dizziness.

## 2023-05-08 ENCOUNTER — OFFICE VISIT (OUTPATIENT)
Dept: NEUROLOGY | Facility: MEDICAL CENTER | Age: 33
End: 2023-05-08
Attending: PSYCHIATRY & NEUROLOGY
Payer: COMMERCIAL

## 2023-05-08 VITALS
WEIGHT: 156.09 LBS | RESPIRATION RATE: 16 BRPM | SYSTOLIC BLOOD PRESSURE: 100 MMHG | BODY MASS INDEX: 22.4 KG/M2 | OXYGEN SATURATION: 99 % | HEART RATE: 72 BPM | TEMPERATURE: 98.2 F | DIASTOLIC BLOOD PRESSURE: 62 MMHG

## 2023-05-08 DIAGNOSIS — G40.109 LOCALIZATION-RELATED EPILEPSY (HCC): ICD-10-CM

## 2023-05-08 DIAGNOSIS — G40.909 SEIZURE DISORDER (HCC): ICD-10-CM

## 2023-05-08 PROCEDURE — 99215 OFFICE O/P EST HI 40 MIN: CPT | Performed by: PSYCHIATRY & NEUROLOGY

## 2023-05-08 PROCEDURE — 99211 OFF/OP EST MAY X REQ PHY/QHP: CPT | Performed by: PSYCHIATRY & NEUROLOGY

## 2023-05-08 PROCEDURE — 99417 PROLNG OP E/M EACH 15 MIN: CPT | Performed by: PSYCHIATRY & NEUROLOGY

## 2023-05-08 RX ORDER — CHOLECALCIFEROL (VITAMIN D3) 125 MCG
5000 CAPSULE ORAL DAILY
Qty: 90 CAPSULE | Refills: 3 | Status: SHIPPED | OUTPATIENT
Start: 2023-05-08

## 2023-05-08 ASSESSMENT — PATIENT HEALTH QUESTIONNAIRE - PHQ9: CLINICAL INTERPRETATION OF PHQ2 SCORE: 0

## 2023-05-08 ASSESSMENT — FIBROSIS 4 INDEX: FIB4 SCORE: 1.69

## 2023-05-08 NOTE — PROGRESS NOTES
"[facility]  New Patient Visit      Patient's Name: Barry Griffith  YOB: 1990  MRN: [id number]  Date of Service: 05/08/23    Referring Provider: No referring provider defined for this encounter.    Chief Concern: Seizures.     HPI: The patient is a 33 y.o., right-handed male, who initially presented to my epilepsy clinic for evaluation of seizures on 05/08/2023.    The patient started having seizures in 2018. He has two events types, as noted below. He never had myoclonus.    He was initially started on Keppra, but he did not like it. It seems that he was more compliant with Keppra and his seizures were better controlled at that time. He was switched to Briviact in early 2022; he has been tolerating Briviact well, as reported at the time of the initial visit, but has not been very compliant.     The patient unfortunately had 3 seizures with ER presentation on 01/02/2023, 04/04/2023, and 04/28/2023, as of the time of the initial visit with me. During one of his seizures in 2022, he was intubated.     Better controlled on Keppra, now on Briviact more seizures, but not as compliant.    Semiology:    Event type #1: \"Seizures\".  Year/Age of Onset: 2018.  Initial features: Some seizures occur out of sleep and no warning signs. Some seizures are preceded by chlorine smell.   Event features: The patient would experience bilateral tonic-clonic seizure, with head deviation to the left per the report he got from his wife. He is not responsive during the event and has no recollection for the time during the event.  He bites his tongue with his seizures; at least once bladder incontinence; no bowel incontinence.   Post-ictal features: He feels it takes his time for his brain to come back to baseline and that he loses a pitch after his seizure, sore in his muscles, and has headaches.   Duration: Up to 5 minutes on average.  Frequency: He has them on average once a month. The last " "event was on 04/28/2023.   Precipitating factors: Not clear.     Event type #2: \"Spells\".  Year/Age of Onset: 2018  Initial features: He would experience chlorine smell. He never had sensation of strange tastes, gastric upraising, autonomic phenomena, nor psychic phenomena except anxiety and history of maria victoria vu as a child.   Event features: He might experience a bit of anxiety with it. With at least one episode he stared off; he also feels his mouth is watering and he is doing swallowing movements. No manual automatisms.   Post-ictal features: Back to normal.  Duration: 30 seconds.  Frequency: He typically gets these events on average once in two to three months.   Precipitating factors: Not clear.     History of status epilepticus: yes - intubated in June 2022 for prolonged seizure.   History of physical injury related to seizures: once hit his head on the counter and still has a scar.   History of surgery related to epilepsy: no  Family Planning: N/A.  Current Driving Status: He is not driving at this time and his license is suspended due to his seizures.  Seizure Clusters: No  Longest Seizure Freedom: About 1 year seizure freedom around 2020.     Pertinent Ancillary Test Results:    MRI brain studies:   - MRI brain wo/w contrast (08/10/2018 at [facility]): \"1. MRI of the brain without and with contrast within normal limits. 2. No evidence of mass lesion, heterotopic gray matter, gross cortical dysplasia or mesial temporal sclerosis.\"    CT head studies:   - CT head wo contrast (01/02/2023 at [facility]): \"No acute intracranial hemorrhage or displaced calvarial fracture.\"    EEG studies:   - Standard EEG (07/10/2018, Dr. Bai): This is an abnormal video EEG recording in the awake, drowsy, and sleep state(s). Frequent left temporal sharps. Intermittent, subtle, left temporal slowing. The findings increase risk for seizures and suggest underlying area of cortical irritability and structural abnormality. Clinical and " radiological correlation is recommended.     - Ambulatory EEG  - none available for my review.     - EMU/LTM study (2022, Dr. Baker): 13-hour 31 minute EEG was abnormal  video EEG recording in the drowsy/sleep and/or comatose state(s):  -Moderate improving to mild background slowing suggestive of a non-specific encephalopathy. The effects of sedation may be contributing. Clinical correlation recommended.   - No focal asymmetries   - No definitive epileptiform discharges or other epileptiform phenomena seen   - No definitive seizures. Clinical correlation is recommended.   - One event at 1046 PM with fine body tremors with no abnormal EEG correlate.     Current Antiseizure Medications: Briviact 100 mg BID (not very compliant at this time).    Previously Tried Antiseizure Medications: Keppra (mood issues).     Review of Systems: No recent fevers nor significant weight changes. No heart disease. No color changes of his urine/stool. No diabetes mellitus nor thyroid disease. No joint pains/swelling nor skin rashes.  No mood issues nor SI/HI.     Risk Factors For Epilepsy/Seizure Disorder: The patient is a product of premature pregnancy/delivery (about 1.5 months early and there was a ). The early development was normal and the patient started waking, talking, and engaging in social interaction as expected. There were no challenges during school and no reported attendance of special education programs. The patient was playing football until , and had several concussions, but never LOC.  There is no history of stroke. There is no history of CNS infections, such as encephalitis and/or meningitis. There is no history of febrile seizures. There is no history of neurosurgical interventions. There is no reported history of staring spells during childhood/school years.    Past Medical History:  Past Medical History:   Diagnosis Date    ASTHMA     Asthma since infancy.    Asthma     Epilepsy (HCC)     Mild  intermittent asthma with acute exacerbation 6/10/2016    Seizure (HCC)     Seizure disorder (HCC)      Past Surgical History:  None.     Social History:  Social History     Tobacco Use    Smoking status: Never    Smokeless tobacco: Never   Vaping Use    Vaping Use: Never used   Substance Use Topics    Alcohol use: Yes     Comment: rare     Drug use: Yes     Types: Marijuana     Comment: THC     Family History:  There is no known family history of seizures/epilepsy.  Family History   Problem Relation Age of Onset    Hypertension Mother     Hyperlipidemia Father     Hypertension Father     Cancer Neg Hx     Diabetes Neg Hx      [place] Suicide Severity Rating Scale     Wish to be Dead?: No  Suicidal Thoughts: No    Suicidal Thoughts with Method Without Specific Plan or Intent to Act:    Suicidal Intent Without Specific Plan:    Suicide Intent with Specific Plan:    Suicide Behavior Question: No  How long ago did you do any of these?:    C-SSRS Risk Level: No Risk    Additional Suicide Screening Questions    Suspected or Confirmed Suicide Attempted?: No  Harming or killing others?: No    Allergies:  No Known Allergies    Current Medications:    Current Outpatient Medications:     albuterol, 2 Puff, Inhalation, Q6HRS PRN, PRN    brivaracetam, 100 mg, Oral, BID, Taking    promethazine-dextromethorphan, 5 mL, Oral, 4X/DAY PRN (Patient not taking: Reported on 5/8/2023), Not Taking    methylPREDNISolone, Follow schedule on package instructions. (Patient not taking: Reported on 5/8/2023), Not Taking    Brivaracetam (BRIVIACT PO), Take  by mouth. (Patient not taking: Reported on 5/8/2023), Not Taking    benzonatate, 100 mg, Oral, TID PRN (Patient not taking: Reported on 4/25/2023), Not Taking    Physical Examination:    Ambulatory Vitals  Encounter Vitals  Temperature: 36.8 °C (98.2 °F)  Temp src: Temporal  Blood Pressure: 100/62  Pulse: 72  Respiration: 16  Pulse Oximetry: 99 %  Weight: 70.8 kg (156 lb 1.4 oz)  Height: (P)  "177.8 cm (5' 10\")  BMI (Calculated): (P) 22.4    Neurological Examination:  Mental Status: The patient is alert and oriented to person, place, time, and situation. Speech is fluent, with no aphasia nor dysarthria noted. Affect is normal.    Cranial Nerve Examination:  CN I: Olfaction examination is deferred.  CN II: Visual fields are full to confrontation examination and show no visual field defect.   CN III, IV, VI: Eye movements are normal in all directions. Pupils are reactive to direct and consensual light. There is no relative afferent pupillary defect. There is no nystagmus.  CN V: Facial sensation to light touch is intact throughout.   CN VII: No significant facial muscle or other soft tissue asymmetry.  CN VIII: Hearing intact to rubbing sounds bilaterally.   CN IX, X: Soft palate elevates symmetrically.  CN XI: Symmetrical shoulder shrug exam.  CN XII: Midline tongue protrusion and moves symmetrically to each side.     Motor Examination:  Muscle strength is intact (5/5) throughout. Muscle tone is normal throughout. No abnormal movements are observed. No pronator drift is noted.     Muscle Stretch Reflexes Examination:  Muscle stretch reflexes are normal (2+) throughout and symmetric.  Babinski reflex is absent bilaterally.  Ankle clonus is absent bilaterally.     Sensory Examination:  Preserved sensation to light touch in all extremities.     Coordination:  Normal finger to nose testing bilaterally, no postural nor intentional tremor was noted.     Stance/gait:  Normal regular gait with normal arm swings and stride length. Able to perform tandem gait. Romberg sign is absent.     ASSESSMENT AND PLAN:  1. 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 The localization is temporal lobe, the lateralalization to the left (though semiology might point toward the right); etiology might be " related to  complications, head traumas.     - we will ensure compliance with Briviact as the first step and if further seizures, we will proceed with more detailed work up and further optimization of ASM regimen. I briefly discussed epilepsy surgery and devices as an option in the future, if needed.  - brivaracetam (BRIVIACT) 100 MG Tab tablet; Take 1 Tablet by mouth 2 times a day for 180 days.  Dispense: 60 Tablet; Refill: 5  - cholecalciferol (VITAMIN D) 125 MCG (5000 UT) Cap; Take 5,000 Int'l Units by mouth every day.  Dispense: 90 Capsule; Refill: 3    - provided epilepsy counseling both verbally and in writing.    Patient Instructions   Please take Briviact 100 mg in the morning and 100 mg in the evening (approximately every 12 hours).    Please let our office know if you have any changes in your seizure frequency and/characteristics. Otherwise, please keep the diary of your events and bring it with you at the time of your next follow up visit with our office.     Please take vitamin D3 [id number] internation units daily.     Please note that the following might precipitate seizures: missed doses of antiseizure medications, being sick with fever, stress, fatigue, sleep deprivation, not eating regularly, not drinking enough water, drinking too much alcohol, stopping alcohol suddenly if you are currently using it on a regular/daily basis, and/or using recreational drugs, among others.    Please note that the following might lead to an injury, potentially a life-threatening injury, in case you have a seizure and/or lose awareness while:   - being in a large body of water by yourself, such as bath, pool, lake, ocean, among others (risk of drowning)   - being on unprotected heights (risk of fall)   - being around and/or operating heavy machinery (risk of injury)   - being around open fire/hot surfaces (risk of burns)   - any other activities/circumstances, in which if you lose awareness, you might injure  yourself and/or others.    Please call for help (crisis line and/or 911) in case you have thoughts of harming yourself and/or others.    Please abstain from driving until further notice.    ------------------------------------------------------------------------------------------  Instructions for your family/caregivers:  Please call 911 if the patient has a seizure longer than 2-3 minutes, if seizures are back to back without him recovering to his baseline, or he does not start recovering within 5-10 minutes after the seizure stops. During the seizure - please turn him on his side, please make sure his head is protected (for example, you should put a pillow under his head, if one is available), and please do not put anything in his mouth.   -------------------------------------------------------------------------------------------    It is important that your seizures are well controlled and you have none or have them rarely. In addition to avoiding injury related to breakthrough seizures, frequent seizures increase risk of SUDEP (sudden unexpected death in epilepsy), where a person goes into a seizure and then never wakes up - this is a rare complication of seizure disorder; one of the best available ways to prevent it is to control your seizures well.     Due to the high volume of patients we are trying to help, your physician will not be able to respond by phone or in [employer] to your routine concerns between appointments.  This does not reflect a lack of interest or concern for you or your diagnosis.  Please bring these questions and concerns to your appointment where your physician can answer.  Please relay more pressing concerns to our office, either via [employer], or by phone; if not able to reach us please visit nearby Urgent Care Center or Emergency Department.  If any emergent medical needs, please seek emergent medical help and/or call 911.    Please note that we are not able to fill out paperwork that  might be related to your work, utility company, disability, and/or driving, among others, in between the visits.  Please schedule a dedicated appointment to address your paperwork, so we can do that in a timely manner.  This is not due to lack of concern or interest for your disease-related work/administrative problems, but to make sure that we provide the best possible care and to fill out your paperwork in a correct and timely manner.    Thank you for entrusting your neurological care to [facility] and we look forward to continuing to serve you.       Follow up in 2 months.     My total time spent caring for the patient on the day of the encounter was 55 minutes.   This does not include time spent on separately billable procedures/tests.      Jarett Maya MD  Neurology Attending, Epilepsy Program  [facility]

## 2023-05-08 NOTE — PATIENT INSTRUCTIONS
Please take Briviact 100 mg in the morning and 100 mg in the evening (approximately every 12 hours).    Please let our office know if you have any changes in your seizure frequency and/characteristics. Otherwise, please keep the diary of your events and bring it with you at the time of your next follow up visit with our office.     Please take vitamin D3 [id number] internation units daily.     Please note that the following might precipitate seizures: missed doses of antiseizure medications, being sick with fever, stress, fatigue, sleep deprivation, not eating regularly, not drinking enough water, drinking too much alcohol, stopping alcohol suddenly if you are currently using it on a regular/daily basis, and/or using recreational drugs, among others.    Please note that the following might lead to an injury, potentially a life-threatening injury, in case you have a seizure and/or lose awareness while:   - being in a large body of water by yourself, such as bath, pool, lake, ocean, among others (risk of drowning)   - being on unprotected heights (risk of fall)   - being around and/or operating heavy machinery (risk of injury)   - being around open fire/hot surfaces (risk of burns)   - any other activities/circumstances, in which if you lose awareness, you might injure yourself and/or others.    Please call for help (crisis line and/or 911) in case you have thoughts of harming yourself and/or others.    Please abstain from driving until further notice.    ------------------------------------------------------------------------------------------  Instructions for your family/caregivers:  Please call 911 if the patient has a seizure longer than 2-3 minutes, if seizures are back to back without him recovering to his baseline, or he does not start recovering within 5-10 minutes after the seizure stops. During the seizure - please turn him on his side, please make sure his head is protected (for example, you should put a  pillow under his head, if one is available), and please do not put anything in his mouth.   -------------------------------------------------------------------------------------------    It is important that your seizures are well controlled and you have none or have them rarely. In addition to avoiding injury related to breakthrough seizures, frequent seizures increase risk of SUDEP (sudden unexpected death in epilepsy), where a person goes into a seizure and then never wakes up - this is a rare complication of seizure disorder; one of the best available ways to prevent it is to control your seizures well.     Due to the high volume of patients we are trying to help, your physician will not be able to respond by phone or in [employer] to your routine concerns between appointments.  This does not reflect a lack of interest or concern for you or your diagnosis.  Please bring these questions and concerns to your appointment where your physician can answer.  Please relay more pressing concerns to our office, either via [employer], or by phone; if not able to reach us please visit nearby Urgent Care Center or Emergency Department.  If any emergent medical needs, please seek emergent medical help and/or call 911.    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  Please schedule a dedicated appointment to address your paperwork, so we can do that in a timely manner.  This is not due to lack of concern or interest for your disease-related work/administrative problems, but to make sure that we provide the best possible care and to fill out your paperwork in a correct and timely manner.    Thank you for entrusting your neurological care to [facility] and we look forward to continuing to serve you.

## 2023-05-10 ENCOUNTER — OFFICE VISIT (OUTPATIENT)
Dept: MEDICAL GROUP | Facility: PHYSICIAN GROUP | Age: 33
End: 2023-05-10
Payer: COMMERCIAL

## 2023-05-10 VITALS
RESPIRATION RATE: 16 BRPM | DIASTOLIC BLOOD PRESSURE: 62 MMHG | SYSTOLIC BLOOD PRESSURE: 110 MMHG | HEART RATE: 87 BPM | WEIGHT: 161.38 LBS | OXYGEN SATURATION: 98 % | HEIGHT: 70 IN | BODY MASS INDEX: 23.1 KG/M2 | TEMPERATURE: 99 F

## 2023-05-10 DIAGNOSIS — F12.90 MARIJUANA USE: ICD-10-CM

## 2023-05-10 DIAGNOSIS — D64.9 NORMOCYTIC ANEMIA: ICD-10-CM

## 2023-05-10 DIAGNOSIS — G40.909 SEIZURE DISORDER (HCC): ICD-10-CM

## 2023-05-10 DIAGNOSIS — Z11.59 NEED FOR HEPATITIS C SCREENING TEST: ICD-10-CM

## 2023-05-10 DIAGNOSIS — J45.20 MILD INTERMITTENT ASTHMA WITHOUT COMPLICATION: ICD-10-CM

## 2023-05-10 DIAGNOSIS — Z91.148 NONCOMPLIANCE WITH MEDICATION REGIMEN: ICD-10-CM

## 2023-05-10 DIAGNOSIS — Z13.6 SCREENING FOR CARDIOVASCULAR CONDITION: ICD-10-CM

## 2023-05-10 DIAGNOSIS — E55.9 VITAMIN D DEFICIENCY: ICD-10-CM

## 2023-05-10 DIAGNOSIS — R53.83 OTHER FATIGUE: ICD-10-CM

## 2023-05-10 DIAGNOSIS — D69.6 THROMBOCYTOPENIA (HCC): ICD-10-CM

## 2023-05-10 PROBLEM — Z70.8 SEXUALLY TRANSMITTED DISEASE COUNSELING: Status: RESOLVED | Noted: 2018-02-27 | Resolved: 2023-05-10

## 2023-05-10 PROBLEM — N17.9 AKI (ACUTE KIDNEY INJURY) (HCC): Status: RESOLVED | Noted: 2022-06-05 | Resolved: 2023-05-10

## 2023-05-10 PROBLEM — J18.9 PNEUMONIA OF BOTH LOWER LOBES DUE TO INFECTIOUS ORGANISM: Status: RESOLVED | Noted: 2017-10-05 | Resolved: 2023-05-10

## 2023-05-10 PROCEDURE — 99214 OFFICE O/P EST MOD 30 MIN: CPT | Performed by: PHYSICIAN ASSISTANT

## 2023-05-10 RX ORDER — ALBUTEROL SULFATE 90 UG/1
2 AEROSOL, METERED RESPIRATORY (INHALATION) EVERY 6 HOURS PRN
Qty: 8.5 G | Refills: 5 | Status: SHIPPED | OUTPATIENT
Start: 2023-05-10

## 2023-05-10 ASSESSMENT — ENCOUNTER SYMPTOMS
SHORTNESS OF BREATH: 0
CHILLS: 0
FEVER: 0

## 2023-05-10 ASSESSMENT — FIBROSIS 4 INDEX: FIB4 SCORE: 1.69

## 2023-05-10 NOTE — PROGRESS NOTES
"Subjective:     CC: establish care     HPI:   Barry presents today with    Problem   Vitamin D Deficiency    Chronic, uncontrolled.  Recently started on vitamin D supplementation by his neurologist.       History of Traumatic Brain Injury    Patient denies any significant injury.  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       Noncompliance With Medication Regimen    History of noncompliance with seizure medications.       Normocytic Anemia    Chronic, uncontrolled.  Repeating labs.       Marijuana Use    Chronic, uncontrolled.  Smokes daily.       Thrombocytopenia (Hcc)    Chronic, uncontrolled.  Repeating labs.       Seizure Disorder (Hcc)    Chronic, intermittent.  Tolerating Briviact 100 mg twice daily.  Was evaluated by neuro 5/8/2023 and has follow-up in 2 months.  He states he is trying to be more compliant with regards to his medications.       Asthma    Chronic, controlled.  Typically only has issues when he is sick.  Would like a refill of his albuterol.       Wan (Acute Kidney Injury) (Hcc) (Resolved)   Sexually Transmitted Disease Counseling (Resolved)   Pneumonia of both lower lobes due to infectious organism (Resolved)   Cough, Persistent (Resolved)       Health Maintenance: Completed    ROS:  Review of Systems   Constitutional:  Negative for chills and fever.   Respiratory:  Negative for shortness of breath.    Cardiovascular:  Negative for chest pain.       Objective:     Exam:  /62 (BP Location: Left arm, Patient Position: Sitting, BP Cuff Size: Adult)   Pulse 87   Temp 37.2 °C (99 °F) (Temporal)   Resp 16   Ht 1.778 m (5' 10\")   Wt 73.2 kg (161 lb 6 oz)   SpO2 98%   BMI 23.15 kg/m²  Body mass index is 23.15 kg/m².    Physical Exam  Constitutional:       Appearance: Normal appearance.   HENT:      Head: Normocephalic.   Eyes:      Conjunctiva/sclera: Conjunctivae normal.   Cardiovascular:      Rate and Rhythm: Normal rate and regular rhythm.      Pulses: Normal pulses.      Heart " sounds: No murmur heard.     No gallop.   Pulmonary:      Effort: No respiratory distress.      Breath sounds: No wheezing.   Abdominal:      Comments: No hepatosplenomegaly noted.   Musculoskeletal:         General: No swelling.   Skin:     General: Skin is warm and dry.   Neurological:      General: No focal deficit present.      Mental Status: He is alert.   Psychiatric:         Mood and Affect: Mood normal.         Behavior: Behavior normal.         Assessment & Plan:     33 y.o. male with the following -     1. Mild intermittent asthma without complication  Chronic, controlled.  Refilling albuterol.  Continue to use as needed.    - albuterol 108 (90 Base) MCG/ACT Aero Soln inhalation aerosol; Inhale 2 Puffs every 6 hours as needed for Shortness of Breath.  Dispense: 8.5 g; Refill: 5    2. Vitamin D deficiency  Chronic, uncontrolled.  Recently started on vitamin D supplementation by his neurologist.    - VITAMIN D,25 HYDROXY (DEFICIENCY); Future    3. Thrombocytopenia (HCC)  4. Normocytic anemia  Chronic, uncontrolled.  Repeating CBC.    - CBC WITH DIFFERENTIAL; Future    5. Marijuana use  Chronic, uncontrolled.  Continues to smoke marijuana daily.    6. Seizure disorder (HCC)  Chronic, intermittent control.  Continue Briviact 100 mg twice daily as prescribed by neurology.  Has follow-up in 2 months.    7. Noncompliance with medication regimen  Historically noncompliant with seizure medications.  He is trying to be more compliant to decrease chances of having another seizure.    8. Screening for cardiovascular condition    - Lipid Profile; Future    9. Other fatigue    - TSH WITH REFLEX TO FT4; Future    10. Need for hepatitis C screening test  \  - HEP C VIRUS ANTIBODY; Future    Declines pneumonia vaccination.    Return in about 1 year (around 5/10/2024), or if symptoms worsen or fail to improve.    Please note that this dictation was created using voice recognition software. I have made every reasonable  attempt to correct obvious errors, but I expect that there are errors of grammar and possibly content that I did not discover before finalizing the note.

## 2023-06-09 ENCOUNTER — HOSPITAL ENCOUNTER (EMERGENCY)
Facility: MEDICAL CENTER | Age: 33
End: 2023-06-09
Attending: EMERGENCY MEDICINE
Payer: COMMERCIAL

## 2023-06-09 VITALS
RESPIRATION RATE: 15 BRPM | HEART RATE: 62 BPM | TEMPERATURE: 97.9 F | HEIGHT: 69 IN | WEIGHT: 160 LBS | OXYGEN SATURATION: 93 % | DIASTOLIC BLOOD PRESSURE: 69 MMHG | SYSTOLIC BLOOD PRESSURE: 116 MMHG | BODY MASS INDEX: 23.7 KG/M2

## 2023-06-09 DIAGNOSIS — R56.9 SEIZURE (HCC): ICD-10-CM

## 2023-06-09 LAB
ALBUMIN SERPL BCP-MCNC: 4.3 G/DL (ref 3.2–4.9)
ALBUMIN/GLOB SERPL: 2 G/DL
ALP SERPL-CCNC: 59 U/L (ref 30–99)
ALT SERPL-CCNC: 25 U/L (ref 2–50)
ANION GAP SERPL CALC-SCNC: 25 MMOL/L (ref 7–16)
AST SERPL-CCNC: 35 U/L (ref 12–45)
BILIRUB SERPL-MCNC: 0.3 MG/DL (ref 0.1–1.5)
BUN SERPL-MCNC: 13 MG/DL (ref 8–22)
CALCIUM ALBUM COR SERPL-MCNC: 8.7 MG/DL (ref 8.5–10.5)
CALCIUM SERPL-MCNC: 8.9 MG/DL (ref 8.5–10.5)
CHLORIDE SERPL-SCNC: 105 MMOL/L (ref 96–112)
CO2 SERPL-SCNC: 14 MMOL/L (ref 20–33)
CREAT SERPL-MCNC: 1.45 MG/DL (ref 0.5–1.4)
EKG IMPRESSION: NORMAL
ETHANOL BLD-MCNC: <10.1 MG/DL
GFR SERPLBLD CREATININE-BSD FMLA CKD-EPI: 65 ML/MIN/1.73 M 2
GLOBULIN SER CALC-MCNC: 2.1 G/DL (ref 1.9–3.5)
GLUCOSE SERPL-MCNC: 75 MG/DL (ref 65–99)
POTASSIUM SERPL-SCNC: 4 MMOL/L (ref 3.6–5.5)
PROT SERPL-MCNC: 6.4 G/DL (ref 6–8.2)
SODIUM SERPL-SCNC: 144 MMOL/L (ref 135–145)

## 2023-06-09 PROCEDURE — 93005 ELECTROCARDIOGRAM TRACING: CPT | Performed by: EMERGENCY MEDICINE

## 2023-06-09 PROCEDURE — 93005 ELECTROCARDIOGRAM TRACING: CPT

## 2023-06-09 PROCEDURE — 80053 COMPREHEN METABOLIC PANEL: CPT

## 2023-06-09 PROCEDURE — 82077 ASSAY SPEC XCP UR&BREATH IA: CPT

## 2023-06-09 PROCEDURE — 99285 EMERGENCY DEPT VISIT HI MDM: CPT

## 2023-06-09 PROCEDURE — 36415 COLL VENOUS BLD VENIPUNCTURE: CPT

## 2023-06-09 RX ORDER — LACOSAMIDE 50 MG/1
50 TABLET ORAL 2 TIMES DAILY
Qty: 60 TABLET | Refills: 0 | Status: SHIPPED | OUTPATIENT
Start: 2023-06-09 | End: 2023-06-12 | Stop reason: SDUPTHER

## 2023-06-09 ASSESSMENT — FIBROSIS 4 INDEX: FIB4 SCORE: 1.69

## 2023-06-09 NOTE — ED PROVIDER NOTES
ED PHYSICIAN NOTE    CHIEF COMPLAINT  Chief Complaint   Patient presents with    Seizure     BIB EMS; patient had 2 witnessed seizure at home; post ictal, patient woke up and was acting weird; broke toilet bowl and hit his face on a glass door; was given 10 mg IM Versed and 70 mg IM Ketamine from the EMS en route,       EXTERNAL RECORDS REVIEWED  Outpatient Notes evaluated at [facility] epilepsy clinic on 5/8/2023.  MRI brain 2018 within normal limits.  EEG consistent with seizure underlying cortical irritability.  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  Patient is prescribed Briviact  100 mg twice daily.  There is mention of compliance issue in the past    HPI/ROS  LIMITATION TO HISTORY   Select: Altered mental status / Confusion  OUTSIDE HISTORIAN(S):  EMS patient brought in for seizure patient was given 10 mg of IM Versed as well as ketamine prior to arrival    Barry Griffith is a 33 y.o. male who presents after having a seizure.  Patient had 2 witnessed seizures at home.  He woke up quite postictal and then got aggressive running around the house.  Hit his face on a glass door.  Paramedics were called patient was agitated he was given sedative as mentioned above.  He arrives unable to provide any history.    PAST MEDICAL HISTORY  Past Medical History:   Diagnosis Date    ASTHMA     Asthma since infancy.    Asthma     Epilepsy (HCC)     Mild intermittent asthma with acute exacerbation 6/10/2016    Seizure (HCC)     Seizure disorder (HCC)        SOCIAL HISTORY  Social History     Tobacco Use    Smoking status: Never    Smokeless tobacco: Never   Vaping Use    Vaping Use: Never used   Substance Use Topics    Alcohol use: Yes     Comment: rare, socially on the weekend    Drug use: Yes     Types: Marijuana, Inhaled     Comment: THC; denies h/o IVDU       CURRENT MEDICATIONS  Home Medications       Reviewed by Chela Singh,  "FIDEL (Registered Nurse) on 06/09/23 at 8836  Med List Status: <None>     Medication Last Dose Status   albuterol 108 (90 Base) MCG/ACT Aero Soln inhalation aerosol  Active   brivaracetam (BRIVIACT) 100 MG Tab tablet  Active   Cholecalciferol (VITAMIN D) 125 MCG (5000 UT) Cap  Active                    ALLERGIES  No Known Allergies    PHYSICAL EXAM  VITAL SIGNS: /61   Pulse 98   Temp 36.7 °C (98 °F)   Resp 20   Ht 1.753 m (5' 9\")   Wt 72.6 kg (160 lb)   SpO2 91%   BMI 23.63 kg/m²    Constitutional: Somnolent but arousable.  Maintaining airway.  HENT: No evidence of head trauma  Eyes: Normal inspection  Neck: Grossly normal range of motion.  Cardiovascular: Normal heart rate, Normal rhythm.  Symmetric peripheral pulses.   Thorax & Lungs: No respiratory distress, No wheezing, No rales, No rhonchi, No chest tenderness.   Abdomen: Bowel sounds normal, soft, non-distended, nontender, no mass  Skin: No obvious rash.  Back: No tenderness, No CVA tenderness.   Extremities: No clubbing, cyanosis, edema, no Homans or cords.  Neurologic: Grossly normal   Psychiatric: Normal for situation     DIAGNOSTIC STUDIES / PROCEDURES  LABS/EKG  Results for orders placed or performed during the hospital encounter of 06/09/23   COMP METABOLIC PANEL   Result Value Ref Range    Sodium 144 135 - 145 mmol/L    Potassium 4.0 3.6 - 5.5 mmol/L    Chloride 105 96 - 112 mmol/L    Co2 14 (L) 20 - 33 mmol/L    Anion Gap 25.0 (H) 7.0 - 16.0    Glucose 75 65 - 99 mg/dL    Bun 13 8 - 22 mg/dL    Creatinine 1.45 (H) 0.50 - 1.40 mg/dL    Calcium 8.9 8.5 - 10.5 mg/dL    AST(SGOT) 35 12 - 45 U/L    ALT(SGPT) 25 2 - 50 U/L    Alkaline Phosphatase 59 30 - 99 U/L    Total Bilirubin 0.3 0.1 - 1.5 mg/dL    Albumin 4.3 3.2 - 4.9 g/dL    Total Protein 6.4 6.0 - 8.2 g/dL    Globulin 2.1 1.9 - 3.5 g/dL    A-G Ratio 2.0 g/dL   DIAGNOSTIC ALCOHOL   Result Value Ref Range    Diagnostic Alcohol <10.1 <10.1 mg/dL   CORRECTED CALCIUM   Result Value Ref Range "    Correct Calcium 8.7 8.5 - 10.5 mg/dL   ESTIMATED GFR   Result Value Ref Range    GFR (CKD-EPI) 65 >60 mL/min/1.73 m 2   EKG   Result Value Ref Range    Report       [facility] Emergency Dept[facility]    Test Date:  2023  Pt Name:    DUANE AGUILAR                Department: ER  MRN:        [id number]                      Room:       [facility]  Gender:     Male                         Technician: [id number]  :        1990                   Requested By:ER TRIAGE PROTOCOL  Order #:    [id number]                    Reading MD: ELIGIO VILLANUEVA MD    Measurements  Intervals                                Axis  Rate:       91                           P:          90  MT:         141                          QRS:        82  QRSD:       95                           T:          35  QT:         354  QTc:        436    Interpretive Statements  Sinus rhythm  Right atrial enlargement  Consider right ventricular hypertrophy  ST elev, probable normal early repol pattern  Compared to ECG 2022 22:03:58  Atrial abnormality now present  ST (T wave) deviation still present  Electronically Signed On 2023 7:30:21 PDT by ELIGIO VILLANUEVA MD        I have independently interpreted this EKG  Rhythm strip interpretation-sinus rhythm      COURSE & MEDICAL DECISION MAKING    ED Observation Status? Yes; I am placing the patient in to an observation status due to a diagnostic uncertainty as well as therapeutic intensity. Patient placed in observation status at 4:59 AM, 2023.     Observation plan is as follows: Obtain diagnostic testing, monitor neurologic status    Upon Reevaluation, the patient's condition has: Improved; and will be discharged.    Patient discharged from ED Observation status at 10 AM (Time) 2023 (Date).     INITIAL ASSESSMENT, COURSE AND PLAN  Care Narrative: Patient presents with complaints as above.  High risk patient because of sedation.  He is placed on monitor seizure precautions.  He  was unable to provide history.  His vital signs were stable.  Observed in ER.    Patient reassessed.  He woke up.  He denies any injury.  Says he does not recall any seizures.  He did not know how he got here.  He states he has been compliant with his antiepileptic.  He denies drugs or alcohol.    Mental status normalized.  Awake alert oriented.  No medical complaints or findings on examination.    Patient's data returned as noted above.  No alarming findings.  Creatinine is mildly elevated will need to be rechecked.    Paged neurology.    Discussed case with Dr. Rangel.  No inciting event for this seizure.  Patient reports compliance.  He advised adding Vimpat 50 mg twice daily to the patient's medication regimen as a temporary measure.  The patient will need to see neurology as soon as possible to determine if this should be continued or an alternate would be more appropriate.  I have written prescription for this medication.  Spoke with the patient about this he voiced understanding.      DISPOSITION AND DISCUSSIONS  I have discussed management of the patient with the following physicians and ALEXA's: Dr. Rangel, neurology    Escalation of care considered, and ultimately not performed: Considered imaging, but does not appear to be indicated      Prescription drugs considered and/or prescribed: Prescribed Vimpat 50 mg twice daily.  Patient to take this in addition to his baseline Briviact 100 mg twice daily    FINAL IMPRESSION  1.  Seizure, breakthrough    This dictation was created using voice recognition software. The accuracy of the dictation is limited to the abilities of the software. I expect there may be some errors of grammar and possibly content. The nursing notes were reviewed and certain aspects of this information were incorporated into this note.    Electronically signed by: Skip Fox M.D., 6/9/2023

## 2023-06-09 NOTE — ED NOTES
pAtient awake and alert; still confused on what happened but able to answer basic question about self

## 2023-06-09 NOTE — ED NOTES
Assumed pt care. A&O x 3. Disoriented to event. Pt has a fiance who can take him home once discharged. Reminded use of call light and to not get oob without assistance. Bed alarm in use. Bed railing padded with blankets.

## 2023-06-09 NOTE — ED NOTES
Discharged home with friend. Instruction given on additional medications. Pt to follow up with neurology call today for appointment.

## 2023-06-09 NOTE — ED TRIAGE NOTES
"Chief Complaint   Patient presents with    Seizure     BIB EMS; patient had 2 witnessed seizure at home; post ictal, patient woke up and was acting weird; broke toilet bowl and hit his face on a glass door; was given 10 mg IM Versed and 70 mg IM Ketamine from the EMS en route,     /61   Pulse 98   Temp 36.7 °C (98 °F)   Resp 20   Ht 1.753 m (5' 9\")   Wt 72.6 kg (160 lb)   SpO2 91%   BMI 23.63 kg/m²     "

## 2023-06-12 ENCOUNTER — TELEPHONE (OUTPATIENT)
Dept: NEUROLOGY | Facility: MEDICAL CENTER | Age: 33
End: 2023-06-12
Payer: COMMERCIAL

## 2023-06-12 ENCOUNTER — OFFICE VISIT (OUTPATIENT)
Dept: NEUROLOGY | Facility: MEDICAL CENTER | Age: 33
End: 2023-06-12
Attending: PSYCHIATRY & NEUROLOGY
Payer: COMMERCIAL

## 2023-06-12 VITALS
SYSTOLIC BLOOD PRESSURE: 108 MMHG | WEIGHT: 161.82 LBS | DIASTOLIC BLOOD PRESSURE: 60 MMHG | OXYGEN SATURATION: 98 % | HEART RATE: 66 BPM | RESPIRATION RATE: 14 BRPM | BODY MASS INDEX: 23.17 KG/M2 | HEIGHT: 70 IN

## 2023-06-12 DIAGNOSIS — R56.9 SEIZURE (HCC): ICD-10-CM

## 2023-06-12 DIAGNOSIS — G40.909 SEIZURE DISORDER (HCC): ICD-10-CM

## 2023-06-12 PROCEDURE — 3078F DIAST BP <80 MM HG: CPT | Performed by: PSYCHIATRY & NEUROLOGY

## 2023-06-12 PROCEDURE — 99214 OFFICE O/P EST MOD 30 MIN: CPT | Performed by: PSYCHIATRY & NEUROLOGY

## 2023-06-12 PROCEDURE — 3074F SYST BP LT 130 MM HG: CPT | Performed by: PSYCHIATRY & NEUROLOGY

## 2023-06-12 PROCEDURE — 99211 OFF/OP EST MAY X REQ PHY/QHP: CPT | Performed by: PSYCHIATRY & NEUROLOGY

## 2023-06-12 RX ORDER — LACOSAMIDE 50 MG/1
50 TABLET ORAL 2 TIMES DAILY
Qty: 60 TABLET | Refills: 5 | Status: SHIPPED | OUTPATIENT
Start: 2023-06-12 | End: 2023-07-31

## 2023-06-12 ASSESSMENT — FIBROSIS 4 INDEX: FIB4 SCORE: 1.52380952380952381

## 2023-06-12 NOTE — PATIENT INSTRUCTIONS
Please continue Briviact 100 mg twice daily.    Please take Vimpat 50 mg twice daily.    Please let our office know if you have any changes in your seizure frequency and/characteristics. Otherwise, please keep the diary of your events and bring it with you at the time of your next follow up visit with our office.     Please take vitamin D3 [id number] internation units daily.     Please note that the following might precipitate seizures: missed doses of antiseizure medications, being sick with fever, stress, fatigue, sleep deprivation, not eating regularly, not drinking enough water, drinking too much alcohol, stopping alcohol suddenly if you are currently using it on a regular/daily basis, and/or using recreational drugs, among others.    Please note that the following might lead to an injury, potentially a life-threatening injury, in case you have a seizure and/or lose awareness while:   - being in a large body of water by yourself, such as bath, pool, lake, ocean, among others (risk of drowning)   - being on unprotected heights (risk of fall)   - being around and/or operating heavy machinery (risk of injury)   - being around open fire/hot surfaces (risk of burns)   - any other activities/circumstances, in which if you lose awareness, you might injure yourself and/or others.    Please call for help (crisis line and/or 911) in case you have thoughts of harming yourself and/or others.    Please abstain from driving until further notice.    ------------------------------------------------------------------------------------------  Instructions for your family/caregivers:  Please call 911 if the patient has a seizure longer than 2-3 minutes, if seizures are back to back without him recovering to his baseline, or he does not start recovering within 5-10 minutes after the seizure stops. During the seizure - please turn him on his side, please make sure his head is protected (for example, you should put a pillow under his  head, if one is available), and please do not put anything in his mouth.   -------------------------------------------------------------------------------------------    It is important that your seizures are well controlled and you have none or have them rarely. In addition to avoiding injury related to breakthrough seizures, frequent seizures increase risk of SUDEP (sudden unexpected death in epilepsy), where a person goes into a seizure and then never wakes up - this is a rare complication of seizure disorder; one of the best available ways to prevent it is to control your seizures well.     Due to the high volume of patients we are trying to help, your physician will not be able to respond by phone or in In[employer]r[facility] to your routine concerns between appointments.  This does not reflect a lack of interest or concern for you or your diagnosis.  Please bring these questions and concerns to your appointment where your physician can answer.  Please relay more pressing concerns to our office, either via In[employer]rMahart, or by phone; if not able to reach us please visit nearby Urgent Care Center or Emergency Department.  If any emergent medical needs, please seek emergent medical help and/or call 911.    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  Please schedule a dedicated appointment to address your paperwork, so we can do that in a timely manner.  This is not due to lack of concern or interest for your disease-related work/administrative problems, but to make sure that we provide the best possible care and to fill out your paperwork in a correct and timely manner.    Thank you for entrusting your neurological care to [facility] and we look forward to continuing to serve you.

## 2023-06-12 NOTE — PROGRESS NOTES
"[facility] Epilepsy Program  Follow Up Visit      Patient's Name: Barry Griffith  YOB: 1990  MRN: [id number]  Date of Service: 06/12/23    Referring Provider: No referring provider defined for this encounter.    Chief Concern: Seizures.     The patient presents alone today.    HPI (as obtained at the time of the initial visit and updated as necessary): The patient is a 33 y.o., right-handed male, who initially presented to my epilepsy clinic for evaluation of seizures on 05/08/2023.    The patient started having seizures in 2018. He has two events types, as noted below. He never had myoclonus.    He was initially started on Keppra, but he did not like it. It seems that he was more compliant with Keppra and his seizures were better controlled at that time. He was switched to Briviact in early 2022; he has been tolerating Briviact well, as reported at the time of the initial visit, but has not been very compliant.     The patient unfortunately had 3 seizures with ER presentation on 01/02/2023, 04/04/2023, and 04/28/2023, as of the time of the initial visit with me. During one of his seizures in 2022, he was intubated.     Better controlled on Keppra, now on Briviact more seizures, but not as compliant, as of the time of the initial visit with me.    Semiology:  Event type #1: \"Seizures\".  Year/Age of Onset: 2018.  Initial features: Some seizures occur out of sleep and no warning signs. Some seizures are preceded by chlorine smell.   Event features: The patient would experience bilateral tonic-clonic seizure, with head deviation to the left per the report he got from his wife. He is not responsive during the event and has no recollection for the time during the event.  He bites his tongue with his seizures; at least once bladder incontinence; no bowel incontinence.   Post-ictal features: He feels it takes his time for his brain to come back to baseline and that he loses a pitch after his " "seizure, sore in his muscles, and has headaches.   Duration: Up to 5 minutes on average.  Frequency: He has them on average once a month. The last event was on 06/09/2023.   Precipitating factors: Not clear.     Event type #2: \"Spells\".  Year/Age of Onset: 2018  Initial features: He would experience chlorine smell. He never had sensation of strange tastes, gastric upraising, autonomic phenomena, nor psychic phenomena except anxiety and history of maria victoria vu as a child.   Event features: He might experience a bit of anxiety with it. With at least one episode he stared off; he also feels his mouth is watering and he is doing swallowing movements. No manual automatisms.   Post-ictal features: Back to normal.  Duration: 30 seconds.  Frequency: He typically gets these events on average once in two to three months.   Precipitating factors: Not clear.     History of status epilepticus: yes - intubated in June 2022 for prolonged seizure.   History of physical injury related to seizures: once hit his head on the counter and still has a scar.   History of surgery related to epilepsy: no  Family Planning: N/A.  Current Driving Status: He is not driving at this time and his license is suspended due to his seizures.  Seizure Clusters: No  Longest Seizure Freedom: About 1 year seizure freedom around 2020.     Pertinent Ancillary Test Results:    MRI brain studies:   - MRI brain wo/w contrast (08/10/2018 at [facility]): \"1. MRI of the brain without and with contrast within normal limits. 2. No evidence of mass lesion, heterotopic gray matter, gross cortical dysplasia or mesial temporal sclerosis.\"    CT head studies:   - CT head wo contrast (01/02/2023 at [facility]): \"No acute intracranial hemorrhage or displaced calvarial fracture.\"    EEG studies:   - Standard EEG (07/10/2018, Dr. Bai): This is an abnormal video EEG recording in the awake, drowsy, and sleep state(s). Frequent left temporal sharps. Intermittent, subtle, left temporal " slowing. The findings increase risk for seizures and suggest underlying area of cortical irritability and structural abnormality. Clinical and radiological correlation is recommended.     - Ambulatory EEG  - none available for my review.     - EMU/LTM study (06/05/2022, Dr. Baker): 13-hour 31 minute EEG was abnormal  video EEG recording in the drowsy/sleep and/or comatose state(s):  -Moderate improving to mild background slowing suggestive of a non-specific encephalopathy. The effects of sedation may be contributing. Clinical correlation recommended.   - No focal asymmetries   - No definitive epileptiform discharges or other epileptiform phenomena seen   - No definitive seizures. Clinical correlation is recommended.   - One event at 1046 PM with fine body tremors with no abnormal EEG correlate.     INTERIM HISTORY (06/12/2023): The patient presents today to inquire about his antiseizure medication regimen.  The patient unfortunately had 2 seizures on 6/9/2023, that occurred out of sleep, and then patient was agitated during his postictal state.  He presented to [facility] emergency department, where he was evaluated, the case was discussed with neurology, and the decision was made to initiate Vimpat 50 mg twice daily.  He presented today to our clinic without appointment to discuss his antiseizure medications further.  He was placed one of the open slots, and we had a discussion about what happened over the last month, including his most recent seizure.  The patient shares that he is taking Briviact 100 mg twice daily, and that he had no precipitating factors leading to his most recent breakthrough seizure on 6/9/2023.  The patient has no clear adverse effects from Briviact at this time.  He did not initiate Vimpat, because he wanted to discuss this issue with me prior to initiating it.    The patient also shared that he would like to go back to Keppra, but he also shared that he did not take Keppra regularly in the  past.  It seems that Keppra caused mood issues in the past.    Current Antiseizure Medications: Briviact 100 mg BID (not very compliant at this time). Vimpat 50 mg BID.     Previously Tried Antiseizure Medications: Keppra (mood issues).     Review of Systems: No recent fevers nor significant weight changes. No mood issues nor suicidal/homicidal ideation.    Risk Factors For Epilepsy/Seizure Disorder: The patient is a product of premature pregnancy/delivery (about 1.5 months early and there was a ). The early development was normal and the patient started waking, talking, and engaging in social interaction as expected. There were no challenges during school and no reported attendance of special education programs. The patient was playing football until , and had several concussions, but never LOC.  There is no history of stroke. There is no history of CNS infections, such as encephalitis and/or meningitis. There is no history of febrile seizures. There is no history of neurosurgical interventions. There is no reported history of staring spells during childhood/school years.    Past Medical History:  Past Medical History:   Diagnosis Date    ASTHMA     Asthma since infancy.    Asthma     Epilepsy (HCC)     Mild intermittent asthma with acute exacerbation 6/10/2016    Seizure (HCC)     Seizure disorder ([facility])      Past Surgical History:  None.     Social History:  Social History     Tobacco Use    Smoking status: Never    Smokeless tobacco: Never   Vaping Use    Vaping Use: Never used   Substance Use Topics    Alcohol use: Yes     Comment: rare, socially on the weekend    Drug use: Yes     Types: Marijuana, Inhaled     Comment: THC; denies h/o IVDU     Family History:  There is no known family history of seizures/epilepsy.  Family History   Problem Relation Age of Onset    Hypertension Mother     Brain Aneurysm Mother     Hyperlipidemia Father     Cancer Neg Hx     Diabetes Neg Hx      [place] Suicide  "Reassessment    New or continued thoughts about killing self?: No  Preparing to end life?: No    Allergies:  No Known Allergies    Current Medications:    Current Outpatient Medications:     albuterol, 2 Puff, Inhalation, Q6HRS PRN, PRN    brivaracetam, 100 mg, Oral, BID, Taking    lacosamide, 50 mg, Oral, BID (Patient not taking: Reported on 6/12/2023), Not Taking    Vitamin D, 5,000 Int'l Units, Oral, DAILY (Patient not taking: Reported on 6/12/2023), Not Taking    Physical Examination:    Ambulatory Vitals  Encounter Vitals  Blood Pressure: 108/60  Pulse: 66  Respiration: 14  Pulse Oximetry: 98 %  Weight: 73.4 kg (161 lb 13.1 oz)  Height: 177.8 cm (5' 10\")  BMI (Calculated): 23.22    Neurological Examination:  Mental Status: The patient is alert and oriented to person, place, time, and situation. Speech is fluent, with no aphasia nor dysarthria noted. Affect is normal.    Cranial Nerve Examination:  CN I: Olfaction examination is deferred.  CN II: Visual fields are full to confrontation examination and show no visual field defect.   CN III, IV, VI: Eye movements are normal in all directions. Pupils are reactive to direct and consensual light. There is no relative afferent pupillary defect. There is no nystagmus.  CN V: Facial sensation to light touch is intact throughout.   CN VII: No significant facial muscle or other soft tissue asymmetry.  CN VIII: Hearing intact to rubbing sounds bilaterally.   CN IX, X: Soft palate elevates symmetrically.  CN XI: Symmetrical shoulder shrug exam.  CN XII: Midline tongue protrusion and moves symmetrically to each side.     Motor Examination:  Muscle strength is intact (5/5) throughout. Muscle tone is normal throughout. No abnormal movements are observed. No pronator drift is noted.     Muscle Stretch Reflexes Examination:  Muscle stretch reflexes are normal (2+) throughout and symmetric.    Sensory Examination:  Preserved sensation to light touch in all extremities. "     Coordination:  Normal finger to nose testing bilaterally, no postural nor intentional tremor was noted.     Stance/gait:  Normal regular gait with normal arm swings and stride length. Able to perform tandem gait. Romberg sign is absent.     ASSESSMENT AND PLAN:  1. 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 The localization is temporal lobe, the lateralalization to the left (though semiology might point toward the right); etiology might be related to  complications, head traumas.     The patient had a breakthrough seizure on 2023 (he had to bilateral tonic-clonic seizures out of sleep, with significant postictal confusion and agitation), and he shared that he was taking Briviact 100 mg twice daily regularly; there were also no other precipitating factors identified.  We had a detailed discussion about his breakthrough seizures, the fact that his breakthrough seizures are possibly now to slightly less frequent that they were recently, and that Briviact 100 mg twice daily might not be enough for him.  We also discussed that the Keppra is an excellent medication, for him it might cause mood issues, even with irregularly use, which patient reported at the time of the visit today.  We agreed to initiate Vimpat 50 mg twice daily as discussed in the ER and prescribed in the ER.  Adverse effects were discussed.   - noted EKG results from 2023; we will monitor for any cardiac issues related to Vimpat.   - lacosamide (VIMPAT) 50 MG Tab tablet; Take 1 Tablet by mouth 2 times a day for 180 days.  Dispense: 60 Tablet; Refill: 5   - continue Briviact 100 mg twice daily with no changes.  Refills were provided at time of the last visit.   - continue vitamin D supplementation as outlined at the time of the last visit.    We will proceed with further epilepsy evaluation at the time of  the next visit (MRI brain and EEG), as it was planned during the previous office visit.  Epilepsy surgery was briefly discussed at the time of the initial visit with me.    Provided epilepsy counseling both verbally and in writing    Patient Instructions   Please continue Briviact 100 mg twice daily.    Please take Vimpat 50 mg twice daily.    Please let our office know if you have any changes in your seizure frequency and/characteristics. Otherwise, please keep the diary of your events and bring it with you at the time of your next follow up visit with our office.     Please take vitamin D3 [id number] internation units daily.     Please note that the following might precipitate seizures: missed doses of antiseizure medications, being sick with fever, stress, fatigue, sleep deprivation, not eating regularly, not drinking enough water, drinking too much alcohol, stopping alcohol suddenly if you are currently using it on a regular/daily basis, and/or using recreational drugs, among others.    Please note that the following might lead to an injury, potentially a life-threatening injury, in case you have a seizure and/or lose awareness while:   - being in a large body of water by yourself, such as bath, pool, lake, ocean, among others (risk of drowning)   - being on unprotected heights (risk of fall)   - being around and/or operating heavy machinery (risk of injury)   - being around open fire/hot surfaces (risk of burns)   - any other activities/circumstances, in which if you lose awareness, you might injure yourself and/or others.    Please call for help (crisis line and/or 911) in case you have thoughts of harming yourself and/or others.    Please abstain from driving until further notice.    ------------------------------------------------------------------------------------------  Instructions for your family/caregivers:  Please call 911 if the patient has a seizure longer than 2-3 minutes, if seizures are back to  back without him recovering to his baseline, or he does not start recovering within 5-10 minutes after the seizure stops. During the seizure - please turn him on his side, please make sure his head is protected (for example, you should put a pillow under his head, if one is available), and please do not put anything in his mouth.   -------------------------------------------------------------------------------------------    It is important that your seizures are well controlled and you have none or have them rarely. In addition to avoiding injury related to breakthrough seizures, frequent seizures increase risk of SUDEP (sudden unexpected death in epilepsy), where a person goes into a seizure and then never wakes up - this is a rare complication of seizure disorder; one of the best available ways to prevent it is to control your seizures well.     Due to the high volume of patients we are trying to help, your physician will not be able to respond by phone or in [employer] to your routine concerns between appointments.  This does not reflect a lack of interest or concern for you or your diagnosis.  Please bring these questions and concerns to your appointment where your physician can answer.  Please relay more pressing concerns to our office, either via [employer], or by phone; if not able to reach us please visit nearby Urgent Care Center or Emergency Department.  If any emergent medical needs, please seek emergent medical help and/or call 911.    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  Please schedule a dedicated appointment to address your paperwork, so we can do that in a timely manner.  This is not due to lack of concern or interest for your disease-related work/administrative problems, but to make sure that we provide the best possible care and to fill out your paperwork in a correct  and timely manner.    Thank you for entrusting your neurological care to [facility] and we look forward to continuing to serve you.     Follow up in 7 weeks.     My total time spent caring for the patient on the day of the encounter was 31 minutes.   This does not include time spent on separately billable procedures/tests.      Jarett Maya MD  Neurology Attending, Epilepsy Program  [facility]

## 2023-06-12 NOTE — TELEPHONE ENCOUNTER
Pt would like to speak to Dr. Maya regarding questions he has as to why he was prescribed vimpat. Pt would like a call back.

## 2023-06-15 ENCOUNTER — TELEPHONE (OUTPATIENT)
Dept: NEUROLOGY | Facility: MEDICAL CENTER | Age: 33
End: 2023-06-15
Payer: COMMERCIAL

## 2023-06-15 NOTE — TELEPHONE ENCOUNTER
Pt's farhat left a voicemail asking for a call back from Dr. Maya's MA, stating she has concerns she wanted to go over with MA.

## 2023-06-19 NOTE — TELEPHONE ENCOUNTER
Called spoke to patient he stated he had appointment with Dr Maya and everything is fine now.   Klisyri Pregnancy And Lactation Text: It is unknown if this medication can harm a developing fetus or if it is excreted in breast milk.

## 2023-07-19 DIAGNOSIS — G40.109 LOCALIZATION-RELATED EPILEPSY (HCC): ICD-10-CM

## 2023-07-20 ENCOUNTER — TELEPHONE (OUTPATIENT)
Dept: NEUROLOGY | Facility: MEDICAL CENTER | Age: 33
End: 2023-07-20

## 2023-07-21 ENCOUNTER — TELEPHONE (OUTPATIENT)
Dept: NEUROLOGY | Facility: MEDICAL CENTER | Age: 33
End: 2023-07-21
Payer: COMMERCIAL

## 2023-07-21 ENCOUNTER — TELEPHONE (OUTPATIENT)
Dept: NEUROLOGY | Facility: MEDICAL CENTER | Age: 33
End: 2023-07-21

## 2023-07-21 NOTE — TELEPHONE ENCOUNTER
Received request via: Pharmacy    Was the patient seen in the last year in this department? Yes    Does the patient have an active prescription (recently filled or refills available) for medication(s) requested? No    Does the patient have half-way Plus and need 100 day supply (blood pressure, diabetes and cholesterol meds only)? Patient does not have SCP    Patient will be out of medication tomorrow.

## 2023-07-21 NOTE — TELEPHONE ENCOUNTER
I am covering Dr. Maya's inbox in his absence. The patient requested a refill of Briviact. I reviewed recent pertinent documentation to determine safety of refilling the medication and verified dosing. Prescription sent to patient's preferred pharmacy.

## 2023-07-31 ENCOUNTER — OFFICE VISIT (OUTPATIENT)
Dept: NEUROLOGY | Facility: MEDICAL CENTER | Age: 33
End: 2023-07-31
Attending: PSYCHIATRY & NEUROLOGY
Payer: COMMERCIAL

## 2023-07-31 VITALS
HEIGHT: 70 IN | HEART RATE: 92 BPM | DIASTOLIC BLOOD PRESSURE: 58 MMHG | OXYGEN SATURATION: 98 % | RESPIRATION RATE: 14 BRPM | SYSTOLIC BLOOD PRESSURE: 102 MMHG | WEIGHT: 156.09 LBS | BODY MASS INDEX: 22.35 KG/M2

## 2023-07-31 DIAGNOSIS — G40.909 SEIZURE DISORDER (HCC): ICD-10-CM

## 2023-07-31 DIAGNOSIS — G40.109 LOCALIZATION-RELATED EPILEPSY (HCC): ICD-10-CM

## 2023-07-31 PROCEDURE — 99215 OFFICE O/P EST HI 40 MIN: CPT | Performed by: PSYCHIATRY & NEUROLOGY

## 2023-07-31 PROCEDURE — 3078F DIAST BP <80 MM HG: CPT | Performed by: PSYCHIATRY & NEUROLOGY

## 2023-07-31 PROCEDURE — 99211 OFF/OP EST MAY X REQ PHY/QHP: CPT | Performed by: PSYCHIATRY & NEUROLOGY

## 2023-07-31 PROCEDURE — 3074F SYST BP LT 130 MM HG: CPT | Performed by: PSYCHIATRY & NEUROLOGY

## 2023-07-31 ASSESSMENT — FIBROSIS 4 INDEX: FIB4 SCORE: 1.52380952380952381

## 2023-07-31 NOTE — PROGRESS NOTES
"[facility] Epilepsy Program  Follow Up Visit      Patient's Name: Barry Griffith  YOB: 1990  MRN: [id number]  Date of Service: 07/31/23    Referring Provider: No referring provider defined for this encounter.    Chief Concern: Seizures.     The patient presents alone today.     HPI (as obtained at the time of the initial visit and updated as necessary): The patient is a 33 y.o., right-handed male, who initially presented to my epilepsy clinic for evaluation of seizures on 05/08/2023.    The patient started having seizures in 2018. He has two events types, as noted below. He never had myoclonus.    He was initially started on Keppra, but he did not like it. It seems that he was more compliant with Keppra and his seizures were better controlled at that time. He was switched to Briviact in early 2022; he has been tolerating Briviact well, as reported at the time of the initial visit, but has not been very compliant. He became more compliant with Briviact as of May 2023.     The patient unfortunately had 3 seizures with ER presentation on 01/02/2023, 04/04/2023, and 04/28/2023, as of the time of the initial visit with me. He also had a breakthrough seizure on 06/09/2023. During one of his seizures in 2022, he was intubated.     Semiology:  Event type #1: \"Seizures\".  Year/Age of Onset: 2018.  Initial features: Some seizures occur out of sleep and no warning signs. Some seizures are preceded by chlorine smell.   Event features: The patient would experience bilateral tonic-clonic seizure, with head deviation to the left per the report he got from his wife. He is not responsive during the event and has no recollection for the time during the event.  He bites his tongue with his seizures; at least once bladder incontinence; no bowel incontinence.   Post-ictal features: He feels it takes his time for his brain to come back to baseline and that he loses a pitch after his seizure, sore in his " "muscles, and has headaches.   Duration: Up to 5 minutes on average.  Frequency: He has them on average once a month. The last event was on 06/09/2023.   Precipitating factors: Not clear.     Event type #2: \"Spells\".  Year/Age of Onset: 2018  Initial features: He would experience chlorine smell. He never had sensation of strange tastes, gastric upraising, autonomic phenomena, nor psychic phenomena except anxiety and history of maria victoria vu as a child.   Event features: He might experience a bit of anxiety with it. With at least one episode he stared off; he also feels his mouth is watering and he is doing swallowing movements. No manual automatisms.   Post-ictal features: Back to normal.  Duration: 30 seconds.  Frequency: He typically gets these events on average once in two to three months. He had two events in early July 2023.   Precipitating factors: Not clear.     History of status epilepticus: yes - intubated in June 2022 for prolonged seizure.   History of physical injury related to seizures: once hit his head on the counter and still has a scar.   History of surgery related to epilepsy: no  Family Planning: N/A.  Current Driving Status: He is not driving at this time and his license is suspended due to his seizures.  Seizure Clusters: No  Longest Seizure Freedom: About 1 year seizure freedom around 2020.     Pertinent Ancillary Test Results:    MRI brain studies:   - MRI brain wo/w contrast (08/10/2018 at [facility]): \"1. MRI of the brain without and with contrast within normal limits. 2. No evidence of mass lesion, heterotopic gray matter, gross cortical dysplasia or mesial temporal sclerosis.\" I reviewed key images on 07/31/2023.     CT head studies:   - CT head wo contrast (01/02/2023 at [facility]): \"No acute intracranial hemorrhage or displaced calvarial fracture.\"    EEG studies:   - Standard EEG (07/10/2018, Dr. Bai): This is an abnormal video EEG recording in the awake, drowsy, and sleep state(s). Frequent left " "temporal sharps. Intermittent, subtle, left temporal slowing. The findings increase risk for seizures and suggest underlying area of cortical irritability and structural abnormality. Clinical and radiological correlation is recommended.     - Ambulatory EEG  - none available for my review.     - EMU/LTM study (06/05/2022, Dr. Baker): 13-hour 31 minute EEG was abnormal  video EEG recording in the drowsy/sleep and/or comatose state(s):  -Moderate improving to mild background slowing suggestive of a non-specific encephalopathy. The effects of sedation may be contributing. Clinical correlation recommended.   - No focal asymmetries   - No definitive epileptiform discharges or other epileptiform phenomena seen   - No definitive seizures. Clinical correlation is recommended.   - One event at 1046 PM with fine body tremors with no abnormal EEG correlate.     INTERIM HISTORY (07/31/2023): The patient presents today for a follow up. He reports that he had no convulsions in the interim - his last convulsion was on 06/09/2023. He had two \"small events\" during sleep, back to back, in early July 2023, where there was possibly some hand/arm movement, but it is not clear if there were any further features (the events were reported to the patient by his fiance, and no further details were available). It is possible that these events were precipitated by him skipping the meals day prior. He is taking Briviact 100 mg BID and tolerates it well - he shared that his fiance noted that his behavioral tolerance was lower on Keppra compared to Briviact.     He is not taking Vimpat (never started it), but since he is consistently taking Briviact, his seizures are much better controlled.    He is not driving at this time, but is interested to go back to driving after the next follow up visit, if he remains seizure free.     Current Antiseizure Medications: Briviact 100 mg BID (compliant at this time).    Previously Tried Antiseizure " Medications: Keppra (mood issues).     Review of Systems: No recent fevers nor significant weight changes. No mood issues nor suicidal/homicidal ideation.    Risk Factors For Epilepsy/Seizure Disorder: The patient is a product of premature pregnancy/delivery (about 1.5 months early and there was a ). The early development was normal and the patient started waking, talking, and engaging in social interaction as expected. There were no challenges during school and no reported attendance of special education programs. The patient was playing football until , and had several concussions, but never LOC.  There is no history of stroke. There is no history of CNS infections, such as encephalitis and/or meningitis. There is no history of febrile seizures. There is no history of neurosurgical interventions. There is no reported history of staring spells during childhood/school years.    Past Medical History:  Past Medical History:   Diagnosis Date    ASTHMA     Asthma since infancy.    Asthma     Epilepsy (HCC)     Mild intermittent asthma with acute exacerbation 6/10/2016    Seizure (HCC)     Seizure disorder (HCC)      Past Surgical History:  None.     Social History:  Social History     Tobacco Use    Smoking status: Never    Smokeless tobacco: Never   Vaping Use    Vaping Use: Never used   Substance Use Topics    Alcohol use: Yes     Comment: rare, socially on the weekend    Drug use: Yes     Types: Marijuana, Inhaled     Comment: THC; denies h/o IVDU     Family History:  There is no known family history of seizures/epilepsy.  Family History   Problem Relation Age of Onset    Hypertension Mother     Brain Aneurysm Mother     Hyperlipidemia Father     Cancer Neg Hx     Diabetes Neg Hx      [place] Suicide Reassessment  New or continued thoughts about killing self?: No  Preparing to end life?: No    Allergies:  Not on File    Current Medications:    Current Outpatient Medications:     brivaracetam, 100 mg,  "Oral, BID, Taking    albuterol, 2 Puff, Inhalation, Q6HRS PRN, PRN    lacosamide, 50 mg, Oral, BID (Patient not taking: Reported on 7/31/2023), Not Taking    Vitamin D, 5,000 Int'l Units, Oral, DAILY (Patient not taking: Reported on 6/12/2023), Not Taking    Physical Examination:    Ambulatory Vitals  Encounter Vitals  Blood Pressure: 102/58  Pulse: 92  Respiration: 14  Pulse Oximetry: 98 %  Weight: 70.8 kg (156 lb 1.4 oz)  Height: 177.8 cm (5' 10\")  BMI (Calculated): 22.4    Neurological Examination:  Mental Status: The patient is alert and oriented to person, place, time, and situation. Speech is fluent, with no aphasia nor dysarthria noted. Affect is normal.    Cranial Nerve Examination:  CN I: Olfaction examination is deferred.  CN II: Visual fields are full to confrontation examination and show no visual field defect.   CN III, IV, VI: Eye movements are normal in all directions. Pupils are reactive to direct and consensual light. There is no relative afferent pupillary defect. There is no nystagmus.  CN V: Facial sensation to light touch is intact throughout.   CN VII: No significant facial muscle or other soft tissue asymmetry.  CN VIII: Hearing intact to rubbing sounds bilaterally.   CN IX, X: Soft palate elevates symmetrically.  CN XI: Symmetrical shoulder shrug exam.  CN XII: Midline tongue protrusion and moves symmetrically to each side.     Motor Examination:  Muscle strength is intact (5/5) throughout. Muscle tone is normal throughout. No abnormal movements are observed. No pronator drift is noted.     Muscle Stretch Reflexes Examination:  Muscle stretch reflexes are normal (2+) throughout and symmetric.    Sensory Examination:  Preserved sensation to light touch in all extremities.     Coordination:  Normal finger to nose testing bilaterally, no postural nor intentional tremor was noted.     Stance/gait:  Normal regular gait with normal arm swings and stride length. Able to perform tandem gait. Romberg " sign is absent.     ASSESSMENT AND PLAN:  1. 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 The localization is temporal lobe, the lateralalization to the left (though semiology might point toward the right); etiology might be related to  complications, head traumas.     His seizures are much better controlled now that he is taking Briviact 100 mg BID regularly.   - brivaracetam (BRIVIACT) 100 MG Tab tablet; Take 1 Tablet by mouth 2 times a day for 180 days.  Dispense: 60 Tablet; Refill: 5    We will obtain basic lab work to make sure it is safe to continue Briviact ( I reviewed prior lab work today and he is due to have the following blood work):  - CBC WITH DIFFERENTIAL; Future  - Comp Metabolic Panel; Future  - AMMONIA; Future    We will defer re-initiation of Vimpat at this time.    We had a detailed discussion about repeating MRI brain and EEG; since the patient is doing better at this time, he opted to wait with repeating these tests at this time; if he has further seizures, we will proceed with these tests. I discussed risks and benefits of this approach where his testing is deferred, and he verbalized understanding and agreement.     We will plan to complete his DMV paperwork at the time of the next visit, for him to be cleared for driving if he remains seizure free.     Epilepsy surgery was briefly discussed at the time of the initial visit with me.    Provided epilepsy counseling in writing.    Patient Instructions   Please continue Briviact 100 mg twice daily.    Please let our office know if you have any changes in your seizure frequency and/characteristics. Otherwise, please keep the diary of your events and bring it with you at the time of your next follow up visit with our office.     Please take vitamin D3 [id number] internation units daily.     Please note that the following  might precipitate seizures: missed doses of antiseizure medications, being sick with fever, stress, fatigue, sleep deprivation, not eating regularly, not drinking enough water, drinking too much alcohol, stopping alcohol suddenly if you are currently using it on a regular/daily basis, and/or using recreational drugs, among others.    Please note that the following might lead to an injury, potentially a life-threatening injury, in case you have a seizure and/or lose awareness while:   - being in a large body of water by yourself, such as bath, pool, lake, ocean, among others (risk of drowning)   - being on unprotected heights (risk of fall)   - being around and/or operating heavy machinery (risk of injury)   - being around open fire/hot surfaces (risk of burns)   - any other activities/circumstances, in which if you lose awareness, you might injure yourself and/or others.    Please call for help (crisis line and/or 911) in case you have thoughts of harming yourself and/or others.    Please abstain from driving until further notice.    ------------------------------------------------------------------------------------------  Instructions for your family/caregivers:  Please call 911 if the patient has a seizure longer than 2-3 minutes, if seizures are back to back without him recovering to his baseline, or he does not start recovering within 5-10 minutes after the seizure stops. During the seizure - please turn him on his side, please make sure his head is protected (for example, you should put a pillow under his head, if one is available), and please do not put anything in his mouth.   -------------------------------------------------------------------------------------------    It is important that your seizures are well controlled and you have none or have them rarely. In addition to avoiding injury related to breakthrough seizures, frequent seizures increase risk of SUDEP (sudden unexpected death in epilepsy),  where a person goes into a seizure and then never wakes up - this is a rare complication of seizure disorder; one of the best available ways to prevent it is to control your seizures well.     Due to the high volume of patients we are trying to help, your physician will not be able to respond by phone or in MyChart to your routine concerns between appointments.  This does not reflect a lack of interest or concern for you or your diagnosis.  Please bring these questions and concerns to your appointment where your physician can answer.  Please relay more pressing concerns to our office, either via MyChart, or by phone; if not able to reach us please visit nearby Urgent Care Center or Emergency Department.  If any emergent medical needs, please seek emergent medical help and/or call 911.    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  Please schedule a dedicated appointment to address your paperwork, so we can do that in a timely manner.  This is not due to lack of concern or interest for your disease-related work/administrative problems, but to make sure that we provide the best possible care and to fill out your paperwork in a correct and timely manner.    Thank you for entrusting your neurological care to [facility] and we look forward to continuing to serve you.     Follow up in 3 months.     My total time spent caring for the patient on the day of the encounter was 43 minutes.   This does not include time spent on separately billable procedures/tests.    Jarett Maya MD  Neurology Attending, Epilepsy Program  [facility]

## 2023-07-31 NOTE — PATIENT INSTRUCTIONS
Please continue Briviact 100 mg twice daily.    Please let our office know if you have any changes in your seizure frequency and/characteristics. Otherwise, please keep the diary of your events and bring it with you at the time of your next follow up visit with our office.     Please take vitamin D3 0090-1978 internation units daily.     Please note that the following might precipitate seizures: missed doses of antiseizure medications, being sick with fever, stress, fatigue, sleep deprivation, not eating regularly, not drinking enough water, drinking too much alcohol, stopping alcohol suddenly if you are currently using it on a regular/daily basis, and/or using recreational drugs, among others.    Please note that the following might lead to an injury, potentially a life-threatening injury, in case you have a seizure and/or lose awareness while:   - being in a large body of water by yourself, such as bath, pool, lake, ocean, among others (risk of drowning)   - being on unprotected heights (risk of fall)   - being around and/or operating heavy machinery (risk of injury)   - being around open fire/hot surfaces (risk of burns)   - any other activities/circumstances, in which if you lose awareness, you might injure yourself and/or others.    Please call for help (crisis line and/or 911) in case you have thoughts of harming yourself and/or others.    Please abstain from driving until further notice.    ------------------------------------------------------------------------------------------  Instructions for your family/caregivers:  Please call 911 if the patient has a seizure longer than 2-3 minutes, if seizures are back to back without him recovering to his baseline, or he does not start recovering within 5-10 minutes after the seizure stops. During the seizure - please turn him on his side, please make sure his head is protected (for example, you should put a pillow under his head, if one is available), and please do  not put anything in his mouth.   -------------------------------------------------------------------------------------------    It is important that your seizures are well controlled and you have none or have them rarely. In addition to avoiding injury related to breakthrough seizures, frequent seizures increase risk of SUDEP (sudden unexpected death in epilepsy), where a person goes into a seizure and then never wakes up - this is a rare complication of seizure disorder; one of the best available ways to prevent it is to control your seizures well.     Due to the high volume of patients we are trying to help, your physician will not be able to respond by phone or in [email or web address] to your routine concerns between appointments.  This does not reflect a lack of interest or concern for you or your diagnosis.  Please bring these questions and concerns to your appointment where your physician can answer.  Please relay more pressing concerns to our office, either via [email or web address], or by phone; if not able to reach us please visit nearby Urgent Care Center or Emergency Department.  If any emergent medical needs, please seek emergent medical help and/or call 911.    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  Please schedule a dedicated appointment to address your paperwork, so we can do that in a timely manner.  This is not due to lack of concern or interest for your disease-related work/administrative problems, but to make sure that we provide the best possible care and to fill out your paperwork in a correct and timely manner.    Thank you for entrusting your neurological care to [facility] and we look forward to continuing to serve you.

## 2023-08-08 ENCOUNTER — HOSPITAL ENCOUNTER (EMERGENCY)
Facility: MEDICAL CENTER | Age: 33
End: 2023-08-08
Attending: EMERGENCY MEDICINE
Payer: COMMERCIAL

## 2023-08-08 VITALS
TEMPERATURE: 98 F | SYSTOLIC BLOOD PRESSURE: 101 MMHG | HEIGHT: 70 IN | OXYGEN SATURATION: 93 % | RESPIRATION RATE: 19 BRPM | WEIGHT: 156.09 LBS | BODY MASS INDEX: 22.35 KG/M2 | HEART RATE: 78 BPM | DIASTOLIC BLOOD PRESSURE: 59 MMHG

## 2023-08-08 DIAGNOSIS — R56.9 SEIZURE (HCC): ICD-10-CM

## 2023-08-08 PROCEDURE — 700102 HCHG RX REV CODE 250 W/ 637 OVERRIDE(OP): Performed by: EMERGENCY MEDICINE

## 2023-08-08 PROCEDURE — A9270 NON-COVERED ITEM OR SERVICE: HCPCS | Performed by: EMERGENCY MEDICINE

## 2023-08-08 PROCEDURE — 99283 EMERGENCY DEPT VISIT LOW MDM: CPT

## 2023-08-08 RX ADMIN — BRIVARACETAM 100 MG: 50 TABLET, FILM COATED ORAL at 07:32

## 2023-08-08 ASSESSMENT — FIBROSIS 4 INDEX: FIB4 SCORE: 1.52380952380952381

## 2023-08-08 NOTE — ED NOTES
Pt states he would like to go home.  Declines blood draw.  Ambulated in hallway with strong, steady gait.  Pt A&O x 4.  Completely lucid.

## 2023-08-08 NOTE — DISCHARGE INSTRUCTIONS
Please call your neurologist today to schedule a follow-up appointment.  Let him know that there is concern that you had 2 breakthrough seizures today.  As we discussed, you have elected to leave AGAINST MEDICAL ADVICE, as the next best alternative to staying in the emergency department for full evaluation, you may follow-up with your neurologist as soon as possible.  Please call today to schedule that appointment.  Please continue to take all of your medications, and return to the emergency department immediately if you have another breakthrough seizure.

## 2023-08-08 NOTE — ED TRIAGE NOTES
Chief Complaint   Patient presents with    Seizure     BIB EMS for seizures x 2 this AM per family. Pt in post ictal state upon EMS arrival and pt was combative required restraints and Given 2mg versed IM en route. Pt arrives A&Ox1, and cooperative.     
Abdomen soft, non-tender, no guarding. Rectal exam with no active bleeding, no signs of hemorrhoids, brown colored stool.

## 2023-08-08 NOTE — ED PROVIDER NOTES
Emergency Physician Note    Chief Concern:  Chief Complaint   Patient presents with    Seizure     BIB EMS for seizures x 2 this AM per family. Pt in post ictal state upon EMS arrival and pt was combative required restraints and Given 2mg versed IM en route. Pt arrives A&Ox1, and cooperative.         Limitation to History:  Select: Patient does not remember the event that brought him here today    HPI/ROS      External Records Reviewed:  Outpatient Notes Mr. Griffith was seen in epilepsy clinic 7/31/2023.  Neurologist note reviewed from this visit.  He is followed by Dr. Da Holloway.  He presented for evaluation of seizures, initially seen 5/8/2023.  Noted that he began having seizures in 2018.  He was initially started on Keppra, but did not like that medication.  Noted that he had 3 seizures with emergency department presentation on 1/2/2023, 4//23, and 4/28/2023.  Prior imaging performed includes MRI brain with and without contrast in 2018, CT head without contrast in 2023, standard EEG in 2018, and EMU/LTM study 2022.  He is currently on Briviact, with plan to continue that medication.  There was discussion regarding repeating MRI brain and EEG, however since patient is improving plan is to defer this testing unless he has further seizures.    HPI:  Barry Griffith is a 33 y.o. male who presents to the emergency department today for evaluation of seizure.  He has a history of seizure disorder, followed in epilepsy clinic here at [facility].  He is currently on Briviact twice daily, states he had been doing quite well on that medication.  However, paramedics reported to RN staff at handoff that he had 2 seizures this morning, paramedics received a report on scene from the patient's family.  He appeared to be postictal, and was somewhat combative on their arrival.  He was given 2 mg of Versed intramuscularly in route.  On my initial assessment he is drowsy, but awake and alert.  He does not remember the event,  "again states that he had been doing very well.  He reports no recent fevers, no other medication use, no history of tramadol use.  He reports no recent infection symptoms.  He has no headache at this time, no neck or back pain.  Does not believe he injured himself.  He states that he does not think he had a seizure, but also was not certain as to how he arrived to the hospital, nor who called paramedics.    PAST MEDICAL HISTORY  Past Medical History:   Diagnosis Date    ASTHMA     Asthma since infancy.    Asthma     Epilepsy ([facility])     Mild intermittent asthma with acute exacerbation 6/10/2016    Seizure ([facility])     Seizure disorder ([facility])        SURGICAL HISTORY  No past surgical history reported.     FAMILY HISTORY  Family History   Problem Relation Age of Onset    Hypertension Mother     Brain Aneurysm Mother     Hyperlipidemia Father     Cancer Neg Hx     Diabetes Neg Hx        SOCIAL HISTORY   reports that he has never smoked. He has never used smokeless tobacco. He reports current alcohol use. He reports current drug use. Drugs: Marijuana and Inhaled.    CURRENT MEDICATIONS  Discharge Medication List as of 8/8/2023  7:52 AM        CONTINUE these medications which have NOT CHANGED    Details   brivaracetam (BRIVIACT) 100 MG Tab tablet Take 1 Tablet by mouth 2 times a day for 180 days., Disp-60 Tablet, R-5, Normal      albuterol 108 (90 Base) MCG/ACT Aero Soln inhalation aerosol Inhale 2 Puffs every 6 hours as needed for Shortness of Breath., Disp-8.5 g, R-5, Normal      Cholecalciferol (VITAMIN D) 125 MCG (5000 UT) Cap Take 5,000 Int'l Units by mouth every day., Disp-90 Capsule, R-3, Normal             ALLERGIES  Patient has no known allergies.    PHYSICAL EXAM  Vital Signs: /59   Pulse 78   Temp 36.7 °C (98 °F)   Resp 19   Ht 1.778 m (5' 10\")   Wt 70.8 kg (156 lb 1.4 oz)   SpO2 93%   BMI 22.40 kg/m²   Constitutional: Alert, no acute distress  HENT: Normocephalic, atraumatic.  Cardiovascular: No " tachycardia  Pulmonary: No respiratory distress, normal work of breathing  Abdomen: Soft, non tender, no peritoneal signs.  Skin: Warm, dry, no rashes or lesions  Musculoskeletal: Normal range of motion in all extremities, no swelling or deformity noted  Neurologic: Awake and alert, no facial droop, no slurred speech, 5 out of 5 upper and lower extremity strength, gait is normal.  Psychiatric: Normal and appropriate mood and affect      Course and Medical Decision Making    ED Observation Status? No; Patient does not meet criteria for ED Observation.     Initial Assessment and Plan  Mr. Griffith presents to the emergency department today for evaluation after a seizure.  He has a longstanding history of seizure disorder, followed in epilepsy clinic.  Paramedic report is that he had 2 breakthrough seizures.  He did seem somewhat drowsy on my initial assessment, however quickly returned to baseline mental status.  He was given Versed in the prehospital setting.  My initial plan was for screening lab work, and ongoing monitoring to verify no further seizure-like activity.    Lab work was ordered, however I was notified by RN that he is refusing blood work, and simply wants to be discharged and return home.  He states he does not believe he had a seizure.  I explained that this was witnessed, however he states that he would like to decline any further evaluation.  At this time, I do of concern for status epilepticus given report of 2 seizures prior to arrival.  He has had no seizure-like activity in the emergency department, and he does have full decision-making capacity.  He is currently established with a neurologist, in absence of emergency department evaluation and treatment, the access plan is for close outpatient follow-up.  He is counseled to call his neurologist today to review his emergency department visit and concern for breakthrough seizures. Return precautions were discussed with the patient, and provided in  written form with the patient's discharge instructions.  His home dose of antiepileptic medications were given in the emergency department prior to discharge.    Return precautions were discussed with the patient, and provided in written form with the patient's discharge instructions.    Additional Problems and Disposition    Escalation of care considered, and ultimately not performed: after discussion with the patient / family, they have elected to decline an escalation in care.      Disposition:    Patient is discharged home AGAINST MEDICAL ADVICE in guarded condition.    FOLLOW UP:  Jarett Maya M.D.  [address] [place] [id number] [phone]    Schedule an appointment as soon as possible for a visit       [facility], Emergency Dept  [facility] [facility] [address] [phone]  Go to   If symptoms worsen        FINAL IMPRESSION   1. Seizure (HCC)       ILevi (Scribe), am scribing for, and in the presence of, No att. providers found.    Electronically signed by: Levi Smith (Natalia), 8/8/2023    I, No att. providers found personally performed the services described in this documentation, as scribed by Levi Smith in my presence, and it is both accurate and complete.    The note accurately reflects work and decisions made by me.  Dana Santiago M.D.  8/8/2023  1:18 PM
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
## 2024-01-02 ENCOUNTER — HOSPITAL ENCOUNTER (EMERGENCY)
Facility: MEDICAL CENTER | Age: 34
End: 2024-01-02
Attending: STUDENT IN AN ORGANIZED HEALTH CARE EDUCATION/TRAINING PROGRAM
Payer: COMMERCIAL

## 2024-01-02 VITALS
BODY MASS INDEX: 22.9 KG/M2 | HEART RATE: 78 BPM | TEMPERATURE: 98.1 F | DIASTOLIC BLOOD PRESSURE: 60 MMHG | RESPIRATION RATE: 16 BRPM | HEIGHT: 70 IN | OXYGEN SATURATION: 96 % | WEIGHT: 160 LBS | SYSTOLIC BLOOD PRESSURE: 132 MMHG

## 2024-01-02 DIAGNOSIS — R56.9 SEIZURE (HCC): ICD-10-CM

## 2024-01-02 LAB
ALBUMIN SERPL BCP-MCNC: 5 G/DL (ref 3.2–4.9)
ALBUMIN/GLOB SERPL: 1.9 G/DL
ALP SERPL-CCNC: 76 U/L (ref 30–99)
ALT SERPL-CCNC: 28 U/L (ref 2–50)
ANION GAP SERPL CALC-SCNC: 11 MMOL/L (ref 7–16)
ANION GAP SERPL CALC-SCNC: 40 MMOL/L (ref 7–16)
ANISOCYTOSIS BLD QL SMEAR: ABNORMAL
APAP SERPL-MCNC: <5 UG/ML (ref 10–30)
AST SERPL-CCNC: 33 U/L (ref 12–45)
BASOPHILS # BLD AUTO: 0 % (ref 0–1.8)
BASOPHILS # BLD: 0 K/UL (ref 0–0.12)
BILIRUB SERPL-MCNC: 0.2 MG/DL (ref 0.1–1.5)
BUN SERPL-MCNC: 17 MG/DL (ref 8–22)
BUN SERPL-MCNC: 18 MG/DL (ref 8–22)
CALCIUM ALBUM COR SERPL-MCNC: 9.1 MG/DL (ref 8.5–10.5)
CALCIUM SERPL-MCNC: 7.8 MG/DL (ref 8.5–10.5)
CALCIUM SERPL-MCNC: 9.9 MG/DL (ref 8.5–10.5)
CHLORIDE SERPL-SCNC: 108 MMOL/L (ref 96–112)
CHLORIDE SERPL-SCNC: 97 MMOL/L (ref 96–112)
CO2 SERPL-SCNC: 21 MMOL/L (ref 20–33)
CO2 SERPL-SCNC: 6 MMOL/L (ref 20–33)
COMMENT NL1176: NORMAL
CREAT SERPL-MCNC: 1.06 MG/DL (ref 0.5–1.4)
CREAT SERPL-MCNC: 1.45 MG/DL (ref 0.5–1.4)
EOSINOPHIL # BLD AUTO: 0.28 K/UL (ref 0–0.51)
EOSINOPHIL NFR BLD: 1.8 % (ref 0–6.9)
ERYTHROCYTE [DISTWIDTH] IN BLOOD BY AUTOMATED COUNT: 44.2 FL (ref 35.9–50)
ETHANOL BLD-MCNC: <10.1 MG/DL
GFR SERPLBLD CREATININE-BSD FMLA CKD-EPI: 65 ML/MIN/1.73 M 2
GFR SERPLBLD CREATININE-BSD FMLA CKD-EPI: 95 ML/MIN/1.73 M 2
GLOBULIN SER CALC-MCNC: 2.7 G/DL (ref 1.9–3.5)
GLUCOSE BLD STRIP.AUTO-MCNC: 80 MG/DL (ref 65–99)
GLUCOSE SERPL-MCNC: 81 MG/DL (ref 65–99)
GLUCOSE SERPL-MCNC: 83 MG/DL (ref 65–99)
HCT VFR BLD AUTO: 51.5 % (ref 42–52)
HGB BLD-MCNC: 16.1 G/DL (ref 14–18)
LACTATE SERPL-SCNC: 2.7 MMOL/L (ref 0.5–2)
LACTATE SERPL-SCNC: 3.7 MMOL/L (ref 0.5–2)
LYMPHOCYTES # BLD AUTO: 9.9 K/UL (ref 1–4.8)
LYMPHOCYTES NFR BLD: 64.3 % (ref 22–41)
MACROCYTES BLD QL SMEAR: ABNORMAL
MANUAL DIFF BLD: NORMAL
MCH RBC QN AUTO: 31.7 PG (ref 27–33)
MCHC RBC AUTO-ENTMCNC: 31.3 G/DL (ref 32.3–36.5)
MCV RBC AUTO: 101.4 FL (ref 81.4–97.8)
MONOCYTES # BLD AUTO: 0.82 K/UL (ref 0–0.85)
MONOCYTES NFR BLD AUTO: 5.3 % (ref 0–13.4)
MORPHOLOGY BLD-IMP: NORMAL
MYELOCYTES NFR BLD MANUAL: 0.9 %
NEUTROPHILS # BLD AUTO: 4.27 K/UL (ref 1.82–7.42)
NEUTROPHILS NFR BLD: 27.7 % (ref 44–72)
NRBC # BLD AUTO: 0.03 K/UL
NRBC BLD-RTO: 0.2 /100 WBC (ref 0–0.2)
PLATELET # BLD AUTO: 148 K/UL (ref 164–446)
PLATELET BLD QL SMEAR: NORMAL
PMV BLD AUTO: 11.7 FL (ref 9–12.9)
POIKILOCYTOSIS BLD QL SMEAR: NORMAL
POTASSIUM SERPL-SCNC: 3.6 MMOL/L (ref 3.6–5.5)
POTASSIUM SERPL-SCNC: 3.7 MMOL/L (ref 3.6–5.5)
PROT SERPL-MCNC: 7.7 G/DL (ref 6–8.2)
RBC # BLD AUTO: 5.08 M/UL (ref 4.7–6.1)
RBC BLD AUTO: PRESENT
SALICYLATES SERPL-MCNC: <1 MG/DL (ref 15–25)
SMUDGE CELLS BLD QL SMEAR: NORMAL
SODIUM SERPL-SCNC: 140 MMOL/L (ref 135–145)
SODIUM SERPL-SCNC: 143 MMOL/L (ref 135–145)
WBC # BLD AUTO: 15.4 K/UL (ref 4.8–10.8)

## 2024-01-02 PROCEDURE — 36415 COLL VENOUS BLD VENIPUNCTURE: CPT

## 2024-01-02 PROCEDURE — 96374 THER/PROPH/DIAG INJ IV PUSH: CPT

## 2024-01-02 PROCEDURE — 80048 BASIC METABOLIC PNL TOTAL CA: CPT

## 2024-01-02 PROCEDURE — 85007 BL SMEAR W/DIFF WBC COUNT: CPT

## 2024-01-02 PROCEDURE — 80053 COMPREHEN METABOLIC PANEL: CPT

## 2024-01-02 PROCEDURE — 82962 GLUCOSE BLOOD TEST: CPT

## 2024-01-02 PROCEDURE — 700111 HCHG RX REV CODE 636 W/ 250 OVERRIDE (IP): Mod: JZ | Performed by: STUDENT IN AN ORGANIZED HEALTH CARE EDUCATION/TRAINING PROGRAM

## 2024-01-02 PROCEDURE — 85027 COMPLETE CBC AUTOMATED: CPT

## 2024-01-02 PROCEDURE — 83605 ASSAY OF LACTIC ACID: CPT

## 2024-01-02 PROCEDURE — 99285 EMERGENCY DEPT VISIT HI MDM: CPT

## 2024-01-02 PROCEDURE — 82077 ASSAY SPEC XCP UR&BREATH IA: CPT

## 2024-01-02 PROCEDURE — 80179 DRUG ASSAY SALICYLATE: CPT

## 2024-01-02 PROCEDURE — 700105 HCHG RX REV CODE 258: Performed by: STUDENT IN AN ORGANIZED HEALTH CARE EDUCATION/TRAINING PROGRAM

## 2024-01-02 PROCEDURE — 80143 DRUG ASSAY ACETAMINOPHEN: CPT

## 2024-01-02 RX ORDER — MIDAZOLAM HYDROCHLORIDE 1 MG/ML
INJECTION INTRAMUSCULAR; INTRAVENOUS
Status: DISCONTINUED
Start: 2024-01-02 | End: 2024-01-03 | Stop reason: HOSPADM

## 2024-01-02 RX ORDER — SODIUM CHLORIDE, SODIUM LACTATE, POTASSIUM CHLORIDE, CALCIUM CHLORIDE 600; 310; 30; 20 MG/100ML; MG/100ML; MG/100ML; MG/100ML
1000 INJECTION, SOLUTION INTRAVENOUS ONCE
Status: COMPLETED | OUTPATIENT
Start: 2024-01-02 | End: 2024-01-02

## 2024-01-02 RX ORDER — LEVETIRACETAM 500 MG/5ML
1500 INJECTION, SOLUTION, CONCENTRATE INTRAVENOUS EVERY 12 HOURS
Status: DISCONTINUED | OUTPATIENT
Start: 2024-01-02 | End: 2024-01-03 | Stop reason: HOSPADM

## 2024-01-02 RX ADMIN — SODIUM CHLORIDE, POTASSIUM CHLORIDE, SODIUM LACTATE AND CALCIUM CHLORIDE 1000 ML: 600; 310; 30; 20 INJECTION, SOLUTION INTRAVENOUS at 18:01

## 2024-01-02 RX ADMIN — LEVETIRACETAM 1500 MG: 100 INJECTION, SOLUTION, CONCENTRATE INTRAVENOUS at 17:12

## 2024-01-02 ASSESSMENT — FIBROSIS 4 INDEX: FIB4 SCORE: 1.52380952380952381

## 2024-01-03 NOTE — ED NOTES
PIV removed. Patient provided discharge instructions, verbalizes understanding and denies any further questions. Patient instructed to follow up with neurologist and return if condition worsens. No distress noted.

## 2024-01-03 NOTE — DISCHARGE INSTRUCTIONS
As we discussed you should follow-up with your neurologist.  You should take your prescribed seizure medication.  You have recurrent seizure, fever, uncontrolled vomiting or headache please return to the emergency room.

## 2024-01-03 NOTE — ED TRIAGE NOTES
Chief Complaint   Patient presents with    Seizure     BIB EMS from home for witnessed seizures x2, approx 1 min. Hx seizures with intubation post versed.       Patient arrives in 4pt restraints, security and ERP at bedside.

## 2024-01-03 NOTE — ED NOTES
Blood collected and sent to lab. IVF infusing. Family at bedside.    VTAMA Counseling: I discussed with the patient that VTAMA is not for use in the eyes, mouth or mouth. They should call the office if they develop any signs of allergic reactions to VTAMA. The patient verbalized understanding of the proper use and possible adverse effects of VTAMA.  All of the patient's questions and concerns were addressed.

## 2024-01-03 NOTE — ED NOTES
Patient A&Ox4, denies any needs or pain. IVF infusing. Educated patient on using call light before trying to get up by himself, verbalizes understanding.

## 2024-01-03 NOTE — ED PROVIDER NOTES
ED Provider Note    CHIEF COMPLAINT  Chief Complaint   Patient presents with    Seizure     BIB EMS from home for witnessed seizures x2, approx 1 min. Hx seizures with intubation post versed.        EXTERNAL RECORDS REVIEWED  Outpatient Notes ED visit from 8/8 patient presented after multiple seizures given Versed by EMS left AMA after refusing blood work    HPI/ROS  LIMITATION TO HISTORY   Select: Altered mental status / Confusion  OUTSIDE HISTORIAN(S):  EMS EMS report generalized seizure in route given 5 mg of intramuscular midazolam    Barry Griffith is a 33 y.o. male who presents after reported seizure activity.  EMS was called to patient's home for reported generalized seizure.  On arrival EMS found patient minimally responsive and in route had 1 to 2-minute generalized seizure he was given 5 mg of intramuscular midazolam.  Limited history from patient as he is altered on arrival.    Further history from patient's father and fiancé.  They report that patient has been well last few days been taking his medications no recent fever, headache, cough, vomiting or diarrhea.  No reported recent trauma.    PAST MEDICAL HISTORY   has a past medical history of ASTHMA, Asthma, Epilepsy ([facility]), Mild intermittent asthma with acute exacerbation (6/10/2016), Seizure ([facility]), and Seizure disorder ([facility]).    SURGICAL HISTORY  patient denies any surgical history    FAMILY HISTORY  Family History   Problem Relation Age of Onset    Hypertension Mother     Brain Aneurysm Mother     Hyperlipidemia Father     Cancer Neg Hx     Diabetes Neg Hx        SOCIAL HISTORY  Social History     Tobacco Use    Smoking status: Never    Smokeless tobacco: Never   Vaping Use    Vaping Use: Never used   Substance and Sexual Activity    Alcohol use: Yes     Comment: rare, socially on the weekend    Drug use: Yes     Types: Marijuana, Inhaled     Comment: THC; denies h/o IVDU    Sexual activity: Yes     Partners: Female     Birth  "control/protection: Condom, Pill       CURRENT MEDICATIONS  Home Medications       Reviewed by Spring Kennedy R.N. (Registered Nurse) on 01/02/24 at 1648  Med List Status: Partial     Medication Last Dose Status   albuterol 108 (90 Base) MCG/ACT Aero Soln inhalation aerosol  Active   brivaracetam (BRIVIACT) 100 MG Tab tablet  Active   Cholecalciferol (VITAMIN D) 125 MCG (5000 UT) Cap  Active                    ALLERGIES  No Known Allergies    PHYSICAL EXAM  VITAL SIGNS: /60   Pulse 78   Temp 36.7 °C (98.1 °F) (Temporal)   Resp 16   Ht 1.778 m (5' 10\")   Wt 72.6 kg (160 lb)   SpO2 96%   BMI 22.96 kg/m²    Constitutional: Somnolent  HENT: No signs of trauma, Bilateral external ears normal, Nose normal.   Eyes: Pupils are equal and reactive, Conjunctiva normal, Non-icteric.   Neck: Normal range of motion, No tenderness, Supple, No stridor.   Lymphatic: No lymphadenopathy noted.   Cardiovascular: Regular rate and rhythm, no murmurs.   Thorax & Lungs: Normal breath sounds, No respiratory distress, No wheezing, No chest tenderness.   Abdomen: Bowel sounds normal, Soft, No tenderness, No masses, No pulsatile masses.   Skin: Warm, Dry, No erythema, No rash.   Back: No bony tenderness  Extremities: Intact distal pulses, No edema, No tenderness, No cyanosis  Musculoskeletal: Good range of motion in all major joints. No tenderness to palpation or major deformities noted.   Neurologic: Somnolent, moving all 4 extremities normal motor function, Normal sensory function, No focal deficits noted.  No facial asymmetry    DIAGNOSTIC STUDIES / PROCEDURES  EKG      LABS  Labs Reviewed   CBC WITH DIFFERENTIAL - Abnormal; Notable for the following components:       Result Value    WBC 15.4 (*)     .4 (*)     MCHC 31.3 (*)     Platelet Count 148 (*)     Neutrophils-Polys 27.70 (*)     Lymphocytes 64.30 (*)     Lymphs (Absolute) 9.90 (*)     All other components within normal limits   COMP METABOLIC PANEL - " Abnormal; Notable for the following components:    Co2 6 (*)     Anion Gap 40.0 (*)     Creatinine 1.45 (*)     Albumin 5.0 (*)     All other components within normal limits   ACETAMINOPHEN - Abnormal; Notable for the following components:    Acetaminophen -Tylenol <5.0 (*)     All other components within normal limits   SALICYLATE - Abnormal; Notable for the following components:    Salicylates, Quant. <1.0 (*)     All other components within normal limits   LACTIC ACID - Abnormal; Notable for the following components:    Lactic Acid 3.7 (*)     All other components within normal limits   LACTIC ACID - Abnormal; Notable for the following components:    Lactic Acid 2.7 (*)     All other components within normal limits   BASIC METABOLIC PANEL - Abnormal; Notable for the following components:    Calcium 7.8 (*)     All other components within normal limits   DIAGNOSTIC ALCOHOL   ESTIMATED GFR   DIFFERENTIAL MANUAL   PERIPHERAL SMEAR REVIEW   PLATELET ESTIMATE   MORPHOLOGY   ESTIMATED GFR   POCT GLUCOSE DEVICE RESULTS         RADIOLOGY      COURSE & MEDICAL DECISION MAKING    ED Observation Status?     INITIAL ASSESSMENT, COURSE AND PLAN  Care Narrative: On arrival was called to patient's room for reports of agitation.  Patient was placed in restraints by EMS prior to arrival.  Patient with 2 reported seizures received benzodiazepines by EMS and intermittently agitated.  At the time my evaluation patient somnolent no longer agitated.  Patient was placed on monitoring with IV access established.  No signs of trauma.  Suspect postictal state effects of benzodiazepines.   Will obtain blood work to assess for metabolic or hematologic derangements toxidrome.  Based on previous reports patient.  Patient has had negative brain imaging earlier this year as well as unremarkable MRI in 2018.  Obtain repeat brain imaging if patient has persistent change in mental status focal seizure and further evaluation of possible CVA, ICH or  mass.    Blood work with significant metabolic acidosis including elevated lactic acid.  Believe this is likely secondary to patient's 2 recent seizures while initiate IV fluids and repeat.  On reassessment patient is awake, oriented able to tell me that he has been taking his medications and feels well and wants to return home.  Initially patient was eager to leave before repeat blood work was done but was willing to stay.  Patient's father and fiancé at bedside.  Patient was given Keppra load.  According to fiancé has adequate seizure medication at home and recently saw his neurologist.    Repeat BMP with resolution of metabolic acidosis.  Patient's been able to ambulate and tolerate p.o. without difficulty.  Believe patient is stable for discharge as he has not reassuring exam, closely follows with his neurologist and has adequate medication at home.  No history or exam to suggest underlying precipitating traumatic injury, infectious process or toxidrome.      CRITICAL CARE  The very real possibilty of a deterioration of this patient's condition required the highest level of my preparedness for sudden, emergent intervention.  I provided critical care services, which included medication orders, frequent reevaluations of the patient's condition and response to treatment, ordering and reviewing test results, and discussing the case with various consultants.  The critical care time associated with the care of the patient was 34 minutes. Review chart for interventions. This time is exclusive of any other billable procedures.      HYDRATION: Based on the patient's presentation of Dehydration the patient was given IV fluids. IV Hydration was used because oral hydration was not adequate alone. Upon recheck following hydration, the patient was improved.      ADDITIONAL PROBLEM LIST    DISPOSITION AND DISCUSSIONS    FINAL DIAGNOSIS  1. Seizure (HCC)           Electronically signed by: Jag Moctezuma D.O., 1/2/2024  4:43 PM

## 2024-02-09 ENCOUNTER — OFFICE VISIT (OUTPATIENT)
Dept: URGENT CARE | Facility: PHYSICIAN GROUP | Age: 34
End: 2024-02-09
Payer: COMMERCIAL

## 2024-02-09 VITALS
SYSTOLIC BLOOD PRESSURE: 114 MMHG | HEIGHT: 70 IN | RESPIRATION RATE: 16 BRPM | DIASTOLIC BLOOD PRESSURE: 84 MMHG | OXYGEN SATURATION: 99 % | BODY MASS INDEX: 22.62 KG/M2 | TEMPERATURE: 97.3 F | HEART RATE: 73 BPM | WEIGHT: 158 LBS

## 2024-02-09 DIAGNOSIS — G40.909 SEIZURE DISORDER (HCC): ICD-10-CM

## 2024-02-09 DIAGNOSIS — Z76.0 MEDICATION REFILL: ICD-10-CM

## 2024-02-09 PROCEDURE — 3074F SYST BP LT 130 MM HG: CPT | Performed by: REGISTERED NURSE

## 2024-02-09 PROCEDURE — 99213 OFFICE O/P EST LOW 20 MIN: CPT | Performed by: REGISTERED NURSE

## 2024-02-09 PROCEDURE — 3079F DIAST BP 80-89 MM HG: CPT | Performed by: REGISTERED NURSE

## 2024-02-09 ASSESSMENT — ENCOUNTER SYMPTOMS
SEIZURES: 0
SHORTNESS OF BREATH: 0

## 2024-02-09 ASSESSMENT — FIBROSIS 4 INDEX: FIB4 SCORE: 1.39

## 2024-02-10 NOTE — PROGRESS NOTES
"Subjective:   Barry Griffith is a 34 y.o. male who presents for Medication Refill (Seizure meds )    HPI  Presenting for refill of controlled substance Briviact 100 mg twice daily.  Last prescription fill was on 7/31/2023 per PDMP.  Has not run out of the medication yet but he will soon.  Attempted to get it filled through neurology provider but did not complete the request.  No change in his symptoms.    Review of Systems   Respiratory:  Negative for shortness of breath.    Cardiovascular:  Negative for chest pain.   Neurological:  Negative for seizures.       Medications, Allergies, and current problem list reviewed today in Epic.     Objective:     /84   Pulse 73   Temp 36.3 °C (97.3 °F)   Resp 16   Ht 1.778 m (5' 10\")   Wt 71.7 kg (158 lb)   SpO2 99%     Physical Exam  Vitals and nursing note reviewed.   Constitutional:       Appearance: He is not ill-appearing or toxic-appearing.   HENT:      Head: Normocephalic.   Eyes:      Pupils: Pupils are equal, round, and reactive to light.   Cardiovascular:      Rate and Rhythm: Normal rate.   Pulmonary:      Effort: Pulmonary effort is normal.   Neurological:      General: No focal deficit present.      Mental Status: He is alert and oriented to person, place, and time.   Psychiatric:         Mood and Affect: Mood normal.         Assessment/Plan:       1. Seizure disorder (HCC)  brivaracetam (BRIVIACT) 100 MG Tab tablet      2. Medication refill          Medication is a schedule V controlled substance, I did look at the PDMP last fill was in July 2023.  Will give 30-month supply so patient does not run out.  Discussed that he needs to schedule appointment to follow-up with neurology as this may be the reason they did not fill it.  He verbalized understanding.    Differential diagnosis, natural history, and supportive care discussed. We also reviewed side effects of medication including allergic response, GI upset, tendon injury, rash, sedation etc. " Patient and/or guardian voices understanding.      Advised the patient to follow-up with the primary care physician for recheck, reevaluation, and consideration of further management.    I personally reviewed prior external notes and test results pertinent to today's visit as well as additional imaging and testing completed in clinic today.     Please note that this dictation was created using voice recognition software. I have made every reasonable attempt to correct obvious errors, but I expect that there are errors of grammar and possibly content that I did not discover before finalizing the note.    This note was electronically signed by KATHLEEN Ram

## 2024-03-04 DIAGNOSIS — G40.909 SEIZURE DISORDER (HCC): ICD-10-CM

## 2024-03-04 NOTE — TELEPHONE ENCOUNTER
Received request via: Patient    Medication Name/Dosage     brivaracetam (BRIVIACT) 100 MG Tab tablet       When was medication last prescribed 02/09/24    How many refills were previously provided 0    How many Refills does he patient have left from last prescription 0    Was the patient seen in the last year in this department? Yes   Date of last office visit 07/31/23     Per last Neurology Office Visit, when was the date of next follow up visit set for?                            Date of office visit follow up request 3 months     Does the patient have an upcoming appointment? No   If yes, when N/A             If no, schedule appointment Message was sent to pt advising to call and schedule a f/u appt.    Does the patient have [facility] and need 100 day supply (blood pressure, diabetes and cholesterol meds only)? Patient does not have SCP

## 2024-03-05 ENCOUNTER — TELEPHONE (OUTPATIENT)
Dept: PHARMACY | Facility: MEDICAL CENTER | Age: 34
End: 2024-03-05
Payer: COMMERCIAL

## 2024-03-05 NOTE — TELEPHONE ENCOUNTER
Prior Authorization for BRIVIACT 100 MG TABLET (Quantity: 14, Days: 7) has been submitted via Cover My Meds: Key ([id number])    Insurance: [employer]IMBER MENG    Will follow up in 24-48 business hours.

## 2024-03-05 NOTE — TELEPHONE ENCOUNTER
Noted that the patient got Briviact refill through [facility] on 02/09/2024 and at that time was advised to schedule an appointment with us. We also advised the patient to schedule an appointment. As soon as appointment is scheduled, I will provide refills until that appointment. The patient is quite overdue to follow up with epilepsy (he was advised to be seen in October 2023), and he also had a seizure in early January 2024. In that context, the patient really needs to be seen by me in order for me to provide appropriate refills. I will provide refills today for a week, and please in the meantime reach out to the patient to help him schedule an appointment. I will copy this message to the patient's PCP as well. Thank you.

## 2024-03-11 ENCOUNTER — OFFICE VISIT (OUTPATIENT)
Dept: NEUROLOGY | Facility: MEDICAL CENTER | Age: 34
End: 2024-03-11
Attending: PSYCHIATRY & NEUROLOGY
Payer: COMMERCIAL

## 2024-03-11 ENCOUNTER — TELEPHONE (OUTPATIENT)
Dept: NEUROLOGY | Facility: MEDICAL CENTER | Age: 34
End: 2024-03-11

## 2024-03-11 VITALS
RESPIRATION RATE: 18 BRPM | TEMPERATURE: 98.2 F | OXYGEN SATURATION: 99 % | HEART RATE: 68 BPM | BODY MASS INDEX: 22 KG/M2 | SYSTOLIC BLOOD PRESSURE: 102 MMHG | HEIGHT: 70 IN | WEIGHT: 153.66 LBS | DIASTOLIC BLOOD PRESSURE: 68 MMHG

## 2024-03-11 DIAGNOSIS — G40.909 SEIZURE DISORDER (HCC): ICD-10-CM

## 2024-03-11 PROCEDURE — 99215 OFFICE O/P EST HI 40 MIN: CPT | Performed by: PSYCHIATRY & NEUROLOGY

## 2024-03-11 PROCEDURE — 99211 OFF/OP EST MAY X REQ PHY/QHP: CPT | Performed by: PSYCHIATRY & NEUROLOGY

## 2024-03-11 ASSESSMENT — PATIENT HEALTH QUESTIONNAIRE - PHQ9: CLINICAL INTERPRETATION OF PHQ2 SCORE: 0

## 2024-03-11 ASSESSMENT — FIBROSIS 4 INDEX: FIB4 SCORE: 1.39

## 2024-03-11 NOTE — PATIENT INSTRUCTIONS
Please continue Briviact 100 mg twice daily.    Please let our office know if you have any changes in your seizure frequency and/characteristics. Otherwise, please keep the diary of your events and bring it with you at the time of your next follow up visit with our office.     Please take vitamin D3 1247-5910 internation units daily.     Please note that the following might precipitate seizures: missed doses of antiseizure medications, being sick with fever, stress, fatigue, sleep deprivation, not eating regularly, not drinking enough water, drinking too much alcohol, stopping alcohol suddenly if you are currently using it on a regular/daily basis, and/or using recreational drugs, among others.    Please note that the following might lead to an injury, potentially a life-threatening injury, in case you have a seizure and/or lose awareness while:   - being in a large body of water by yourself, such as bath, pool, lake, ocean, among others (risk of drowning)   - being on unprotected heights (risk of fall)   - being around and/or operating heavy machinery (risk of injury)   - being around open fire/hot surfaces (risk of burns)   - any other activities/circumstances, in which if you lose awareness, you might injure yourself and/or others.    Please call for help (crisis line and/or 911) in case you have thoughts of harming yourself and/or others.    Please abstain from driving until further notice.    ------------------------------------------------------------------------------------------  Instructions for your family/caregivers:  Please call 911 if the patient has a seizure longer than 2-3 minutes, if seizures are back to back without him recovering to his baseline, or he does not start recovering within 5-10 minutes after the seizure stops. During the seizure - please turn him on his side, please make sure his head is protected (for example, you should put a pillow under his head, if one is available), and please do  not put anything in his mouth.   -------------------------------------------------------------------------------------------    It is important that your seizures are well controlled and you have none or have them rarely. In addition to avoiding injury related to breakthrough seizures, frequent seizures increase risk of SUDEP (sudden unexpected death in epilepsy), where a person goes into a seizure and then never wakes up - this is a rare complication of seizure disorder; one of the best available ways to prevent it is to control your seizures well.     Due to the high volume of patients we are trying to help, your physician will not be able to respond by phone or in Twoneshart to your routine concerns between appointments.  This does not reflect a lack of interest or concern for you or your diagnosis.  Please bring these questions and concerns to your appointment where your physician can answer.  Please relay more pressing concerns to our office, either via Twoneshart, or by phone; if not able to reach us please visit nearby Urgent Care Center or Emergency Department.  If any emergent medical needs, please seek emergent medical help and/or call 911.    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  Please schedule a dedicated appointment to address your paperwork, so we can do that in a timely manner.  This is not due to lack of concern or interest for your disease-related work/administrative problems, but to make sure that we provide the best possible care and to fill out your paperwork in a correct and timely manner.    Thank you for entrusting your neurological care to [facility] and we look forward to continuing to serve you.

## 2024-03-11 NOTE — PROGRESS NOTES
"[facility] Epilepsy Program  Follow Up Visit      Patient's Name: Barry Griffith  YOB: 1990  MRN: [id number]  Date of Service: 03/11/24.    Referring Provider: No referring provider defined for this encounter.    Chief Concern: Seizures.     The patient presents alone today.     HPI (as obtained at the time of the initial visit and updated as necessary): The patient is a 34 y.o., right-handed male, who initially presented to my epilepsy clinic for evaluation of seizures on 05/08/2023.    The patient started having seizures in 2018. He has two events types, as noted below. He never had myoclonus.    He was initially started on Keppra, but he did not like it. It seems that he was more compliant with Keppra and his seizures were better controlled at that time. He was switched to Briviact in early 2022; he has been tolerating Briviact well, as reported at the time of the initial visit, but has not been very compliant. He became more compliant with Briviact as of May 2023.     The patient unfortunately had 3 seizures with ER presentation on 01/02/2023, 04/04/2023, and 04/28/2023, as of the time of the initial visit with me. He also had a breakthrough seizure on 06/09/2023. During one of his seizures in 2022, he was intubated.     Semiology:  Event type #1: \"Seizures\".  Year/Age of Onset: 2018.  Initial features: Some seizures occur out of sleep and no warning signs. Some seizures are preceded by chlorine smell.   Event features: The patient would experience bilateral tonic-clonic seizure, with head deviation to the left per the report he got from his wife. He is not responsive during the event and has no recollection for the time during the event.  He bites his tongue with his seizures; at least once bladder incontinence; no bowel incontinence.   Post-ictal features: He feels it takes his time for his brain to come back to baseline and that he loses a pitch after his seizure, sore in his " "muscles, and has headaches.   Duration: Up to 5 minutes on average.  Frequency: He has them on average once a month. The last recorded event was on 01/02/2024.  Precipitating factors: Not clear.     Event type #2: \"Spells\".  Year/Age of Onset: 2018  Initial features: He would experience chlorine smell. He never had sensation of strange tastes, gastric upraising, autonomic phenomena, nor psychic phenomena except anxiety and history of maria victoria vu as a child.   Event features: He might experience a bit of anxiety with it. With at least one episode he stared off; he also feels his mouth is watering and he is doing swallowing movements. No manual automatisms.   Post-ictal features: Back to normal.  Duration: 30 seconds.  Frequency: He typically gets these events on average once in two to three months. He had two events in early July 2023.   Precipitating factors: Not clear.     History of status epilepticus: yes - intubated in June 2022 for prolonged seizure.   History of physical injury related to seizures: once hit his head on the counter and still has a scar.   History of surgery related to epilepsy: no  Family Planning: N/A.  Current Driving Status: He is not driving at this time and his license is suspended due to his seizures.  Seizure Clusters: No  Longest Seizure Freedom: About 1 year seizure freedom around 2020.     Pertinent Ancillary Test Results:    MRI brain studies:   - MRI brain wo/w contrast (08/10/2018 at [facility]): \"1. MRI of the brain without and with contrast within normal limits. 2. No evidence of mass lesion, heterotopic gray matter, gross cortical dysplasia or mesial temporal sclerosis.\" I reviewed key images on 07/31/2023.     CT head studies:   - CT head wo contrast (01/02/2023 at [facility]): \"No acute intracranial hemorrhage or displaced calvarial fracture.\"    EEG studies:   - Standard EEG (07/10/2018, Dr. Bai): This is an abnormal video EEG recording in the awake, drowsy, and sleep state(s). Frequent " left temporal sharps. Intermittent, subtle, left temporal slowing. The findings increase risk for seizures and suggest underlying area of cortical irritability and structural abnormality. Clinical and radiological correlation is recommended.     - Ambulatory EEG  - none available for my review.     - EMU/LTM study (06/05/2022, Dr. Baker): 13-hour 31 minute EEG was abnormal  video EEG recording in the drowsy/sleep and/or comatose state(s):  -Moderate improving to mild background slowing suggestive of a non-specific encephalopathy. The effects of sedation may be contributing. Clinical correlation recommended.   - No focal asymmetries   - No definitive epileptiform discharges or other epileptiform phenomena seen   - No definitive seizures. Clinical correlation is recommended.   - One event at 1046 PM with fine body tremors with no abnormal EEG correlate.     INTERIM HISTORY (03/11/2024): The patient unfortunately continues to have focal to bilateral tonic-clonic breakthrough seizures.  Initially, the patient shared that he had no seizures for over 4 months, but then recalls being in the ambulance in early January 2024, though he stated he had no seizures at the time.  He reports taking Briviact 100 mg twice daily regularly. He reports no adverse effects from Briviact.     It is not clear why the patient was not able to follow-up earlier in our office, since he was advised to follow-up within 3 months after the last visit in July 2023.    He reports feeling quite well at this time and is happy with his current seizure control.    He is not driving at this time, but is interested to go back to driving after the next follow up visit, if he remains seizure free.     Current Antiseizure Medications: Briviact 100 mg BID (compliant at this time).    Previously Tried Antiseizure Medications: Keppra (mood issues).     Review of Systems: No recent fevers. He lost 3 lbs since the last visit. No mood issues nor suicidal/homicidal  ideation.    Risk Factors For Epilepsy/Seizure Disorder: The patient is a product of premature pregnancy/delivery (about 1.5 months early and there was a ). The early development was normal and the patient started waking, talking, and engaging in social interaction as expected. There were no challenges during school and no reported attendance of special education programs. The patient was playing football until , and had several concussions, but never LOC.  There is no history of stroke. There is no history of CNS infections, such as encephalitis and/or meningitis. There is no history of febrile seizures. There is no history of neurosurgical interventions. There is no reported history of staring spells during childhood/school years.    Past Medical History:  Past Medical History:   Diagnosis Date    ASTHMA     Asthma since infancy.    Asthma     Epilepsy (HCC)     Mild intermittent asthma with acute exacerbation 6/10/2016    Seizure (HCC)     Seizure disorder (HCC)      Past Surgical History:  None.     Social History:  Social History     Tobacco Use    Smoking status: Never    Smokeless tobacco: Never   Vaping Use    Vaping Use: Never used   Substance Use Topics    Alcohol use: Yes     Comment: rare, socially on the weekend    Drug use: Yes     Types: Marijuana, Inhaled     Comment: THC; denies h/o IVDU     Family History:  There is no known family history of seizures/epilepsy.  Family History   Problem Relation Age of Onset    Hypertension Mother     Brain Aneurysm Mother     Hyperlipidemia Father     Cancer Neg Hx     Diabetes Neg Hx          3/11/2024     1:30 PM 2023     7:20 AM 2022     1:00 PM 2021    10:00 AM 3/9/2020     9:20 AM   PHQ-9 Screening   Little interest or pleasure in doing things 0 - not at all 0 - not at all 0 - not at all 0 - not at all 0 - not at all   Feeling down, depressed, or hopeless 0 - not at all 0 - not at all 0 - not at all 0 - not at all 0 - not at all  "  PHQ-2 Total Score 0 0 0 0 0     [place] Suicide Reassessment  New or continued thoughts about killing self?: No  Preparing to end life?: No    Allergies:  No Known Allergies    Current Medications:    Current Outpatient Medications:     brivaracetam, 100 mg, Oral, BID, Taking    albuterol, 2 Puff, Inhalation, Q6HRS PRN, PRN    Vitamin D, 5,000 Int'l Units, Oral, DAILY (Patient not taking: Reported on 6/12/2023), Not Taking    Physical Examination:    Ambulatory Vitals  Encounter Vitals  Temperature: 36.8 °C (98.2 °F)  Temp src: Temporal  Blood Pressure: 102/68  Pulse: 68  Respiration: 18  Pulse Oximetry: 99 %  Weight: 69.7 kg (153 lb 10.6 oz)  Height: 176.5 cm (5' 9.5\")  BMI (Calculated): 22.37    Neurological Examination:  Mental Status: The patient is alert and oriented to person, place, time, and situation. Speech is fluent, with no aphasia nor dysarthria noted. Affect is normal.    Cranial Nerve Examination:  CN I: Olfaction examination is deferred.  CN II: Visual fields are full to confrontation examination and show no visual field defect.   CN III, IV, VI: Eye movements are normal in all directions. Pupils are reactive to direct and consensual light. There is no relative afferent pupillary defect. There is no nystagmus.  CN V: Facial sensation to light touch is intact throughout.   CN VII: No significant facial muscle or other soft tissue asymmetry.  CN VIII: Hearing intact to rubbing sounds bilaterally.   CN IX, X: Soft palate elevates symmetrically.  CN XI: Symmetrical shoulder shrug exam.  CN XII: Midline tongue protrusion and moves symmetrically to each side.     Motor Examination:  Muscle strength is intact (5/5) throughout. Muscle tone is normal throughout. No abnormal movements are observed. No pronator drift is noted.     Muscle Stretch Reflexes Examination:  Muscle stretch reflexes are normal (2+) throughout and symmetric.    Sensory Examination:  Preserved sensation to light touch in all " extremities.     Coordination:  Normal finger to nose testing bilaterally, no postural nor intentional tremor was noted.     Stance/gait:  Normal regular gait with normal arm swings and stride length. Able to perform tandem gait. Romberg sign is absent.     ASSESSMENT AND PLAN:  1. 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 The localization is temporal lobe, the lateralalization to the left (though semiology might point toward the right); etiology might be related to  complications, head traumas.     Currently the concern is that the patient does not recognize he is breakthrough seizures, and is not able to keep an accurate diary of those.  In that context, we will obtain 24-hour ambulatory EEG to evaluate for subclinical seizures.   - 24-hour ambulatory EEG: Referral to Neurodiagnostics (EEG,EP,EMG/NCS/DBS)    We will obtain repeat basic blood work, as well as Briviact level.  - MISCELLANEOUS LAB TEST ([facility]/Other - Briviact level); Future  - CBC WITH DIFFERENTIAL; Future  - Comp Metabolic Panel; Future    We will defer repeat MRI brain at this time, but if he continues to experience breakthrough seizures, we will repeat MRI brain with and without contrast.    We had a discussion of adding another antiseizure medication, and the patient states he feels well and does not want to get on any additional antiseizure medication.  We will revisit this at the time of the next visit, as well as in context of 24-hour EEG results. Vimpat would be the next ASM to consider.   - brivaracetam (BRIVIACT) 100 MG Tab tablet; Take 1 Tablet by mouth 2 times a day for 120 days.  Dispense: 60 Tablet; Refill: 3  - he needs a close follow-up with neurology, in context of his frequent breakthrough seizures, and they provided enough refills until the next recommended follow-up visit with neurology.    The  patient will continue to abstain from driving at this time.  We discussed that if he remains seizure-free for 3 months, and he is 24-ambulatory EEG comes back unremarkable, I will consider clearing him at the time of the next visit.    The patient remains at high risk for breakthrough seizures at this time, which can be life-threatening, and we need high intensity monitoring of his antiseizure medication level.    Epilepsy surgery was briefly discussed at the time of the initial visit with me.    Provided epilepsy counseling in writing.    Patient Instructions   Please continue Briviact 100 mg twice daily.    Please let our office know if you have any changes in your seizure frequency and/characteristics. Otherwise, please keep the diary of your events and bring it with you at the time of your next follow up visit with our office.     Please take vitamin D3 3660-4550 internation units daily.     Please note that the following might precipitate seizures: missed doses of antiseizure medications, being sick with fever, stress, fatigue, sleep deprivation, not eating regularly, not drinking enough water, drinking too much alcohol, stopping alcohol suddenly if you are currently using it on a regular/daily basis, and/or using recreational drugs, among others.    Please note that the following might lead to an injury, potentially a life-threatening injury, in case you have a seizure and/or lose awareness while:   - being in a large body of water by yourself, such as bath, pool, lake, ocean, among others (risk of drowning)   - being on unprotected heights (risk of fall)   - being around and/or operating heavy machinery (risk of injury)   - being around open fire/hot surfaces (risk of burns)   - any other activities/circumstances, in which if you lose awareness, you might injure yourself and/or others.    Please call for help (crisis line and/or 911) in case you have thoughts of harming yourself and/or others.    Please  abstain from driving until further notice.    ------------------------------------------------------------------------------------------  Instructions for your family/caregivers:  Please call 911 if the patient has a seizure longer than 2-3 minutes, if seizures are back to back without him recovering to his baseline, or he does not start recovering within 5-10 minutes after the seizure stops. During the seizure - please turn him on his side, please make sure his head is protected (for example, you should put a pillow under his head, if one is available), and please do not put anything in his mouth.   -------------------------------------------------------------------------------------------    It is important that your seizures are well controlled and you have none or have them rarely. In addition to avoiding injury related to breakthrough seizures, frequent seizures increase risk of SUDEP (sudden unexpected death in epilepsy), where a person goes into a seizure and then never wakes up - this is a rare complication of seizure disorder; one of the best available ways to prevent it is to control your seizures well.     Due to the high volume of patients we are trying to help, your physician will not be able to respond by phone or in [email or web address] to your routine concerns between appointments.  This does not reflect a lack of interest or concern for you or your diagnosis.  Please bring these questions and concerns to your appointment where your physician can answer.  Please relay more pressing concerns to our office, either via [email or web address], or by phone; if not able to reach us please visit nearby Urgent Care Center or Emergency Department.  If any emergent medical needs, please seek emergent medical help and/or call 911.    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  Please schedule a dedicated  appointment to address your paperwork, so we can do that in a timely manner.  This is not due to lack of concern or interest for your disease-related work/administrative problems, but to make sure that we provide the best possible care and to fill out your paperwork in a correct and timely manner.    Thank you for entrusting your neurological care to [facility] and we look forward to continuing to serve you.     Follow up in 2 months.     Jarett Maya MD  Neurology Attending, Epilepsy Program  [facility]

## 2024-03-11 NOTE — TELEPHONE ENCOUNTER
Pt was seen today by Dr. Maya and when he went to the pharmacy, they said they hadn't received it yet. Pt came back to our office to see what was going on with it and stated he tried to call our front office and the MA multiple times and was unable to reach anyone. It was an inconvenience for him and his ride going back and forth, so he wanted to inform Dr. Maya that he was unable to get his blood work done today due to all of this, and that he will complete it when he's able to.

## 2024-03-12 NOTE — TELEPHONE ENCOUNTER
Looks like Silvia took care of this as she called the pharmacy and confirmed that the medication was sent and received by the pharmacy and is ready for the patient to . Thank you!  -MEET Luke.

## 2024-03-25 DIAGNOSIS — G40.909 SEIZURE DISORDER (HCC): ICD-10-CM

## 2024-03-26 NOTE — TELEPHONE ENCOUNTER
Per pharmacy- rest of medication came in and working on the refill, Notified patient.   [[id number]]

## 2024-04-03 NOTE — PROGRESS NOTES
01/04/2019 1553 - Discharge Outreach attempt - No answer, VMB full. Letter sent.   Destinee Arambula(Attending)

## 2024-04-17 ENCOUNTER — NON-PROVIDER VISIT (OUTPATIENT)
Dept: NEUROLOGY | Facility: MEDICAL CENTER | Age: 34
End: 2024-04-17
Attending: PSYCHIATRY & NEUROLOGY
Payer: COMMERCIAL

## 2024-04-17 DIAGNOSIS — G40.909 SEIZURE DISORDER (HCC): ICD-10-CM

## 2024-04-17 PROCEDURE — 95708 EEG WO VID EA 12-26HR UNMNTR: CPT | Performed by: PSYCHIATRY & NEUROLOGY

## 2024-04-17 PROCEDURE — 95719 EEG PHYS/QHP EA INCR W/O VID: CPT | Performed by: PSYCHIATRY & NEUROLOGY

## 2024-04-17 PROCEDURE — 95700 EEG CONT REC W/VID EEG TECH: CPT | Performed by: PSYCHIATRY & NEUROLOGY

## 2024-04-17 NOTE — PROCEDURES
AMBULATORY ELECTROENCEPHALOGRAM REPORT      REFERRING PROVIDER:  Jarett Maya M.D.  [address] [facility] [place] [id number]  DOS: 04/17/2024   DURATION OF RECORDING: (23 hours and 10 minutes).    INDICATION:  Barry Griffith 34 y.o. male presenting with  seizure(s).    CURRENT OUTPATIENT MEDICATION LIST:   Current Outpatient Medications   Medication Instructions    albuterol 108 (90 Base) MCG/ACT Aero Soln inhalation aerosol 2 Puffs, Inhalation, EVERY 6 HOURS PRN    Briviact 100 mg, Oral, 2 TIMES DAILY    Cholecalciferol (VITAMIN D) 125 MCG (5000 UT) Cap 5,000 Int'l Units, Oral, DAILY     TECHNIQUE:   The EEG was set up and taken down by a Neurodiagnostic technologist who performed education to patient and staff.  A minimum but not limited to 23 electrodes and 23 channel recording was setup and performed by Neurodiagnostic technologist. Impedances, electrode integrity, and technical impressions were documented a minimum of every 2-24 hour period by a Neurodiagnostic Technologist and reviewed by Interpreting Physician.    DESCRIPTION OF THE RECORD:  During maximal wakefulness, the background was continuous and showed a 9.5 Hz posterior dominant rhythm.  Reactivity and state changes were present.  During drowsiness, theta/delta frequencies were seen.    Sleep was captured and was characterized by diffuse background delta/theta activity with a loss of myogenic artifact.  N2 sleep transients in the form of sleep spindles and vertex waves were seen in the leads over the central regions.     ICTAL AND INTERICTAL FINDINGS:   No definite focal or generalized epileptiform activity noted.     There was almost continuous, right temporal, focal slowing.         No seizures.    ACTIVATION PROCEDURES:   Hyperventilation was performed by the patient for a total of 3 minutes. The technician performing the test noted good effort. This technique did not elicited any additional abnormalities on EEG.  and Intermittent  Photic stimulation was performed in a stepwise fashion from 1 to 30 Hz and did not elicited any additional abnormalities on EEG.     EKG: Sampling of the EKG recording did not demonstrate any abnormalities    EVENTS:  Push button events and/or ambulatory diary events: No clinical events reported on the event diary. There were several accidental event button push events.     INTERPRETATION:  This was an abnormal ambulatory EEG recording in the awake and drowsy/sleep state(s):  The presence of almost continuous, right temporal, focal slowing, is suggestive of cerebral dysfunction in this region.  This finding may be seen in context of structural lesion and/or ictal onset zone, among other considerations.  Clinical and radiological correlate is recommended.  No definitive epileptiform discharges or other epileptiform phenomena seen.  No definitive seizures.   Clinical Events: No reported events. There were several accidental event button push events.    Jarett Maya MD  Neurology Attending, Epilepsy Program  [facility]

## 2024-04-18 ENCOUNTER — NON-PROVIDER VISIT (OUTPATIENT)
Dept: NEUROLOGY | Facility: MEDICAL CENTER | Age: 34
End: 2024-04-18
Attending: PSYCHIATRY & NEUROLOGY
Payer: COMMERCIAL

## 2024-04-18 PROCEDURE — 99999 PR NO CHARGE: CPT | Performed by: PSYCHIATRY & NEUROLOGY

## 2024-04-22 ENCOUNTER — TELEPHONE (OUTPATIENT)
Dept: NEUROLOGY | Facility: MEDICAL CENTER | Age: 34
End: 2024-04-22
Payer: COMMERCIAL

## 2024-04-22 PROCEDURE — RXMED WILLOW AMBULATORY MEDICATION CHARGE: Performed by: PSYCHIATRY & NEUROLOGY

## 2024-04-22 NOTE — TELEPHONE ENCOUNTER
Dr. QUIROZ Per patient request- [facility] in [facility] will work to get Briviact prescription. Confirmed with pharmacy that medication is available for the patient. Requesting a 30 days supply please. Thank you. Claudette Davis MA.   This encounter was created in error - please disregard.  Patient here for pacemaker battery check.

## 2024-04-22 NOTE — TELEPHONE ENCOUNTER
Received Refill PA request via [employer]  for Briviact 100 MG Tabs. (Quantity:60, Day Supply:30) - Copay $10.00 after $40.00 MFG Voucher, please mention voucher to patient OR #180/90 Copay $10.00 after $140.00 - (No PA req'd) Order written for #60/30 with 3 refills     Insurance: Les MENG ([employer])  Member ID:  [id number]  BIN: [id number]  PCN: [id number]   Group: [id number]     Ran Test claim via [employer] medication Pays for a $10.00 copay. Will outreach to patient to offer specialty pharmacy services and or release to preferred pharmacy

## 2024-04-22 NOTE — TELEPHONE ENCOUNTER
Thank you for the updates. Please confirm with the patient and the pharmacy that the patient has received refills. The patient should have plenty refills (provided 3 refills on 03/11/2024) until the next follow up visit. If his pharmacy has challenges in obtaining the medication, please offer the patient to switch to [facility]. Thank you.

## 2024-04-23 ENCOUNTER — PHARMACY VISIT (OUTPATIENT)
Dept: PHARMACY | Facility: MEDICAL CENTER | Age: 34
End: 2024-04-23
Payer: COMMERCIAL

## 2024-05-29 ENCOUNTER — OFFICE VISIT (OUTPATIENT)
Dept: NEUROLOGY | Facility: MEDICAL CENTER | Age: 34
End: 2024-05-29
Attending: PSYCHIATRY & NEUROLOGY
Payer: COMMERCIAL

## 2024-05-29 VITALS
HEIGHT: 70 IN | HEART RATE: 86 BPM | OXYGEN SATURATION: 99 % | RESPIRATION RATE: 18 BRPM | BODY MASS INDEX: 23.67 KG/M2 | SYSTOLIC BLOOD PRESSURE: 110 MMHG | TEMPERATURE: 99.1 F | WEIGHT: 165.34 LBS | DIASTOLIC BLOOD PRESSURE: 68 MMHG

## 2024-05-29 DIAGNOSIS — G40.909 SEIZURE DISORDER (HCC): ICD-10-CM

## 2024-05-29 ASSESSMENT — FIBROSIS 4 INDEX: FIB4 SCORE: 1.39

## 2024-05-29 ASSESSMENT — PATIENT HEALTH QUESTIONNAIRE - PHQ9: CLINICAL INTERPRETATION OF PHQ2 SCORE: 0

## 2024-05-29 NOTE — PROGRESS NOTES
"[facility] Epilepsy Program  Follow Up Visit      Patient's Name: Barry Griffith  YOB: 1990.  MRN: [id number]  Date of Service: 05/29/24.    Referring Provider: No referring provider defined for this encounter.    Chief Concern: Seizures.     The patient presents for a follow up. The patient presents alone today.     HPI (as obtained at the time of the initial visit and updated as necessary): The patient is a 34 y.o., right-handed male, who initially presented to my epilepsy clinic for evaluation of seizures on 05/08/2023.    The patient started having seizures in 2018. He has two event types, as noted below. He never had myoclonus.    He was initially started on Keppra, but he did not like it. It seems that he was more compliant with Keppra and his seizures were better controlled at that time. He was switched to Briviact in early 2022; he has been tolerating Briviact well, as reported at the time of the initial visit, but has not been very compliant. He became more compliant with Briviact as of May 2023.     The patient unfortunately had 3 seizures with ER presentation on 01/02/2023, 04/04/2023, and 04/28/2023, as of the time of the initial visit with me. He also had a breakthrough seizure on 06/09/2023, and then the last one in January 2024. During one of his seizures in 2022, he was intubated.     Semiology:  Event type #1: \"Seizures\".  Year/Age of Onset: 2018.  Initial features: Some seizures occur out of sleep and no warning signs. Some seizures are preceded by chlorine smell.   Event features: The patient would experience bilateral tonic-clonic seizure, with head deviation to the left per the report he got from his wife. He is not responsive during the event and has no recollection for the time during the event.  He bites his tongue with his seizures; at least once bladder incontinence; no bowel incontinence.   Post-ictal features: He feels it takes his time for his brain to come " "back to baseline and that he loses a pitch after his seizure, sore in his muscles, and has headaches.   Duration: Up to 5 minutes on average.  Frequency: He has them on average once a month. The last recorded event was on 01/02/2024.  Precipitating factors: Not clear.     Event type #2: \"Spells\".  Year/Age of Onset: 2018  Initial features: He would experience chlorine smell. He never had sensation of strange tastes, gastric upraising, autonomic phenomena, nor psychic phenomena except anxiety and history of maria victoria vu as a child.   Event features: He might experience a bit of anxiety with it. With at least one episode he stared off; he also feels his mouth is watering and he is doing swallowing movements. No manual automatisms.   Post-ictal features: Back to normal.  Duration: 30 seconds.  Frequency: He typically gets these events on average once in two to three months. He had two events in early July 2023.   Precipitating factors: Not clear.     History of status epilepticus: yes - intubated in June 2022 for prolonged seizure.   History of physical injury related to seizures: once hit his head on the counter and still has a scar.   History of surgery related to epilepsy: no  Family Planning: N/A.  Current Driving Status: He is not driving at this time and his license is suspended due to his seizures.  Seizure Clusters: No  Longest Seizure Freedom: About 1 year seizure freedom around 2020.     Pertinent Ancillary Test Results:    MRI brain studies:   - MRI brain wo/w contrast (08/10/2018 at [facility]): \"1. MRI of the brain without and with contrast within normal limits. 2. No evidence of mass lesion, heterotopic gray matter, gross cortical dysplasia or mesial temporal sclerosis.\" I reviewed key images on 07/31/2023.     CT head studies:   - CT head wo contrast (01/02/2023 at [facility]): \"No acute intracranial hemorrhage or displaced calvarial fracture.\"    EEG studies:   - Standard EEG (07/10/2018, Dr. Bai): This is an " abnormal video EEG recording in the awake, drowsy, and sleep state(s). Frequent left temporal sharps. Intermittent, subtle, left temporal slowing. The findings increase risk for seizures and suggest underlying area of cortical irritability and structural abnormality. Clinical and radiological correlation is recommended.     - 23-hour ambulatory EEG (04/17/2024, Dr. Maya): This was an abnormal ambulatory EEG recording in the awake and drowsy/sleep state(s):  This was,within the above noted limitations, an abnormal ambulatory EEG recording in the awake and drowsy/sleep state(s):  The presence of almost continuous, right temporal, focal slowing, is suggestive of cerebral dysfunction in this region.  This finding may be seen in context of structural lesion and/or ictal onset zone, among other considerations.  Clinical and radiological correlate is recommended.  The presence of rare to occasional right temporal sharps, along with questionable right TIRDA, points toward increased propensity toward focal seizures arising from that region.  No definitive seizures.   Clinical Events: No reported events. There were several accidental event button push events.     - EMU/LTM study (06/05/2022, Dr. Baker): 13-hour 31 minute EEG was abnormal  video EEG recording in the drowsy/sleep and/or comatose state(s):  -Moderate improving to mild background slowing suggestive of a non-specific encephalopathy. The effects of sedation may be contributing. Clinical correlation recommended.   - No focal asymmetries   - No definitive epileptiform discharges or other epileptiform phenomena seen   - No definitive seizures. Clinical correlation is recommended.   - One event at 1046 PM with fine body tremors with no   abnormal EEG correlate.     INTERIM HISTORY (05/29/2024): The patient reports no seizures since January 2024.  He is taking Briviact regularly at this time, and it seems that the fact that he is now compliant with it helps  control his seizures fully.  He reports no adverse effects from Briviact.    He was not able to complete blood work due to challenges with transportation.    Current Antiseizure Medications: Briviact 100 mg BID (compliant at this time).    Previously Tried Antiseizure Medications: Keppra (mood issues).     Review of Systems: No recent fevers. He gained 12 lbs since the last visit (intentional). No mood issues nor suicidal/homicidal ideation.    Risk Factors For Epilepsy/Seizure Disorder: The patient is a product of premature pregnancy/delivery (about 1.5 months early and there was a ). The early development was normal and the patient started waking, talking, and engaging in social interaction as expected. There were no challenges during school and no reported attendance of special education programs. The patient was playing football until , and had several concussions, but never LOC.  There is no history of stroke. There is no history of CNS infections, such as encephalitis and/or meningitis. There is no history of febrile seizures. There is no history of neurosurgical interventions. There is no reported history of staring spells during childhood/school years.    Past Medical History:  Past Medical History:   Diagnosis Date    ASTHMA     Asthma since infancy.    Asthma     Epilepsy (HCC)     Mild intermittent asthma with acute exacerbation 6/10/2016    Seizure (HCC)     Seizure disorder (HCC)      Past Surgical History:  None.     Social History:  Social History     Tobacco Use    Smoking status: Never    Smokeless tobacco: Never   Vaping Use    Vaping status: Never Used   Substance Use Topics    Alcohol use: Yes     Comment: rare, socially on the weekend    Drug use: Yes     Types: Marijuana, Inhaled     Comment: THC; denies h/o IVDU     Family History:  There is no known family history of seizures/epilepsy.  Family History   Problem Relation Age of Onset    Hypertension Mother     Brain Aneurysm  "Mother     Hyperlipidemia Father     Cancer Neg Hx     Diabetes Neg Hx          5/29/2024     3:30 PM 3/11/2024     1:30 PM 5/8/2023     7:20 AM 1/17/2022     1:00 PM 7/23/2021    10:00 AM   PHQ-9 Screening   Little interest or pleasure in doing things 0 - not at all 0 - not at all 0 - not at all 0 - not at all 0 - not at all   Feeling down, depressed, or hopeless 0 - not at all 0 - not at all 0 - not at all 0 - not at all 0 - not at all   PHQ-2 Total Score 0 0 0 0 0     [place] Suicide Reassessment  New or continued thoughts about killing self?: No  Preparing to end life?: No    Allergies:  No Known Allergies    Current Medications:    Current Outpatient Medications:     brivaracetam, 100 mg, Oral, BID, Taking    albuterol, 2 Puff, Inhalation, Q6HRS PRN, PRN    Vitamin D, 5,000 Int'l Units, Oral, DAILY, Taking    Physical Examination:    Ambulatory Vitals  Encounter Vitals  Temperature: 37.3 °C (99.1 °F)  Temp src: Temporal  Blood Pressure: 110/68  Pulse: 86  Respiration: 18  Pulse Oximetry: 99 %  Weight: 75 kg (165 lb 5.5 oz)  Height: 176.5 cm (5' 9.5\")  BMI (Calculated): 24.07    Neurological Examination:  Mental Status: The patient is alert and oriented to person, place, time, and situation. Speech is fluent, with no aphasia nor dysarthria noted. Affect is normal.    Cranial Nerve Examination:  CN I: Olfaction examination is deferred.  CN II: Visual fields are full to confrontation examination and show no visual field defect.   CN III, IV, VI: Eye movements are normal in all directions. Pupils are reactive to direct and consensual light. There is no relative afferent pupillary defect. There is no nystagmus.  CN V: Facial sensation to light touch is intact throughout.   CN VII: No significant facial muscle or other soft tissue asymmetry.  CN VIII: Hearing intact to rubbing sounds bilaterally.   CN IX, X: Soft palate elevates symmetrically.  CN XI: Symmetrical shoulder shrug exam.  CN XII: Midline tongue " protrusion and moves symmetrically to each side.     Motor Examination:  Muscle strength is intact (5/5) throughout. Muscle tone is normal throughout. No abnormal movements are observed. No pronator drift is noted.     Muscle Stretch Reflexes Examination:  Muscle stretch reflexes are normal throughout and symmetric.    Sensory Examination:  Preserved sensation to light touch in all extremities.     Coordination:  Normal finger to nose testing bilaterally, no postural nor intentional tremor was noted.     Stance/gait:  Normal regular gait with normal arm swings and stride length. Able to perform tandem gait. Romberg sign is absent.     ASSESSMENT AND PLAN:  1. 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 The localization is temporal lobe, the lateralalization to the left (though semiology might point toward the right); etiology might be related to  complications, head traumas.  The EEG done in 2024, showed right temporal focal slowing, as well as right temporal sharp discharges, pointing toward increased propensity toward focal seizures arising from that region; there was at times significant movement artifact noted, but no clear evolving seizures were captured during that EEG.    The patient reports doing well at this time.  He reports having no seizures since 2024.  He reports taking his Briviact regularly.  We will continue Briviact with no changes.  Refills were provided.  Adverse effects were briefly discussed. If seizures recur, Vimpat would be the addition to consider.   - brivaracetam (BRIVIACT) 100 MG Tab tablet; Take 1 Tablet by mouth 2 times a day for 180 days.  Dispense: 180 Tablet; Refill: 1    The patient will complete his blood work which was ordered in the past, as soon as he is able to.  - MISCELLANEOUS LAB TEST ([facility]/Other - Briviact level); Future  - CBC  WITH DIFFERENTIAL; Future  - Comp Metabolic Panel; Future    We will defer repeat MRI brain/EEG at this time. If any further seizures, we will plan for repeat MRI brain w/wo contrast.    Since the patient has been seizure-free for more than 3 months now, since he is ambulatory EEG in April 2024 did not show any clear seizures, and since he reports good compliance with Briviact, he was cleared for driving today.  He was advised that if he experiences any further seizures that he needs to stop driving.  The patient verbalized understanding and agreement.    Epilepsy surgery was briefly discussed at the time of the initial visit with me.    Provided epilepsy counseling in writing.    Patient Instructions   Please continue Briviact 100 mg twice daily.    Please let our office know if you have any changes in your seizure frequency and/characteristics. Otherwise, please keep the diary of your events and bring it with you at the time of your next follow up visit with our office.     Please take vitamin D3 [id number] internation units daily.     Please note that the following might precipitate seizures: missed doses of antiseizure medications, being sick with fever, stress, fatigue, sleep deprivation, not eating regularly, not drinking enough water, drinking too much alcohol, stopping alcohol suddenly if you are currently using it on a regular/daily basis, and/or using recreational drugs, among others.    Please note that the following might lead to an injury, potentially a life-threatening injury, in case you have a seizure and/or lose awareness while:   - being in a large body of water by yourself, such as bath, pool, lake, ocean, among others (risk of drowning)   - being on unprotected heights (risk of fall)   - being around and/or operating heavy machinery (risk of injury)   - being around open fire/hot surfaces (risk of burns)   - any other activities/circumstances, in which if you lose awareness, you might injure  yourself and/or others.    Please call for help (crisis line and/or 911) in case you have thoughts of harming yourself and/or others.    Please stop driving if you experience any changes/loss of awareness and/or seizures.     ------------------------------------------------------------------------------------------  Instructions for your family/caregivers:  Please call 911 if the patient has a seizure longer than 2-3 minutes, if seizures are back to back without him recovering to his baseline, or he does not start recovering within 5-10 minutes after the seizure stops. During the seizure - please turn him on his side, please make sure his head is protected (for example, you should put a pillow under his head, if one is available), and please do not put anything in his mouth.   -------------------------------------------------------------------------------------------    It is important that your seizures are well controlled and you have none or have them rarely. In addition to avoiding injury related to breakthrough seizures, frequent seizures increase risk of SUDEP (sudden unexpected death in epilepsy), where a person goes into a seizure and then never wakes up - this is a rare complication of seizure disorder; one of the best available ways to prevent it is to control your seizures well.     Due to the high volume of patients we are trying to help, your physician will not be able to respond by phone or in [email or web address] to your routine concerns between appointments.  This does not reflect a lack of interest or concern for you or your diagnosis.  Please bring these questions and concerns to your appointment where your physician can answer.  Please relay more pressing concerns to our office, either via [email or web address], or by phone; if not able to reach us please visit nearby Urgent Care Center or Emergency Department.  If any emergent medical needs, please seek emergent medical help and/or call 911.    Please note that we are not  able to fill out paperwork that might be related to your work, utility company, disability, and/or driving, among others, in between the visits.  Please schedule a dedicated appointment to address your paperwork, so we can do that in a timely manner.  This is not due to lack of concern or interest for your disease-related work/administrative problems, but to make sure that we provide the best possible care and to fill out your paperwork in a correct and timely manner.    Thank you for entrusting your neurological care to [facility] and we look forward to continuing to serve you.    Follow up in 5 months.     Jarett Maya MD  Neurology Attending, Epilepsy Program  [facility]

## 2024-05-29 NOTE — PATIENT INSTRUCTIONS
Please continue Briviact 100 mg twice daily.    Please let our office know if you have any changes in your seizure frequency and/characteristics. Otherwise, please keep the diary of your events and bring it with you at the time of your next follow up visit with our office.     Please take vitamin D3 2535-3959 internation units daily.     Please note that the following might precipitate seizures: missed doses of antiseizure medications, being sick with fever, stress, fatigue, sleep deprivation, not eating regularly, not drinking enough water, drinking too much alcohol, stopping alcohol suddenly if you are currently using it on a regular/daily basis, and/or using recreational drugs, among others.    Please note that the following might lead to an injury, potentially a life-threatening injury, in case you have a seizure and/or lose awareness while:   - being in a large body of water by yourself, such as bath, pool, lake, ocean, among others (risk of drowning)   - being on unprotected heights (risk of fall)   - being around and/or operating heavy machinery (risk of injury)   - being around open fire/hot surfaces (risk of burns)   - any other activities/circumstances, in which if you lose awareness, you might injure yourself and/or others.    Please call for help (crisis line and/or 911) in case you have thoughts of harming yourself and/or others.    Please stop driving if you experience any changes/loss of awareness and/or seizures.     ------------------------------------------------------------------------------------------  Instructions for your family/caregivers:  Please call 911 if the patient has a seizure longer than 2-3 minutes, if seizures are back to back without him recovering to his baseline, or he does not start recovering within 5-10 minutes after the seizure stops. During the seizure - please turn him on his side, please make sure his head is protected (for example, you should put a pillow under his head,  if one is available), and please do not put anything in his mouth.   -------------------------------------------------------------------------------------------    It is important that your seizures are well controlled and you have none or have them rarely. In addition to avoiding injury related to breakthrough seizures, frequent seizures increase risk of SUDEP (sudden unexpected death in epilepsy), where a person goes into a seizure and then never wakes up - this is a rare complication of seizure disorder; one of the best available ways to prevent it is to control your seizures well.     Due to the high volume of patients we are trying to help, your physician will not be able to respond by phone or in [place] to your routine concerns between appointments.  This does not reflect a lack of interest or concern for you or your diagnosis.  Please bring these questions and concerns to your appointment where your physician can answer.  Please relay more pressing concerns to our office, either via [place], or by phone; if not able to reach us please visit nearby Urgent Care Center or Emergency Department.  If any emergent medical needs, please seek emergent medical help and/or call 911.    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  Please schedule a dedicated appointment to address your paperwork, so we can do that in a timely manner.  This is not due to lack of concern or interest for your disease-related work/administrative problems, but to make sure that we provide the best possible care and to fill out your paperwork in a correct and timely manner.    Thank you for entrusting your neurological care to [facility] and we look forward to continuing to serve you.

## 2024-07-17 DIAGNOSIS — G40.909 SEIZURE DISORDER (HCC): ICD-10-CM

## 2024-07-22 ENCOUNTER — HOSPITAL ENCOUNTER (EMERGENCY)
Facility: MEDICAL CENTER | Age: 34
End: 2024-07-22
Attending: EMERGENCY MEDICINE
Payer: COMMERCIAL

## 2024-07-22 VITALS
DIASTOLIC BLOOD PRESSURE: 76 MMHG | HEART RATE: 64 BPM | WEIGHT: 170 LBS | SYSTOLIC BLOOD PRESSURE: 130 MMHG | HEIGHT: 70 IN | OXYGEN SATURATION: 99 % | RESPIRATION RATE: 19 BRPM | BODY MASS INDEX: 24.34 KG/M2 | TEMPERATURE: 98.1 F
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
## 2024-07-22 DIAGNOSIS — R42 DIZZINESS: ICD-10-CM

## 2024-07-22 LAB — EKG IMPRESSION: NORMAL

## 2024-07-22 PROCEDURE — 93005 ELECTROCARDIOGRAM TRACING: CPT | Performed by: EMERGENCY MEDICINE

## 2024-07-22 PROCEDURE — 99284 EMERGENCY DEPT VISIT MOD MDM: CPT

## 2024-07-22 ASSESSMENT — FIBROSIS 4 INDEX: FIB4 SCORE: 1.39

## 2024-09-16 ENCOUNTER — APPOINTMENT (OUTPATIENT)
Dept: RADIOLOGY | Facility: MEDICAL CENTER | Age: 34
DRG: 101 | End: 2024-09-16
Attending: EMERGENCY MEDICINE
Payer: COMMERCIAL

## 2024-09-16 ENCOUNTER — HOSPITAL ENCOUNTER (INPATIENT)
Facility: MEDICAL CENTER | Age: 34
LOS: 1 days | DRG: 101 | End: 2024-09-17
Attending: EMERGENCY MEDICINE | Admitting: STUDENT IN AN ORGANIZED HEALTH CARE EDUCATION/TRAINING PROGRAM
Payer: COMMERCIAL

## 2024-09-16 DIAGNOSIS — G40.909 SEIZURE DISORDER (HCC): ICD-10-CM

## 2024-09-16 DIAGNOSIS — G40.901 STATUS EPILEPTICUS (HCC): ICD-10-CM

## 2024-09-16 DIAGNOSIS — E87.20 METABOLIC ACIDOSIS: ICD-10-CM

## 2024-09-16 DIAGNOSIS — R56.9 SEIZURE (HCC): ICD-10-CM

## 2024-09-16 DIAGNOSIS — J45.20 MILD INTERMITTENT ASTHMA WITHOUT COMPLICATION: ICD-10-CM

## 2024-09-16 LAB
ACANTHOCYTES BLD QL SMEAR: NORMAL
ALBUMIN SERPL BCP-MCNC: 4.9 G/DL (ref 3.2–4.9)
ALBUMIN/GLOB SERPL: 1.8 G/DL
ALP SERPL-CCNC: 85 U/L (ref 30–99)
ALT SERPL-CCNC: 31 U/L (ref 2–50)
ANION GAP SERPL CALC-SCNC: 43 MMOL/L (ref 7–16)
ANISOCYTOSIS BLD QL SMEAR: ABNORMAL
AST SERPL-CCNC: 38 U/L (ref 12–45)
BASE EXCESS BLDA CALC-SCNC: -4 MMOL/L (ref -4–3)
BASOPHILS # BLD AUTO: 0 % (ref 0–1.8)
BASOPHILS # BLD: 0 K/UL (ref 0–0.12)
BILIRUB SERPL-MCNC: 0.3 MG/DL (ref 0.1–1.5)
BODY TEMPERATURE: 36.7 CENTIGRADE
BUN SERPL-MCNC: 11 MG/DL (ref 8–22)
BURR CELLS BLD QL SMEAR: NORMAL
CALCIUM ALBUM COR SERPL-MCNC: 9.8 MG/DL (ref 8.5–10.5)
CALCIUM SERPL-MCNC: 10.5 MG/DL (ref 8.5–10.5)
CHLORIDE SERPL-SCNC: 99 MMOL/L (ref 96–112)
CO2 SERPL-SCNC: 2 MMOL/L (ref 20–33)
CREAT SERPL-MCNC: 1.49 MG/DL (ref 0.5–1.4)
EKG IMPRESSION: NORMAL
EOSINOPHIL # BLD AUTO: 0 K/UL (ref 0–0.51)
EOSINOPHIL NFR BLD: 0 % (ref 0–6.9)
ERYTHROCYTE [DISTWIDTH] IN BLOOD BY AUTOMATED COUNT: 45.1 FL (ref 35.9–50)
GFR SERPLBLD CREATININE-BSD FMLA CKD-EPI: 63 ML/MIN/1.73 M 2
GLOBULIN SER CALC-MCNC: 2.8 G/DL (ref 1.9–3.5)
GLUCOSE SERPL-MCNC: 134 MG/DL (ref 65–99)
HCO3 BLDA-SCNC: 19 MMOL/L (ref 17–25)
HCT VFR BLD AUTO: 56.2 % (ref 42–52)
HGB BLD-MCNC: 16.5 G/DL (ref 14–18)
INHALED O2 FLOW RATE: NORMAL L/MIN
LACTATE SERPL-SCNC: 1.1 MMOL/L (ref 0.5–2)
LACTATE SERPL-SCNC: 2.3 MMOL/L (ref 0.5–2)
LG PLATELETS BLD QL SMEAR: NORMAL
LYMPHOCYTES # BLD AUTO: 12.75 K/UL (ref 1–4.8)
LYMPHOCYTES NFR BLD: 74.1 % (ref 22–41)
MACROCYTES BLD QL SMEAR: ABNORMAL
MANUAL DIFF BLD: NORMAL
MCH RBC QN AUTO: 31.3 PG (ref 27–33)
MCHC RBC AUTO-ENTMCNC: 29.4 G/DL (ref 32.3–36.5)
MCV RBC AUTO: 106.4 FL (ref 81.4–97.8)
MONOCYTES # BLD AUTO: 1.08 K/UL (ref 0–0.85)
MONOCYTES NFR BLD AUTO: 6.3 % (ref 0–13.4)
MORPHOLOGY BLD-IMP: NORMAL
MYELOCYTES NFR BLD MANUAL: 0.9 %
NEUTROPHILS # BLD AUTO: 3.22 K/UL (ref 1.82–7.42)
NEUTROPHILS NFR BLD: 18.7 % (ref 44–72)
NRBC # BLD AUTO: 0 K/UL
NRBC BLD-RTO: 0 /100 WBC (ref 0–0.2)
OVALOCYTES BLD QL SMEAR: NORMAL
PCO2 BLDA: 30.4 MMHG (ref 26–37)
PCO2 TEMP ADJ BLDA: 30 MMHG (ref 26–37)
PH BLDA: 7.41 [PH] (ref 7.4–7.5)
PH TEMP ADJ BLDA: 7.42 [PH] (ref 7.4–7.5)
PLATELET # BLD AUTO: 76 K/UL (ref 164–446)
PLATELET BLD QL SMEAR: NORMAL
PLATELETS.RETICULATED NFR BLD AUTO: 16.8 % (ref 0.6–13.1)
PMV BLD AUTO: 11.7 FL (ref 9–12.9)
PO2 BLDA: 75.7 MMHG (ref 64–87)
PO2 TEMP ADJ BLDA: 74.2 MMHG (ref 64–87)
POIKILOCYTOSIS BLD QL SMEAR: NORMAL
POTASSIUM SERPL-SCNC: 4.2 MMOL/L (ref 3.6–5.5)
PROT SERPL-MCNC: 7.7 G/DL (ref 6–8.2)
RBC # BLD AUTO: 5.28 M/UL (ref 4.7–6.1)
RBC BLD AUTO: PRESENT
SAO2 % BLDA: 93.8 % (ref 93–99)
SODIUM SERPL-SCNC: 144 MMOL/L (ref 135–145)
TSH SERPL-ACNC: 6.91 UIU/ML (ref 0.35–5.5)
VIT B12 SERPL-MCNC: 725 PG/ML (ref 211–911)
WBC # BLD AUTO: 17.2 K/UL (ref 4.8–10.8)

## 2024-09-16 PROCEDURE — 71045 X-RAY EXAM CHEST 1 VIEW: CPT

## 2024-09-16 PROCEDURE — 85007 BL SMEAR W/DIFF WBC COUNT: CPT

## 2024-09-16 PROCEDURE — 83605 ASSAY OF LACTIC ACID: CPT

## 2024-09-16 PROCEDURE — 82803 BLOOD GASES ANY COMBINATION: CPT

## 2024-09-16 PROCEDURE — 700111 HCHG RX REV CODE 636 W/ 250 OVERRIDE (IP): Performed by: PSYCHIATRY & NEUROLOGY

## 2024-09-16 PROCEDURE — 96374 THER/PROPH/DIAG INJ IV PUSH: CPT

## 2024-09-16 PROCEDURE — 700102 HCHG RX REV CODE 250 W/ 637 OVERRIDE(OP): Performed by: STUDENT IN AN ORGANIZED HEALTH CARE EDUCATION/TRAINING PROGRAM

## 2024-09-16 PROCEDURE — 99285 EMERGENCY DEPT VISIT HI MDM: CPT

## 2024-09-16 PROCEDURE — 770000 HCHG ROOM/CARE - INTERMEDIATE ICU *

## 2024-09-16 PROCEDURE — A9270 NON-COVERED ITEM OR SERVICE: HCPCS | Performed by: STUDENT IN AN ORGANIZED HEALTH CARE EDUCATION/TRAINING PROGRAM

## 2024-09-16 PROCEDURE — 85055 RETICULATED PLATELET ASSAY: CPT

## 2024-09-16 PROCEDURE — 70450 CT HEAD/BRAIN W/O DYE: CPT

## 2024-09-16 PROCEDURE — 93005 ELECTROCARDIOGRAM TRACING: CPT | Performed by: EMERGENCY MEDICINE

## 2024-09-16 PROCEDURE — 700111 HCHG RX REV CODE 636 W/ 250 OVERRIDE (IP): Performed by: EMERGENCY MEDICINE

## 2024-09-16 PROCEDURE — 36415 COLL VENOUS BLD VENIPUNCTURE: CPT

## 2024-09-16 PROCEDURE — 700105 HCHG RX REV CODE 258: Performed by: EMERGENCY MEDICINE

## 2024-09-16 PROCEDURE — 700111 HCHG RX REV CODE 636 W/ 250 OVERRIDE (IP): Performed by: STUDENT IN AN ORGANIZED HEALTH CARE EDUCATION/TRAINING PROGRAM

## 2024-09-16 PROCEDURE — 80053 COMPREHEN METABOLIC PANEL: CPT

## 2024-09-16 PROCEDURE — C9254 INJECTION, LACOSAMIDE: HCPCS | Performed by: PSYCHIATRY & NEUROLOGY

## 2024-09-16 PROCEDURE — 87040 BLOOD CULTURE FOR BACTERIA: CPT

## 2024-09-16 PROCEDURE — 82607 VITAMIN B-12: CPT

## 2024-09-16 PROCEDURE — 700105 HCHG RX REV CODE 258: Performed by: STUDENT IN AN ORGANIZED HEALTH CARE EDUCATION/TRAINING PROGRAM

## 2024-09-16 PROCEDURE — 85027 COMPLETE CBC AUTOMATED: CPT

## 2024-09-16 PROCEDURE — 96375 TX/PRO/DX INJ NEW DRUG ADDON: CPT

## 2024-09-16 PROCEDURE — 84443 ASSAY THYROID STIM HORMONE: CPT

## 2024-09-16 PROCEDURE — 99223 1ST HOSP IP/OBS HIGH 75: CPT | Performed by: STUDENT IN AN ORGANIZED HEALTH CARE EDUCATION/TRAINING PROGRAM

## 2024-09-16 PROCEDURE — 99223 1ST HOSP IP/OBS HIGH 75: CPT | Performed by: PSYCHIATRY & NEUROLOGY

## 2024-09-16 RX ORDER — LEVETIRACETAM 500 MG/5ML
2000 INJECTION, SOLUTION, CONCENTRATE INTRAVENOUS ONCE
Status: COMPLETED | OUTPATIENT
Start: 2024-09-16 | End: 2024-09-16

## 2024-09-16 RX ORDER — SODIUM CHLORIDE, SODIUM LACTATE, POTASSIUM CHLORIDE, CALCIUM CHLORIDE 600; 310; 30; 20 MG/100ML; MG/100ML; MG/100ML; MG/100ML
INJECTION, SOLUTION INTRAVENOUS CONTINUOUS
Status: DISCONTINUED | OUTPATIENT
Start: 2024-09-16 | End: 2024-09-17 | Stop reason: HOSPADM

## 2024-09-16 RX ORDER — LACOSAMIDE 10 MG/ML
200 INJECTION, SOLUTION INTRAVENOUS ONCE
Status: COMPLETED | OUTPATIENT
Start: 2024-09-16 | End: 2024-09-16

## 2024-09-16 RX ORDER — LACOSAMIDE 100 MG/1
100 TABLET ORAL 2 TIMES DAILY
Status: DISCONTINUED | OUTPATIENT
Start: 2024-09-17 | End: 2024-09-17 | Stop reason: HOSPADM

## 2024-09-16 RX ORDER — SODIUM BICARBONATE IN D5W 150/1000ML
PLASTIC BAG, INJECTION (ML) INTRAVENOUS CONTINUOUS
Status: DISCONTINUED | OUTPATIENT
Start: 2024-09-16 | End: 2024-09-17 | Stop reason: HOSPADM

## 2024-09-16 RX ORDER — LACOSAMIDE 100 MG/1
50 TABLET ORAL 2 TIMES DAILY
Status: DISCONTINUED | OUTPATIENT
Start: 2024-09-17 | End: 2024-09-16

## 2024-09-16 RX ORDER — MIDAZOLAM HYDROCHLORIDE 1 MG/ML
2.5 INJECTION INTRAMUSCULAR; INTRAVENOUS ONCE
Status: COMPLETED | OUTPATIENT
Start: 2024-09-16 | End: 2024-09-16

## 2024-09-16 RX ORDER — LORAZEPAM 2 MG/ML
4 INJECTION INTRAMUSCULAR
Status: DISCONTINUED | OUTPATIENT
Start: 2024-09-16 | End: 2024-09-17 | Stop reason: HOSPADM

## 2024-09-16 RX ORDER — LACOSAMIDE 10 MG/ML
100 INJECTION, SOLUTION INTRAVENOUS ONCE
Status: DISCONTINUED | OUTPATIENT
Start: 2024-09-16 | End: 2024-09-16

## 2024-09-16 RX ORDER — SODIUM CHLORIDE, SODIUM LACTATE, POTASSIUM CHLORIDE, CALCIUM CHLORIDE 600; 310; 30; 20 MG/100ML; MG/100ML; MG/100ML; MG/100ML
1000 INJECTION, SOLUTION INTRAVENOUS ONCE
Status: COMPLETED | OUTPATIENT
Start: 2024-09-16 | End: 2024-09-16

## 2024-09-16 RX ADMIN — SODIUM CHLORIDE, POTASSIUM CHLORIDE, SODIUM LACTATE AND CALCIUM CHLORIDE 1000 ML: 600; 310; 30; 20 INJECTION, SOLUTION INTRAVENOUS at 14:40

## 2024-09-16 RX ADMIN — LACOSAMIDE 200 MG: 10 INJECTION INTRAVENOUS at 16:22

## 2024-09-16 RX ADMIN — LEVETIRACETAM 2000 MG: 100 INJECTION, SOLUTION INTRAVENOUS at 14:31

## 2024-09-16 RX ADMIN — SODIUM BICARBONATE: 84 INJECTION, SOLUTION INTRAVENOUS at 18:04

## 2024-09-16 RX ADMIN — MIDAZOLAM HYDROCHLORIDE 2.5 MG: 1 INJECTION, SOLUTION INTRAMUSCULAR; INTRAVENOUS at 14:28

## 2024-09-16 RX ADMIN — BRIVARACETAM 100 MG: 50 TABLET, FILM COATED ORAL at 18:05

## 2024-09-16 RX ADMIN — SODIUM CHLORIDE, POTASSIUM CHLORIDE, SODIUM LACTATE AND CALCIUM CHLORIDE: 600; 310; 30; 20 INJECTION, SOLUTION INTRAVENOUS at 18:34

## 2024-09-16 ASSESSMENT — COGNITIVE AND FUNCTIONAL STATUS - GENERAL
SUGGESTED CMS G CODE MODIFIER MOBILITY: CH
MOBILITY SCORE: 24
SUGGESTED CMS G CODE MODIFIER DAILY ACTIVITY: CH
DAILY ACTIVITIY SCORE: 24

## 2024-09-16 ASSESSMENT — FIBROSIS 4 INDEX: FIB4 SCORE: 1.39

## 2024-09-16 ASSESSMENT — PAIN DESCRIPTION - PAIN TYPE
TYPE: ACUTE PAIN
TYPE: ACUTE PAIN

## 2024-09-16 ASSESSMENT — ENCOUNTER SYMPTOMS: SEIZURES: 1

## 2024-09-16 NOTE — ED PROVIDER NOTES
ER Provider Note    Scribed for Philippe Taylor M.d. by Weston Vyas. 9/16/2024  2:03 PM    Primary Care Provider: Jami Griffith P.A.-C.    CHIEF COMPLAINT   Chief Complaint   Patient presents with    Seizure     Pt BIB EMS from [place] [facility], where the pt works. Per report pt had 4 X Seizures PTA. Hx of same, pt might not be compliant with all seizure meds. Report that pt can be violent after coming out of seizure, EMS has pt in 2 point restraints. No injury noted, pt incontinent of urine.  PTA.      EXTERNAL RECORDS REVIEWED  Other Reviewed patient's visit form January where he had seizure activity and had 5 of versed. Reviewed family medicine notes and patient had profound thrombocytopenia. Lowest platelt count in the past two years was 90 and today it is 76.        HPI/ROS  LIMITATION TO HISTORY   Select: : None  OUTSIDE HISTORIAN(S):  EMS provided the patient's history for today's encounter, as noted below     Barry Griffith is a 34 y.o. male with a history of seizures who presents to the ED for evaluation of a seizure onset prior to arrival today. Per EMS, the patient had four seizures today with one unwitnessed, and the others at 2 minutes, 30 seconds, and 1.5 minutes, without any recovery period. 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 Patient was given 5 of Versed thirty minutes ago, and his last glucose was measured at 111. Per EMS, patient is destructive after waking up. EMS reports that his current movement is improved form prior.        PAST MEDICAL HISTORY  Past Medical History:   Diagnosis Date    ASTHMA     Asthma since infancy.    Asthma     Epilepsy (HCC)     Mild intermittent asthma with acute exacerbation 6/10/2016    Seizure (HCC)     Seizure disorder (HCC)        SURGICAL HISTORY  None known at this time.     FAMILY HISTORY  Family History   Problem Relation Age of Onset    Hypertension Mother     Brain Aneurysm Mother     Hyperlipidemia Father      "Cancer Neg Hx     Diabetes Neg Hx        SOCIAL HISTORY   reports that he has never smoked. He has never used smokeless tobacco. He reports current alcohol use. He reports current drug use. Drugs: Marijuana and Inhaled.    CURRENT MEDICATIONS  Current Discharge Medication List        CONTINUE these medications which have NOT CHANGED    Details   brivaracetam (BRIVIACT) 100 MG Tab tablet Take 1 Tablet by mouth 2 times a day for 120 days.  Qty: 60 Tablet, Refills: 3    Associated Diagnoses: Seizure disorder (HCC)      albuterol 108 (90 Base) MCG/ACT Aero Soln inhalation aerosol Inhale 2 Puffs every 6 hours as needed for Shortness of Breath.  Qty: 8.5 g, Refills: 5    Associated Diagnoses: Mild intermittent asthma without complication      Cholecalciferol (VITAMIN D) 125 MCG (5000 UT) Cap Take 5,000 Int'l Units by mouth every day.  Qty: 90 Capsule, Refills: 3    Associated Diagnoses: Seizure disorder ([facility])             ALLERGIES  Patient has no known allergies.    PHYSICAL EXAM  Ht 1.778 m (5' 10\")   Wt 77.1 kg (170 lb)   BMI 24.39 kg/m²   Constitutional: Altered.   HENT: No signs of trauma, Bilateral external ears normal, Nose normal. Uvula midline.   Eyes: Pupils are equal and reactive 2 mm bilaterally, Conjunctiva normal, Non-icteric.   Neck: Normal range of motion, No tenderness, Supple, No stridor.   Lymphatic: No lymphadenopathy noted.   Cardiovascular: Regular rate and rhythm, no murmurs.   Thorax & Lungs: Rhonchus breath sounds bilaterally, Tachypnea, No chest tenderness.   Abdomen: Bowel sounds normal, Soft, No tenderness, No peritoneal signs, No masses, No pulsatile masses.   Skin: Warm, Dry, No erythema, No rash.   Back: No bony tenderness, No CVA tenderness.   Extremities: Intact distal pulses, No edema, No tenderness, No cyanosis.  Musculoskeletal: Good range of motion in all major joints. No tenderness to palpation or major deformities noted.   Neurologic: Altered.      DIAGNOSTIC " STUDIES    EKG/LABS  Labs Reviewed   CBC WITH DIFFERENTIAL - Abnormal; Notable for the following components:       Result Value    WBC 17.2 (*)     Hematocrit 56.2 (*)     .4 (*)     MCHC 29.4 (*)     Platelet Count 76 (*)     Neutrophils-Polys 18.70 (*)     Lymphocytes 74.10 (*)     Lymphs (Absolute) 12.75 (*)     Monos (Absolute) 1.08 (*)     All other components within normal limits   COMP METABOLIC PANEL - Abnormal; Notable for the following components:    Co2 2 (*)     Anion Gap 43.0 (*)     Glucose 134 (*)     Creatinine 1.49 (*)     All other components within normal limits   IMMATURE PLT FRACTION - Abnormal; Notable for the following components:    Imm. Plt Fraction 16.8 (*)     All other components within normal limits   TSH - Abnormal; Notable for the following components:    TSH 6.910 (*)     All other components within normal limits   LACTIC ACID - Abnormal; Notable for the following components:    Lactic Acid 2.3 (*)     All other components within normal limits   DIFFERENTIAL MANUAL   PERIPHERAL SMEAR REVIEW   PLATELET ESTIMATE   MORPHOLOGY   ESTIMATED GFR   VITAMIN B12   ARTERIAL BLOOD GAS   URINE DRUG SCREEN   LACTIC ACID   BLOOD CULTURE   BLOOD CULTURE   URINALYSIS   CULTURE RESPIRATORY W/ GRM STN     Results for orders placed or performed during the hospital encounter of 24   EKG   Result Value Ref Range    Report       [facility] Emergency Dept.    Test Date:  2024  Pt Name:    DUANE AGUILAR                Department: ER  MRN:        [id number]                      Room:       [facility]  Gender:     Male                         Technician: [id number]  :        1990                   Requested By:VIN WEI  Order #:    [id number]                    Reading MD:    Measurements  Intervals                                Axis  Rate:       99                           P:          0  WY:         0                            QRS:        90  QRSD:       109                           T:          47  QT:         359  QTc:        461    Interpretive Statements  Atrial flutter  Consider right ventricular hypertrophy  Compared to ECG 07/22/2024 16:00:24  Sinus rhythm no longer present  Possible ischemia no longer present        I have independently interpreted this EKG    RADIOLOGY/PROCEDURES   The attending emergency physician has independently interpreted the diagnostic imaging associated with this visit and am waiting the final reading from the radiologist.   My preliminary interpretation is a follows: no ich or ptx    Radiologist interpretation:  CT-HEAD W/O   Final Result      No evidence of acute intracranial process.               DX-CHEST-PORTABLE (1 VIEW)   Final Result      No evidence of acute cardiopulmonary process.          COURSE & MEDICAL DECISION MAKING     ASSESSMENT, COURSE AND PLAN  Care Narrative:     2:07 PM - Patient seen and examined acutely at bedside. Patient arrives today with EMS for evaluation of seizures. He has a history of seizures and was given 5 of Versed by EMS while en rout today. His last glucose was 111 and he was reportedly non hypoxic while en rout. The patient will be medicated with LR bolus, Keppra 2000 mg, and Versed 2.5 mg. Ordered for CMP, CBC with differential, and DX-Chest to evaluate his symptoms.      2:23 PM - Patient was reevaluated at bedside and does not have any seizure like activity on reassessment. Unable to obtain chest X-ray as patient began to become combative, but did not speak. He is still moving all four extremities. Administering Versed.     Cannot rule out status epilepticus at this time and will treat with Keppra in addition to additional Versed    2:30 PM - Patient's father is now present at bedside and explained that he has had seizures for years now. He is unsure why the patient was taken off of Keppra. He reports that the patient has been feeling well and seemed to be fine for the past several days. I updated the  patient's father on my plan of care, and he agrees with my plan.     3:24 PM - I reassessed the patient and he does not have any seizure like activity at this time.  Patient beginning to wake up and speak.    3:46 PM - I discussed the patient's case and the above findings with Dr. Plunkett (Neurology) who will review the patient's records and come consult the patient.     3:51 PM - Critical lab: Bicarb of 2. Will repeat CMP and CT head is ordered, given report of multiple seizures prior to arrival.     4:11 PM - I discussed the patient's case and the above findings with Dr. Marks (hospitalist) who agrees with the plan for admission and will discuss the patient's case with admitting physician.    4:33 PM - I discussed the patient's case and the above findings with Dr. Sanches (hospitalist) who agrees to admit the patient.       CRITICAL CARE  The very real possibilty of a deterioration of this patient's condition required the highest level of my preparedness for sudden, emergent intervention.  I provided critical care services, which included medication orders, frequent reevaluations of the patient's condition and response to treatment, ordering and reviewing test results, and discussing the case with various consultants.  The critical care time associated with the care of the patient was 35 minutes. Review chart for interventions. This time is exclusive of any other billable procedures.        DISPOSITION AND DISCUSSIONS  I have discussed management of the patient with the following physicians and ALEXA's:  Dr. Plunkett (Neurology), Dr. Marks, and Dr. Sanches (hospitalist)    Discussion of management with other \[facility] [place] [facility]\"" or appropriate source(s): None       DISPOSITION:  Patient will be hospitalized by Dr. Sanches in critical condition.     FINAL DIANGOSIS  1. Seizure (HCC)    2. Metabolic acidosis         Weston VANN (Scribe), am scribing for, and in the presence of, Philippe Taylor M.D..    Electronically signed by:  Weston Vyas (Scribe), 9/16/2024    IPhilippe M.D. personally performed the services described in this documentation, as scribed by Weston Vyas in my presence, and it is both accurate and complete.      The note accurately reflects work and decisions made by me.  Philippe Taylor M.D.  9/16/2024  8:13 PM

## 2024-09-16 NOTE — CONSULTS
Neurology Initial Consult H&P  Neurohospitalist Service, [facility]    Referring Physician: Philippe Taylor M.D.    Chief Complaint   Patient presents with    Seizure     Pt BIB EMS from [place] [employer] [facility], where the pt works. Per report pt had 4 X Seizures PTA. Hx of same, pt might not be compliant with all seizure meds. Report that pt can be violent after coming out of seizure, EMS has pt in 2 point restraints. No injury noted, pt incontinent of urine.  PTA.        HPI: Barry Griffith is a 34 y.o. man presenting for whom neurology has been consulted for seizure.  He has long hx of seizures and now takes Brivact 100mg BID for some years now.  His parents are at bedside and state that he is compliant with medications.  Pt says he takes it once a day and sometimes twice a day but parents state that he is compliant.  He denies feeling sick or new medications.  He only gets like 4-5 hours of sleep per night.  He follows with Dr. Maya as an outpatient.  Family reports that he is still having episodes of wondering and confusion at least 2-3 per month.  He has these confusion episodes more frequent than large seizures.  He initially does not want to take any additional medications but after the thought that he might get his driving back at some date, he finally agreed to take Vimpat.      Review of systems: In addition to what is detailed in the HPI above, all other systems reviewed and are negative.    Past Medical History:    has a past medical history of ASTHMA, Asthma, Epilepsy (HCC), Mild intermittent asthma with acute exacerbation (6/10/2016), Seizure ([facility]), and Seizure disorder ([facility]).    FHx:  family history includes Brain Aneurysm in his mother; Hyperlipidemia in his father; Hypertension in his mother.    SHx:   reports that he has never smoked. He has never used smokeless tobacco. He reports current alcohol use. He reports current drug use. Drugs: Marijuana and  "Inhaled.    Allergies:  No Known Allergies    Medications:    Current Facility-Administered Medications:     lacosamide (Vimpat) injection 100 mg, 100 mg, Intravenous, Once, Ady Plunkett M.D.    [START ON 9/17/2024] lacosamide (Vimpat) tablet 50 mg, 50 mg, Oral, BID, Ady Plunkett M.D.    Current Outpatient Medications:     brivaracetam (BRIVIACT) 100 MG Tab tablet, Take 1 Tablet by mouth 2 times a day for 120 days., Disp: 60 Tablet, Rfl: 3    albuterol 108 (90 Base) MCG/ACT Aero Soln inhalation aerosol, Inhale 2 Puffs every 6 hours as needed for Shortness of Breath., Disp: 8.5 g, Rfl: 5    Cholecalciferol (VITAMIN D) 125 MCG (5000 UT) Cap, Take 5,000 Int'l Units by mouth every day., Disp: 90 Capsule, Rfl: 3    Physical Examination:     General: Patient is awake and in no acute distress  Eye: Examination of optic disks not indicated at this time given acuity of consult  Neck: There is normal range of motion  CV: regular rate   Extremities:  clear, dry, intact, without peripheral edema    NEUROLOGICAL EXAM:     /55   Pulse 97   Temp 36.9 °C (98.5 °F) (Temporal)   Resp (!) 26   Ht 1.778 m (5' 10\")   Wt 77.1 kg (170 lb)   SpO2 97%   BMI 24.39 kg/m²       Mental status: Awake, alert and oriented to person, follows most commands but is confused  Speech and language: Speech is clear and fluent. The patient is able to name and repeat, and follow commands  Cranial nerve exam: Pupils are equal, round and reactive to light bilaterally. Visual fields are full. There is no nystagmus. Extraocular muscles are intact. Face is symmetric. Sensation in the face is intact to light touch. Palate elevates symmetrically. Tongue is midline.  Motor exam: There is sustained antigravity with no downward drift in bilateral arms and legs.  There is no pronator drift. Tone is normal. No abnormal movements were seen on exam.  Sensory exam: Reacts to tactile in all 4 extremities, no neglect to double stim   Deep tendon reflexes: "  2+ throughout. Toes down-going bilaterally.  Coordination: No ataxia on bilateral finger-to-nose testing  Gait: Deferred due to patient preference      Objective Data:    Labs:  Lab Results   Component Value Date/Time    PROTHROMBTM 12.1 01/02/2023 12:54 PM    INR 0.90 01/02/2023 12:54 PM      Lab Results   Component Value Date/Time    WBC 17.2 (H) 09/16/2024 02:05 PM    RBC 5.28 09/16/2024 02:05 PM    HEMOGLOBIN 16.5 09/16/2024 02:05 PM    HEMATOCRIT 56.2 (H) 09/16/2024 02:05 PM    .4 (H) 09/16/2024 02:05 PM    MCH 31.3 09/16/2024 02:05 PM    MCHC 29.4 (L) 09/16/2024 02:05 PM    MPV 11.7 09/16/2024 02:05 PM    NEUTSPOLYS 18.70 (L) 09/16/2024 02:05 PM    LYMPHOCYTES 74.10 (H) 09/16/2024 02:05 PM    MONOCYTES 6.30 09/16/2024 02:05 PM    EOSINOPHILS 0.00 09/16/2024 02:05 PM    BASOPHILS 0.00 09/16/2024 02:05 PM    ANISOCYTOSIS 1+ 09/16/2024 02:05 PM      Lab Results   Component Value Date/Time    SODIUM 144 09/16/2024 02:05 PM    POTASSIUM 4.2 09/16/2024 02:05 PM    CHLORIDE 99 09/16/2024 02:05 PM    CO2 2 (LL) 09/16/2024 02:05 PM    GLUCOSE 134 (H) 09/16/2024 02:05 PM    BUN 11 09/16/2024 02:05 PM    CREATININE 1.49 (H) 09/16/2024 02:05 PM      Lab Results   Component Value Date/Time    CHOLSTRLTOT 159 10/09/2018 09:15 AM    LDL 85 10/09/2018 09:15 AM    HDL 40 10/09/2018 09:15 AM    TRIGLYCERIDE 179 (H) 06/06/2022 04:28 AM       Lab Results   Component Value Date/Time    ALKPHOSPHAT 85 09/16/2024 02:05 PM    ASTSGOT 38 09/16/2024 02:05 PM    ALTSGPT 31 09/16/2024 02:05 PM    TBILIRUBIN 0.3 09/16/2024 02:05 PM        Imaging/Testing:    I interpreted and/or reviewed the patient's neuroimaging    DX-CHEST-PORTABLE (1 VIEW)   Final Result      No evidence of acute cardiopulmonary process.      CT-HEAD W/O    (Results Pending)       Assessment and Plan:    Barry Griffith is a 34 y.o. presenting for whom neurology has been consulted for seizure.     Status epilepticus-  pt has had four seizures and is  still not back to baseline plus has significant leukocytosis and tachycardia  Leukocytosis-  likely reactive, rule out infections  Macrocytic anemia-  etiology unknown  -  load Vimpat 200mg IV now then continue 100mg BID PO  -  continue Brivact 100mg BID  -  PRN ativan for seizures  -  panculture to r/o infections  -  seizure precautions  -  no driving x 6 months seizure free  -  pt advised to get 6-7 hours of sleep  -  will follow    34 minutes of critical care time spent, examining pt, reviewing chart and coordinating care    Linden Plunkett MD  Neurology

## 2024-09-16 NOTE — ED NOTES
Restraints removed. Pt more alert. Family at bedside. Pt used urinal. Vs stable. Pt is on room air. Bed alarm in use. Will continue to monitor.

## 2024-09-16 NOTE — ED NOTES
Bedside report received from off going RN. I have assumed care of patient. POC discussed with patient. Call light within reach, all needs addressed at this time.       Fall risk interventions in place: Move the patient closer to the nurse's station, Patient's personal possessions are with in their safe reach, Place socks on patient, Place fall risk sign on patient's door, Give patient urinal if applicable, Keep floor surfaces clean and dry, Accompanied to restroom, and Bed Alarm in use (all applicable per [place] Fall risk assessment)   Continuous monitoring: Cardiac Leads  IVF/IV medications: Infusion per MAR (List Med(s)) LR running  Oxygen: How many liters 10L oxy mask  Bedside sitter: Not Applicable but bed alarm in use.  Isolation: Not Applicable

## 2024-09-16 NOTE — PROGRESS NOTES
ERP requests higher level of monitoring in the stepdown unit after multiple seizures - the patient is awake and alert currently easily protecting his airway. CCM consult has not been requested but is readily available should his condition worsen.    Gómez Marks M.D.

## 2024-09-16 NOTE — ED NOTES
Bedside report to Adrianna SÁNCHEZ.  POC discussed with patient. Call light within reach, all needs addressed at this time.         Fall risk interventions in place: in place (all applicable per [place] Fall risk assessment)   Continuous monitoring: Cardiac Leads, Pulse Ox, or Blood Pressure  IVF/IV medications: Not Applicable   Oxygen: Room Air  Bedside sitter: Not Applicable   Isolation: Not Applicable

## 2024-09-17 VITALS
RESPIRATION RATE: 15 BRPM | BODY MASS INDEX: 23.17 KG/M2 | OXYGEN SATURATION: 97 % | HEART RATE: 75 BPM | HEIGHT: 70 IN | WEIGHT: 161.82 LBS | TEMPERATURE: 98 F | SYSTOLIC BLOOD PRESSURE: 126 MMHG | DIASTOLIC BLOOD PRESSURE: 73 MMHG

## 2024-09-17 PROBLEM — G40.901 STATUS EPILEPTICUS (HCC): Status: RESOLVED | Noted: 2022-06-05 | Resolved: 2024-09-17

## 2024-09-17 PROBLEM — G40.909 SEIZURE DISORDER (HCC): Status: RESOLVED | Noted: 2018-05-01 | Resolved: 2024-09-17

## 2024-09-17 PROBLEM — E87.20 METABOLIC ACIDOSIS: Status: RESOLVED | Noted: 2022-06-05 | Resolved: 2024-09-17

## 2024-09-17 LAB
AMPHET UR QL SCN: NEGATIVE
ANION GAP SERPL CALC-SCNC: 13 MMOL/L (ref 7–16)
APPEARANCE UR: CLEAR
BARBITURATES UR QL SCN: NEGATIVE
BENZODIAZ UR QL SCN: POSITIVE
BILIRUB UR QL STRIP.AUTO: NEGATIVE
BUN SERPL-MCNC: 10 MG/DL (ref 8–22)
BZE UR QL SCN: NEGATIVE
CALCIUM SERPL-MCNC: 8.4 MG/DL (ref 8.5–10.5)
CANNABINOIDS UR QL SCN: POSITIVE
CHLORIDE SERPL-SCNC: 109 MMOL/L (ref 96–112)
CO2 SERPL-SCNC: 21 MMOL/L (ref 20–33)
COLOR UR: YELLOW
CREAT SERPL-MCNC: 1.5 MG/DL (ref 0.5–1.4)
FENTANYL UR QL: NEGATIVE
GFR SERPLBLD CREATININE-BSD FMLA CKD-EPI: 62 ML/MIN/1.73 M 2
GLUCOSE SERPL-MCNC: 94 MG/DL (ref 65–99)
GLUCOSE UR STRIP.AUTO-MCNC: NEGATIVE MG/DL
KETONES UR STRIP.AUTO-MCNC: NEGATIVE MG/DL
LACTATE SERPL-SCNC: 0.9 MMOL/L (ref 0.5–2)
LACTATE SERPL-SCNC: 0.9 MMOL/L (ref 0.5–2)
LEUKOCYTE ESTERASE UR QL STRIP.AUTO: NEGATIVE
METHADONE UR QL SCN: NEGATIVE
MICRO URNS: NORMAL
NITRITE UR QL STRIP.AUTO: NEGATIVE
OPIATES UR QL SCN: NEGATIVE
OXYCODONE UR QL SCN: NEGATIVE
PCP UR QL SCN: NEGATIVE
PH UR STRIP.AUTO: 5.5 [PH] (ref 5–8)
POTASSIUM SERPL-SCNC: 3.6 MMOL/L (ref 3.6–5.5)
PROPOXYPH UR QL SCN: NEGATIVE
PROT UR QL STRIP: NEGATIVE MG/DL
RBC UR QL AUTO: NEGATIVE
SODIUM SERPL-SCNC: 143 MMOL/L (ref 135–145)
SP GR UR STRIP.AUTO: 1.01
UROBILINOGEN UR STRIP.AUTO-MCNC: 0.2 MG/DL

## 2024-09-17 PROCEDURE — A9270 NON-COVERED ITEM OR SERVICE: HCPCS | Performed by: STUDENT IN AN ORGANIZED HEALTH CARE EDUCATION/TRAINING PROGRAM

## 2024-09-17 PROCEDURE — 700111 HCHG RX REV CODE 636 W/ 250 OVERRIDE (IP): Performed by: STUDENT IN AN ORGANIZED HEALTH CARE EDUCATION/TRAINING PROGRAM

## 2024-09-17 PROCEDURE — 80048 BASIC METABOLIC PNL TOTAL CA: CPT

## 2024-09-17 PROCEDURE — 700102 HCHG RX REV CODE 250 W/ 637 OVERRIDE(OP): Performed by: STUDENT IN AN ORGANIZED HEALTH CARE EDUCATION/TRAINING PROGRAM

## 2024-09-17 PROCEDURE — 700105 HCHG RX REV CODE 258: Performed by: STUDENT IN AN ORGANIZED HEALTH CARE EDUCATION/TRAINING PROGRAM

## 2024-09-17 PROCEDURE — 80307 DRUG TEST PRSMV CHEM ANLYZR: CPT

## 2024-09-17 PROCEDURE — 83605 ASSAY OF LACTIC ACID: CPT

## 2024-09-17 PROCEDURE — 99239 HOSP IP/OBS DSCHRG MGMT >30: CPT | Performed by: HOSPITALIST

## 2024-09-17 PROCEDURE — 99232 SBSQ HOSP IP/OBS MODERATE 35: CPT | Performed by: PSYCHIATRY & NEUROLOGY

## 2024-09-17 PROCEDURE — 700102 HCHG RX REV CODE 250 W/ 637 OVERRIDE(OP): Performed by: PSYCHIATRY & NEUROLOGY

## 2024-09-17 PROCEDURE — A9270 NON-COVERED ITEM OR SERVICE: HCPCS | Performed by: PSYCHIATRY & NEUROLOGY

## 2024-09-17 PROCEDURE — 81003 URINALYSIS AUTO W/O SCOPE: CPT

## 2024-09-17 RX ORDER — LACOSAMIDE 100 MG/1
100 TABLET ORAL 2 TIMES DAILY
Qty: 60 TABLET | Refills: 3 | Status: SHIPPED | OUTPATIENT
Start: 2024-09-17 | End: 2024-11-20 | Stop reason: SDUPTHER

## 2024-09-17 RX ORDER — LACOSAMIDE 100 MG/1
100 TABLET ORAL 2 TIMES DAILY
Qty: 120 TABLET | Refills: 0 | Status: CANCELLED | OUTPATIENT
Start: 2024-09-17 | End: 2024-11-16

## 2024-09-17 RX ORDER — ALBUTEROL SULFATE 90 UG/1
2 INHALANT RESPIRATORY (INHALATION) EVERY 6 HOURS PRN
Qty: 2 EACH | Refills: 5 | Status: SHIPPED | OUTPATIENT
Start: 2024-09-17

## 2024-09-17 RX ADMIN — LACOSAMIDE 100 MG: 100 TABLET, FILM COATED ORAL at 05:45

## 2024-09-17 RX ADMIN — SODIUM BICARBONATE: 84 INJECTION, SOLUTION INTRAVENOUS at 05:47

## 2024-09-17 RX ADMIN — BRIVARACETAM 100 MG: 50 TABLET, FILM COATED ORAL at 05:45

## 2024-09-17 ASSESSMENT — FIBROSIS 4 INDEX: FIB4 SCORE: 2.96

## 2024-09-17 ASSESSMENT — PAIN DESCRIPTION - PAIN TYPE
TYPE: ACUTE PAIN

## 2024-09-17 NOTE — PROGRESS NOTES
Patient transported to discharge and signed in. Father waiting in discharge lounge with son. All questions answered.

## 2024-09-17 NOTE — H&P
Hospital Medicine History & Physical Note    Date of Service  9/16/2024    Primary Care Physician  Jami Griffith P.A.-C.    Consultants  Neuro - dr. Plunkett     Code Status  Full Code    Chief Complaint  Chief Complaint   Patient presents with    Seizure     Pt BIB EMS from [employer], where the pt works. Per report pt had 4 X Seizures PTA. Hx of same, pt might not be compliant with all seizure meds. Report that pt can be violent after coming out of seizure, EMS has pt in 2 point restraints. No injury noted, pt incontinent of urine.  PTA.        History of Presenting Illness  Barry Griffith is a 34 y.o. 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  Patient reported to have 4 seizures at outside.  Patient received 5 mg of Versed by EMS en route to hospital.  In the ER, he was loaded with Keppra 2000 mg, Versed 2.5 mg and 1 L IV fluid LR bolus.  Metabolic panel significant for bicarb negative to, anion gap of 43, lactic acid of 2.3.  Patient to be admitted to IMCU for closer monitoring.  Neurology has been consulted recommended loading dose of Vimpat and to continue on Vimpat and Briviact    I discussed the plan of care with patient, family, bedside RN, and ERP .    Review of Systems  Review of Systems   Neurological:  Positive for seizures.       Past Medical History   has a past medical history of ASTHMA, Asthma, Epilepsy (HCC), Mild intermittent asthma with acute exacerbation (6/10/2016), Seizure ([facility]), and Seizure disorder ([facility]).    Surgical History   has no past surgical history on file.     Family History  family history includes Brain Aneurysm in his mother; Hyperlipidemia in his father; Hypertension in his mother.   Family history reviewed with patient. There is no family history that is pertinent to the chief complaint.     Social History   reports that he has never smoked. He  has never used smokeless tobacco. He reports current alcohol use. He reports current drug use. Drugs: Marijuana and Inhaled.    Allergies  No Known Allergies    Medications  Prior to Admission Medications   Prescriptions Last Dose Informant Patient Reported? Taking?   Cholecalciferol (VITAMIN D) 125 MCG (5000 UT) Cap 1 wk ago  No No   Sig: Take 5,000 Int'l Units by mouth every day.   albuterol 108 (90 Base) MCG/ACT Aero Soln inhalation aerosol unk at k  No No   Sig: Inhale 2 Puffs every 6 hours as needed for Shortness of Breath.   brivaracetam (BRIVIACT) 100 MG Tab tablet 2d ago at unk  No No   Sig: Take 1 Tablet by mouth 2 times a day for 120 days.      Facility-Administered Medications: None       Physical Exam  Temp:  [36.7 °C (98.1 °F)-36.9 °C (98.5 °F)] 36.7 °C (98.1 °F)  Pulse:  [65-97] 65  Resp:  [20-30] 23  BP: (106-132)/(55-86) 120/70  SpO2:  [94 %-100 %] 94 %  Blood Pressure: 120/70   Temperature: 36.7 °C (98.1 °F)   Pulse: 65   Respiration: (!) 23   Pulse Oximetry: 94 %       Physical Exam  Vitals and nursing note reviewed.   Constitutional:       Appearance: Normal appearance.   HENT:      Head: Normocephalic and atraumatic.      Right Ear: Tympanic membrane normal.      Left Ear: Tympanic membrane normal.      Nose: Nose normal.      Mouth/Throat:      Mouth: Mucous membranes are moist.      Pharynx: Oropharynx is clear.   Eyes:      Extraocular Movements: Extraocular movements intact.      Pupils: Pupils are equal, round, and reactive to light.   Cardiovascular:      Rate and Rhythm: Normal rate and regular rhythm.      Pulses: Normal pulses.      Heart sounds: Normal heart sounds.   Pulmonary:      Effort: Pulmonary effort is normal.      Breath sounds: Normal breath sounds.   Abdominal:      General: Bowel sounds are normal. There is no distension.      Palpations: Abdomen is soft. There is no mass.   Musculoskeletal:         General: Normal range of motion.      Cervical back: Neck supple.    Skin:     General: Skin is warm.      Capillary Refill: Capillary refill takes less than 2 seconds.   Neurological:      General: No focal deficit present.      Mental Status: He is alert and oriented to person, place, and time. Mental status is at baseline.   Psychiatric:         Mood and Affect: Mood normal.         Behavior: Behavior normal.         Laboratory:  Recent Labs     09/16/24  1405   WBC 17.2*   RBC 5.28   HEMOGLOBIN 16.5   HEMATOCRIT 56.2*   .4*   MCH 31.3   MCHC 29.4*   RDW 45.1   PLATELETCT 76*   MPV 11.7     Recent Labs     09/16/24  1405   SODIUM 144   POTASSIUM 4.2   CHLORIDE 99   CO2 2*   GLUCOSE 134*   BUN 11   CREATININE 1.49*   CALCIUM 10.5     Recent Labs     09/16/24  1405   ALTSGPT 31   ASTSGOT 38   ALKPHOSPHAT 85   TBILIRUBIN 0.3   GLUCOSE 134*       Imaging:  DX-CHEST-PORTABLE (1 VIEW)   Final Result      No evidence of acute cardiopulmonary process.      CT-HEAD W/O    (Results Pending)       X-Ray:  I have personally reviewed the images and compared with prior images.    Assessment/Plan:  Justification for Admission Status  I anticipate this patient will require at least two midnights for appropriate medical management, necessitating inpatient admission because status epilepticus    Patient will need a Intermediate Care (Adult and Pediatrics) bed on MEDICAL service .  The need is secondary to see above.    * Status epilepticus (HCC)- (present on admission)  Assessment & Plan  Received 5 mg Versed prior to arrival.  In the ER he received 2 g of Keppra, Versed 2.5 mg, loaded with Vimpat  Continue with Briviact and Vimpat  Admit to IMCU  Neuro-checks  Check UA, blood cultures, respiratory cultures rule out infectious etiology  IV fluids  Seizure precautions    Seizure disorder (HCC)- (present on admission)  Assessment & Plan  Known seizure disorder.  Continue with Briviact 100 mg twice daily and Vimpat  Monitoring IMCU  Seizure precautions    Metabolic acidosis- (present on  admission)  Assessment & Plan  Started on IV bicarbonate   IV fluid 1 L LR bolus given, continue maintenance fluids  Serial BMP    Asthma- (present on admission)  Assessment & Plan  Albuterol PRN         VTE prophylaxis: SCDs/TEDs

## 2024-09-17 NOTE — PROGRESS NOTES
Neurology Follow-up  Neurohospitalist Service, [facility]    Referring Physician: Tomy Munroe D.O.    Chief Complaint   Patient presents with    Seizure     Pt BIB EMS from [place] [employer] [facility], where the pt works. Per report pt had 4 X Seizures PTA. Hx of same, pt might not be compliant with all seizure meds. Report that pt can be violent after coming out of seizure, EMS has pt in 2 point restraints. No injury noted, pt incontinent of urine.  PTA.        Interval history: 33 yo RHD M with hx of seizure.  Pt has not had anymore seizures.  No family at bedside.  Pt is ready to go home.      Review of systems: In addition to what is detailed in the HPI above, all other systems reviewed and are negative.    Past Medical History:    has a past medical history of ASTHMA, Asthma, Epilepsy (HCC), Mild intermittent asthma with acute exacerbation (6/10/2016), Seizure (HCC), and Seizure disorder (HCC).    FHx:  family history includes Brain Aneurysm in his mother; Hyperlipidemia in his father; Hypertension in his mother.    SHx:   reports that he has never smoked. He has never used smokeless tobacco. He reports current alcohol use. He reports current drug use. Drugs: Marijuana and Inhaled.    Allergies:  No Known Allergies    Medications:    Current Facility-Administered Medications:     lacosamide (Vimpat) tablet 100 mg, 100 mg, Oral, BID, Ady Plunkett M.D., 100 mg at 09/17/24 0545    LORazepam (Ativan) injection 4 mg, 4 mg, Intravenous, Q10 MIN PRN, Garcia Sanches M.D.    sodium bicarbonate 150 mEq in D5W infusion (premix), , Intravenous, Continuous, Garcia Sanches M.D., Last Rate: 100 mL/hr at 09/17/24 0547, New Bag at 09/17/24 0547    brivaracetam (Briviact) tablet 100 mg, 100 mg, Oral, BID, Garcia Sanches M.D., 100 mg at 09/17/24 0545    lactated ringers infusion, , Intravenous, Continuous, Garcia Sanches M.D., Stopped at 09/17/24 0537    Physical Examination:     General: Patient  "is awake and in no acute distress  Eye: Examination of optic disks not indicated at this time given acuity of consult  Neck: There is normal range of motion  CV: regular rate   Extremities:  clear, dry, intact, without peripheral edema    NEUROLOGICAL EXAM:     /59   Pulse 70   Temp 37.2 °C (98.9 °F) (Temporal)   Resp 16   Ht 1.778 m (5' 10\")   Wt 73.4 kg (161 lb 13.1 oz)   SpO2 97%   BMI 23.22 kg/m²       Mental status: Awake, alert and fully oriented  Speech and language: Speech is clear and fluent. The patient is able to name and repeat, and follow commands  Cranial nerve exam: Pupils are equal, round and reactive to light bilaterally. Visual fields are full. There is no nystagmus. Extraocular muscles are intact. Face is symmetric. Sensation in the face is intact to light touch. Palate elevates symmetrically. Tongue is midline.  Motor exam: There is sustained antigravity with no downward drift in bilateral arms and legs.  There is no pronator drift. Tone is normal. No abnormal movements were seen on exam.  Sensory exam: Reacts to tactile in all 4 extremities, no neglect to double stim   Deep tendon reflexes:  2+ throughout. Toes down-going bilaterally.  Coordination: No dysmetria on bilateral finger-to-nose testing  Gait: Deferred due to patient preference        Objective Data:    Labs:  Lab Results   Component Value Date/Time    PROTHROMBTM 12.1 01/02/2023 12:54 PM    INR 0.90 01/02/2023 12:54 PM      Lab Results   Component Value Date/Time    WBC 17.2 (H) 09/16/2024 02:05 PM    RBC 5.28 09/16/2024 02:05 PM    HEMOGLOBIN 16.5 09/16/2024 02:05 PM    HEMATOCRIT 56.2 (H) 09/16/2024 02:05 PM    .4 (H) 09/16/2024 02:05 PM    MCH 31.3 09/16/2024 02:05 PM    MCHC 29.4 (L) 09/16/2024 02:05 PM    MPV 11.7 09/16/2024 02:05 PM    NEUTSPOLYS 18.70 (L) 09/16/2024 02:05 PM    LYMPHOCYTES 74.10 (H) 09/16/2024 02:05 PM    MONOCYTES 6.30 09/16/2024 02:05 PM    EOSINOPHILS 0.00 09/16/2024 02:05 PM    " BASOPHILS 0.00 09/16/2024 02:05 PM    ANISOCYTOSIS 1+ 09/16/2024 02:05 PM      Lab Results   Component Value Date/Time    SODIUM 143 09/17/2024 01:44 AM    POTASSIUM 3.6 09/17/2024 01:44 AM    CHLORIDE 109 09/17/2024 01:44 AM    CO2 21 09/17/2024 01:44 AM    GLUCOSE 94 09/17/2024 01:44 AM    BUN 10 09/17/2024 01:44 AM    CREATININE 1.50 (H) 09/17/2024 01:44 AM      Lab Results   Component Value Date/Time    CHOLSTRLTOT 159 10/09/2018 09:15 AM    LDL 85 10/09/2018 09:15 AM    HDL 40 10/09/2018 09:15 AM    TRIGLYCERIDE 179 (H) 06/06/2022 04:28 AM       Lab Results   Component Value Date/Time    ALKPHOSPHAT 85 09/16/2024 02:05 PM    ASTSGOT 38 09/16/2024 02:05 PM    ALTSGPT 31 09/16/2024 02:05 PM    TBILIRUBIN 0.3 09/16/2024 02:05 PM        Imaging/Testing:    I interpreted and/or reviewed the patient's neuroimaging    CT-HEAD W/O   Final Result      No evidence of acute intracranial process.               DX-CHEST-PORTABLE (1 VIEW)   Final Result      No evidence of acute cardiopulmonary process.           Epilepsy-  not controlled  - continue Vimpat 100mg BID  -  continue Brivact 100mg BID  -  no driving x 6 months seizure free  -  pt needs to sleep 6-7 hours nightly  -  ok to discharge, pt needs to follow up with Dr. Zulma Plunkett MD  Neurohospitalist, Acute Care Services          Please note that this dictation was created using voice recognition software.  I have made every reasonable attempt to correct obvious errors, but I expect that there are errors of grammar and possibly content that I did not discover before finalizing the note.

## 2024-09-17 NOTE — CARE PLAN
The patient is Stable - Low risk of patient condition declining or worsening         Progress made toward(s) clinical / shift goals:    Problem: Knowledge Deficit - Standard  Goal: Patient and family/care givers will demonstrate understanding of plan of care, disease process/condition, diagnostic tests and medications  9/16/2024 1904 by Hannah Haase, RLUIS  Outcome: Progressing  9/16/2024 1904 by Hannah Haase, R.N.  Outcome: Progressing     Problem: Pain - Standard  Goal: Alleviation of pain or a reduction in pain to the patient’s comfort goal  Outcome: Progressing       Patient is not progressing towards the following goals:

## 2024-09-17 NOTE — CARE PLAN
The patient is Stable - Low risk of patient condition declining or worsening    Shift Goals  Clinical Goals: Neuro stability  Patient Goals: Rest  Family Goals: updates    Progress made toward(s) clinical / shift goals:    Problem: Knowledge Deficit - Standard  Goal: Patient and family/care givers will demonstrate understanding of plan of care, disease process/condition, diagnostic tests and medications  Outcome: Progressing     Problem: Pain - Standard  Goal: Alleviation of pain or a reduction in pain to the patient’s comfort goal  Outcome: Progressing       Patient is not progressing towards the following goals:

## 2024-09-17 NOTE — ASSESSMENT & PLAN NOTE
Known seizure disorder.  Continue with Briviact 100 mg twice daily and Vimpat  Monitoring IMCU  Seizure precautions

## 2024-09-17 NOTE — DISCHARGE INSTRUCTIONS
Discharge Instructions per Tomy Munroe D.O.      DIET: Healthy balanced diet    ACTIVITY: as tolerates.  No driving, no swimming, no activity that would put you at risk of death or others at harm/death if you were happen to have another seizure.    DIAGNOSIS: Seizure    Return to ER if needed    Seizure, Adult  A seizure is a sudden burst of abnormal electrical and chemical activity in the brain. Seizures usually last from 30 seconds to 2 minutes.   What are the causes?  Common causes of this condition include:  Fever or infection.  Problems that affect the brain. These may include:  A brain or head injury.  Bleeding in the brain.  A brain tumor.  Low levels of blood sugar or salt.  Kidney problems or liver problems.  Conditions that are passed from parent to child (are inherited).  Problems with a substance, such as:  Having a reaction to a drug or a medicine.  Stopping the use of a substance all of a sudden (withdrawal).  A stroke.  Disorders that affect how you develop.  Sometimes, the cause may not be known.   What increases the risk?  Having someone in your family who has epilepsy. In this condition, seizures happen again and again over time. They have no clear cause.  Having had a tonic-clonic seizure before. This type of seizure causes you to:  Tighten the muscles of the whole body.  Lose consciousness.  Having had a head injury or strokes before.  Having had a lack of oxygen at birth.  What are the signs or symptoms?  There are many types of seizures. The symptoms vary depending on the type of seizure you have.  Symptoms during a seizure  Shaking that you cannot control (convulsions) with fast, jerky movements of muscles.  Stiffness of the body.  Breathing problems.  Feeling mixed up (confused).  Staring or not responding to sound or touch.  Head nodding.  Eyes that blink, flutter, or move fast.  Drooling, grunting, or making clicking sounds with your mouth  Losing control of when you pee or  poop.  Symptoms before a seizure  Feeling afraid, nervous, or worried.  Feeling like you may vomit.  Feeling like:  You are moving when you are not.  Things around you are moving when they are not.  Feeling like you saw or heard something before (christy zepeda).  Odd tastes or smells.  Changes in how you see. You may see flashing lights or spots.  Symptoms after a seizure  Feeling confused.  Feeling sleepy.  Headache.  Sore muscles.  How is this treated?  If your seizure stops on its own, you will not need treatment. If your seizure lasts longer than 5 minutes, you will normally need treatment. Treatment may include:  Medicines given through an IV tube.  Avoiding things, such as medicines, that are known to cause your seizures.  Medicines to prevent seizures.  A device to prevent or control seizures.  Surgery.  A diet low in carbohydrates and high in fat (ketogenic diet).  Follow these instructions at home:  Medicines  Take over-the-counter and prescription medicines only as told by your doctor.  Avoid foods or drinks that may keep your medicine from working, such as alcohol.  Activity  Follow instructions about driving, swimming, or doing things that would be dangerous if you had another seizure. Wait until your doctor says it is safe for you to do these things.  If you live in the [place] ask your local department of motor vehicles when you can drive.  Get a lot of rest.  Teaching others    Teach friends and family what to do when you have a seizure. They should:  Help you get down to the ground.  Protect your head and body.  Loosen any clothing around your neck.  Turn you on your side.  Know whether or not you need emergency care.  Stay with you until you are better.  Also, tell them what not to do if you have a seizure. Tell them:  They should not hold you down.  They should not put anything in your mouth.  General instructions  Avoid anything that gives you seizures.  Keep a seizure diary. Write down:  What you  remember about each seizure.  What you think caused each seizure.  Keep all follow-up visits.  Contact a doctor if:  You have another seizure or seizures. Call the doctor each time you have a seizure.  The pattern of your seizures changes.  You keep having seizures with treatment.  You have symptoms of being sick or having an infection.  You are not able to take your medicine.  Get help right away if:  You have any of these problems:  A seizure that lasts longer than 5 minutes.  Many seizures in a row and you do not feel better between seizures.  A seizure that makes it harder to breathe.  A seizure and you can no longer speak or use part of your body.  You do not wake up right after a seizure.  You get hurt during a seizure.  You feel confused or have pain right after a seizure.  These symptoms may be an emergency. Get help right away. Call your local emergency services (911 in the [place]  Do not wait to see if the symptoms will go away.  Do not drive yourself to the hospital.  Summary  A seizure is a sudden burst of abnormal electrical and chemical activity in the brain. Seizures normally last from 30 seconds to 2 minutes.  Causes of seizures include illness, injury to the head, low levels of blood sugar or salt, and certain conditions.  Most seizures will stop on their own in less than 5 minutes. Seizures that last longer than 5 minutes are a medical emergency and need treatment right away.  Many medicines are used to treat seizures. Take over-the-counter and prescription medicines only as told by your doctor.  This information is not intended to replace advice given to you by your health care provider. Make sure you discuss any questions you have with your health care provider.  Document Revised: 06/25/2021 Document Reviewed: 06/25/2021  [employer] Patient Education © 2023 [employer]

## 2024-09-17 NOTE — ASSESSMENT & PLAN NOTE
Received 5 mg Versed prior to arrival.  In the ER he received 2 g of Keppra, Versed 2.5 mg, loaded with Vimpat  Continue with Briviact and Vimpat  Admit to IMCU  Neuro-checks  Check UA, blood cultures, respiratory cultures rule out infectious etiology  IV fluids  Seizure precautions

## 2024-09-17 NOTE — PROGRESS NOTES
4 Eyes Skin Assessment Completed by PRINCESS Kam and PRINCESS Irving.    Head WDL  Ears WDL  Nose WDL  Mouth WDL  Neck WDL  Breast/Chest WDL  Shoulder Blades WDL  Spine WDL  (R) Arm/Elbow/Hand Redness and Blanching  (L) Arm/Elbow/Hand Redness and Blanching  Abdomen WDL  Groin Redness and Excoriation  Scrotum/Coccyx/Buttocks Redness and Blanching  (R) Leg WDL  (L) Leg WDL  (R) Heel/Foot/Toe Redness and Blanching  (L) Heel/Foot/Toe Redness and Blanching          Devices In Places ECG, Blood Pressure Cuff, Pulse Ox, and SCD's      Interventions In Place Sacral Mepilex, Pillows, Q2 Turns, and Low Air Loss Mattress    Possible Skin Injury No    Pictures Uploaded Into Epic N/A  Wound Consult Placed N/A  RN Wound Prevention Protocol Ordered Yes

## 2024-09-17 NOTE — DISCHARGE SUMMARY
Discharge Summary    CHIEF COMPLAINT ON ADMISSION  Chief Complaint   Patient presents with    Seizure     Pt BIB EMS from [place] [employer], where the pt works. Per report pt had 4 X Seizures PTA. Hx of same, pt might not be compliant with all seizure meds. Report that pt can be violent after coming out of seizure, EMS has pt in 2 point restraints. No injury noted, pt incontinent of urine.  PTA.        Reason for Admission  Seizures    Admission Date  9/16/2024    CODE STATUS  Full Code    HPI & HOSPITAL COURSE  Barry Griffith is a 34 y.o. male w/ hx of seizure disorder, asthma.  He was admitted 9/16/2024 with a seziure at work with 4 other seizures. He received 5mg of versed en route to hospital. In ER he was given Keppra 2000mg, versed 2.5mg.  Neurology has consulted and patient was given Vimpat and briviact.  EEG was performed.  Per neurologist patient was okay to discharge home on Vimpat and Briviact.  Patient was instructed not to drive or swim no activities that would put him at harm if he had recurrent seizure.  I did review with the patient and his father current medications he did request refill of both seizure medications and albuterol.  The patient and his father were alerted of a elevated TSH and a low platelet count.  I have recommended that he follow-up for this chronically low platelet as well as his abnormal TSH and I have ordered a free T4 at time of discharge which should be added to his labs today.  I question if his thrombocytopenia secondary to his medications.    Therefore, he is discharged in good and stable condition to home with close outpatient follow-up.    The patient recovered much more quickly than anticipated on admission.    Discharge Date  9/17/2024    FOLLOW UP ITEMS POST DISCHARGE  Free T4    DISCHARGE DIAGNOSES  Principal Problem (Resolved):    Status epilepticus (HCC) (POA: Yes)  Active Problems:    Seizure disorder (HCC) (POA: Yes)      Overview: Chronic,  intermittent.      Tolerating Briviact 100 mg twice daily.      Was evaluated by neuro 5/8/2023 and has follow-up in 2 months.      He states he is trying to be more compliant with regards to his       medications.  Resolved Problems:    Asthma (POA: Yes)      Overview: Chronic, controlled.      Typically only has issues when he is sick.      Would like a refill of his albuterol.    Metabolic acidosis (POA: Yes)      FOLLOW UP  Future Appointments   Date Time Provider Department Center   11/20/2024  2:00 PM Jarett Maya M.D. [facility] None     Jami Griffith P.A.-C.  [address]  [facility] [id number] [phone]    Schedule an appointment as soon as possible for a visit in 3 week(s)  Needs follow up for thrombocytopenia and thyroid function.      MEDICATIONS ON DISCHARGE     Medication List        START taking these medications        Instructions   lacosamide 100 MG Tabs tablet  Commonly known as: Vimpat   Take 1 Tablet by mouth 2 times a day for 120 days.  Dose: 100 mg            CONTINUE taking these medications        Instructions   brivaracetam 100 MG Tabs tablet  Commonly known as: Briviact   Take 1 Tablet by mouth 2 times a day for 120 days.  Dose: 100 mg            ASK your doctor about these medications        Instructions   albuterol 108 (90 Base) MCG/ACT Aers inhalation aerosol   Inhale 2 Puffs every 6 hours as needed for Shortness of Breath.  Dose: 2 Puff     Vitamin D 125 MCG (5000 UT) Caps   Take 5,000 Int'l Units by mouth every day.  Dose: 5,000 Int'l Units              Allergies  No Known Allergies    DIET  Orders Placed This Encounter   Procedures    Diet Order Diet: Regular     Standing Status:   Standing     Number of Occurrences:   1     Order Specific Question:   Diet:     Answer:   Regular [1]       ACTIVITY  As tolerated.  Weight bearing as tolerated    CONSULTATIONS  Neurologist Dr. Linden Plunkett    PROCEDURES  None    LABORATORY  Lab Results   Component Value Date    SODIUM  143 09/17/2024    POTASSIUM 3.6 09/17/2024    CHLORIDE 109 09/17/2024    CO2 21 09/17/2024    GLUCOSE 94 09/17/2024    BUN 10 09/17/2024    CREATININE 1.50 (H) 09/17/2024        Lab Results   Component Value Date    WBC 17.2 (H) 09/16/2024    HEMOGLOBIN 16.5 09/16/2024    HEMATOCRIT 56.2 (H) 09/16/2024    PLATELETCT 76 (L) 09/16/2024     CT-HEAD W/O   Final Result      No evidence of acute intracranial process.               DX-CHEST-PORTABLE (1 VIEW)   Final Result      No evidence of acute cardiopulmonary process.             Total time of the discharge process exceeds 31 minutes.

## 2024-09-21 LAB
BACTERIA BLD CULT: NORMAL
BACTERIA BLD CULT: NORMAL
SIGNIFICANT IND 70042: NORMAL
SIGNIFICANT IND 70042: NORMAL
SITE SITE: NORMAL
SITE SITE: NORMAL
SOURCE SOURCE: NORMAL
SOURCE SOURCE: NORMAL

## 2024-10-17 ENCOUNTER — HOSPITAL ENCOUNTER (OUTPATIENT)
Dept: RADIOLOGY | Facility: MEDICAL CENTER | Age: 34
End: 2024-10-17
Attending: PHYSICIAN ASSISTANT
Payer: COMMERCIAL

## 2024-10-17 ENCOUNTER — OFFICE VISIT (OUTPATIENT)
Dept: MEDICAL GROUP | Facility: PHYSICIAN GROUP | Age: 34
End: 2024-10-17
Payer: COMMERCIAL

## 2024-10-17 VITALS
HEART RATE: 72 BPM | OXYGEN SATURATION: 99 % | TEMPERATURE: 98.7 F | RESPIRATION RATE: 16 BRPM | HEIGHT: 70 IN | DIASTOLIC BLOOD PRESSURE: 70 MMHG | WEIGHT: 152 LBS | BODY MASS INDEX: 21.76 KG/M2 | SYSTOLIC BLOOD PRESSURE: 116 MMHG

## 2024-10-17 DIAGNOSIS — R79.89 ELEVATED TSH: ICD-10-CM

## 2024-10-17 DIAGNOSIS — M79.641 RIGHT HAND PAIN: ICD-10-CM

## 2024-10-17 DIAGNOSIS — M79.644 PAIN IN FINGER OF RIGHT HAND: ICD-10-CM

## 2024-10-17 PROCEDURE — 3078F DIAST BP <80 MM HG: CPT | Performed by: FAMILY MEDICINE

## 2024-10-17 PROCEDURE — 73218 MRI UPPER EXTREMITY W/O DYE: CPT | Mod: RT

## 2024-10-17 PROCEDURE — 3074F SYST BP LT 130 MM HG: CPT | Performed by: FAMILY MEDICINE

## 2024-10-17 PROCEDURE — 99213 OFFICE O/P EST LOW 20 MIN: CPT | Performed by: FAMILY MEDICINE

## 2024-10-17 ASSESSMENT — FIBROSIS 4 INDEX: FIB4 SCORE: 2.96

## 2024-10-23 ENCOUNTER — APPOINTMENT (OUTPATIENT)
Dept: NEUROLOGY | Facility: MEDICAL CENTER | Age: 34
End: 2024-10-23
Attending: PSYCHIATRY & NEUROLOGY
Payer: COMMERCIAL

## 2024-11-20 ENCOUNTER — OFFICE VISIT (OUTPATIENT)
Dept: NEUROLOGY | Facility: MEDICAL CENTER | Age: 34
End: 2024-11-20
Attending: PSYCHIATRY & NEUROLOGY
Payer: COMMERCIAL

## 2024-11-20 VITALS
TEMPERATURE: 97.6 F | RESPIRATION RATE: 12 BRPM | WEIGHT: 154 LBS | DIASTOLIC BLOOD PRESSURE: 58 MMHG | BODY MASS INDEX: 22.81 KG/M2 | HEIGHT: 69 IN | SYSTOLIC BLOOD PRESSURE: 104 MMHG | OXYGEN SATURATION: 99 % | HEART RATE: 45 BPM

## 2024-11-20 DIAGNOSIS — G40.909 SEIZURE DISORDER (HCC): ICD-10-CM

## 2024-11-20 DIAGNOSIS — R56.9 SEIZURE (HCC): ICD-10-CM

## 2024-11-20 PROCEDURE — 99214 OFFICE O/P EST MOD 30 MIN: CPT | Performed by: PSYCHIATRY & NEUROLOGY

## 2024-11-20 PROCEDURE — 3074F SYST BP LT 130 MM HG: CPT | Performed by: PSYCHIATRY & NEUROLOGY

## 2024-11-20 PROCEDURE — 99211 OFF/OP EST MAY X REQ PHY/QHP: CPT | Performed by: PSYCHIATRY & NEUROLOGY

## 2024-11-20 PROCEDURE — 3078F DIAST BP <80 MM HG: CPT | Performed by: PSYCHIATRY & NEUROLOGY

## 2024-11-20 RX ORDER — LACOSAMIDE 100 MG/1
100 TABLET ORAL 2 TIMES DAILY
Qty: 60 TABLET | Refills: 3 | Status: SHIPPED | OUTPATIENT
Start: 2024-11-20 | End: 2025-03-20

## 2024-11-20 ASSESSMENT — FIBROSIS 4 INDEX: FIB4 SCORE: 2.96

## 2024-11-20 ASSESSMENT — PATIENT HEALTH QUESTIONNAIRE - PHQ9: CLINICAL INTERPRETATION OF PHQ2 SCORE: 0

## 2024-11-20 NOTE — PATIENT INSTRUCTIONS
Please continue Briviact 100 mg twice daily.    Please take lacosamide (Vimpat) 100 mg twice daiyl.     Please let our office know if you have any changes in your seizure frequency and/characteristics. Otherwise, please keep the diary of your events and bring it with you at the time of your next follow up visit with our office.     Please take vitamin D3 [id number] internation units daily.     Please note that the following might precipitate seizures: missed doses of antiseizure medications, being sick with fever, stress, fatigue, sleep deprivation, not eating regularly, not drinking enough water, drinking too much alcohol, stopping alcohol suddenly if you are currently using it on a regular/daily basis, and/or using recreational drugs, among others.    Please note that the following might lead to an injury, potentially a life-threatening injury, in case you have a seizure and/or lose awareness while:   - being in a large body of water by yourself, such as bath, pool, lake, ocean, among others (risk of drowning)   - being on unprotected heights (risk of fall)   - being around and/or operating heavy machinery (risk of injury)   - being around open fire/hot surfaces (risk of burns)   - any other activities/circumstances, in which if you lose awareness, you might injure yourself and/or others.    Please call for help (crisis line and/or 911) in case you have thoughts of harming yourself and/or others.    Please continue to abstain from driving until further notice.     ------------------------------------------------------------------------------------------  Instructions for your family/caregivers:  Please call 911 if the patient has a seizure longer than 2-3 minutes, if seizures are back to back without him recovering to his baseline, or he does not start recovering within 5-10 minutes after the seizure stops. During the seizure - please turn him on his side, please make sure his head is protected (for example, you  should put a pillow under his head, if one is available), and please do not put anything in his mouth.   -------------------------------------------------------------------------------------------    It is important that your seizures are well controlled and you have none or have them rarely. In addition to avoiding injury related to breakthrough seizures, frequent seizures increase risk of SUDEP (sudden unexpected death in epilepsy), where a person goes into a seizure and then never wakes up - this is a rare complication of seizure disorder; one of the best available ways to prevent it is to control your seizures well.     Due to the high volume of patients we are trying to help, your physician will not be able to respond by phone or in [employer] to your routine concerns between appointments.  This does not reflect a lack of interest or concern for you or your diagnosis.  Please bring these questions and concerns to your appointment where your physician can answer.  Please relay more pressing concerns to our office, either via [employer], or by phone; if not able to reach us please visit nearby Urgent Care Center or Emergency Department.  If any emergent medical needs, please seek emergent medical help and/or call 911.    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  Please schedule a dedicated appointment to address your paperwork, so we can do that in a timely manner.  This is not due to lack of concern or interest for your disease-related work/administrative problems, but to make sure that we provide the best possible care and to fill out your paperwork in a correct and timely manner.    Thank you for entrusting your neurological care to [facility] and we look forward to continuing to serve you.
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
## 2024-12-15 ENCOUNTER — OFFICE VISIT (OUTPATIENT)
Dept: URGENT CARE | Facility: PHYSICIAN GROUP | Age: 34
End: 2024-12-15
Payer: COMMERCIAL

## 2024-12-15 VITALS
DIASTOLIC BLOOD PRESSURE: 64 MMHG | TEMPERATURE: 98.1 F | HEIGHT: 69 IN | BODY MASS INDEX: 23.92 KG/M2 | WEIGHT: 161.5 LBS | OXYGEN SATURATION: 98 % | HEART RATE: 70 BPM | SYSTOLIC BLOOD PRESSURE: 112 MMHG | RESPIRATION RATE: 18 BRPM

## 2024-12-15 DIAGNOSIS — H00.022 HORDEOLUM INTERNUM RIGHT LOWER EYELID: ICD-10-CM

## 2024-12-15 PROCEDURE — 3074F SYST BP LT 130 MM HG: CPT

## 2024-12-15 PROCEDURE — 99213 OFFICE O/P EST LOW 20 MIN: CPT

## 2024-12-15 PROCEDURE — 3078F DIAST BP <80 MM HG: CPT

## 2024-12-15 RX ORDER — ERYTHROMYCIN 5 MG/G
1 OINTMENT OPHTHALMIC
Qty: 1 G | Refills: 0 | Status: SHIPPED | OUTPATIENT
Start: 2024-12-15 | End: 2024-12-22

## 2024-12-15 RX ORDER — MOXIFLOXACIN 5 MG/ML
1 SOLUTION/ DROPS OPHTHALMIC 3 TIMES DAILY
Qty: 2 ML | Refills: 0 | Status: SHIPPED | OUTPATIENT
Start: 2024-12-15 | End: 2024-12-22

## 2024-12-15 ASSESSMENT — ENCOUNTER SYMPTOMS
COUGH: 0
DOUBLE VISION: 0
CONSTITUTIONAL NEGATIVE: 1
CHILLS: 0
FOREIGN BODY SENSATION: 0
SORE THROAT: 0
CARDIOVASCULAR NEGATIVE: 1
PHOTOPHOBIA: 0
VOMITING: 0
EYE DISCHARGE: 1
EYE REDNESS: 1
RESPIRATORY NEGATIVE: 1
SINUS PAIN: 0
EYE ITCHING: 0
NAUSEA: 0
WEIGHT LOSS: 0
BLURRED VISION: 0
FEVER: 0

## 2024-12-15 ASSESSMENT — FIBROSIS 4 INDEX: FIB4 SCORE: 2.96

## 2024-12-15 NOTE — PROGRESS NOTES
Subjective:   Barry Griffith is a 34 y.o. male who presents for Eye Problem (Swelling (R) eye X yesterday. States he woke up with swelling, discharge, tender)      Patient presents with right eye lower lid swelling, mucopurulent discharge for the last 2 days.  He denies any injury to the area, denies any visual changes, no foreign body sensation    Eye Problem   The right eye is affected. This is a new problem. The current episode started yesterday. The problem occurs constantly. The problem has been unchanged. There was no injury mechanism. The pain is at a severity of 2/10. The pain is mild. There is No known exposure to pink eye. He Does not wear contacts. Associated symptoms include an eye discharge and eye redness. Pertinent negatives include no blurred vision, double vision, fever, foreign body sensation, itching, nausea, photophobia, recent URI or vomiting. He has tried eye drops for the symptoms. The treatment provided no relief.       Review of Systems   Constitutional: Negative.  Negative for chills, fever and weight loss.   HENT:  Negative for congestion, ear discharge, ear pain, sinus pain and sore throat.    Eyes:  Positive for discharge and redness. Negative for blurred vision, double vision, photophobia and itching.   Respiratory: Negative.  Negative for cough.    Cardiovascular: Negative.    Gastrointestinal:  Negative for nausea and vomiting.   Skin: Negative.      Refer to [facility] for additional details.    During this visit, appropriate PPE was worn, and hand hygiene was performed.    PMH:  has a past medical history of ASTHMA, Asthma, Epilepsy ([facility]), Mild intermittent asthma with acute exacerbation (6/10/2016), Seizure ([facility]), and Seizure disorder ([facility]).    MEDS:   Current Outpatient Medications:     erythromycin 5 MG/GM Ointment, Apply 1 Application to right eye at bedtime for 7 days., Disp: 1 g, Rfl: 0    moxifloxacin (VIGAMOX) 0.5 % Solution, Administer 1 Drop into the right eye 3 times a  "day for 7 days., Disp: 2 mL, Rfl: 0    brivaracetam (BRIVIACT) 100 MG Tab tablet, Take 1 Tablet by mouth 2 times a day for 120 days., Disp: 60 Tablet, Rfl: 3    lacosamide (VIMPAT) 100 MG Tab tablet, Take 1 Tablet by mouth 2 times a day for 120 days., Disp: 60 Tablet, Rfl: 3    albuterol 108 (90 Base) MCG/ACT Aero Soln inhalation aerosol, Inhale 2 Puffs every 6 hours as needed for Shortness of Breath., Disp: 2 Each, Rfl: 5    Cholecalciferol (VITAMIN D) 125 MCG (5000 UT) Cap, Take 5,000 Int'l Units by mouth every day., Disp: 90 Capsule, Rfl: 3    ALLERGIES: No Known Allergies  SURGHX: History reviewed. No pertinent surgical history.  SOCHX:  reports that he has never smoked. He has never used smokeless tobacco. He reports current alcohol use. He reports current drug use. Drugs: Marijuana and Inhaled.    FH: Per HPI as applicable/pertinent.    Medications, Allergies, and current problem list reviewed today in Epic.     Objective:     /64 (BP Location: Left arm, Patient Position: Sitting, BP Cuff Size: Adult long)   Pulse 70   Temp 36.7 °C (98.1 °F) (Temporal)   Resp 18   Ht 1.753 m (5' 9\")   Wt 73.3 kg (161 lb 8 oz)   SpO2 98%     Physical Exam  Constitutional:       General: He is not in acute distress.     Appearance: He is not ill-appearing.   HENT:      Right Ear: Tympanic membrane, ear canal and external ear normal.      Left Ear: Tympanic membrane, ear canal and external ear normal.      Nose: Nose normal. No congestion or rhinorrhea.      Mouth/Throat:      Mouth: Mucous membranes are moist.      Pharynx: No oropharyngeal exudate or posterior oropharyngeal erythema.   Eyes:      General:         Right eye: Discharge and hordeolum present. No foreign body.         Left eye: No foreign body, discharge or hordeolum.      Conjunctiva/sclera:      Right eye: Right conjunctiva is not injected. No chemosis, exudate or hemorrhage.     Pupils: Pupils are equal, round, and reactive to light.        Comments: " Swelling redness and tenderness in the above marked area.  No open wounds.  Conjunctivo without redness   Cardiovascular:      Rate and Rhythm: Normal rate.   Pulmonary:      Effort: Pulmonary effort is normal.   Musculoskeletal:      Cervical back: Normal range of motion.   Lymphadenopathy:      Cervical: No cervical adenopathy.   Neurological:      Mental Status: He is alert.         Assessment/Plan:     Diagnosis and associated orders:     1. Hordeolum internum right lower eyelid  - erythromycin 5 MG/GM Ointment; Apply 1 Application to right eye at bedtime for 7 days.  Dispense: 1 g; Refill: 0  - moxifloxacin (VIGAMOX) 0.5 % Solution; Administer 1 Drop into the right eye 3 times a day for 7 days.  Dispense: 2 mL; Refill: 0     Comments/MDM:     Patient history and physical exam are consistent with a hordeolum internum of the right lower eyelid.  No signs or symptoms of retained foreign body, global conjunctivitis, or preseptal cellulitis.  No red flag symptoms noticed on exam or endorsed by patient  Outpatient management will consist of erythromycin ointment at night, moxifloxacin 3 times daily during the day, warm compresses, gentle massage with baby shampoo, strict hand hygiene, disinfect anything that comes in contact with face  Follow up in 3-5 days if no improvement in symptoms         Differential diagnosis, natural history, supportive care, and indications for immediate follow-up discussed.    Advised the patient to follow-up with the primary care physician for recheck, reevaluation, and consideration of further management.    Please note that this dictation was created using voice recognition software. I have made a reasonable attempt to correct obvious errors, but I expect that there are errors of grammar and possibly content that I did not discover before finalizing the note.    This note was electronically signed by KATHLEEN Mckinley

## 2024-12-17 NOTE — TELEPHONE ENCOUNTER
Jmai stated if there is another medication he can possibly try that wont have the side effects of the keppra but then stated pt wanted to wait until after the testing to start taking any seizure medications. So wait until after testing.   Pt is s/p extension of fusion to L2 on 6/24/19 with Dr. Lindo. Pt had MRI Lumbar Spine on 8/27/19.  Imaging reviewed by Dr. Lindo. No neurosurgical intervention at this time. Pt should follow up with MD for 3 month postoperative appointment at the end of September with lumbar flexion extension imaging. We will discuss at that time possible cervical intervention as well.    Incidental finding on MRI imaging demonstrated: bilateral renal cysts. Results faxed to pt's PCP Dr. Espinal.     Pt does not currently have follow up appointment with Dr. GEIGER: Tentatively scheduled for an appointment on 9/26/19 at 3:30pm.     I called pt to discuss results and plan of care. Numbers provided were either non working on voicemail boxes were full. Will attempt to call pt again at a later time.    Xray at bedside.

## 2025-01-03 ENCOUNTER — OFFICE VISIT (OUTPATIENT)
Dept: URGENT CARE | Facility: PHYSICIAN GROUP | Age: 35
End: 2025-01-03
Payer: COMMERCIAL

## 2025-01-03 VITALS
SYSTOLIC BLOOD PRESSURE: 116 MMHG | WEIGHT: 159.6 LBS | HEIGHT: 70 IN | RESPIRATION RATE: 16 BRPM | DIASTOLIC BLOOD PRESSURE: 74 MMHG | HEART RATE: 97 BPM | TEMPERATURE: 98.2 F | OXYGEN SATURATION: 99 % | BODY MASS INDEX: 22.85 KG/M2

## 2025-01-03 DIAGNOSIS — L72.9 CYST OF SCROTUM: ICD-10-CM

## 2025-01-03 PROCEDURE — 3078F DIAST BP <80 MM HG: CPT | Performed by: PHYSICIAN ASSISTANT

## 2025-01-03 PROCEDURE — 3074F SYST BP LT 130 MM HG: CPT | Performed by: PHYSICIAN ASSISTANT

## 2025-01-03 PROCEDURE — 99214 OFFICE O/P EST MOD 30 MIN: CPT | Performed by: PHYSICIAN ASSISTANT

## 2025-01-03 ASSESSMENT — ENCOUNTER SYMPTOMS
FLANK PAIN: 0
CHILLS: 0
FEVER: 0

## 2025-01-03 ASSESSMENT — FIBROSIS 4 INDEX: FIB4 SCORE: 2.96

## 2025-01-03 NOTE — PROGRESS NOTES
"  Subjective:   Barry Griffith is a 34 y.o. male who presents today with   Chief Complaint   Patient presents with    Bump     Lump on testicle first noticed a couple months ago and stayed the same size. No pain reported       HPI    Patient states he first noticed a bump in the genital area couple months ago.  Is not causing him any trouble or pain has not changed in size since then.  Patient states he has not been sexually active in the last year.  No urinary symptoms.    PMH:  has a past medical history of ASTHMA, Asthma, Epilepsy ([facility]), Mild intermittent asthma with acute exacerbation (6/10/2016), Seizure ([facility]), and Seizure disorder ([facility]).  MEDS:   Current Outpatient Medications:     brivaracetam (BRIVIACT) 100 MG Tab tablet, Take 1 Tablet by mouth 2 times a day for 120 days., Disp: 60 Tablet, Rfl: 3    lacosamide (VIMPAT) 100 MG Tab tablet, Take 1 Tablet by mouth 2 times a day for 120 days., Disp: 60 Tablet, Rfl: 3    albuterol 108 (90 Base) MCG/ACT Aero Soln inhalation aerosol, Inhale 2 Puffs every 6 hours as needed for Shortness of Breath., Disp: 2 Each, Rfl: 5    Cholecalciferol (VITAMIN D) 125 MCG (5000 UT) Cap, Take 5,000 Int'l Units by mouth every day., Disp: 90 Capsule, Rfl: 3  ALLERGIES: No Known Allergies  SURGHX: No past surgical history on file.  SOCHX:  reports that he has never smoked. He has never used smokeless tobacco. He reports current alcohol use. He reports current drug use. Drugs: Marijuana and Inhaled.  FH: Reviewed with patient, not pertinent to this visit.       Review of Systems   Constitutional:  Negative for chills and fever.   Genitourinary:  Negative for dysuria, flank pain, frequency, hematuria and urgency.      Objective:   /74 (BP Location: Right arm, Patient Position: Sitting, BP Cuff Size: Adult)   Pulse 97   Temp 36.8 °C (98.2 °F) (Temporal)   Resp 16   Ht 1.778 m (5' 10\")   Wt 72.4 kg (159 lb 9.6 oz)   SpO2 99%   BMI 22.90 kg/m²   Physical Exam  Vitals " and nursing note reviewed.   Constitutional:       General: He is not in acute distress.     Appearance: Normal appearance. He is well-developed. He is not ill-appearing or toxic-appearing.   HENT:      Head: Normocephalic and atraumatic.      Right Ear: Hearing normal.      Left Ear: Hearing normal.   Cardiovascular:      Rate and Rhythm: Normal rate and regular rhythm.      Heart sounds: Normal heart sounds.   Pulmonary:      Effort: Pulmonary effort is normal.   Abdominal:      Hernia: There is no hernia in the right inguinal area.   Genitourinary:     Penis: Circumcised. No discharge, swelling or lesions.       Testes: Normal.         Right: Mass, tenderness, swelling, testicular hydrocele or varicocele not present. Right testis is descended.          Comments: Pea-sized cystic lesion superficially noted to the right scrotal area with no surrounding erythema.  No induration or fluctuance.  No drainage or discharge.  No open wound site.  No necrosis.  Musculoskeletal:      Comments: Normal movement in all 4 extremities   Skin:     General: Skin is warm and dry.   Neurological:      Mental Status: He is alert.      Coordination: Coordination normal.   Psychiatric:         Mood and Affect: Mood normal.       US SCROTUM   Pending    Assessment/Plan:   Assessment    1. Cyst of scrotum  - [id number]; Future  - Referral to Urology  - Referral to General Surgery    Presentation appears most consistent with subcutaneous cyst of the scrotal area.  Does not show any signs of genital warts or herpes at this time.  No signs of STDs on exam although did offer testing.  Patient is interested in having the area removed and I placed a referral to urology as well as general surgery.  Did order ultrasound for further confirmation of the area and make sure there is no deeper involvement.  He is not having any pain at all today or any associated symptoms.  Patient was given information regarding ultrasound and phone number  to call to have that scheduled.    Differential diagnosis, natural history, supportive care, and indications for immediate follow-up discussed.   Patient given instructions and understanding of medications and treatment.    If not improving in 3-5 days, F/U with PCP or return to UC if symptoms worsen.    Patient agreeable to plan.      Please note that this dictation was created using voice recognition software. I have made every reasonable attempt to correct obvious errors, but I expect that there are errors of grammar and possibly content that I did not discover before finalizing the note.    Thony Garcia PA-C

## 2025-01-14 ENCOUNTER — HOSPITAL ENCOUNTER (EMERGENCY)
Facility: MEDICAL CENTER | Age: 35
End: 2025-01-15
Attending: STUDENT IN AN ORGANIZED HEALTH CARE EDUCATION/TRAINING PROGRAM
Payer: COMMERCIAL

## 2025-01-14 ENCOUNTER — APPOINTMENT (OUTPATIENT)
Dept: RADIOLOGY | Facility: MEDICAL CENTER | Age: 35
End: 2025-01-14
Attending: STUDENT IN AN ORGANIZED HEALTH CARE EDUCATION/TRAINING PROGRAM
Payer: COMMERCIAL

## 2025-01-14 DIAGNOSIS — R56.9 SEIZURE (HCC): ICD-10-CM

## 2025-01-14 DIAGNOSIS — R45.1 AGITATION REQUIRING SEDATION PROTOCOL: ICD-10-CM

## 2025-01-14 DIAGNOSIS — S09.90XA CLOSED HEAD INJURY, INITIAL ENCOUNTER: ICD-10-CM

## 2025-01-14 PROCEDURE — 99285 EMERGENCY DEPT VISIT HI MDM: CPT

## 2025-01-14 PROCEDURE — 70450 CT HEAD/BRAIN W/O DYE: CPT

## 2025-01-14 PROCEDURE — 96374 THER/PROPH/DIAG INJ IV PUSH: CPT

## 2025-01-14 PROCEDURE — 93005 ELECTROCARDIOGRAM TRACING: CPT | Mod: TC | Performed by: STUDENT IN AN ORGANIZED HEALTH CARE EDUCATION/TRAINING PROGRAM

## 2025-01-14 PROCEDURE — 36415 COLL VENOUS BLD VENIPUNCTURE: CPT

## 2025-01-14 PROCEDURE — 700111 HCHG RX REV CODE 636 W/ 250 OVERRIDE (IP)

## 2025-01-14 RX ORDER — MIDAZOLAM HYDROCHLORIDE 1 MG/ML
INJECTION INTRAMUSCULAR; INTRAVENOUS
Status: COMPLETED
Start: 2025-01-14 | End: 2025-01-14

## 2025-01-14 RX ADMIN — MIDAZOLAM HYDROCHLORIDE 4 MG: 1 INJECTION INTRAMUSCULAR; INTRAVENOUS at 22:48

## 2025-01-14 RX ADMIN — MIDAZOLAM HYDROCHLORIDE 4 MG: 1 INJECTION, SOLUTION INTRAMUSCULAR; INTRAVENOUS at 22:48

## 2025-01-14 ASSESSMENT — FIBROSIS 4 INDEX: FIB4 SCORE: 3.05

## 2025-01-15 VITALS
WEIGHT: 160 LBS | HEART RATE: 83 BPM | TEMPERATURE: 97 F | DIASTOLIC BLOOD PRESSURE: 55 MMHG | RESPIRATION RATE: 22 BRPM | OXYGEN SATURATION: 95 % | HEIGHT: 70 IN | SYSTOLIC BLOOD PRESSURE: 112 MMHG | BODY MASS INDEX: 22.9 KG/M2

## 2025-01-15 LAB
ALBUMIN SERPL BCP-MCNC: 4.7 G/DL (ref 3.2–4.9)
ALBUMIN/GLOB SERPL: 2.2 G/DL
ALP SERPL-CCNC: 64 U/L (ref 30–99)
ALT SERPL-CCNC: 23 U/L (ref 2–50)
ANION GAP SERPL CALC-SCNC: 15 MMOL/L (ref 7–16)
AST SERPL-CCNC: 37 U/L (ref 12–45)
BASOPHILS # BLD AUTO: 0.3 % (ref 0–1.8)
BASOPHILS # BLD: 0.05 K/UL (ref 0–0.12)
BILIRUB SERPL-MCNC: 0.3 MG/DL (ref 0.1–1.5)
BUN SERPL-MCNC: 20 MG/DL (ref 8–22)
CALCIUM ALBUM COR SERPL-MCNC: 8.7 MG/DL (ref 8.5–10.5)
CALCIUM SERPL-MCNC: 9.3 MG/DL (ref 8.5–10.5)
CHLORIDE SERPL-SCNC: 103 MMOL/L (ref 96–112)
CO2 SERPL-SCNC: 19 MMOL/L (ref 20–33)
CREAT SERPL-MCNC: 1.09 MG/DL (ref 0.5–1.4)
EKG IMPRESSION: NORMAL
EOSINOPHIL # BLD AUTO: 0.02 K/UL (ref 0–0.51)
EOSINOPHIL NFR BLD: 0.1 % (ref 0–6.9)
ERYTHROCYTE [DISTWIDTH] IN BLOOD BY AUTOMATED COUNT: 40.8 FL (ref 35.9–50)
ETHANOL BLD-MCNC: <10.1 MG/DL
GFR SERPLBLD CREATININE-BSD FMLA CKD-EPI: 91 ML/MIN/1.73 M 2
GLOBULIN SER CALC-MCNC: 2.1 G/DL (ref 1.9–3.5)
GLUCOSE SERPL-MCNC: 70 MG/DL (ref 65–99)
HCT VFR BLD AUTO: 45.1 % (ref 42–52)
HGB BLD-MCNC: 15.2 G/DL (ref 14–18)
IMM GRANULOCYTES # BLD AUTO: 0.15 K/UL (ref 0–0.11)
IMM GRANULOCYTES NFR BLD AUTO: 1 % (ref 0–0.9)
LYMPHOCYTES # BLD AUTO: 1.14 K/UL (ref 1–4.8)
LYMPHOCYTES NFR BLD: 7.3 % (ref 22–41)
MCH RBC QN AUTO: 31.5 PG (ref 27–33)
MCHC RBC AUTO-ENTMCNC: 33.7 G/DL (ref 32.3–36.5)
MCV RBC AUTO: 93.4 FL (ref 81.4–97.8)
MONOCYTES # BLD AUTO: 0.92 K/UL (ref 0–0.85)
MONOCYTES NFR BLD AUTO: 5.9 % (ref 0–13.4)
NEUTROPHILS # BLD AUTO: 13.3 K/UL (ref 1.82–7.42)
NEUTROPHILS NFR BLD: 85.4 % (ref 44–72)
NRBC # BLD AUTO: 0 K/UL
NRBC BLD-RTO: 0 /100 WBC (ref 0–0.2)
PLATELET # BLD AUTO: 150 K/UL (ref 164–446)
PMV BLD AUTO: 10.8 FL (ref 9–12.9)
POTASSIUM SERPL-SCNC: 3.9 MMOL/L (ref 3.6–5.5)
PROT SERPL-MCNC: 6.8 G/DL (ref 6–8.2)
RBC # BLD AUTO: 4.83 M/UL (ref 4.7–6.1)
SODIUM SERPL-SCNC: 137 MMOL/L (ref 135–145)
WBC # BLD AUTO: 15.6 K/UL (ref 4.8–10.8)

## 2025-01-15 PROCEDURE — 85025 COMPLETE CBC W/AUTO DIFF WBC: CPT

## 2025-01-15 PROCEDURE — 82077 ASSAY SPEC XCP UR&BREATH IA: CPT

## 2025-01-15 PROCEDURE — 80053 COMPREHEN METABOLIC PANEL: CPT

## 2025-01-15 RX ORDER — MIDAZOLAM HYDROCHLORIDE 1 MG/ML
4 INJECTION INTRAMUSCULAR; INTRAVENOUS ONCE
Status: COMPLETED | OUTPATIENT
Start: 2025-01-15 | End: 2025-01-14

## 2025-01-15 NOTE — ED PROVIDER NOTES
ED Provider Note    CHIEF COMPLAINT  Chief Complaint   Patient presents with    Seizure     BIBA from home. Patient has witnessed seizure from family members lasting 3 mins. Patient has hx of epilepsy and TBI. Patient also has a hx of being combative postictal.     LIMITATION TO HISTORY   Select: Altered mental status    HPI    Barry Griffith is a 35 y.o. male who presents to the Emergency Department via EMS for a seizure onset earlier today. Per the nursing note the patient had a seizure earlier today which lasted for 3 minutes and was witnessed by a family member, prompting EMS transport to the ED. According to EMS the patient had the seizure at bedside and hit his head on a night stand during the incident.. The patient was given Versed by EMS while en route. The patient has a history of seizures which is reportedly triggered when the patient is hypoglycemic. He has also been reported to be combative when postictal and the patient was combative at bedside upon arrival. 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     OUTSIDE HISTORIAN(S):  Select: None     EXTERNAL RECORDS REVIEWED  Select: Reviewed discharge summary from December 2024.    PAST MEDICAL HISTORY  Past Medical History:   Diagnosis Date    ASTHMA     Asthma since infancy.    Asthma     Epilepsy (HCC)     Mild intermittent asthma with acute exacerbation 6/10/2016    Seizure (HCC)     Seizure disorder (HCC)      SURGICAL HISTORY  History reviewed. No pertinent surgical history.    FAMILY HISTORY  Family History   Problem Relation Age of Onset    Hypertension Mother     Brain Aneurysm Mother     Hyperlipidemia Father     Cancer Neg Hx     Diabetes Neg Hx       SOCIAL HISTORY  Social History     Socioeconomic History    Marital status: Single     Spouse name: None noted    Number of children: 2    Years of education: None noted    Highest education level: None noted   Occupational History    Occupation: guidance  "counsellor [employer]    Occupation: psych coordinator [employer]   Tobacco Use    Smoking status: Never    Smokeless tobacco: Never   Vaping Use    Vaping status: Never Used   Substance and Sexual Activity    Alcohol use: Yes     Comment: rare, socially on the weekend    Drug use: Yes     Types: Marijuana, Inhaled     Comment: THC; denies h/o IVDU    Sexual activity: Yes     Partners: Female     Birth control/protection: Condom, Pill   Other Topics Concern    None noted   Social History Narrative    ** Merged History Encounter **          Social Drivers of Health     Financial Resource Strain: None noted   Food Insecurity: None noted   Transportation Needs: None noted   Physical Activity: None noted   Stress: None noted    Social Connections: None noted   Intimate Partner Violence: None noted   Housing Stability: None noted     CURRENT MEDICATIONS  No current facility-administered medications prior to encounter.     Current Outpatient Medications Prior to Encounter   Medication Sig Dispense Refill    brivaracetam (BRIVIACT) 100 MG Tab tablet Take 1 Tablet by mouth 2 times a day for 120 days. 60 Tablet 3    lacosamide (VIMPAT) 100 MG Tab tablet Take 1 Tablet by mouth 2 times a day for 120 days. 60 Tablet 3    albuterol 108 (90 Base) MCG/ACT Aero Soln inhalation aerosol Inhale 2 Puffs every 6 hours as needed for Shortness of Breath. 2 Each 5    Cholecalciferol (VITAMIN D) 125 MCG (5000 UT) Cap Take 5,000 Int'l Units by mouth every day. 90 Capsule 3     ALLERGIES  No Known Allergies    PHYSICAL EXAM  VITAL SIGNS:/77   Pulse 79   Temp 36.1 °C (97 °F) (Temporal)   Resp (!) 24   Ht 1.778 m (5' 10\")   Wt 72.6 kg (160 lb)   SpO2 92%   BMI 22.96 kg/m²       GENERAL: Awake and alert, Somnolent, Somewhat combative, Trying to get out of gurney, Moaning   HEAD: Normocephalic, Abrasion on forehead.   NECK: Normal range of motion, without meningismus  EYES: Pupils Equal, Round, Reactive to Light, extraocular " movements intact, conjunctiva white  ENT: Mucous membranes moist, oropharynx clear  PULMONARY: Normal effort, clear to auscultation  CARDIOVASCULAR: No murmurs, clicks or rubs, peripheral pulses 2+  ABDOMINAL: Soft, non-tender, no guarding or rigidity present, no pulsatile masses  BACK: no midline tenderness, no costovertebral tenderness  NEUROLOGICAL: Grossly non-focal neurological examination, Grossly non focal, Not following commands, Moving all four extremities.   EXTREMITIES: No edema, normal to inspection  SKIN: Warm and dry.  PSYCHIATRIC: Affect is appropriate    DIAGNOSTIC STUDIES / PROCEDURES    EKG  I have independently interpreted this EKG  Results for orders placed or performed during the hospital encounter of 25   EKG   Result Value Ref Range    Report       [facility] Emergency Dept.    Test Date:  2025-01-15  Pt Name:    DUANE AGUILAR                Department: ER  MRN:        [id number]                      Room:        22  Gender:     Male                         Technician: [id number]  :        1990                   Requested By:RAVINDRA MORGAN  Order #:    [id number]                    Reading MD: Ravindra Morgan    Measurements  Intervals                                Axis  Rate:       77                           P:          84  MS:         137                          QRS:        88  QRSD:       103                          T:          68  QT:         369  QTc:        418    Interpretive Statements  Sinus arrhythmia  Right atrial enlargement  Consider right ventricular hypertrophy  ST elev, probable normal early repol pattern  Compared to ECG 2024 14:10:51  Atrial abnormality now present  ST (T wave) deviation now present  Atrial flutter no longer present  Electronically Signed On 01- 04:34:27 P ST by Ravindra Morgan       LABS  Labs Reviewed   CBC WITH DIFFERENTIAL - Abnormal; Notable for the following components:       Result Value    WBC 15.6 (*)      Platelet Count 150 (*)     Neutrophils-Polys 85.40 (*)     Lymphocytes 7.30 (*)     Immature Granulocytes 1.00 (*)     Neutrophils (Absolute) 13.30 (*)     Monos (Absolute) 0.92 (*)     Immature Granulocytes (abs) 0.15 (*)     All other components within normal limits   COMP METABOLIC PANEL - Abnormal; Notable for the following components:    Co2 19 (*)     All other components within normal limits   DIAGNOSTIC ALCOHOL   ESTIMATED GFR   All labs reviewed by me.     RADIOLOGY  I have independently interpreted the diagnostic imaging associated with this visit and am waiting the final reading from the radiologist.   My preliminary interpretation is as follows: no brain bleed  Formal Radiologist interpretation:  CT-HEAD W/O   Final Result         1.  No acute intracranial abnormality.   2.  Left maxillary sinusitis changes                 COURSE & MEDICAL DECISION MAKING    ED COURSE:    INTERVENTIONS BY ME:  Medications   midazolam (Versed) injection 4 mg (4 mg Intravenous Given 1/14/25 2248)       Response on recheck:    10:41 PM - Patient seen and examined at bedside. The patient has a patient of combative behavior, there aee 6 security guards at bedside as he is somewhat agitated and considered restraints, Considering need for labs for studies, will sedate with versed. Plan to order imaging and labs to evaluate as well. Ordered CT-Head w/Out, EKG, CBC w/Diff, and CMP to evaluate. Patient was medicated with Versed 5 mg.     12:15 PM - Patient was reevaluated at bedside. The patient is sleeping comfortably in bed with normal vital signs.     1:43 AM - Patient was reevaluated at bedside.The patient is A and O x4 and is conversive. He reports being compliant with his medication and plans to follow up with his neurologist. I discussed plan for discharge and follow up as outlined below. The patient is stable for discharge at this time and will return for any new or worsening symptoms. Patient verbalizes understanding and  support with my plan for discharge.     CRITICAL CARE  The very real possibility of a deterioration of this patient's condition required the highest level of my preparedness for sudden, emergent intervention.  I provided critical care services, which included medication orders, frequent reevaluations of the patient's condition and response to treatment, ordering and reviewing test results, and discussing the case with various consultants.  The critical care time associated with the care of the patient was 35 minutes. Review chart for interventions. This time is exclusive of any other billable procedures.     INITIAL ASSESSMENT, COURSE AND PLAN  Care Narrative:   This patient presented after a witnessed seizure lasting three minutes, during which he sustained head trauma. He has a history of epilepsy and traumatic brain injury, with seizures previously linked to hypoglycemia. On arrival, the patient was postictal, combative, and required sedation with 5mg of midazolam to facilitate evaluation.  The workup included a CT head, which showed no acute intracranial abnormalities but did reveal left maxillary sinusitis. An EKG demonstrated sinus arrhythmia and right atrial enlargement without acute ischemic changes. Lab results were notable for mild leukocytosis, likely stress-related, and mild metabolic acidosis with a decreased CO2 of 19, which could be secondary to postictal hyperventilation or other metabolic stress. No significant electrolyte disturbances were noted, and imaging excluded intracranial bleeding.  The patient was stabilized with sedation, and his condition improved to alertness and full orientation without focal neurological deficits. He reported medication compliance and was instructed to follow up with his neurologist. Given the absence of acute findings and resolution of symptoms, he was discharged home with instructions to return for any worsening symptoms, including persistent neurological changes,  additional seizures, or other concerning signs.  ADDITIONAL PROBLEM LIST      DISPOSITION AND DISCUSSIONS  Discussion of management with other [facility] or appropriate source(s): None    I have discussed management of the patient with the following physicians and ALEXA's: None    Escalation of care considered, and ultimately not performed:    Barriers to care at this time, including but not limited to:  None .     Decision tools and prescription drugs considered including, but not limited to:     The patient will return for new or worsening symptoms and is stable at the time of discharge.    DISPOSITION:  Patient will be discharged home in stable condition.    FOLLOW UP:  Jami Griffith P.A.-C.  [address]  [facility] [address]  [phone]          [facility], Emergency Dept  [address] [facility] [place] [address] [phone]    If symptoms worsen    FINAL DIAGNOSIS  1. Seizure (HCC)    2. Agitation requiring sedation protocol    3. Closed head injury, initial encounter      IYosef (Scribe), am scribing for, and in the presence of, Ravindra Monroy.    Electronically signed by: Yosef Alexander (Scribe), 1/14/2025    IRavindra personally performed the services described in this documentation, as scribed by Yosef Alexander in my presence, and it is both accurate and complete.     Electronically signed by: Ravindra Monroy DO ,4:46 AM 01/14/25

## 2025-01-15 NOTE — ED TRIAGE NOTES
Chief Complaint   Patient presents with    Seizure     BIBA from home. Patient has witnessed seizure from family members lasting 3 mins. Patient has hx of epilepsy and TBI. Patient also has a hx of being combative postictal.     Patient presents altered and confused. EMS witnessed positive HS. EMS gave 250ml of D10 and 5 versed. IV 20 sun to left forearm

## 2025-02-10 ENCOUNTER — OFFICE VISIT (OUTPATIENT)
Dept: UROLOGY | Facility: MEDICAL CENTER | Age: 35
End: 2025-02-10
Payer: COMMERCIAL

## 2025-02-10 DIAGNOSIS — N50.9 SCROTAL SKIN LESION: ICD-10-CM

## 2025-02-10 PROCEDURE — 99203 OFFICE O/P NEW LOW 30 MIN: CPT | Performed by: UROLOGY

## 2025-02-10 NOTE — PROGRESS NOTES
Chief Complaint: scrotal cyst    HPI: Barry Griffith is a 35 y.o. male with a history of asthma and epilepsy, who presented to urgent care in early January with a palpable scrotal lesion; he was referred here for further evaluation.     He first noticed this lesion, which is on the right lateral scrotum, about 6 months ago. It has likely grown a bit over that time, but has done so slowly. It is uncomfortable after he tries to pop it, but there's been no bleeding or discharge.    He has no other scrotal pain.     A scrotal ultrasound was ordered on 1/3/25 but has not yet been completed.       Past Medical History:  Past Medical History:   Diagnosis Date    ASTHMA     Asthma since infancy.    Asthma     Epilepsy (HCC)     Mild intermittent asthma with acute exacerbation 6/10/2016    Seizure (HCC)     Seizure disorder (HCC)        Past Surgical History:  No past surgical history on file.    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[employer]    Occupation: psych coordinator [employer]   Tobacco Use    Smoking status: Never    Smokeless tobacco: Never   Vaping Use    Vaping status: Never Used   Substance and Sexual Activity    Alcohol use: Yes     Comment: rare, socially on the weekend    Drug use: Yes     Types: Marijuana, Inhaled     Comment: THC; denies h/o IVDU    Sexual activity: Yes     Partners: Female     Birth control/protection: Condom, Pill   Other Topics Concern    Not on file   Social History Narrative    ** Merged History Encounter **          Social Drivers of Health     Financial  Resource Strain: Not on file   Food Insecurity: Not on file   Transportation Needs: Not on file   Physical Activity: Not on file   Stress: Not on file   Social Connections: Not on file   Intimate Partner Violence: Not on file   Housing Stability: Not on file       Medications:  Current Outpatient Medications   Medication Sig Dispense Refill    brivaracetam (BRIVIACT) 100 MG Tab tablet Take 1 Tablet by mouth 2 times a day for 120 days. 60 Tablet 3    lacosamide (VIMPAT) 100 MG Tab tablet Take 1 Tablet by mouth 2 times a day for 120 days. 60 Tablet 3    albuterol 108 (90 Base) MCG/ACT Aero Soln inhalation aerosol Inhale 2 Puffs every 6 hours as needed for Shortness of Breath. 2 Each 5    Cholecalciferol (VITAMIN D) 125 MCG (5000 UT) Cap Take 5,000 Int'l Units by mouth every day. 90 Capsule 3     No current facility-administered medications for this visit.       Allergies:  No Known Allergies    Review of Systems:  Constitutional: Negative for fever, chills and malaise/fatigue.   HENT: Negative for congestion.    Eyes: Negative for pain.   Respiratory: Negative for cough and shortness of breath.    Cardiovascular: Negative for leg swelling.   Gastrointestinal: Negative for nausea, vomiting, abdominal pain and diarrhea.   Genitourinary: Negative for dysuria and hematuria.   Skin: Negative for rash.   Neurological: Negative for dizziness, focal weakness and headaches.   Endo/Heme/Allergies: Does not bruise/bleed easily.   Psychiatric/Behavioral: Negative for depression.  The patient is not nervous/anxious.        Physical Exam:  There were no vitals filed for this visit.    GENERAL: well appearing, well nourished, NAD  RESP: respiratory effort normal  ABDOMEN: soft, nontender, nondistended, no masses or organomegaly  HERNIAS: no hernias found on exam  SKIN/LYMPH: normal coloration and turgor, no suspicious skin lesions noted  NEURO/PSYCH: alert, oriented, normal speech, no focal findings or movement disorder  noted  EXTREMITIES: no pedal edema noted  GENITOURINARY: normal male external genitalia, penis is normal, testes descended bilaterally, no testicular masses or scrotal swelling, and there is a 6 mm mobile lesion just beneath the epidermis on the right upper scrotum; this appears to be an epidermoid cyst  RECTAL: Exam Deferred    Data Review:    Labs:  POCT UA   Lab Results   Component Value Date/Time    POCCOLOR LIGHT YELLOW 07/24/2009 10:06 AM    POCAPPEAR CLEAR 07/24/2009 10:06 AM    POCLEUKEST NEG 07/24/2009 10:06 AM    POCNITRITE NEG 07/24/2009 10:06 AM    POCUROBILIGE NEG 07/24/2009 10:06 AM    POCPROTEIN NEG 07/24/2009 10:06 AM    POCURPH 6.0 07/24/2009 10:06 AM    POCBLOOD NEG 07/24/2009 10:06 AM    POCSPGRV 1.015 07/24/2009 10:06 AM    POCKETONES NEG 07/24/2009 10:06 AM    POCBILIRUBIN NEG 07/24/2009 10:06 AM    POCGLUCUA NEG 07/24/2009 10:06 AM      CBC   Lab Results   Component Value Date/Time    WBC 15.6 (H) 01/15/2025 0101    RBC 4.83 01/15/2025 0101    HEMOGLOBIN 15.2 01/15/2025 0101    HEMATOCRIT 45.1 01/15/2025 0101    MCV 93.4 01/15/2025 0101    MCH 31.5 01/15/2025 0101    MCHC 33.7 01/15/2025 0101    RDW 40.8 01/15/2025 0101    MPV 10.8 01/15/2025 0101    LYMPHOCYTES 7.30 (L) 01/15/2025 0101    LYMPHS 1.14 01/15/2025 0101    MONOCYTES 5.90 01/15/2025 0101    MONOS 0.92 (H) 01/15/2025 0101    EOSINOPHILS 0.10 01/15/2025 0101    EOS 0.02 01/15/2025 0101    BASOPHILS 0.30 01/15/2025 0101    BASO 0.05 01/15/2025 0101    NRBC 0.00 01/15/2025 0101       CMP   Lab Results   Component Value Date/Time    SODIUM 137 01/15/2025 0101    POTASSIUM 3.9 01/15/2025 0101    CHLORIDE 103 01/15/2025 0101    CO2 19 (L) 01/15/2025 0101    ANION 15.0 01/15/2025 0101    GLUCOSE 70 01/15/2025 0101    BUN 20 01/15/2025 0101    CREATININE 1.09 01/15/2025 0101    GFRCKD 91 01/15/2025 0101    CALCIUM 9.3 01/15/2025 0101    CORRCALC 8.7 01/15/2025 0101    ASTSGOT 37 01/15/2025 0101    ALTSGPT 23 01/15/2025 0101    ALKPHOSPHAT  64 01/15/2025 0101    TBILIRUBIN 0.3 01/15/2025 0101    ALBUMIN 4.7 01/15/2025 0101    TOTPROTEIN 6.8 01/15/2025 0101    GLOBULIN 2.1 01/15/2025 0101    AGRATIO 2.2 01/15/2025 0101       Assessment: 35 y.o. male with a history of seizures but in otherwise very good health, here today for evaluation of a scrotal lesion that he has noticed for about the last six months. On exam, this appears to be an epidermoid cyst or sebaceous inclusion cyst.     I explained that this is not an infectious disease, nor is it a sign of cancer or malignancy. The lesion is benign, but it is bothersome and intermittently painful and so he'd like it removed.    We discussed removal either as an office procedure or in the operating room, and he is comfortable having it done with local anesthesia in the office.       Plan:    -Schedule excision of benign scrotal lesion in the office      Cain Pink MD

## 2025-02-25 ENCOUNTER — PROCEDURE VISIT (OUTPATIENT)
Dept: UROLOGY | Facility: MEDICAL CENTER | Age: 35
End: 2025-02-25
Payer: COMMERCIAL

## 2025-02-25 DIAGNOSIS — N50.9 SCROTAL SKIN LESION: ICD-10-CM

## 2025-02-25 RX ORDER — BUPIVACAINE HYDROCHLORIDE 5 MG/ML
5 INJECTION, SOLUTION EPIDURAL; INTRACAUDAL ONCE
Status: COMPLETED | OUTPATIENT
Start: 2025-02-25 | End: 2025-02-25

## 2025-02-25 RX ADMIN — Medication 5 ML: at 15:31

## 2025-02-25 RX ADMIN — BUPIVACAINE HYDROCHLORIDE 5 ML: 5 INJECTION, SOLUTION EPIDURAL; INTRACAUDAL at 15:31

## 2025-02-25 NOTE — PROGRESS NOTES
Procedure Note: Excision of benign scrotal lesion    Pre-Procedure Diagnosis: Right scrotal cyst    Post-Procedure Diagnosis: Same     Procedure Performed: Excision of benign scrotal lesion     Surgeon: Cain Pink MD    EBL: minimal    Complications: None     Specimens: Scrotal lesion    Anesthesia: Local anesthesia    Procedure in Detail:   After obtaining informed consent, the patient was preped and draped in the standard sterile fashionion in the lower abdominal and genitalia.    The scrotal cystic lesion was located on the right upper scrotum, and was about 6-7 mm in diameter. The surrounding skin was anesthetized with a mixture of bupivicaine and lidocaine, and this was injected about 2 minutes prior to making an incision. Once the patient was sufficiently numbed, a scalpel was used to open along the top of the lesion. This exposed thick white debris, likely representing sebaceous secretions, but at the base of this it was very densely adherent to underlying Dartos tissue. I therefore used Iris scissors to excise that base of Dartos along with a small rim of surrounding scrotal skin, and this specimen was removed. Cautery was used to obtain hemostasis. The scrotal incision as then closed with interrupted horizontal mattress sutures using 3-0 chromic. Scrotal gauze was applied. He tolerated the procedure well and was sent home with in stable condition.

## 2025-03-03 ENCOUNTER — HOSPITAL ENCOUNTER (EMERGENCY)
Facility: MEDICAL CENTER | Age: 35
End: 2025-03-03
Attending: EMERGENCY MEDICINE
Payer: COMMERCIAL

## 2025-03-03 VITALS
OXYGEN SATURATION: 98 % | WEIGHT: 160 LBS | BODY MASS INDEX: 22.9 KG/M2 | HEART RATE: 96 BPM | TEMPERATURE: 97.7 F | HEIGHT: 70 IN | SYSTOLIC BLOOD PRESSURE: 131 MMHG | DIASTOLIC BLOOD PRESSURE: 95 MMHG | RESPIRATION RATE: 16 BRPM

## 2025-03-03 DIAGNOSIS — G40.919 BREAKTHROUGH SEIZURE (HCC): ICD-10-CM

## 2025-03-03 DIAGNOSIS — G40.909 NONINTRACTABLE EPILEPSY WITHOUT STATUS EPILEPTICUS, UNSPECIFIED EPILEPSY TYPE (HCC): ICD-10-CM

## 2025-03-03 PROCEDURE — 99285 EMERGENCY DEPT VISIT HI MDM: CPT

## 2025-03-03 ASSESSMENT — FIBROSIS 4 INDEX: FIB4 SCORE: 1.75

## 2025-03-03 NOTE — ED PROVIDER NOTES
ED Provider Note    CHIEF COMPLAINT  Chief Complaint   Patient presents with    Seizure       EXTERNAL RECORDS REVIEWED  11/20/2024: Outpatient neurology epilepsy: Seizure disorder on Briviact and Vimpat    HPI/ROS  LIMITATION TO HISTORY   Patient is postictal  OUTSIDE HISTORIAN(S):  EMS    Barry Griffith is a 35 y.o. male who presents for evaluation of witnessed seizure.  Reports at his work place where this was a 9-minute seizure.  History of the same on medication.  Apparently the patient has a history of combative behavior and his postictal state and that is exactly what EMS described.  They treated him with Versed.  I was called to emergently evaluate him given the concerns about agitated behavior as he wakes up, the patient was placed in soft restraints upon initial encounter.  At the time of initial encounter he is not able to provide history    PAST MEDICAL HISTORY   has a past medical history of ASTHMA, Asthma, Epilepsy (HCC), Mild intermittent asthma with acute exacerbation (6/10/2016), Seizure (HCC), and Seizure disorder (HCC).    SURGICAL HISTORY  patient denies any surgical history    FAMILY HISTORY  Family History   Problem Relation Age of Onset    Hypertension Mother     Brain Aneurysm Mother     Hyperlipidemia Father     Cancer Neg Hx     Diabetes Neg Hx        SOCIAL HISTORY  Social History     Tobacco Use    Smoking status: Never    Smokeless tobacco: Never   Vaping Use    Vaping status: Never Used   Substance and Sexual Activity    Alcohol use: Yes     Comment: rare, socially on the weekend    Drug use: Yes     Types: Marijuana, Inhaled     Comment: THC; denies h/o IVDU    Sexual activity: Yes     Partners: Female     Birth control/protection: Condom, Pill       CURRENT MEDICATIONS  Home Medications       Reviewed by Chela Singh R.N. (Registered Nurse) on 03/03/25 at 1328  Med List Status: Partial     Medication Last Dose Status   albuterol 108 (90 Base) MCG/ACT Aero Soln inhalation  "aerosol  Active   brivaracetam (BRIVIACT) 100 MG Tab tablet  Active   Cholecalciferol (VITAMIN D) 125 MCG (5000 UT) Cap  Active   lacosamide (VIMPAT) 100 MG Tab tablet  Active                    ALLERGIES  No Known Allergies    PHYSICAL EXAM  VITAL SIGNS: /58   Pulse (!) 101   Temp 36.5 °C (97.7 °F)   Resp 16   Ht 1.778 m (5' 10\")   Wt 72.6 kg (160 lb)   SpO2 93%   BMI 22.96 kg/m²    Constitutional: Sleepy but arousable, follows commands but is clearly confused,  HENT: Normocephalic, no obvious evidence of acute trauma.  Eyes: No scleral icterus. Normal conjunctiva   Thorax & Lungs: Normal nonlabored respirations. I appreciate no wheezing, rhonchi or rales. There is normal air movement.  Upon cardiac ascultation I appreciate a regular heart rhythm and a mildly tachycardic rate  Abdomen: The abdomen is not visibly distended. Upon palpation, I find it to be without tenderness.  No mass appreciated.  Skin: The exposed portions of skin reveal no obvious rash or other abnormalities.  Extremities/Musculoskeletal: No obvious sign of acute trauma. No asymmetric calf tenderness or edema.   Neurologic: Sleepy but arousable, confused, no obvious focal deficit          COURSE & MEDICAL DECISION MAKING    ASSESSMENT, COURSE AND PLAN  Care Narrative: 35-year-old male with a seizure disorder on medication comes in after witnessed seizure.  No longer seizing upon arrival but does have a history of combative postictal course.  Received Versed in the ambulance.  Will keep a very close eye on him and allow him to slowly return to normal consciousness.  Afterwards will get a secondary examination on him for reevaluation and disposition    Upon reevaluation about an hour and a half after his arrival the patient is awake, alert, he is apologetic for requiring restraints.  He reports that he has not missed a dose of his seizure medicines recently.  This morning he was in his normal state of health.  He now feels well, back to " baseline.  At this point he is discharged home in stable condition to follow-up with his neurology team.        DISPOSITION AND DISCUSSIONS    Escalation of care considered, and ultimately not performed: I did consider escalation of care to include blood work and imaging but since he has returned to his normal baseline and he has no specific complaints at this time I did not feel as though he would be helpful at this time      FINAL DIAGNOSIS  1. Breakthrough seizure (HCC)    2. Nonintractable epilepsy without status epilepticus, unspecified epilepsy type (HCC)         Electronically signed by: Catarino Colunga M.D., 3/3/2025 1:28 PM

## 2025-03-03 NOTE — ED NOTES
Patient went out of the ER ambulatory with steady gait., alert and oriented x 4, with all belongings.

## 2025-03-03 NOTE — ED TRIAGE NOTES
"BIB EMS from work; per EMS report; patient had a witnessed seizure for 9 mins. Patient was combative with coworker sustaining a bruising on forehead. No GLF and head strike per EMS report.      Patient arrived with 4P restraint due to being combayive; received 5 mg IM Versed en route.   AOX0 GCS8; patient trying to get out from bed upon arrival and doesn't follow command; soft restraints initiated        /58   Pulse (!) 101   Temp 36.5 °C (97.7 °F)   Resp 16   Ht 1.778 m (5' 10\")   Wt 72.6 kg (160 lb)   SpO2 93%   BMI 22.96 kg/m²     "

## 2025-03-13 ENCOUNTER — HOSPITAL ENCOUNTER (EMERGENCY)
Facility: MEDICAL CENTER | Age: 35
End: 2025-03-13
Attending: EMERGENCY MEDICINE
Payer: COMMERCIAL

## 2025-03-13 VITALS
HEIGHT: 70 IN | DIASTOLIC BLOOD PRESSURE: 64 MMHG | HEART RATE: 83 BPM | BODY MASS INDEX: 23.62 KG/M2 | WEIGHT: 165 LBS | OXYGEN SATURATION: 95 % | TEMPERATURE: 97.7 F | SYSTOLIC BLOOD PRESSURE: 127 MMHG | RESPIRATION RATE: 15 BRPM

## 2025-03-13 DIAGNOSIS — E86.0 DEHYDRATION: ICD-10-CM

## 2025-03-13 DIAGNOSIS — R56.9 SEIZURE (HCC): ICD-10-CM

## 2025-03-13 LAB
ALBUMIN SERPL BCP-MCNC: 4.3 G/DL (ref 3.2–4.9)
ALBUMIN/GLOB SERPL: 1.5 G/DL
ALP SERPL-CCNC: 65 U/L (ref 30–99)
ALT SERPL-CCNC: 36 U/L (ref 2–50)
ANION GAP SERPL CALC-SCNC: 19 MMOL/L (ref 7–16)
AST SERPL-CCNC: 51 U/L (ref 12–45)
BASOPHILS # BLD AUTO: 0.2 % (ref 0–1.8)
BASOPHILS # BLD: 0.03 K/UL (ref 0–0.12)
BILIRUB SERPL-MCNC: 0.2 MG/DL (ref 0.1–1.5)
BUN SERPL-MCNC: 11 MG/DL (ref 8–22)
CALCIUM ALBUM COR SERPL-MCNC: 9.4 MG/DL (ref 8.5–10.5)
CALCIUM SERPL-MCNC: 9.6 MG/DL (ref 8.5–10.5)
CHLORIDE SERPL-SCNC: 106 MMOL/L (ref 96–112)
CO2 SERPL-SCNC: 14 MMOL/L (ref 20–33)
CREAT SERPL-MCNC: 1.45 MG/DL (ref 0.5–1.4)
EKG IMPRESSION: NORMAL
EOSINOPHIL # BLD AUTO: 0 K/UL (ref 0–0.51)
EOSINOPHIL NFR BLD: 0 % (ref 0–6.9)
ERYTHROCYTE [DISTWIDTH] IN BLOOD BY AUTOMATED COUNT: 39.8 FL (ref 35.9–50)
FLUAV RNA SPEC QL NAA+PROBE: NEGATIVE
FLUBV RNA SPEC QL NAA+PROBE: NEGATIVE
GFR SERPLBLD CREATININE-BSD FMLA CKD-EPI: 65 ML/MIN/1.73 M 2
GLOBULIN SER CALC-MCNC: 2.9 G/DL (ref 1.9–3.5)
GLUCOSE SERPL-MCNC: 68 MG/DL (ref 65–99)
HCT VFR BLD AUTO: 49.1 % (ref 42–52)
HGB BLD-MCNC: 16.3 G/DL (ref 14–18)
IMM GRANULOCYTES # BLD AUTO: 0.14 K/UL (ref 0–0.11)
IMM GRANULOCYTES NFR BLD AUTO: 1.2 % (ref 0–0.9)
LYMPHOCYTES # BLD AUTO: 0.87 K/UL (ref 1–4.8)
LYMPHOCYTES NFR BLD: 7.2 % (ref 22–41)
MCH RBC QN AUTO: 31.2 PG (ref 27–33)
MCHC RBC AUTO-ENTMCNC: 33.2 G/DL (ref 32.3–36.5)
MCV RBC AUTO: 93.9 FL (ref 81.4–97.8)
MONOCYTES # BLD AUTO: 0.48 K/UL (ref 0–0.85)
MONOCYTES NFR BLD AUTO: 4 % (ref 0–13.4)
NEUTROPHILS # BLD AUTO: 10.53 K/UL (ref 1.82–7.42)
NEUTROPHILS NFR BLD: 87.4 % (ref 44–72)
NRBC # BLD AUTO: 0 K/UL
NRBC BLD-RTO: 0 /100 WBC (ref 0–0.2)
PLATELET # BLD AUTO: 159 K/UL (ref 164–446)
PMV BLD AUTO: 11 FL (ref 9–12.9)
POTASSIUM SERPL-SCNC: 5.5 MMOL/L (ref 3.6–5.5)
PROT SERPL-MCNC: 7.2 G/DL (ref 6–8.2)
RBC # BLD AUTO: 5.23 M/UL (ref 4.7–6.1)
RSV RNA SPEC QL NAA+PROBE: NEGATIVE
SARS-COV-2 RNA RESP QL NAA+PROBE: NOTDETECTED
SODIUM SERPL-SCNC: 139 MMOL/L (ref 135–145)
WBC # BLD AUTO: 12.1 K/UL (ref 4.8–10.8)

## 2025-03-13 PROCEDURE — 93005 ELECTROCARDIOGRAM TRACING: CPT | Mod: TC

## 2025-03-13 PROCEDURE — 700105 HCHG RX REV CODE 258: Performed by: EMERGENCY MEDICINE

## 2025-03-13 PROCEDURE — 700102 HCHG RX REV CODE 250 W/ 637 OVERRIDE(OP): Performed by: EMERGENCY MEDICINE

## 2025-03-13 PROCEDURE — 0241U HCHG SARS-COV-2 COVID-19 NFCT DS RESP RNA 4 TRGT ED POC: CPT

## 2025-03-13 PROCEDURE — 85025 COMPLETE CBC W/AUTO DIFF WBC: CPT

## 2025-03-13 PROCEDURE — A9270 NON-COVERED ITEM OR SERVICE: HCPCS | Performed by: EMERGENCY MEDICINE

## 2025-03-13 PROCEDURE — 93005 ELECTROCARDIOGRAM TRACING: CPT | Mod: TC | Performed by: EMERGENCY MEDICINE

## 2025-03-13 PROCEDURE — 80053 COMPREHEN METABOLIC PANEL: CPT

## 2025-03-13 PROCEDURE — 80235 DRUG ASSAY LACOSAMIDE: CPT

## 2025-03-13 PROCEDURE — 36415 COLL VENOUS BLD VENIPUNCTURE: CPT

## 2025-03-13 PROCEDURE — 99284 EMERGENCY DEPT VISIT MOD MDM: CPT

## 2025-03-13 RX ORDER — IBUPROFEN 600 MG/1
600 TABLET, FILM COATED ORAL ONCE
Status: COMPLETED | OUTPATIENT
Start: 2025-03-13 | End: 2025-03-13

## 2025-03-13 RX ORDER — SODIUM CHLORIDE, SODIUM LACTATE, POTASSIUM CHLORIDE, AND CALCIUM CHLORIDE .6; .31; .03; .02 G/100ML; G/100ML; G/100ML; G/100ML
1000 INJECTION, SOLUTION INTRAVENOUS ONCE
Status: COMPLETED | OUTPATIENT
Start: 2025-03-13 | End: 2025-03-13

## 2025-03-13 RX ORDER — OXYCODONE AND ACETAMINOPHEN 5; 325 MG/1; MG/1
1 TABLET ORAL ONCE
Refills: 0 | Status: COMPLETED | OUTPATIENT
Start: 2025-03-13 | End: 2025-03-13

## 2025-03-13 RX ADMIN — IBUPROFEN 600 MG: 600 TABLET, FILM COATED ORAL at 22:09

## 2025-03-13 RX ADMIN — SODIUM CHLORIDE, POTASSIUM CHLORIDE, SODIUM LACTATE AND CALCIUM CHLORIDE 1000 ML: 600; 310; 30; 20 INJECTION, SOLUTION INTRAVENOUS at 21:30

## 2025-03-13 RX ADMIN — OXYCODONE AND ACETAMINOPHEN 1 TABLET: 5; 325 TABLET ORAL at 22:09

## 2025-03-13 ASSESSMENT — PAIN DESCRIPTION - PAIN TYPE: TYPE: ACUTE PAIN

## 2025-03-13 ASSESSMENT — FIBROSIS 4 INDEX: FIB4 SCORE: 1.75

## 2025-03-14 NOTE — DISCHARGE INSTRUCTIONS
We have made a referral for you for the neurologist.  You should get a phone call by Tuesday    The most important things if you continue have more or worsening seizures please come back.  At this point were not uncertain why you had 3 but lab work showed some dehydration but no signs of infection.  Obviously if anything gets worse please come back as well

## 2025-03-14 NOTE — ED TRIAGE NOTES
Chief Complaint   Patient presents with    Seizure     Pt BIB [facility] for a witnessed seizure x3. Pt has a history of. Pt was combative for EMS. Was given 180 ketamine and 5 mg of versed.

## 2025-03-14 NOTE — ED NOTES
Pt discharged to home. Discharge paperwork provided. Education provided by ERP. Reinforced discharge instructions.  Pt was given follow up instructions.  Pt verbalized understanding of all instructions for discharge.   Patient went out of the ER ambulatory with steady gait., alert and oriented x 4, with all belongings.

## 2025-03-14 NOTE — Clinical Note
REFERRAL APPROVAL NOTICE         Sent on March 14, 2025                   Barry Griffith  [address] [address]                   Dear Mr. Griffith,    After a careful review of the medical information and benefit coverage, [facility] has processed your referral. See below for additional details.    If applicable, you must be actively enrolled with your insurance for coverage of the authorized service. If you have any questions regarding your coverage, please contact your insurance directly.    REFERRAL INFORMATION   Referral #:  [id number]  Referred-To Department    Referred-By Provider:  Neurology    Karri Gonzalez M.D.   Neurology [facility]      [address]  [address]  [place] [address]  [phone] [address] [facility], [address]  [place] [id number] [phone]    Referral Start Date:  03/13/2025  Referral End Date:   03/13/2026           SCHEDULING  If you do not already have an appointment, please call [phone] to make an appointment.   MORE INFORMATION  As a reminder, [facility] ownership has changed, meaning this location is now owned and operated by [facility]. As such, we want to clarify that our patients should expect to receive two separate bills for the services received at [facility] - one representing the [facility] facility fees as the owner of the establishment, and the other to represent the physician's services and subsequent fees. You can speak with your insurance carrier for a pricing estimate by calling the customer service number on the back of your card and ask about charges for a hospital outpatient visit.  If you do not already have a [email or web address] account, sign up at: [email or web address]  You can access your medical information, make appointments, see lab results, billing information, and more.  If you have questions regarding this referral, please contact  the [facility] Referrals department at:              [phone]. Monday - Friday 7:30AM - 5:00PM.      Sincerely,  [facility]

## 2025-03-14 NOTE — ED PROVIDER NOTES
CHIEF COMPLAINT  Chief Complaint   Patient presents with    Seizure     Pt BIB [facility] for a witnessed seizure x3. Pt has a history of. Pt was combative for EMS. Was given 180 ketamine and 5 mg of versed.      LIMITATION TO HISTORY   None    HPI    Barry Griffith is a 35 y.o. male who presents for evaluation of seizures onset earlier today. Patient's father reports that he has 3 witnessed seizures, with a short amount of time between seizures. He adds that this is abnormal and never happened previously, as the patient will normally have a single seizure at a time. Father notes that the patient's work called him, saying he was in a daze, with the patient continuing to act abnormally as he got home prior to the seizures. Patient's father states that he has been sleeping since the seizures and after he awoke, the patient became repetitive but verbally directed. He remarks that this is normal behavior following his seizures. Father brought his medications and confirmed that he takes lacosamide 100 mg BID along with brivaracetam 100 mg BID, but is unsure that he has been taking these doses correctly. He denies recent illness but confirms that the patient's children are sick.     OUTSIDE HISTORIAN(S):  Patient's family was present at bedside and provided history as the patient was not awake.    EXTERNAL RECORDS REVIEWED   Patient has a history of being combative after seizures.    PAST MEDICAL HISTORY  Past Medical History:   Diagnosis Date    ASTHMA     Asthma since infancy.    Asthma     Epilepsy (HCC)     Mild intermittent asthma with acute exacerbation 6/10/2016    Seizure (HCC)     Seizure disorder (HCC)      FAMILY HISTORY  Family History   Problem Relation Age of Onset    Hypertension Mother     Brain Aneurysm Mother     Hyperlipidemia Father     Cancer Neg Hx     Diabetes Neg Hx      SOCIAL HISTORY  Social History     Tobacco Use    Smoking status: Never    Smokeless tobacco: Never   Vaping Use    Vaping  "status: Never Used   Substance Use Topics    Alcohol use: Yes     Comment: rare, socially on the weekend    Drug use: Yes     Types: Marijuana, Inhaled     Comment: THC; denies h/o IVDU     Social History     Substance and Sexual Activity   Drug Use Yes    Types: Marijuana, Inhaled    Comment: THC; denies h/o IVDU     SURGICAL HISTORY  No past surgical history noted.    CURRENT MEDICATIONS  Current Outpatient Medications:     brivaracetam (BRIVIACT) 100 MG Tab tablet, Take 1 Tablet by mouth 2 times a day for 120 days., Disp: 60 Tablet, Rfl: 3    lacosamide (VIMPAT) 100 MG Tab tablet, Take 1 Tablet by mouth 2 times a day for 120 days., Disp: 60 Tablet, Rfl: 3    albuterol 108 (90 Base) MCG/ACT Aero Soln inhalation aerosol, Inhale 2 Puffs every 6 hours as needed for Shortness of Breath., Disp: 2 Each, Rfl: 5    Cholecalciferol (VITAMIN D) 125 MCG (5000 UT) Cap, Take 5,000 Int'l Units by mouth every day., Disp: 90 Capsule, Rfl: 3    ALLERGIES  No Known Allergies    PHYSICAL EXAM  VITAL SIGNS: /60   Pulse 85   Resp 20   Ht 1.778 m (5' 10\")   Wt 74.8 kg (165 lb)   SpO2 99%   BMI 23.68 kg/m²   Reviewed and WNL  Constitutional: Well developed, Well nourished, Patient is repetitive asking what happened to him but verbally directed. He is sleeping but arouses intermittently before going back to sleep.  HENT: Normocephalic, atraumatic, bilateral external ears normal, No intraoral erythema, edema, exudate,   Eyes: PERRLA, conjunctiva pink, no scleral icterus.   Cardiovascular: Regular rate and rhythm. No murmurs, rubs or gallops.  No dependent edema or calf tenderness  Respiratory: Lungs clear to auscultation bilaterally. No wheezes, rales, or rhonchi.  Abdominal:  Abdomen soft, non-tender, non distended. No rebound, or guarding.    Skin: No erythema, no rash. Bruising on chest.  Genitourinary: No costovertebral angle tenderness.   Musculoskeletal: no deformities.   Neurologic: Alert, no facial droop noted. All " extra ocular muscles intact. Moves all extremities with out weakness noted  Psychiatric: Affect normal, Judgment normal, Mood normal.       PROBLEMS EVALUATED THIS VISIT:  Seizure: Patient has long standing history of seizures but had 3 today which per family is abnormally. Family is concerned that he is not taking his medications. He is postictal/sedated so exam is not able to be properly obtained but has no focal deficits.     MEDICAL DECISION MAKING:  Multiple seizures with atypical presentation of his usual seizures. Patient is postictal/sedated thus difficult to obtain exam.     PLAN:  Sent in for lacosamide level but no level available for brivaracetam.   CBC and CMP for metabolic evaluation  Reassess once awake  Point of care testing for viral illness.        Diagnostic tests and prescription drugs considered including, but not limited to: Select: Sitter CT scan but the patient looked well had mild headache..    Escalation of care considered, and ultimately not performed: Patient and family were discussed unfortunately neurology is not on after 7 PM stridor is unable to consult them.  We discussed possibly admitting him for observational.  Hughson this point the patient and the family decided that they will watch him at home and have to come back if he has worsening seizures..     Barriers to care at this time, including but not limited to: Select: No known barriers to care.     RESULTS    LABS Ordered and Reviewed by Me:  Results for orders placed or performed during the hospital encounter of 03/13/25   COMP METABOLIC PANEL    Collection Time: 03/13/25  6:25 PM   Result Value Ref Range    Sodium 139 135 - 145 mmol/L    Potassium 5.5 3.6 - 5.5 mmol/L    Chloride 106 96 - 112 mmol/L    Co2 14 (L) 20 - 33 mmol/L    Anion Gap 19.0 (H) 7.0 - 16.0    Glucose 68 65 - 99 mg/dL    Bun 11 8 - 22 mg/dL    Creatinine 1.45 (H) 0.50 - 1.40 mg/dL    Calcium 9.6 8.5 - 10.5 mg/dL    Correct Calcium 9.4 8.5 - 10.5 mg/dL     AST(SGOT) 51 (H) 12 - 45 U/L    ALT(SGPT) 36 2 - 50 U/L    Alkaline Phosphatase 65 30 - 99 U/L    Total Bilirubin 0.2 0.1 - 1.5 mg/dL    Albumin 4.3 3.2 - 4.9 g/dL    Total Protein 7.2 6.0 - 8.2 g/dL    Globulin 2.9 1.9 - 3.5 g/dL    A-G Ratio 1.5 g/dL   CBC WITH DIFFERENTIAL    Collection Time: 03/13/25  6:25 PM   Result Value Ref Range    WBC 12.1 (H) 4.8 - 10.8 K/uL    RBC 5.23 4.70 - 6.10 M/uL    Hemoglobin 16.3 14.0 - 18.0 g/dL    Hematocrit 49.1 42.0 - 52.0 %    MCV 93.9 81.4 - 97.8 fL    MCH 31.2 27.0 - 33.0 pg    MCHC 33.2 32.3 - 36.5 g/dL    RDW 39.8 35.9 - 50.0 fL    Platelet Count 159 (L) 164 - 446 K/uL    MPV 11.0 9.0 - 12.9 fL    Neutrophils-Polys 87.40 (H) 44.00 - 72.00 %    Lymphocytes 7.20 (L) 22.00 - 41.00 %    Monocytes 4.00 0.00 - 13.40 %    Eosinophils 0.00 0.00 - 6.90 %    Basophils 0.20 0.00 - 1.80 %    Immature Granulocytes 1.20 (H) 0.00 - 0.90 %    Nucleated RBC 0.00 0.00 - 0.20 /100 WBC    Neutrophils (Absolute) 10.53 (H) 1.82 - 7.42 K/uL    Lymphs (Absolute) 0.87 (L) 1.00 - 4.80 K/uL    Monos (Absolute) 0.48 0.00 - 0.85 K/uL    Eos (Absolute) 0.00 0.00 - 0.51 K/uL    Baso (Absolute) 0.03 0.00 - 0.12 K/uL    Immature Granulocytes (abs) 0.14 (H) 0.00 - 0.11 K/uL    NRBC (Absolute) 0.00 K/uL   ESTIMATED GFR    Collection Time: 03/13/25  6:25 PM   Result Value Ref Range    GFR (CKD-EPI) 65 >60 mL/min/1.73 m 2   POC CoV-2, FLU A/B, RSV by PCR    Collection Time: 03/13/25  6:59 PM   Result Value Ref Range    POC Influenza A RNA, PCR Negative Negative    POC Influenza B RNA, PCR Negative Negative    POC RSV, by PCR Negative Negative    POC SARS-CoV-2, PCR NotDetected NotDetected   EKG    Collection Time: 03/13/25 10:11 PM   Result Value Ref Range    Report       [facility] Emergency Dept.    Test Date:  2025-03-13  Pt Name:    DUANE AGUILAR                Department: ER  MRN:        [id number]                      Room:        16  Gender:     Male                          Technician: [id number]  :        1990                   Requested By:ER TRIAGE PROTOCOL  Order #:    [id number]                    Reading MD: Karri ARAUJO MD    Measurements  Intervals                                Axis  Rate:       87                           P:          87  DE:         151                          QRS:        85  QRSD:       102                          T:          61  QT:         364  QTc:        438    Interpretive Statements  Sinus rhythm  rate of 103  Rate of 87  Intervals normal DE, normal QRS, normal QTc  Axis normal  LVH noted by voltage  Ischemia: No significant or obvious ST segment elevations or depressions.  There is questionable J-point elevations anterior leads.  Unchanged from  previous EKG 1/15/2025  Abnor mal no obvious acute ischemic findings  Electronically Signed On 2025 22:11:10 PDT by Karri ARAUJO MD       *Note: Due to a large number of results and/or encounters for the requested time period, some results have not been displayed. A complete set of results can be found in Results Review.     ED COURSE:    ED Observation Status? No   No noted need for observation for developing issue    INTERVENTIONS BY ME:  Medications   LR (Bolus) infusion 1,000 mL (0 mL Intravenous Stopped 3/13/25 2208)   oxyCODONE-acetaminophen (Percocet) 5-325 MG per tablet 1 Tablet (1 Tablet Oral Given 3/13/25 2209)   ibuprofen (Motrin) tablet 600 mg (600 mg Oral Given 3/13/25 2209)       Response on recheck:  Patient is much improved at this time.    CONSULTANTS/OTHER GROUPS CONTACTED    Outpatient urgent referral to his neurology clinic was made    FINAL DISPO PLAN     In summary this a very pleasant 35 General Have chronic's seizure history.  He also getting violent at this point patient had 3 which is atypical in family so that was unusual they are uncertain if he is taking his medication patient when he woke up said he was takes medications has a mild headache  which was treated and I do not think the patient has any clinical signs of meningitis or encephalitis or intracranial hemorrhage therefore the patient was discharged.  He was instructed to return to the ER if symptoms worsen.  Before discharge we discussed about admitting the patient and observing him overnight and getting a neurology consult was after 7 PM and we do not have a general neurology consult.  He declined at this time and will return if symptoms worsen we did help him by making an urgent referral back to his neurology clinic.      Followup:  [facility], Emergency Dept  [address] [address] [phone]  Go to   If seizures return      CONDITION: Stable.     FINAL IMPRESSION  1. Seizure (HCC)    2. Dehydration          IDavid (Scribe), am scribing for, and in the presence of, Karri Gonzalez MD.    Electronically signed by: David Lazar (Bhupendraibe), 3/13/2025    Karri VANN MD personally performed the services described in this documentation, as scribed by David Lazar in my presence, and it is both accurate and complete.    The note accurately reflects work and decisions made by me.  Karri Gonzalez M.D.  3/13/2025  10:12 PM

## 2025-03-15 LAB — LACOSAMIDE SERPL-MCNC: 2.8 UG/ML (ref 1–10)

## 2025-03-18 ENCOUNTER — TELEPHONE (OUTPATIENT)
Dept: HEALTH INFORMATION MANAGEMENT | Facility: OTHER | Age: 35
End: 2025-03-18
Payer: COMMERCIAL

## 2025-03-30 NOTE — TELEPHONE ENCOUNTER
Pt phoned into the clinic today and is requesting a refill on his inhaler due to the smoke. Please call patient with any questions.   [facility]   IN-PATIENT SERVICE   [facility]    Progress Note    3/30/2025    11:22 AM    Name:   Mahesh Brownlee  MRN:     [id number]     Acct:      [id number]   Room:   0537/0537-01  IP Day:  3  Admit Date:  3/27/2025  3:37 AM    PCP:   Ira Roberts APRN - CNP  Code Status:  Full Code    Subjective:     Interval History Status: improved.     Patient is alert oriented to place and was able to tell me the year and the president's name.  Continues to say that he does not remember his underlying issues and how they were treated.  Denies knowledge of any family member that is involved in his care.    Per H&P: Mahesh Brownlee is a 66 y.o. Non- / non  male who presents with Altered Mental Status   and is admitted to the hospital for the management of Pneumonia of left upper lobe due to infectious organism.     This 66 yom presents to the hospital with ams.  He has no recollection of how he got here, nor why he is here.  He admits to sob and cough with cp from coughing but does not know anything else.  He might have had fevers and chills but is unsure.      Medications:     Allergies:    Allergies   Allergen Reactions    Augmentin Es-600 [Amoxicillin-Pot Clavulanate]      Other reaction(s): Unknown    Sulfa Antibiotics Other (See Comments)       Current Meds:   Scheduled Meds:    metoprolol succinate  25 mg Oral Daily    sacubitril-valsartan  1 tablet Oral BID    rivaroxaban  20 mg Oral Daily    donepezil  5 mg Oral Daily    chlordiazePOXIDE  10 mg Oral TID    amLODIPine  10 mg Oral Daily    nicotine  1 patch TransDERmal Daily    sodium chloride flush  5-40 mL IntraVENous 2 times per day    cefTRIAXone (ROCEPHIN) IV  1,000 mg IntraVENous Q24H    thiamine  100 mg Oral Daily    ipratropium 0.5 mg-albuterol 2.5 mg  1 Dose Inhalation Q6H WA RT    lacosamide  100 mg Oral BID     Continuous Infusions:    sodium chloride 25 mL/hr at 03/28/25 0517     PRN Meds: potassium  History of CVA (cerebrovascular accident) 3/27/2025 Yes    Amnesia 3/27/2025 Yes    Substance use disorder 3/27/2025 Yes    ICD (implantable cardioverter-defibrillator) in place 3/27/2025 Yes    History of seizures 3/27/2025 Yes    Non compliance w medication regimen 3/27/2025 Yes       Plan:        Acute toxic/metabolic encephalopathy: Appears to be multifactorial related to pneumonia and being positive for narcotics and benzodiazepine, patient continues to have amnesia and does not remember recent events or on medications.  He does have a history of adjustment disorder, will try to obtain psych consult if available  Pneumonia presumed bacterial continue Rocephin and azithromycin  History of seizure in the setting of alcohol withdrawal: Currently does not show any signs of alcohol withdrawal continue to monitor.  Appreciate neurology input continue antiseizure medications.  History of Multiple comorbidities including paroxysmal atrial fibrillation, COPD and coronary artery disease status post ICD: Medically appears to be stable.  It was reported that patient was under hospice and not clear what medication he has been taking.  Awaiting confirmation of home medications to reconcile.  Hypertension: Norvasc has been added on 3/29/2025, will increase metoprolol to 50 p.o. twice daily and continue Entresto will continue to monitor and adjust treatment.  PT OT and discharge planning.  Start small dose Librium as patient is high risk of going into alcohol withdrawal.  Mental status is improving.  Continue current treatment.  Consult  for discharge planning.    Junior Gonsalez MD  3/30/2025  11:22 AM

## 2025-04-30 ENCOUNTER — TELEPHONE (OUTPATIENT)
Dept: NEUROLOGY | Facility: MEDICAL CENTER | Age: 35
End: 2025-04-30
Payer: COMMERCIAL

## 2025-04-30 ENCOUNTER — HOSPITAL ENCOUNTER (EMERGENCY)
Facility: MEDICAL CENTER | Age: 35
End: 2025-04-30
Attending: EMERGENCY MEDICINE
Payer: COMMERCIAL

## 2025-04-30 VITALS
DIASTOLIC BLOOD PRESSURE: 71 MMHG | BODY MASS INDEX: 23.62 KG/M2 | SYSTOLIC BLOOD PRESSURE: 118 MMHG | RESPIRATION RATE: 20 BRPM | HEART RATE: 86 BPM | HEIGHT: 70 IN | TEMPERATURE: 98.5 F | WEIGHT: 165 LBS | OXYGEN SATURATION: 96 %

## 2025-04-30 DIAGNOSIS — G40.909 SEIZURE DISORDER (HCC): ICD-10-CM

## 2025-04-30 LAB — GLUCOSE BLD STRIP.AUTO-MCNC: 93 MG/DL (ref 65–99)

## 2025-04-30 PROCEDURE — 99284 EMERGENCY DEPT VISIT MOD MDM: CPT

## 2025-04-30 PROCEDURE — 82962 GLUCOSE BLOOD TEST: CPT

## 2025-04-30 RX ORDER — LACOSAMIDE 100 MG/1
100 TABLET ORAL 2 TIMES DAILY
Qty: 60 TABLET | Refills: 0 | Status: SHIPPED | OUTPATIENT
Start: 2025-04-30 | End: 2025-05-30

## 2025-04-30 RX ORDER — LAMOTRIGINE 100 MG/1
100 TABLET ORAL DAILY
COMMUNITY

## 2025-04-30 ASSESSMENT — FIBROSIS 4 INDEX: FIB4 SCORE: 1.82

## 2025-04-30 NOTE — ED NOTES
Discharge instructions provided and reviewed with patient. Patient to follow with neurology/PCP as needed. Discussed to return to ER if symptoms worsen. Patient verbalized understanding. Pt ambulated to pharmacy

## 2025-04-30 NOTE — ED PROVIDER NOTES
ED Provider Note    CHIEF COMPLAINT  Chief Complaint   Patient presents with    Seizure     Witnessed at work lasting approx. 2.5 mins.       EXTERNAL RECORDS REVIEWED  Multiple previous visits here for seizure disorder also has most recent neurology visit in December of last year at which time patient was prescribed Vimpat and Briviact    HPI/ROS    LIMITATION TO HISTORY       OUTSIDE HISTORIAN(S):      Barry Griffith is a 35 y.o. male who presents to the emergency department chief complaint of seizure.  Patient had an episode at work today that he states was not a full tonic-clonic seizure but does believe he had a seizure.  States that he had altered mental status and was somewhat incoherent for approximately 2 minutes.  This resolved spontaneously no oral trauma no fall no tongue bite no loss of urination.  States that he has had small seizures like this in the past.  Currently states he feels completely back to his baseline.  He states that he thinks that this happened because he missed a dose of his Briviact.  Patient reports that he was unable to obtain his refill prescription at his pharmacy.  He reports that he did get good sleep last night no increased caffeine or alcohol no other drug use no other acute symptom change or concerns.  Feels completely back to his baseline at this time and wants to go home.    PAST MEDICAL HISTORY   has a past medical history of ASTHMA, Asthma, Epilepsy (HCC), Mild intermittent asthma with acute exacerbation (6/10/2016), Seizure (HCC), and Seizure disorder (HCC).    SURGICAL HISTORY  patient denies any surgical history    FAMILY HISTORY  Family History   Problem Relation Age of Onset    Hypertension Mother     Brain Aneurysm Mother     Hyperlipidemia Father     Cancer Neg Hx     Diabetes Neg Hx        SOCIAL HISTORY  Social History     Tobacco Use    Smoking status: Never    Smokeless tobacco: Never   Vaping Use    Vaping status: Never Used   Substance and Sexual  "Activity    Alcohol use: Yes     Comment: rare, socially on the weekend    Drug use: Yes     Types: Marijuana, Inhaled     Comment: THC; denies h/o IVDU    Sexual activity: Yes     Partners: Female     Birth control/protection: Condom, Pill       CURRENT MEDICATIONS  Home Medications    **Home medications have not yet been reviewed for this encounter**         ALLERGIES  No Known Allergies    PHYSICAL EXAM  VITAL SIGNS: BP (!) 143/65   Pulse 88   Temp 36.9 °C (98.5 °F) (Temporal)   Resp 20   Ht 1.765 m (5' 9.5\")   Wt 74.8 kg (165 lb)   SpO2 94%   BMI 24.02 kg/m²      Pulse ox interpretation: I interpret this pulse ox as normal.  Constitutional: Alert and oriented x 3, minimal distress  HEENT: Atraumatic normocephalic, pupils are equal round reactive to light extraocular movements are intact. The nares is clear, external ears are normal, mouth shows moist mucous membranes normal dentition for age  Neck: Supple, no JVD no tracheal deviation  Cardiovascular: Regular rate and rhythm no murmur rub or gallop 2+ pulses peripherally x4  Thorax & Lungs: No respiratory distress, no wheezes rales or rhonchi, No chest tenderness.   GI: Soft nontender nondistended positive bowel sounds, no peritoneal signs  Skin: Warm dry no acute rash or lesion  Musculoskeletal: Moving all extremities with full range and 5 of 5 strength no acute  deformity  Neurologic: Cranial nerves III through XII are grossly intact no sensory deficit no cerebellar dysfunction   Psychiatric: Appropriate affect for situation at this time            COURSE & MEDICAL DECISION MAKING    ASSESSMENT, COURSE AND PLAN  Care Narrative: Patient is back to his baseline at this time he has no acute complaints at this time.  He is anxious to leave.  However he does want refills of his seizure medications.  These been sent electronically to our pharmacy.  He is given instruction to contact his neurologist at the next available time for further refills.  Patient is " "urged to return here for further seizure altered mental status any other acute symptom change or concern otherwise discharged in stable and improved condition.              ADDITIONAL PROBLEMS MANAGED      DISPOSITION AND DISCUSSIONS  I have discussed management of the patient with the following physicians and ALEXA's:      Discussion of management with other QHP or appropriate source(s):      Escalation of care considered, and ultimately not performed:Laboratory analysis and diagnostic imaging    Barriers to care at this time, including but not limited to: .     Decision tools and prescription drugs considered including, but not limited to: .  BP (!) 143/65   Pulse 88   Temp 36.9 °C (98.5 °F) (Temporal)   Resp 20   Ht 1.765 m (5' 9.5\")   Wt 74.8 kg (165 lb)   SpO2 94%   BMI 24.02 kg/m²       Jarett Maya M.D.  [address] [place] [id number] [phone]    Schedule an appointment as soon as possible for a visit       [facility], Emergency Dept  [address] [address] [phone]    in 12-24 hours if symptoms persist, immediately If symptoms worsen, or if you develop any other symptoms or concerns      FINAL DIAGNOSIS  1. Seizure disorder (HCC)         Electronically signed by: Ernesto Calvillo M.D.      "

## 2025-04-30 NOTE — TELEPHONE ENCOUNTER
Pt is having frequent seizures and would like to be seen sooner rather than later. He has an appt scheduled 6/16. He stated that he has also been having a lot of trouble with his BRIVIACT medication every time he needs a refill. Pt would like for a longer supply to avoid the trouble.

## 2025-04-30 NOTE — ED TRIAGE NOTES
Barry Griffith  35 y.o. male  Chief Complaint   Patient presents with    Seizure     Witnessed at work lasting approx. 2.5 mins.       BIBA for above-Pt was at work at [employer]. Pt takes lacosamide and Briviact for seizures and reports taking lacosamide this morning but not briviact.    Pt is A&Ox4. FSBG by EMS 91. PIV established by EMS

## 2025-05-30 ENCOUNTER — PHARMACY VISIT (OUTPATIENT)
Dept: PHARMACY | Facility: MEDICAL CENTER | Age: 35
End: 2025-05-30
Payer: COMMERCIAL

## 2025-05-30 DIAGNOSIS — G40.909 SEIZURE DISORDER (HCC): ICD-10-CM

## 2025-05-30 PROCEDURE — RXMED WILLOW AMBULATORY MEDICATION CHARGE: Performed by: EMERGENCY MEDICINE

## 2025-06-12 DIAGNOSIS — G40.909 SEIZURE DISORDER (HCC): ICD-10-CM

## 2025-06-12 NOTE — TELEPHONE ENCOUNTER
A refill for Brivact sent to the pharmacy (mode - Normal). Further refills at the time of the next follow up visit. Thank you.

## 2025-06-13 ENCOUNTER — PHARMACY VISIT (OUTPATIENT)
Dept: PHARMACY | Facility: MEDICAL CENTER | Age: 35
End: 2025-06-13
Payer: COMMERCIAL

## 2025-06-13 DIAGNOSIS — G40.909 SEIZURE DISORDER (HCC): ICD-10-CM

## 2025-06-13 PROCEDURE — RXMED WILLOW AMBULATORY MEDICATION CHARGE: Performed by: PSYCHIATRY & NEUROLOGY

## 2025-06-16 ENCOUNTER — OFFICE VISIT (OUTPATIENT)
Dept: NEUROLOGY | Facility: MEDICAL CENTER | Age: 35
End: 2025-06-16
Attending: PSYCHIATRY & NEUROLOGY
Payer: COMMERCIAL

## 2025-06-16 VITALS
TEMPERATURE: 97 F | DIASTOLIC BLOOD PRESSURE: 60 MMHG | HEART RATE: 69 BPM | OXYGEN SATURATION: 96 % | BODY MASS INDEX: 23.67 KG/M2 | WEIGHT: 165.34 LBS | SYSTOLIC BLOOD PRESSURE: 132 MMHG | HEIGHT: 70 IN

## 2025-06-16 DIAGNOSIS — G40.909 SEIZURE DISORDER (HCC): Primary | ICD-10-CM

## 2025-06-16 PROCEDURE — 99214 OFFICE O/P EST MOD 30 MIN: CPT | Performed by: PSYCHIATRY & NEUROLOGY

## 2025-06-16 PROCEDURE — 99215 OFFICE O/P EST HI 40 MIN: CPT | Performed by: PSYCHIATRY & NEUROLOGY

## 2025-06-16 PROCEDURE — 3078F DIAST BP <80 MM HG: CPT | Performed by: PSYCHIATRY & NEUROLOGY

## 2025-06-16 PROCEDURE — 3075F SYST BP GE 130 - 139MM HG: CPT | Performed by: PSYCHIATRY & NEUROLOGY

## 2025-06-16 RX ORDER — LACOSAMIDE 100 MG/1
100 TABLET ORAL 2 TIMES DAILY
Qty: 60 TABLET | Refills: 2 | Status: SHIPPED | OUTPATIENT
Start: 2025-06-16 | End: 2025-09-14

## 2025-06-16 RX ORDER — LAMOTRIGINE 100 MG/1
100 TABLET ORAL DAILY
Qty: 30 TABLET | Refills: 2 | Status: SHIPPED | OUTPATIENT
Start: 2025-06-16 | End: 2025-06-16

## 2025-06-16 ASSESSMENT — PATIENT HEALTH QUESTIONNAIRE - PHQ9: CLINICAL INTERPRETATION OF PHQ2 SCORE: 0

## 2025-06-16 ASSESSMENT — FIBROSIS 4 INDEX: FIB4 SCORE: 1.82

## 2025-06-16 NOTE — PROGRESS NOTES
"[facility] Epilepsy Program  Follow Up Visit      Patient's Name: Barry Griffith  YOB: 1990.  MRN: [id number]  Date of Service: 06/16/25.    Referring Provider: No referring provider defined for this encounter.    Chief Concern: Seizures.     The patient presents for a follow up. The patient presents alone today.  As the visit progressed, the patient shared that his father is in clinic with him, and the patient agreed to have his father provide additional history.    HPI (as obtained at the time of the initial visit and updated as necessary): The patient is a 35 y.o., right-handed male, who initially presented to my epilepsy clinic for evaluation of seizures on 05/08/2023.    The patient started having seizures in 2018. He has two event types, as noted below. He never had myoclonus.    He was initially started on Keppra, but he did not like it. It seems that he was more compliant with Keppra and his seizures were better controlled at that time. He was switched to Briviact in early 2022; he has been tolerating Briviact well, as reported at the time of the initial visit, but has not been very compliant. He became more compliant with Briviact as of May 2023.     The patient unfortunately had 3 seizures with ER presentation on 01/02/2023, 04/04/2023, and 04/28/2023, as of the time of the initial visit with me. He also had a breakthrough seizure on 06/09/2023, and then the last one in January 2024. During one of his seizures in 2022, he was intubated. He unfortunately continues to have breakthrough seizures.     Semiology:  Event type #1: \"Seizures\".  Year/Age of Onset: 2018.  Initial features: Some seizures occur out of sleep and no warning signs. Some seizures are preceded by chlorine smell.   Event features: The patient would experience bilateral tonic-clonic seizure, with head deviation to the left per the report he got from his wife. He is not responsive during the event and has no " "recollection for the time during the event.  He bites his tongue with his seizures; at least once bladder incontinence; no bowel incontinence.   Post-ictal features: He feels it takes his time for his brain to come back to baseline and that he loses a pitch after his seizure, sore in his muscles, and has headaches.   Duration: Up to 5 minutes on average.  Frequency: He has them on average once a month or less frequently.   Precipitating factors: Not clear.     Event type #2: \"Spells\".  Year/Age of Onset: 2018  Initial features: He would experience chlorine smell. He never had sensation of strange tastes, gastric upraising, autonomic phenomena, nor psychic phenomena except anxiety and history of maria victoria vu as a child.   Event features: He might experience a bit of anxiety with it. With at least one episode he stared off; he also feels his mouth is watering and he is doing swallowing movements. No manual automatisms.   Post-ictal features: Back to normal.  Duration: 30 seconds.  Frequency: He typically gets these events on average once in two to three months, or more frequently.  Precipitating factors: Not clear.     History of status epilepticus: yes - intubated in June 2022 for prolonged seizure; another episode in September 2024.   History of physical injury related to seizures: once hit his head on the counter and still has a scar.   History of surgery related to epilepsy: no  Family Planning: N/A.  Current Driving Status: He is not driving at this time and his license is suspended due to his seizures.  Seizure Clusters: No  Longest Seizure Freedom: About 1 year seizure freedom around 2020.     Pertinent Ancillary Test Results:    MRI brain studies:   - MRI brain wo/w contrast (08/10/2018 at [facility]): \"1. MRI of the brain without and with contrast within normal limits. 2. No evidence of mass lesion, heterotopic gray matter, gross cortical dysplasia or mesial temporal sclerosis.\" I reviewed key images on 07/31/2023. " "    CT head studies:   - CT head wo contrast (01/02/2023 at [facility]): \"No acute intracranial hemorrhage or displaced calvarial fracture.\"    EEG studies:   - Standard EEG (07/10/2018, Dr. Bai): This is an abnormal video EEG recording in the awake, drowsy, and sleep state(s). Frequent left temporal sharps. Intermittent, subtle, left temporal slowing. The findings increase risk for seizures and suggest underlying area of cortical irritability and structural abnormality. Clinical and radiological correlation is recommended.     - 23-hour ambulatory EEG (04/17/2024, Dr. Maya): This was an abnormal ambulatory EEG recording in the awake and drowsy/sleep state(s):  This was,within the above noted limitations, an abnormal ambulatory EEG recording in the awake and drowsy/sleep state(s):  The presence of almost continuous, right temporal, focal slowing, is suggestive of cerebral dysfunction in this region.  This finding may be seen in context of structural lesion and/or ictal onset zone, among other considerations.  Clinical and radiological correlate is recommended.  The presence of rare to occasional right temporal sharps, along with questionable right TIRDA, points toward increased propensity toward focal seizures arising from that region.  No definitive seizures.   Clinical Events: No reported events. There were several accidental event button push events.     - EMU/LTM study (06/05/2022, Dr. Baker): 13-hour 31 minute EEG was abnormal  video EEG recording in the drowsy/sleep and/or comatose state(s):  -Moderate improving to mild background slowing suggestive of a non-specific encephalopathy. The effects of sedation may be contributing. Clinical correlation recommended.   - No focal asymmetries   - No definitive epileptiform discharges or other epileptiform phenomena seen   - No definitive seizures. Clinical correlation is recommended.   - One event at 1046 PM with fine body tremors with no   abnormal EEG correlate. "     INTERIM HISTORY (06/16/2025): The the patient unfortunately continues to have breakthrough seizures.  He has both focal impaired awareness breakthrough seizures, as well as what appears to be focal to bilateral tonic-clonic seizures.  He had a burst of seizures in March/April 2025, where he was brought to the ER multiple times.  The patient's father shares that there were no recent convulsive seizures, but he continues to have staring spells with swallowing movements.    The patient had an event during the clinic visit today: He stopped responding, stared ahead, experienced lip smacking, swallowing movements, left hand automatisms, and was unresponsive.  The event lasted about 2 minutes, and was followed by significant postictal confusion, with the patient walking around the clinic, going from room to room, and then eventually becoming more responsive, and asking to go to the restroom.  He was still confused in the restroom, and left his cell phone in the sink, and I had to remind him to take his phone.  He was not aware that he had a seizure.  He recovered fully after this event.    The patient is not driving at this time.    The patient had challenges with coming for follow-up visits, and the patient shared that there was a confusion about him having scheduled visits, but then when he presents, there was no appointment.    There was also a confusion if he is taking lamotrigine or lacosamide and we are working with the pharmacy to clear that up.     The patient is taking his antiseizure medications on and off.     Current Antiseizure Medications: Briviact 100 mg BID. Vimpat 100 mg BID.     Previously Tried Antiseizure Medications: Keppra (mood issues).     Review of Systems: No recent fevers. He gained 11 lbs since the last visit (his weight fluctuates). No mood issues nor suicidal/homicidal ideation.    Risk Factors For Epilepsy/Seizure Disorder: The patient is a product of premature pregnancy/delivery (about  1.5 months early and there was a ). The early development was normal and the patient started waking, talking, and engaging in social interaction as expected. There were no challenges during school and no reported attendance of special education programs. The patient was playing football until , and had several concussions, but never LOC.  There is no history of stroke. There is no history of CNS infections, such as encephalitis and/or meningitis. There is no history of febrile seizures. There is no history of neurosurgical interventions. There is no reported history of staring spells during childhood/school years.    Past Medical History:  Past Medical History:   Diagnosis Date    ASTHMA     Asthma since infancy.    Asthma     Epilepsy (HCC)     Mild intermittent asthma with acute exacerbation 6/10/2016    Seizure (HCC)     Seizure disorder (HCC)      Past Surgical History:  None.     Social History:  Social History     Tobacco Use    Smoking status: Never    Smokeless tobacco: Never   Vaping Use    Vaping status: Never Used   Substance Use Topics    Alcohol use: Yes     Comment: rare, socially on the weekend    Drug use: Yes     Types: Marijuana, Inhaled     Comment: THC; denies h/o IVDU     Family History:  There is no known family history of seizures/epilepsy.  Family History   Problem Relation Age of Onset    Hypertension Mother     Brain Aneurysm Mother     Hyperlipidemia Father     Cancer Neg Hx     Diabetes Neg Hx          2025     1:30 PM 2024     2:00 PM 2024     3:30 PM 3/11/2024     1:30 PM 2023     7:20 AM   PHQ-9 Screening   Little interest or pleasure in doing things 0 - not at all 0 - not at all 0 - not at all 0 - not at all 0 - not at all   Feeling down, depressed, or hopeless 0 - not at all 0 - not at all 0 - not at all 0 - not at all 0 - not at all   PHQ-2 Total Score 0 0 0 0 0     [place] Suicide Reassessment  New or continued thoughts about killing self?:  "No  Preparing to end life?: No    Allergies:  No Known Allergies    Current Medications:    Current Outpatient Medications:     brivaracetam, 100 mg, Oral, BID, Taking    lamoTRIgine, 100 mg, Oral, DAILY, Taking    albuterol, 2 Puff, Inhalation, Q6HRS PRN, Taking As Needed    Vitamin D, 5,000 Int'l Units, Oral, DAILY, Taking    Physical Examination:    Ambulatory Vitals  Encounter Vitals  Temperature: 36.1 °C (97 °F)  Temp src: Temporal  Blood Pressure: 132/60  Pulse: 69  Pulse Oximetry: 96 %  Weight: 75 kg (165 lb 5.5 oz)  Height: 176.5 cm (5' 9.5\")  BMI (Calculated): 24.07    Neurological Examination:  Mental Status: The patient is alert and oriented to person, place, time, and situation. Speech is fluent, with no aphasia nor dysarthria noted. Affect is normal.    Cranial Nerve Examination:  CN I: Olfaction examination is deferred.  CN II: Visual fields are full to confrontation examination and show no visual field defect.   CN III, IV, VI: Eye movements are normal in all directions. Pupils are reactive to direct and consensual light. There is no relative afferent pupillary defect. There is no nystagmus.  CN V: Facial sensation to light touch is intact throughout.   CN VII: No significant facial muscle or other soft tissue asymmetry.  CN VIII: Hearing intact to rubbing sounds bilaterally.   CN IX, X: Soft palate elevates symmetrically.  CN XI: Symmetrical shoulder shrug exam.  CN XII: Midline tongue protrusion and moves symmetrically to each side.     Motor Examination:  Muscle strength is intact throughout. Muscle tone is normal throughout. No abnormal movements are observed. No pronator drift is noted.     Muscle Stretch Reflexes Examination:  Deferred.     Sensory Examination:  Preserved sensation to light touch in all extremities.     Coordination:  Normal finger to nose testing bilaterally, no postural nor intentional tremor was noted.     Stance/gait:  Normal regular gait with normal arm swings and stride " length. Able to perform tandem gait. Romberg sign is absent.     ASSESSMENT AND PLAN:  1. 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 The localization is temporal lobe, the lateralalization to the left (though semiology might point toward the right); etiology might be related to  complications, head traumas.  The EEG done in 2024, showed right temporal focal slowing, as well as right temporal sharp discharges, pointing toward increased propensity toward focal seizures arising from that region; there was at times significant movement artifact noted, but no clear evolving seizures were captured during that EEG.    We will continue current medications with no changes and evaluate the level of seizure control while on current antiseizure medications regularly:  - brivaracetam (BRIVIACT) 100 MG Tab tablet; Take 1 Tablet by mouth 2 times a day for 90 days.  Dispense: 60 Tablet; Refill: 2  - lacosamide (VIMPAT) 100 MG Tab tablet; Take 1 Tablet by mouth 2 times a day for 90 days.  Dispense: 60 Tablet; Refill: 2   - initially, lamotrigine was entered, but called the pharmacy to discontinue the medication, since it was entered in error (it appears that lamotrigine was entered on 2025 in our system); the patient confirmed taking lamotrigine, but based on the rest of history provided, he is really unreliable historian.     We will obtain basic labs as well:   - CBC WITH DIFFERENTIAL; Future  - Comp Metabolic Panel; Future  - BRIVARACETAM, SE/PL  - LAMOTRIGINE; Future (to evaluate if he was taking any at all)   - LACOSAMIDE; Future    If he continues to have seizures despite being in two antiseizure medications, we will plan for repeat  MRI brain/EEG in a month.     Epilepsy surgery was briefly discussed at the time of the initial visit.    Provided epilepsy counseling in writing.    The  patient has chronically low platelets, and he was advised in the past to follow this with his primary care physician.  I also advised him to obtain the above-noted blood work right after this visit, so we can follow his platelets.    Patient Instructions   Please continue Briviact 100 mg twice daily.    Please take lacosamide (Vimpat) 100 mg twice daily.     Please let our office know if you have any changes in your seizure frequency and/characteristics. Otherwise, please keep the diary of your events and bring it with you at the time of your next follow up visit with our office.     Please take vitamin D3 [id number] internation units daily.     Please note that the following might precipitate seizures: missed doses of antiseizure medications, being sick with fever, stress, fatigue, sleep deprivation, not eating regularly, not drinking enough water, drinking too much alcohol, stopping alcohol suddenly if you are currently using it on a regular/daily basis, and/or using recreational drugs, among others.    Please note that the following might lead to an injury, potentially a life-threatening injury, in case you have a seizure and/or lose awareness while:   - being in a large body of water by yourself, such as bath, pool, lake, ocean, among others (risk of drowning)   - being on unprotected heights (risk of fall)   - being around and/or operating heavy machinery (risk of injury)   - being around open fire/hot surfaces (risk of burns)   - any other activities/circumstances, in which if you lose awareness, you might injure yourself and/or others.    Please call for help (crisis line and/or 911) in case you have thoughts of harming yourself and/or others.    Please continue to abstain from driving until further notice.     ------------------------------------------------------------------------------------------  Instructions for your family/caregivers:  Please call 911 if the patient has a seizure longer than 2-3  minutes, if seizures are back to back without him recovering to his baseline, or he does not start recovering within 5-10 minutes after the seizure stops. During the seizure - please turn him on his side, please make sure his head is protected (for example, you should put a pillow under his head, if one is available), and please do not put anything in his mouth.   -------------------------------------------------------------------------------------------    It is important that your seizures are well controlled and you have none or have them rarely. In addition to avoiding injury related to breakthrough seizures, frequent seizures increase risk of SUDEP (sudden unexpected death in epilepsy), where a person goes into a seizure and then never wakes up - this is a rare complication of seizure disorder; one of the best available ways to prevent it is to control your seizures well.     Due to the high volume of patients we are trying to help, your physician will not be able to respond by phone or in [employer] to your routine concerns between appointments.  This does not reflect a lack of interest or concern for you or your diagnosis.  Please bring these questions and concerns to your appointment where your physician can answer.  Please relay more pressing concerns to our office, either via [employer], or by phone; if not able to reach us please visit nearby Urgent Care Center or Emergency Department.  If any emergent medical needs, please seek emergent medical help and/or call 911.    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  Please schedule a dedicated appointment to address your paperwork, so we can do that in a timely manner.  This is not due to lack of concern or interest for your disease-related work/administrative problems, but to make sure that we provide the best possible care and to fill  out your paperwork in a correct and timely manner.    Thank you for entrusting your neurological care to [facility] and we look forward to continuing to serve you.    Follow up in 1.5 months (Scheduled for 7/30/2025 and confirmed with the patient that he is aware of that appointment).    My total time spent caring for the patient on the day of the encounter was 46 minutes.   This does not include time spent on separately billable procedures/tests.      aJrett Maya MD  Neurology Attending, Epilepsy Program  [facility]

## 2025-06-16 NOTE — PATIENT INSTRUCTIONS
Please continue Briviact 100 mg twice daily.    Please take lacosamide (Vimpat) 100 mg twice daily.     Please let our office know if you have any changes in your seizure frequency and/characteristics. Otherwise, please keep the diary of your events and bring it with you at the time of your next follow up visit with our office.     Please take vitamin D3 [id number] internation units daily.     Please note that the following might precipitate seizures: missed doses of antiseizure medications, being sick with fever, stress, fatigue, sleep deprivation, not eating regularly, not drinking enough water, drinking too much alcohol, stopping alcohol suddenly if you are currently using it on a regular/daily basis, and/or using recreational drugs, among others.    Please note that the following might lead to an injury, potentially a life-threatening injury, in case you have a seizure and/or lose awareness while:   - being in a large body of water by yourself, such as bath, pool, lake, ocean, among others (risk of drowning)   - being on unprotected heights (risk of fall)   - being around and/or operating heavy machinery (risk of injury)   - being around open fire/hot surfaces (risk of burns)   - any other activities/circumstances, in which if you lose awareness, you might injure yourself and/or others.    Please call for help (crisis line and/or 911) in case you have thoughts of harming yourself and/or others.    Please continue to abstain from driving until further notice.     ------------------------------------------------------------------------------------------  Instructions for your family/caregivers:  Please call 911 if the patient has a seizure longer than 2-3 minutes, if seizures are back to back without him recovering to his baseline, or he does not start recovering within 5-10 minutes after the seizure stops. During the seizure - please turn him on his side, please make sure his head is protected (for example, you  should put a pillow under his head, if one is available), and please do not put anything in his mouth.   -------------------------------------------------------------------------------------------    It is important that your seizures are well controlled and you have none or have them rarely. In addition to avoiding injury related to breakthrough seizures, frequent seizures increase risk of SUDEP (sudden unexpected death in epilepsy), where a person goes into a seizure and then never wakes up - this is a rare complication of seizure disorder; one of the best available ways to prevent it is to control your seizures well.     Due to the high volume of patients we are trying to help, your physician will not be able to respond by phone or in [employer] to your routine concerns between appointments.  This does not reflect a lack of interest or concern for you or your diagnosis.  Please bring these questions and concerns to your appointment where your physician can answer.  Please relay more pressing concerns to our office, either via [employer], or by phone; if not able to reach us please visit nearby Urgent Care Center or Emergency Department.  If any emergent medical needs, please seek emergent medical help and/or call 911.    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  Please schedule a dedicated appointment to address your paperwork, so we can do that in a timely manner.  This is not due to lack of concern or interest for your disease-related work/administrative problems, but to make sure that we provide the best possible care and to fill out your paperwork in a correct and timely manner.    Thank you for entrusting your neurological care to [facility] and we look forward to continuing to serve you.

## 2025-07-08 ENCOUNTER — APPOINTMENT (OUTPATIENT)
Dept: RADIOLOGY | Facility: MEDICAL CENTER | Age: 35
End: 2025-07-08
Attending: EMERGENCY MEDICINE
Payer: COMMERCIAL

## 2025-07-08 ENCOUNTER — HOSPITAL ENCOUNTER (EMERGENCY)
Facility: MEDICAL CENTER | Age: 35
End: 2025-07-08
Attending: EMERGENCY MEDICINE
Payer: COMMERCIAL

## 2025-07-08 VITALS
DIASTOLIC BLOOD PRESSURE: 67 MMHG | SYSTOLIC BLOOD PRESSURE: 136 MMHG | OXYGEN SATURATION: 96 % | BODY MASS INDEX: 25.47 KG/M2 | WEIGHT: 175 LBS | RESPIRATION RATE: 20 BRPM | HEART RATE: 91 BPM | TEMPERATURE: 98.6 F

## 2025-07-08 DIAGNOSIS — S01.81XA LACERATION OF FOREHEAD, INITIAL ENCOUNTER: ICD-10-CM

## 2025-07-08 DIAGNOSIS — R41.82 ALTERED MENTAL STATUS, UNSPECIFIED ALTERED MENTAL STATUS TYPE: ICD-10-CM

## 2025-07-08 DIAGNOSIS — R56.9 SEIZURE (HCC): Primary | ICD-10-CM

## 2025-07-08 DIAGNOSIS — S61.212A LACERATION OF RIGHT MIDDLE FINGER WITHOUT FOREIGN BODY WITHOUT DAMAGE TO NAIL, INITIAL ENCOUNTER: ICD-10-CM

## 2025-07-08 LAB
ALBUMIN SERPL BCP-MCNC: 4.4 G/DL (ref 3.2–4.9)
ALBUMIN/GLOB SERPL: 1.9 G/DL
ALP SERPL-CCNC: 66 U/L (ref 30–99)
ALT SERPL-CCNC: 32 U/L (ref 2–50)
AMMONIA PLAS-SCNC: 108 UMOL/L (ref 11–45)
ANION GAP SERPL CALC-SCNC: 20 MMOL/L (ref 7–16)
AST SERPL-CCNC: 54 U/L (ref 12–45)
BASOPHILS # BLD AUTO: 0.6 % (ref 0–1.8)
BASOPHILS # BLD: 0.04 K/UL (ref 0–0.12)
BILIRUB SERPL-MCNC: 0.4 MG/DL (ref 0.1–1.5)
BUN SERPL-MCNC: 12 MG/DL (ref 8–22)
CALCIUM ALBUM COR SERPL-MCNC: 8.9 MG/DL (ref 8.5–10.5)
CALCIUM SERPL-MCNC: 9.2 MG/DL (ref 8.5–10.5)
CHLORIDE SERPL-SCNC: 104 MMOL/L (ref 96–112)
CO2 SERPL-SCNC: 17 MMOL/L (ref 20–33)
CREAT SERPL-MCNC: 1.4 MG/DL (ref 0.5–1.4)
EKG IMPRESSION: NORMAL
EOSINOPHIL # BLD AUTO: 0.06 K/UL (ref 0–0.51)
EOSINOPHIL NFR BLD: 0.8 % (ref 0–6.9)
ERYTHROCYTE [DISTWIDTH] IN BLOOD BY AUTOMATED COUNT: 41.5 FL (ref 35.9–50)
ETHANOL BLD-MCNC: <10.1 MG/DL
GFR SERPLBLD CREATININE-BSD FMLA CKD-EPI: 67 ML/MIN/1.73 M 2
GLOBULIN SER CALC-MCNC: 2.3 G/DL (ref 1.9–3.5)
GLUCOSE SERPL-MCNC: 97 MG/DL (ref 65–99)
HCT VFR BLD AUTO: 43.4 % (ref 42–52)
HGB BLD-MCNC: 14.5 G/DL (ref 14–18)
IMM GRANULOCYTES # BLD AUTO: 0.07 K/UL (ref 0–0.11)
IMM GRANULOCYTES NFR BLD AUTO: 1 % (ref 0–0.9)
LYMPHOCYTES # BLD AUTO: 1.47 K/UL (ref 1–4.8)
LYMPHOCYTES NFR BLD: 20.3 % (ref 22–41)
MCH RBC QN AUTO: 31.8 PG (ref 27–33)
MCHC RBC AUTO-ENTMCNC: 33.4 G/DL (ref 32.3–36.5)
MCV RBC AUTO: 95.2 FL (ref 81.4–97.8)
MONOCYTES # BLD AUTO: 0.53 K/UL (ref 0–0.85)
MONOCYTES NFR BLD AUTO: 7.3 % (ref 0–13.4)
NEUTROPHILS # BLD AUTO: 5.07 K/UL (ref 1.82–7.42)
NEUTROPHILS NFR BLD: 70 % (ref 44–72)
NRBC # BLD AUTO: 0 K/UL
NRBC BLD-RTO: 0 /100 WBC (ref 0–0.2)
PLATELET # BLD AUTO: 142 K/UL (ref 164–446)
PMV BLD AUTO: 11.3 FL (ref 9–12.9)
POTASSIUM SERPL-SCNC: 4 MMOL/L (ref 3.6–5.5)
PROT SERPL-MCNC: 6.7 G/DL (ref 6–8.2)
RBC # BLD AUTO: 4.56 M/UL (ref 4.7–6.1)
SODIUM SERPL-SCNC: 141 MMOL/L (ref 135–145)
WBC # BLD AUTO: 7.2 K/UL (ref 4.8–10.8)

## 2025-07-08 PROCEDURE — 700111 HCHG RX REV CODE 636 W/ 250 OVERRIDE (IP): Performed by: EMERGENCY MEDICINE

## 2025-07-08 PROCEDURE — 36415 COLL VENOUS BLD VENIPUNCTURE: CPT

## 2025-07-08 PROCEDURE — 304217 HCHG IRRIGATION SYSTEM

## 2025-07-08 PROCEDURE — 82077 ASSAY SPEC XCP UR&BREATH IA: CPT

## 2025-07-08 PROCEDURE — 99285 EMERGENCY DEPT VISIT HI MDM: CPT

## 2025-07-08 PROCEDURE — 82140 ASSAY OF AMMONIA: CPT

## 2025-07-08 PROCEDURE — 71045 X-RAY EXAM CHEST 1 VIEW: CPT

## 2025-07-08 PROCEDURE — A9270 NON-COVERED ITEM OR SERVICE: HCPCS | Performed by: EMERGENCY MEDICINE

## 2025-07-08 PROCEDURE — 96374 THER/PROPH/DIAG INJ IV PUSH: CPT

## 2025-07-08 PROCEDURE — 93005 ELECTROCARDIOGRAM TRACING: CPT | Mod: TC | Performed by: EMERGENCY MEDICINE

## 2025-07-08 PROCEDURE — 700102 HCHG RX REV CODE 250 W/ 637 OVERRIDE(OP): Performed by: EMERGENCY MEDICINE

## 2025-07-08 PROCEDURE — 85025 COMPLETE CBC W/AUTO DIFF WBC: CPT

## 2025-07-08 PROCEDURE — 303353 HCHG DERMABOND SKIN ADHESIVE

## 2025-07-08 PROCEDURE — 70450 CT HEAD/BRAIN W/O DYE: CPT

## 2025-07-08 PROCEDURE — 96375 TX/PRO/DX INJ NEW DRUG ADDON: CPT

## 2025-07-08 PROCEDURE — 304999 HCHG REPAIR-SIMPLE/INTERMED LEVEL 1

## 2025-07-08 PROCEDURE — 700105 HCHG RX REV CODE 258: Performed by: EMERGENCY MEDICINE

## 2025-07-08 PROCEDURE — 80053 COMPREHEN METABOLIC PANEL: CPT

## 2025-07-08 RX ORDER — SODIUM CHLORIDE, SODIUM LACTATE, POTASSIUM CHLORIDE, AND CALCIUM CHLORIDE .6; .31; .03; .02 G/100ML; G/100ML; G/100ML; G/100ML
1000 INJECTION, SOLUTION INTRAVENOUS ONCE
Status: COMPLETED | OUTPATIENT
Start: 2025-07-08 | End: 2025-07-08

## 2025-07-08 RX ORDER — DIAZEPAM 10 MG/2ML
5 INJECTION, SOLUTION INTRAMUSCULAR; INTRAVENOUS ONCE
Status: COMPLETED | OUTPATIENT
Start: 2025-07-08 | End: 2025-07-08

## 2025-07-08 RX ORDER — LACOSAMIDE 100 MG/1
100 TABLET ORAL ONCE
Status: COMPLETED | OUTPATIENT
Start: 2025-07-08 | End: 2025-07-08

## 2025-07-08 RX ORDER — KETOROLAC TROMETHAMINE 15 MG/ML
15 INJECTION, SOLUTION INTRAMUSCULAR; INTRAVENOUS ONCE
Status: COMPLETED | OUTPATIENT
Start: 2025-07-08 | End: 2025-07-08

## 2025-07-08 RX ORDER — DROPERIDOL 2.5 MG/ML
2.5 INJECTION, SOLUTION INTRAMUSCULAR; INTRAVENOUS ONCE
Status: COMPLETED | OUTPATIENT
Start: 2025-07-08 | End: 2025-07-08

## 2025-07-08 RX ADMIN — DIAZEPAM 5 MG: 10 INJECTION, SOLUTION INTRAMUSCULAR; INTRAVENOUS at 18:30

## 2025-07-08 RX ADMIN — BRIVARACETAM 100 MG: 100 TABLET, FILM COATED ORAL at 19:44

## 2025-07-08 RX ADMIN — KETOROLAC TROMETHAMINE 15 MG: 15 INJECTION, SOLUTION INTRAMUSCULAR; INTRAVENOUS at 16:30

## 2025-07-08 RX ADMIN — LACOSAMIDE 100 MG: 100 TABLET, FILM COATED ORAL at 18:53

## 2025-07-08 RX ADMIN — SODIUM CHLORIDE, POTASSIUM CHLORIDE, SODIUM LACTATE AND CALCIUM CHLORIDE 1000 ML: 600; 310; 30; 20 INJECTION, SOLUTION INTRAVENOUS at 14:30

## 2025-07-08 RX ADMIN — DROPERIDOL 2.5 MG: 2.5 INJECTION, SOLUTION INTRAMUSCULAR; INTRAVENOUS at 14:53

## 2025-07-08 ASSESSMENT — FIBROSIS 4 INDEX: FIB4 SCORE: 1.82

## 2025-07-08 ASSESSMENT — PAIN DESCRIPTION - PAIN TYPE
TYPE: ACUTE PAIN
TYPE: ACUTE PAIN

## 2025-07-08 NOTE — ED TRIAGE NOTES
.  Chief Complaint   Patient presents with    Seizure      Pt BIB EMS. Per report Pt was found altered AOx1 naked outside of a school. Pt has Hx of seizures, post ictal agitation. Pt came in 4 Pt restraints. Pt has multiple abrasions to feet bilateral lower extremities, laceration above eye brow. 80% RA sat. Pt was given Ketamine 300 mg IM, Versed 8 MG IM PTA. Pt was suctioned by EMS, suctioned upon arrival with moist cough noted. FS 90. AOx0 upon arrival.

## 2025-07-08 NOTE — ED PROVIDER NOTES
ED Provider Note      ED PHYSICIAN NOTE    CHIEF COMPLAINT  Chief Complaint   Patient presents with    Seizure       EXTERNAL RECORDS REVIEWED  Outpatient Notes for seizure, June 16, 2025 through the epilepsy clinic, he was initially started on the Keppra although did not tolerate it well but seemed to have better control of his seizures, was then switched to Briviact in 2022 he has had seizures in January, April 2020 23 x 2 as well as in January 2024  patient seen in the emergency department April 30, 2025 for seizure noted to be on Vimpat as well, additional 2 visits in March 2025 for seizure, was quite agitated    HPI/ROS  LIMITATION TO HISTORY   Select: Altered mental status / Confusion  OUTSIDE HISTORIAN(S):  EMS report patient was quite agitated history of seizure, was given 300 of ketamine and 8 of Versed IM    Barry Griffith is a 35 y.o. male who presents for he was found running around naked outside of school, patient was known to EMS with history of seizure disorder, apparently there was a recent seizure although EMS did not witness this, report by witnesses and then was running around agitated outside the school.  He was quite combative for EMS and was given ketamine and Versed as above.  There has been no return of seizure activity but he is now requiring supplemental oxygen with room air of 80% after his sedation.  Patient does have a laceration towards his face as well as some abrasions over his hands and feet, no other reports of trauma.  Per EMS no reports of vomiting, and he was initially running around and moving all 4 extremities.  Other history is unobtainable at this time due to his current mental status    PAST MEDICAL HISTORY  Past Medical History[1]    SOCIAL HISTORY  Social History[2]    CURRENT MEDICATIONS  Home Medications       Reviewed by Ernestine Diaz R.N. (Registered Nurse) on 07/08/25 at 1401  Med List Status: Partial     Medication Last Dose Status   albuterol 108 (90  Base) MCG/ACT Aero Soln inhalation aerosol  Active   brivaracetam (BRIVIACT) 100 MG Tab tablet  Active   Cholecalciferol (VITAMIN D) 125 MCG (5000 UT) Cap  Active   lacosamide (VIMPAT) 100 MG Tab tablet  Active                    ALLERGIES  Allergies[3]    PHYSICAL EXAM  VITAL SIGNS: /67   Pulse 91   Temp 37 °C (98.6 °F) (Temporal)   Resp 20   Wt 79.4 kg (175 lb)   SpO2 96%   BMI 25.47 kg/m²    Constitutional: Sedated, moaning  HENT: Normal inspection, no Garnica sign raccoon eyes, there is a laceration around 0.5 cm to the left forehead as well as multiple abrasions on the face  Eyes: Normal inspection, Pupils equal, non-icteric  Neck: Grossly normal range of motion. No stridor  Cardiovascular: Regular rate and rhythm, no murmurs.  Symmetric peripheral pulses at radial  Thorax & Lungs: Patient is on oxy mask at this time, diminished in the bases no chest tenderness.   Abdomen:  soft, non-distended, nontender, no mass  Skin: No obvious rash. Warm. Dry.   Back: No tenderness, No CVA tenderness.   Extremities: Multiple abrasions over the hands, 1 cm laceration over the third digit on palmar aspect, superficial, distal capillary refills less than 2 seconds no cyanosis, no edema  Neurologic: Patient is sedated, he is moaning, he is moving all 4 extremities spontaneously as well as localizing pain           DIAGNOSTIC STUDIES / PROCEDURES  LABS/EKG  Results for orders placed or performed during the hospital encounter of 07/08/25   CBC WITH DIFFERENTIAL    Collection Time: 07/08/25  2:10 PM   Result Value Ref Range    WBC 7.2 4.8 - 10.8 K/uL    RBC 4.56 (L) 4.70 - 6.10 M/uL    Hemoglobin 14.5 14.0 - 18.0 g/dL    Hematocrit 43.4 42.0 - 52.0 %    MCV 95.2 81.4 - 97.8 fL    MCH 31.8 27.0 - 33.0 pg    MCHC 33.4 32.3 - 36.5 g/dL    RDW 41.5 35.9 - 50.0 fL    Platelet Count 142 (L) 164 - 446 K/uL    MPV 11.3 9.0 - 12.9 fL    Neutrophils-Polys 70.00 44.00 - 72.00 %    Lymphocytes 20.30 (L) 22.00 - 41.00 %    Monocytes  7.30 0.00 - 13.40 %    Eosinophils 0.80 0.00 - 6.90 %    Basophils 0.60 0.00 - 1.80 %    Immature Granulocytes 1.00 (H) 0.00 - 0.90 %    Nucleated RBC 0.00 0.00 - 0.20 /100 WBC    Neutrophils (Absolute) 5.07 1.82 - 7.42 K/uL    Lymphs (Absolute) 1.47 1.00 - 4.80 K/uL    Monos (Absolute) 0.53 0.00 - 0.85 K/uL    Eos (Absolute) 0.06 0.00 - 0.51 K/uL    Baso (Absolute) 0.04 0.00 - 0.12 K/uL    Immature Granulocytes (abs) 0.07 0.00 - 0.11 K/uL    NRBC (Absolute) 0.00 K/uL   COMP METABOLIC PANEL    Collection Time: 25  2:10 PM   Result Value Ref Range    Sodium 141 135 - 145 mmol/L    Potassium 4.0 3.6 - 5.5 mmol/L    Chloride 104 96 - 112 mmol/L    Co2 17 (L) 20 - 33 mmol/L    Anion Gap 20.0 (H) 7.0 - 16.0    Glucose 97 65 - 99 mg/dL    Bun 12 8 - 22 mg/dL    Creatinine 1.40 0.50 - 1.40 mg/dL    Calcium 9.2 8.5 - 10.5 mg/dL    Correct Calcium 8.9 8.5 - 10.5 mg/dL    AST(SGOT) 54 (H) 12 - 45 U/L    ALT(SGPT) 32 2 - 50 U/L    Alkaline Phosphatase 66 30 - 99 U/L    Total Bilirubin 0.4 0.1 - 1.5 mg/dL    Albumin 4.4 3.2 - 4.9 g/dL    Total Protein 6.7 6.0 - 8.2 g/dL    Globulin 2.3 1.9 - 3.5 g/dL    A-G Ratio 1.9 g/dL   DIAGNOSTIC ALCOHOL    Collection Time: 25  2:10 PM   Result Value Ref Range    Diagnostic Alcohol <10.1 <10.1 mg/dL   ESTIMATED GFR    Collection Time: 25  2:10 PM   Result Value Ref Range    GFR (CKD-EPI) 67 >60 mL/min/1.73 m 2   AMMONIA    Collection Time: 25  2:20 PM   Result Value Ref Range    Ammonia 108 (H) 11 - 45 umol/L   EKG    Collection Time: 25  7:51 PM   Result Value Ref Range    Report       [facility] Emergency Dept.    Test Date:  2025  Pt Name:    DUANE AGUILAR                Department: ER  MRN:        [id number]                      Room:       [facility]  Gender:     Male                         Technician: [id number]  :        1990                   Requested By:JUNO ROMERO  Order #:    [id number]                    Tashia MD: JUNO  MARQUITA ROMERO MD    Measurements  Intervals                                Axis  Rate:       88                           P:          90  TX:         172                          QRS:        86  QRSD:       95                           T:          56  QT:         353  QTc:        427    Interpretive Statements  Sinus rhythm  RSR' in V1 or V2, right VCD or RVH  Baseline wander in lead(s) V2  Compared to ECG 03/13/2025 17:46:27  RSR' in V1 or V2 now present    Electronically Signed On 07- 19:51:55 PDT by JUNO ROMERO MD         I have independently interpreted this EKG as documented above      RADIOLOGY  I have independently interpreted the diagnostic imaging associated with this visit and am waiting the final reading from the radiologist.   My preliminary interpretation is as follows: No intracranial bleed    CT-HEAD W/O   Final Result      Head CT without contrast within normal limits. No acute findings.                  DX-CHEST-PORTABLE (1 VIEW)   Final Result      No acute cardiac or pulmonary abnormalities are identified.            COURSE & MEDICAL DECISION MAKING    INITIAL ASSESSMENT, COURSE AND PLAN  Care Narrative: 2:12 PM  Patient presents with altered mental status after apparent seizure.  This may be more postictal and in review of his records, this agitation has happened several times in the past after his seizures.  He is quite sedated now after medications by EMS.  There is findings of head trauma so consideration for intracranial bleed or head injury, electrolyte or metabolic derangement, arrhythmia, he is afebrile although difficult to evaluate for any infectious symptoms,.  Of ordered for diagnostic labs, CT as well as chest x-ray and ECG.  Will monitor his respiratory and mental status closely with need for further escalation of care    2:52 PM  Patient has become more agitated again, he is moaning, and agitated, fighting against treatment.  I have ordered for droperidol    4:33  "PM  Patient is reevaluated, he is awake, conversant, quite pleasant.  He states his last seizure was 3 months ago, this is typical for his seizures.  He would like to go home.  He reports no focal areas of pain he just feels sore.  I will refer Toradol, plan for wound care to evaluate for other potential injury    5:31 PM  Patient is reevaluated again, family is present as well, he is well-appearing, comfortable, mildly sleepy.  Laceration repair as below    Laceration Repair Procedure    Indication: Laceration    Location/Description: Left forehead right third digit    Procedure: The patient was placed in the appropriate position a. The area was then irrigated with normal saline. The laceration was closed with Dermabond. A second laceration was closed with Dermabond. The wound area was then dressed with a sterile dressing.      Total repaired wound length: 2 cm.     Other Items: None    The patient tolerated the procedure well.    Complications: None    6:46 PM  Patient is reevaluated, he did stand and ambulate states he felt somewhat off\".  Will continue to observe, was provided with Valium as well as his home medications to prevent any further seizure    7:53 PM  Patient is reevaluated, he is awake, alert, well-appearing.  Oriented x 3 and ambulates with steady gait.  I have discussed with him and family plan for admission.  Patient has refused admission.  I discussed at length with family present my concerns that he had a significant seizure today and with quite a prolonged postictal period requiring significant sedation.  I discussed with the patient the risks of their decision to leave without receiving the appropriate medical care, I discussed with the patient the risks of their decision to refuse or withhold consent to receive appropriate medical care. The patient is of sound and clear mind and has the capacity to understand the risks and benefits described above, and is able to convey this understanding " verbally to me. The patient is not altered is alert and oriented and able to make a good decision in my opinion. The patient was given discharge instructions and a followup plan as documented in the medical record. I have asked the patient to return at any time to the emergency department for any reason.        Interventions  Medications   LR (Bolus) infusion 1,000 mL (0 mL Intravenous Stopped 7/8/25 1606)   droperidol (Inapsine) injection 2.5 mg (2.5 mg Intravenous Given 7/8/25 1453)   ketorolac (Toradol) 15 MG/ML injection 15 mg (15 mg Intravenous Given 7/8/25 1630)   lacosamide (Vimpat) tablet 100 mg (100 mg Oral Given 7/8/25 1853)   brivaracetam (Briviact) tablet 100 mg (100 mg Oral Given 7/8/25 1944)   diazePAM (Valium) injection 5 mg (5 mg Intravenous Given 7/8/25 1830)       Measures  Hydration: Based on the patient's presentation of Tachycardia the patient was given IV fluids. IV Hydration was used because oral hydration was not as rapid as required. Upon recheck following hydration, the patient was improved.       PROBLEM LIST  This is a pleasant 35-year-old male presenting after    # Seizure.  Patient with longstanding history of seizure, his last was 3 months ago and he has apparently been compliant with his medication although does get intermittent seizures.  Patient was quite altered after the seizure, did require sedation in the field as well as here, he did ultimately clear.  His CT shows no evidence of intracranial bleed, no electrolyte derangement, did have a slightly high ammonia.  His lacerations repaired with Dermabond as above.  As above I did discuss with the patient that we should admit him given the severity of his seizure even though he has returned back to baseline  however he has declined this as above.  He understands that he can return at any time and the importance of follow-up as well        DISPOSITION AND DISCUSSIONS  I have discussed management of the patient with the following  physicians and ALEXA's:  as noted above    Escalation of care considered, and ultimately not performed:after discussion with the patient / family, they have elected to decline an escalation in care    Barriers to care at this time, including but not limited to: none.     Prescription drugs considered and/or prescribed:   Considered opiate prescription, but nonnarcotic analgesic is most appropriate  Prescribed   Discharge Medication List as of 7/8/2025  7:46 PM          The patient will return for new or worsening symptoms and is stable at the time of discharge.    The patient is referred to a primary physician for blood pressure management, diabetic screening, and for all other preventative health concerns.        DISPOSITION:  Patient will be discharged home in stable condition.    FOLLOW UP:  No follow-up provider specified.    OUTPATIENT MEDICATIONS:  Discharge Medication List as of 7/8/2025  7:46 PM            FINAL DIAGNOSIS  1. Seizure (HCC)        2. Laceration of right middle finger without foreign body without damage to nail, initial encounter        3. Laceration of forehead, initial encounter        4. Altered mental status, unspecified altered mental status type               This dictation was created using voice recognition software. The accuracy of the dictation is limited to the abilities of the software. I expect there may be some errors of grammar and possibly content. The nursing notes were reviewed and certain aspects of this information were incorporated into this note.    Electronically signed by: Ray Hernández M.D., 7/8/2025            [1]   Past Medical History:  Diagnosis Date    ASTHMA     Asthma since infancy.    Asthma     Epilepsy (HCC)     Mild intermittent asthma with acute exacerbation 6/10/2016    Seizure (HCC)     Seizure disorder (HCC)    [2]   Social History  Tobacco Use    Smoking status: Never    Smokeless tobacco: Never   Vaping Use    Vaping status: Never Used   Substance Use  Topics    Alcohol use: Yes     Comment: rare, socially on the weekend    Drug use: Yes     Types: Marijuana, Inhaled     Comment: THC; denies h/o IVDU   [3] No Known Allergies

## 2025-07-09 NOTE — ED NOTES
Pt was prepped and ready for dc. PIV's removed, pt successfully road tested. Pt then began acting confused and difficult to redirect. Pts family stated he was exhibiting similar behaviors he has right before he has a seizure. Pt redirected back to bed, ERP at bedside. PIV established again, medicated according to MAR. Pt attached to all monitors. Family at bedside.

## 2025-07-09 NOTE — ED NOTES
Bedside report received from off going RN/tech: marco rn, assumed care of patient.  POC discussed with patient. Call light within reach, all needs addressed at this time.       Fall risk interventions in place: Place socks on patient and Keep floor surfaces clean and dry (all applicable per [facility] Fall risk assessment)   Continuous monitoring: Cardiac Leads, Pulse Ox, or Blood Pressure  IVF/IV medications: Not Applicable   Oxygen: Room Air  Bedside sitter: Not Applicable   Isolation: Not Applicable

## 2025-07-09 NOTE — DISCHARGE INSTRUCTIONS
Your tests today including CT chest and blood work were reassuring.  However with the severity of your seizure we did advise staying in the hospital for further monitoring and treatment as needed.  Please feel free to return at anytime if you change your mind and certainly seek medical attention should you begin to feel poorly have a seizure or have any other concerns.  Otherwise ensure that you rest, stay well-hydrated and follow-up with your primary doctor

## 2025-07-28 ENCOUNTER — OFFICE VISIT (OUTPATIENT)
Facility: MEDICAL CENTER | Age: 35
End: 2025-07-28
Attending: PSYCHIATRY & NEUROLOGY
Payer: COMMERCIAL

## 2025-07-28 VITALS
HEART RATE: 74 BPM | HEIGHT: 69 IN | BODY MASS INDEX: 23.4 KG/M2 | OXYGEN SATURATION: 96 % | DIASTOLIC BLOOD PRESSURE: 70 MMHG | SYSTOLIC BLOOD PRESSURE: 130 MMHG | RESPIRATION RATE: 20 BRPM | WEIGHT: 158 LBS | TEMPERATURE: 98.2 F

## 2025-07-28 DIAGNOSIS — G40.909 SEIZURE DISORDER (HCC): ICD-10-CM

## 2025-07-28 DIAGNOSIS — R56.9 SEIZURE (HCC): Primary | ICD-10-CM

## 2025-07-28 PROCEDURE — 3078F DIAST BP <80 MM HG: CPT | Performed by: PSYCHIATRY & NEUROLOGY

## 2025-07-28 PROCEDURE — 3075F SYST BP GE 130 - 139MM HG: CPT | Performed by: PSYCHIATRY & NEUROLOGY

## 2025-07-28 PROCEDURE — 99215 OFFICE O/P EST HI 40 MIN: CPT | Performed by: PSYCHIATRY & NEUROLOGY

## 2025-07-28 PROCEDURE — 99214 OFFICE O/P EST MOD 30 MIN: CPT | Performed by: PSYCHIATRY & NEUROLOGY

## 2025-07-28 RX ORDER — LACOSAMIDE 100 MG/1
100 TABLET ORAL 2 TIMES DAILY
Qty: 60 TABLET | Refills: 4 | Status: SHIPPED | OUTPATIENT
Start: 2025-07-28 | End: 2025-12-25

## 2025-07-28 ASSESSMENT — PATIENT HEALTH QUESTIONNAIRE - PHQ9: CLINICAL INTERPRETATION OF PHQ2 SCORE: 0

## 2025-07-28 ASSESSMENT — FIBROSIS 4 INDEX: FIB4 SCORE: 2.29

## 2025-07-28 NOTE — PROGRESS NOTES
"[facility] Epilepsy Program  Follow Up Visit      Patient's Name: Barry Griffith  YOB: 1990.  MRN: [id number]  Date of Service: 07/28/25.    Referring Provider: No referring provider defined for this encounter.    Chief Concern: Seizures.     The patient presents for a follow up. The patient presents alone today.      HPI (as obtained at the time of the initial visit and updated as necessary): The patient is a 35 y.o., right-handed male, who initially presented to my epilepsy clinic for evaluation of seizures on 05/08/2023.    The patient started having seizures in 2018. He has two event types, as noted below. He never had myoclonus.    He was initially started on Keppra, but he did not like it. It seems that he was more compliant with Keppra and his seizures were better controlled at that time. He was switched to Briviact in early 2022; he has been tolerating Briviact well, as reported at the time of the initial visit, but has not been very compliant. He became more compliant with Briviact as of May 2023.     The patient unfortunately had 3 seizures with ER presentation on 01/02/2023, 04/04/2023, and 04/28/2023, as of the time of the initial visit with me. He also had a breakthrough seizure on 06/09/2023, and then the last one in January 2024. During one of his seizures in 2022, he was intubated. He unfortunately continues to have breakthrough seizures.     Semiology:  Event type #1: \"Seizures\".  Year/Age of Onset: 2018.  Initial features: Some seizures occur out of sleep and no warning signs. Some seizures are preceded by chlorine smell.   Event features: The patient would experience bilateral tonic-clonic seizure, with head deviation to the left per the report he got from his wife. He is not responsive during the event and has no recollection for the time during the event.  He bites his tongue with his seizures; at least once bladder incontinence; no bowel incontinence.   Post-ictal " "features: He feels it takes his time for his brain to come back to baseline and that he loses a pitch after his seizure, sore in his muscles, and has headaches.   Duration: Up to 5 minutes on average.  Frequency: He has them on average once a month or less frequently.   Precipitating factors: Not clear.     Event type #2: \"Spells\".  Year/Age of Onset: 2018  Initial features: He would experience chlorine smell. He never had sensation of strange tastes, gastric upraising, autonomic phenomena, nor psychic phenomena except anxiety and history of maria victoria vu as a child.   Event features: He might experience a bit of anxiety with it. With at least one episode he stared off; he also feels his mouth is watering and he is doing swallowing movements. No manual automatisms.   Post-ictal features: Back to normal.  Duration: 30 seconds.  Frequency: He typically gets these events on average once in two to three months, or more frequently.  Precipitating factors: Not clear.     History of status epilepticus: yes - intubated in June 2022 for prolonged seizure; another episode in September 2024.   History of physical injury related to seizures: once hit his head on the counter and still has a scar.   History of surgery related to epilepsy: no  Family Planning: N/A.  Current Driving Status: He is not driving at this time and his license is suspended due to his seizures.  Seizure Clusters: No  Longest Seizure Freedom: About 1 year seizure freedom around 2020.     Pertinent Ancillary Test Results:    MRI brain studies:   - MRI brain wo/w contrast (08/10/2018 at [facility]): \"1. MRI of the brain without and with contrast within normal limits. 2. No evidence of mass lesion, heterotopic gray matter, gross cortical dysplasia or mesial temporal sclerosis.\" I reviewed key images on 07/31/2023.     CT head studies:   - CT head wo contrast (01/02/2023 at [facility]): \"No acute intracranial hemorrhage or displaced calvarial fracture.\"    EEG studies:   - " Standard EEG (07/10/2018, Dr. Bai): This is an abnormal video EEG recording in the awake, drowsy, and sleep state(s). Frequent left temporal sharps. Intermittent, subtle, left temporal slowing. The findings increase risk for seizures and suggest underlying area of cortical irritability and structural abnormality. Clinical and radiological correlation is recommended.     - 23-hour ambulatory EEG (04/17/2024, Dr. Maya): This was an abnormal ambulatory EEG recording in the awake and drowsy/sleep state(s):  This was,within the above noted limitations, an abnormal ambulatory EEG recording in the awake and drowsy/sleep state(s):  The presence of almost continuous, right temporal, focal slowing, is suggestive of cerebral dysfunction in this region.  This finding may be seen in context of structural lesion and/or ictal onset zone, among other considerations.  Clinical and radiological correlate is recommended.  The presence of rare to occasional right temporal sharps, along with questionable right TIRDA, points toward increased propensity toward focal seizures arising from that region.  No definitive seizures.   Clinical Events: No reported events. There were several accidental event button push events.     - EMU/LTM study (06/05/2022, Dr. Baker): 13-hour 31 minute EEG was abnormal  video EEG recording in the drowsy/sleep and/or comatose state(s):  -Moderate improving to mild background slowing suggestive of a non-specific encephalopathy. The effects of sedation may be contributing. Clinical correlation recommended.   - No focal asymmetries   - No definitive epileptiform discharges or other epileptiform phenomena seen   - No definitive seizures. Clinical correlation is recommended.   - One event at 1046 PM with fine body tremors with no   abnormal EEG correlate.     INTERIM HISTORY (07/28/2025): The the patient unfortunately had a breakthrough seizure on 07/08/2025. The event was not witnessed, but he was quite  agitated afterwards and was naked at work. No additional seizures recognized.     He is taking Briviact and Vimpat regularly and has no adverse effects from these.    He is still to complete blood work.     He is not driving at this time.     Current Antiseizure Medications: Briviact 100 mg BID. Vimpat 100 mg BID.     Previously Tried Antiseizure Medications: Keppra (mood issues).     Review of Systems: No recent fevers. He lost 7 lbs since the last visit (his weight fluctuates). No mood issues nor suicidal/homicidal ideation.     Risk Factors For Epilepsy/Seizure Disorder: The patient is a product of premature pregnancy/delivery (about 1.5 months early and there was a ). The early development was normal and the patient started waking, talking, and engaging in social interaction as expected. There were no challenges during school and no reported attendance of special education programs. The patient was playing football until , and had several concussions, but never LOC.  There is no history of stroke. There is no history of CNS infections, such as encephalitis and/or meningitis. There is no history of febrile seizures. There is no history of neurosurgical interventions. There is no reported history of staring spells during childhood/school years.    Past Medical History:  Past Medical History:   Diagnosis Date    ASTHMA     Asthma since infancy.    Asthma     Epilepsy (HCC)     Mild intermittent asthma with acute exacerbation 6/10/2016    Seizure ([facility])     Seizure disorder ([facility])      Past Surgical History:  None.     Social History:  Social History     Tobacco Use    Smoking status: Never    Smokeless tobacco: Never   Vaping Use    Vaping status: Never Used   Substance Use Topics    Alcohol use: Yes     Comment: rare, socially on the weekend    Drug use: Yes     Types: Marijuana, Inhaled     Comment: THC; denies h/o IVDU     Family History:  There is no known family history of seizures/epilepsy.  Family  "History   Problem Relation Age of Onset    Hypertension Mother     Brain Aneurysm Mother     Hyperlipidemia Father     Cancer Neg Hx     Diabetes Neg Hx          7/28/2025    11:15 AM 6/16/2025     1:30 PM 11/20/2024     2:00 PM 5/29/2024     3:30 PM 3/11/2024     1:30 PM   PHQ-9 Screening   Little interest or pleasure in doing things 0 - not at all 0 - not at all 0 - not at all 0 - not at all 0 - not at all   Feeling down, depressed, or hopeless 0 - not at all 0 - not at all 0 - not at all 0 - not at all 0 - not at all   PHQ-2 Total Score 0 0 0 0 0     [place] Suicide Reassessment  New or continued thoughts about killing self?: No  Preparing to end life?: No    Allergies:  No Known Allergies    Current Medications:    Current Outpatient Medications:     brivaracetam, 100 mg, Oral, BID, Taking    lacosamide, 100 mg, Oral, BID, Taking    albuterol, 2 Puff, Inhalation, Q6HRS PRN, Taking As Needed    Vitamin D, 5,000 Int'l Units, Oral, DAILY, Taking    Physical Examination:    Ambulatory Vitals  Encounter Vitals  Temperature: 36.8 °C (98.2 °F)  Temp src: Temporal  Blood Pressure: 130/70  Pulse: 74  Respiration: 20  Pulse Oximetry: 96 %  Weight: 71.7 kg (158 lb)  Height: 175.3 cm (5' 9\") (Patient reported)  BMI (Calculated): 23.33    Neurological Examination:  Mental Status: The patient is alert and oriented to person, place, time, and situation. Speech is fluent, with no aphasia nor dysarthria noted. Affect is normal.    Cranial Nerve Examination:  CN I: Olfaction examination is deferred.  CN II: Visual fields are full to confrontation examination and show no visual field defect.   CN III, IV, VI: Eye movements are normal in all directions. Pupils are reactive to direct and consensual light. There is no relative afferent pupillary defect. There is no nystagmus.  CN V: Facial sensation to light touch is intact throughout.   CN VII: No significant facial muscle or other soft tissue asymmetry.  CN VIII: Hearing intact to " rubbing sounds bilaterally.   CN IX, X: Soft palate elevates symmetrically.  CN XI: Symmetrical shoulder shrug exam.  CN XII: Midline tongue protrusion and moves symmetrically to each side.     Motor Examination:  Muscle strength is intact throughout. Muscle tone is normal throughout. No abnormal movements are observed. No pronator drift is noted.     Muscle Stretch Reflexes Examination:  Deferred.     Sensory Examination:  Preserved sensation to light touch in all extremities.     Coordination:  Normal finger to nose testing bilaterally, no postural nor intentional tremor was noted.     Stance/gait:  Normal regular gait with normal arm swings and stride length. Able to perform tandem gait. Romberg sign is absent.     ASSESSMENT AND PLAN:  1. 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 The localization is temporal lobe, the lateralalization to the left (though semiology might point toward the right); etiology might be related to  complications, head traumas.  The EEG done in 2024, showed right temporal focal slowing, as well as right temporal sharp discharges, pointing toward increased propensity toward focal seizures arising from that region; there was at times significant movement artifact noted, but no clear evolving seizures were captured during that EEG.    We will continue current medications with no changes and evaluate the level of seizure control while on current antiseizure medications regularly:  - brivaracetam (BRIVIACT) 100 MG Tab tablet; Take 1 Tablet by mouth 2 times a day for 150 days.  Dispense: 60 Tablet; Refill: 4  - lacosamide (VIMPAT) 100 MG Tab tablet; Take 1 Tablet by mouth 2 times a day for 150 days.  Dispense: 60 Tablet; Refill: 4   - there was a confusion about him being on lamotrigine, but he has not been on lamotrigine and we will confirm that via  labs.    We will obtain basic labs as well:   - CBC WITH DIFFERENTIAL; Future  - Comp Metabolic Panel; Future  - BRIVARACETAM, SE/PL  - LAMOTRIGINE; Future (to evaluate if he was taking any at all)   - LACOSAMIDE; Future   - AMMONIA; Future (ordered today in context of recent elevated ammonia level.     We will obtain repeat MRI brain/72-hour ambulatory EEG in context of ongoing seizures.   - MR-BRAIN-WITH & W/O; Future  - EEG; Future    Epilepsy surgery was briefly discussed at the time of the initial visit.    Provided epilepsy counseling in writing.    The patient has chronically low platelets, and he was advised in the past to follow this with his primary care physician.  I also advised him to obtain the above-noted blood work right after this visit, so we can follow his platelets.    Patient Instructions   Please continue Briviact 100 mg twice daily.    Please take lacosamide (Vimpat) 100 mg twice daily.     Please let our office know if you have any changes in your seizure frequency and/characteristics. Otherwise, please keep the diary of your events and bring it with you at the time of your next follow up visit with our office.     Please take vitamin D3 [id number] internation units daily.     Please note that the following might precipitate seizures: missed doses of antiseizure medications, being sick with fever, stress, fatigue, sleep deprivation, not eating regularly, not drinking enough water, drinking too much alcohol, stopping alcohol suddenly if you are currently using it on a regular/daily basis, and/or using recreational drugs, among others.    Please note that the following might lead to an injury, potentially a life-threatening injury, in case you have a seizure and/or lose awareness while:   - being in a large body of water by yourself, such as bath, pool, lake, ocean, among others (risk of drowning)   - being on unprotected heights (risk of fall)   - being around and/or operating heavy machinery  (risk of injury)   - being around open fire/hot surfaces (risk of burns)   - any other activities/circumstances, in which if you lose awareness, you might injure yourself and/or others.    Please call for help (crisis line and/or 911) in case you have thoughts of harming yourself and/or others.    Please continue to abstain from driving until further notice.     ------------------------------------------------------------------------------------------  Instructions for your family/caregivers:  Please call 911 if the patient has a seizure longer than 2-3 minutes, if seizures are back to back without him recovering to his baseline, or he does not start recovering within 5-10 minutes after the seizure stops. During the seizure - please turn him on his side, please make sure his head is protected (for example, you should put a pillow under his head, if one is available), and please do not put anything in his mouth.   -------------------------------------------------------------------------------------------    It is important that your seizures are well controlled and you have none or have them rarely. In addition to avoiding injury related to breakthrough seizures, frequent seizures increase risk of SUDEP (sudden unexpected death in epilepsy), where a person goes into a seizure and then never wakes up - this is a rare complication of seizure disorder; one of the best available ways to prevent it is to control your seizures well.     Due to the high volume of patients we are trying to help, your physician will not be able to respond by phone or in MyChart to your routine concerns between appointments.  This does not reflect a lack of interest or concern for you or your diagnosis.  Please bring these questions and concerns to your appointment where your physician can answer.  Please relay more pressing concerns to our office, either via MyChart, or by phone; if not able to reach us please visit nearby Urgent Care Center  or Emergency Department.  If any emergent medical needs, please seek emergent medical help and/or call 911.    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  Please schedule a dedicated appointment to address your paperwork, so we can do that in a timely manner.  This is not due to lack of concern or interest for your disease-related work/administrative problems, but to make sure that we provide the best possible care and to fill out your paperwork in a correct and timely manner.    Thank you for entrusting your neurological care to [facility] and we look forward to continuing to serve you.    Follow up in 2 months    Jarett Maya MD  Neurology Attending, Epilepsy Program  [facility]

## 2025-07-28 NOTE — PATIENT INSTRUCTIONS
Please continue Briviact 100 mg twice daily.    Please take lacosamide (Vimpat) 100 mg twice daily.     Please let our office know if you have any changes in your seizure frequency and/characteristics. Otherwise, please keep the diary of your events and bring it with you at the time of your next follow up visit with our office.     Please take vitamin D3 [id number] internation units daily.     Please note that the following might precipitate seizures: missed doses of antiseizure medications, being sick with fever, stress, fatigue, sleep deprivation, not eating regularly, not drinking enough water, drinking too much alcohol, stopping alcohol suddenly if you are currently using it on a regular/daily basis, and/or using recreational drugs, among others.    Please note that the following might lead to an injury, potentially a life-threatening injury, in case you have a seizure and/or lose awareness while:   - being in a large body of water by yourself, such as bath, pool, lake, ocean, among others (risk of drowning)   - being on unprotected heights (risk of fall)   - being around and/or operating heavy machinery (risk of injury)   - being around open fire/hot surfaces (risk of burns)   - any other activities/circumstances, in which if you lose awareness, you might injure yourself and/or others.    Please call for help (crisis line and/or 911) in case you have thoughts of harming yourself and/or others.    Please continue to abstain from driving until further notice.     ------------------------------------------------------------------------------------------  Instructions for your family/caregivers:  Please call 911 if the patient has a seizure longer than 2-3 minutes, if seizures are back to back without him recovering to his baseline, or he does not start recovering within 5-10 minutes after the seizure stops. During the seizure - please turn him on his side, please make sure his head is protected (for example, you  should put a pillow under his head, if one is available), and please do not put anything in his mouth.   -------------------------------------------------------------------------------------------    It is important that your seizures are well controlled and you have none or have them rarely. In addition to avoiding injury related to breakthrough seizures, frequent seizures increase risk of SUDEP (sudden unexpected death in epilepsy), where a person goes into a seizure and then never wakes up - this is a rare complication of seizure disorder; one of the best available ways to prevent it is to control your seizures well.     Due to the high volume of patients we are trying to help, your physician will not be able to respond by phone or in [email or web address] to your routine concerns between appointments.  This does not reflect a lack of interest or concern for you or your diagnosis.  Please bring these questions and concerns to your appointment where your physician can answer.  Please relay more pressing concerns to our office, either via [email or web address], or by phone; if not able to reach us please visit nearby Urgent Care Center or Emergency Department.  If any emergent medical needs, please seek emergent medical help and/or call 911.    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  Please schedule a dedicated appointment to address your paperwork, so we can do that in a timely manner.  This is not due to lack of concern or interest for your disease-related work/administrative problems, but to make sure that we provide the best possible care and to fill out your paperwork in a correct and timely manner.    Thank you for entrusting your neurological care to [facility] and we look forward to continuing to serve you.

## 2025-07-29 ENCOUNTER — APPOINTMENT (OUTPATIENT)
Facility: MEDICAL CENTER | Age: 35
End: 2025-07-29
Attending: PSYCHIATRY & NEUROLOGY
Payer: COMMERCIAL

## 2025-07-30 ENCOUNTER — APPOINTMENT (OUTPATIENT)
Dept: NEUROLOGY | Facility: MEDICAL CENTER | Age: 35
End: 2025-07-30
Attending: PSYCHIATRY & NEUROLOGY
Payer: COMMERCIAL